# Patient Record
Sex: FEMALE | Race: WHITE | NOT HISPANIC OR LATINO | URBAN - METROPOLITAN AREA
[De-identification: names, ages, dates, MRNs, and addresses within clinical notes are randomized per-mention and may not be internally consistent; named-entity substitution may affect disease eponyms.]

---

## 2018-01-21 ENCOUNTER — INPATIENT (INPATIENT)
Facility: HOSPITAL | Age: 73
LOS: 7 days | Discharge: PSYCHIATRIC OR SUBSTANCE USE FACILITY | DRG: 871 | End: 2018-01-29
Attending: HOSPITALIST | Admitting: HOSPITALIST
Payer: MEDICARE

## 2018-01-21 VITALS
OXYGEN SATURATION: 93 % | DIASTOLIC BLOOD PRESSURE: 81 MMHG | HEIGHT: 62 IN | SYSTOLIC BLOOD PRESSURE: 147 MMHG | RESPIRATION RATE: 27 BRPM | HEART RATE: 89 BPM | WEIGHT: 149.91 LBS | TEMPERATURE: 101 F

## 2018-01-21 DIAGNOSIS — N39.0 URINARY TRACT INFECTION, SITE NOT SPECIFIED: ICD-10-CM

## 2018-01-21 LAB
ALBUMIN SERPL ELPH-MCNC: 3.2 G/DL — LOW (ref 3.3–5.2)
ALP SERPL-CCNC: 180 U/L — HIGH (ref 40–120)
ALT FLD-CCNC: 30 U/L — SIGNIFICANT CHANGE UP
ANION GAP SERPL CALC-SCNC: 13 MMOL/L — SIGNIFICANT CHANGE UP (ref 5–17)
ANISOCYTOSIS BLD QL: SLIGHT — SIGNIFICANT CHANGE UP
APPEARANCE UR: ABNORMAL
APTT BLD: 30.6 SEC — SIGNIFICANT CHANGE UP (ref 27.5–37.4)
AST SERPL-CCNC: 40 U/L — HIGH
BACTERIA # UR AUTO: ABNORMAL
BILIRUB SERPL-MCNC: 0.2 MG/DL — LOW (ref 0.4–2)
BILIRUB UR-MCNC: NEGATIVE — SIGNIFICANT CHANGE UP
BUN SERPL-MCNC: 33 MG/DL — HIGH (ref 8–20)
CALCIUM SERPL-MCNC: 9.7 MG/DL — SIGNIFICANT CHANGE UP (ref 8.6–10.2)
CHLORIDE SERPL-SCNC: 98 MMOL/L — SIGNIFICANT CHANGE UP (ref 98–107)
CO2 SERPL-SCNC: 21 MMOL/L — LOW (ref 22–29)
COLOR SPEC: YELLOW — SIGNIFICANT CHANGE UP
CREAT SERPL-MCNC: 0.76 MG/DL — SIGNIFICANT CHANGE UP (ref 0.5–1.3)
DIFF PNL FLD: ABNORMAL
EPI CELLS # UR: SIGNIFICANT CHANGE UP
GLUCOSE SERPL-MCNC: 165 MG/DL — HIGH (ref 70–115)
GLUCOSE UR QL: NEGATIVE MG/DL — SIGNIFICANT CHANGE UP
HCT VFR BLD CALC: 31.4 % — LOW (ref 37–47)
HGB BLD-MCNC: 10 G/DL — LOW (ref 12–16)
INR BLD: 1.16 RATIO — SIGNIFICANT CHANGE UP (ref 0.88–1.16)
KETONES UR-MCNC: NEGATIVE — SIGNIFICANT CHANGE UP
LACTATE BLDV-MCNC: 1.7 MMOL/L — SIGNIFICANT CHANGE UP (ref 0.5–2)
LEUKOCYTE ESTERASE UR-ACNC: ABNORMAL
LITHIUM SERPL-MCNC: 1.1 MMOL/L — SIGNIFICANT CHANGE UP (ref 0.5–1.5)
LYMPHOCYTES # BLD AUTO: 2 % — LOW (ref 20–55)
MACROCYTES BLD QL: SLIGHT — SIGNIFICANT CHANGE UP
MCHC RBC-ENTMCNC: 31.7 PG — HIGH (ref 27–31)
MCHC RBC-ENTMCNC: 31.8 G/DL — LOW (ref 32–36)
MCV RBC AUTO: 99.7 FL — HIGH (ref 81–99)
MICROCYTES BLD QL: SLIGHT — SIGNIFICANT CHANGE UP
MONOCYTES NFR BLD AUTO: 3 % — SIGNIFICANT CHANGE UP (ref 3–10)
NEUTROPHILS NFR BLD AUTO: 90 % — HIGH (ref 37–73)
NEUTS BAND # BLD: 4 % — SIGNIFICANT CHANGE UP (ref 0–8)
NITRITE UR-MCNC: NEGATIVE — SIGNIFICANT CHANGE UP
NT-PROBNP SERPL-SCNC: 5455 PG/ML — HIGH (ref 0–300)
OVALOCYTES BLD QL SMEAR: SLIGHT — SIGNIFICANT CHANGE UP
PH UR: 6 — SIGNIFICANT CHANGE UP (ref 5–8)
PLAT MORPH BLD: NORMAL — SIGNIFICANT CHANGE UP
PLATELET # BLD AUTO: 165 K/UL — SIGNIFICANT CHANGE UP (ref 150–400)
POIKILOCYTOSIS BLD QL AUTO: SLIGHT — SIGNIFICANT CHANGE UP
POTASSIUM SERPL-MCNC: 4.2 MMOL/L — SIGNIFICANT CHANGE UP (ref 3.5–5.3)
POTASSIUM SERPL-SCNC: 4.2 MMOL/L — SIGNIFICANT CHANGE UP (ref 3.5–5.3)
PROCALCITONIN SERPL-MCNC: 2.62 NG/ML — HIGH (ref 0–0.04)
PROT SERPL-MCNC: 9.4 G/DL — HIGH (ref 6.6–8.7)
PROT UR-MCNC: 500 MG/DL
PROTHROM AB SERPL-ACNC: 12.8 SEC — HIGH (ref 9.8–12.7)
RBC # BLD: 3.15 M/UL — LOW (ref 4.4–5.2)
RBC # FLD: 17.9 % — HIGH (ref 11–15.6)
RBC BLD AUTO: ABNORMAL
RBC CASTS # UR COMP ASSIST: ABNORMAL /HPF (ref 0–4)
SODIUM SERPL-SCNC: 132 MMOL/L — LOW (ref 135–145)
SP GR SPEC: 1.01 — SIGNIFICANT CHANGE UP (ref 1.01–1.02)
TROPONIN T SERPL-MCNC: <0.01 NG/ML — SIGNIFICANT CHANGE UP (ref 0–0.06)
TROPONIN T SERPL-MCNC: <0.01 NG/ML — SIGNIFICANT CHANGE UP (ref 0–0.06)
UROBILINOGEN FLD QL: NEGATIVE MG/DL — SIGNIFICANT CHANGE UP
VARIANT LYMPHS # BLD: 1 % — SIGNIFICANT CHANGE UP (ref 0–6)
WBC # BLD: 9.9 K/UL — SIGNIFICANT CHANGE UP (ref 4.8–10.8)
WBC # FLD AUTO: 9.9 K/UL — SIGNIFICANT CHANGE UP (ref 4.8–10.8)
WBC UR QL: ABNORMAL

## 2018-01-21 PROCEDURE — 71045 X-RAY EXAM CHEST 1 VIEW: CPT | Mod: 26

## 2018-01-21 PROCEDURE — 70450 CT HEAD/BRAIN W/O DYE: CPT | Mod: 26

## 2018-01-21 PROCEDURE — 71250 CT THORAX DX C-: CPT | Mod: 26

## 2018-01-21 PROCEDURE — 99291 CRITICAL CARE FIRST HOUR: CPT

## 2018-01-21 PROCEDURE — 99223 1ST HOSP IP/OBS HIGH 75: CPT

## 2018-01-21 RX ORDER — FUROSEMIDE 40 MG
40 TABLET ORAL DAILY
Qty: 0 | Refills: 0 | Status: COMPLETED | OUTPATIENT
Start: 2018-01-21 | End: 2018-01-21

## 2018-01-21 RX ORDER — SODIUM CHLORIDE 9 MG/ML
2000 INJECTION INTRAMUSCULAR; INTRAVENOUS; SUBCUTANEOUS ONCE
Qty: 0 | Refills: 0 | Status: DISCONTINUED | OUTPATIENT
Start: 2018-01-21 | End: 2018-01-21

## 2018-01-21 RX ORDER — BENZTROPINE MESYLATE 1 MG
0.5 TABLET ORAL AT BEDTIME
Qty: 0 | Refills: 0 | Status: DISCONTINUED | OUTPATIENT
Start: 2018-01-21 | End: 2018-01-29

## 2018-01-21 RX ORDER — CHOLECALCIFEROL (VITAMIN D3) 125 MCG
2000 CAPSULE ORAL DAILY
Qty: 0 | Refills: 0 | Status: DISCONTINUED | OUTPATIENT
Start: 2018-01-21 | End: 2018-01-29

## 2018-01-21 RX ORDER — CALCIUM CARBONATE 500(1250)
1 TABLET ORAL DAILY
Qty: 0 | Refills: 0 | Status: DISCONTINUED | OUTPATIENT
Start: 2018-01-21 | End: 2018-01-29

## 2018-01-21 RX ORDER — LITHIUM CARBONATE 300 MG/1
600 TABLET, EXTENDED RELEASE ORAL DAILY
Qty: 0 | Refills: 0 | Status: DISCONTINUED | OUTPATIENT
Start: 2018-01-21 | End: 2018-01-29

## 2018-01-21 RX ORDER — ASPIRIN/CALCIUM CARB/MAGNESIUM 324 MG
81 TABLET ORAL DAILY
Qty: 0 | Refills: 0 | Status: DISCONTINUED | OUTPATIENT
Start: 2018-01-21 | End: 2018-01-29

## 2018-01-21 RX ORDER — ACYCLOVIR SODIUM 500 MG
400 VIAL (EA) INTRAVENOUS
Qty: 0 | Refills: 0 | Status: DISCONTINUED | OUTPATIENT
Start: 2018-01-21 | End: 2018-01-29

## 2018-01-21 RX ORDER — FOLIC ACID 0.8 MG
1 TABLET ORAL DAILY
Qty: 0 | Refills: 0 | Status: DISCONTINUED | OUTPATIENT
Start: 2018-01-21 | End: 2018-01-29

## 2018-01-21 RX ORDER — CEFTRIAXONE 500 MG/1
1 INJECTION, POWDER, FOR SOLUTION INTRAMUSCULAR; INTRAVENOUS EVERY 24 HOURS
Qty: 0 | Refills: 0 | Status: DISCONTINUED | OUTPATIENT
Start: 2018-01-22 | End: 2018-01-22

## 2018-01-21 RX ORDER — VANCOMYCIN HCL 1 G
125 VIAL (EA) INTRAVENOUS EVERY 6 HOURS
Qty: 0 | Refills: 0 | Status: DISCONTINUED | OUTPATIENT
Start: 2018-01-21 | End: 2018-01-22

## 2018-01-21 RX ORDER — VANCOMYCIN HCL 1 G
1000 VIAL (EA) INTRAVENOUS ONCE
Qty: 0 | Refills: 0 | Status: COMPLETED | OUTPATIENT
Start: 2018-01-21 | End: 2018-01-21

## 2018-01-21 RX ORDER — LEVOTHYROXINE SODIUM 125 MCG
75 TABLET ORAL DAILY
Qty: 0 | Refills: 0 | Status: DISCONTINUED | OUTPATIENT
Start: 2018-01-21 | End: 2018-01-29

## 2018-01-21 RX ORDER — TAMSULOSIN HYDROCHLORIDE 0.4 MG/1
0.4 CAPSULE ORAL AT BEDTIME
Qty: 0 | Refills: 0 | Status: DISCONTINUED | OUTPATIENT
Start: 2018-01-21 | End: 2018-01-29

## 2018-01-21 RX ORDER — CEFTRIAXONE 500 MG/1
1 INJECTION, POWDER, FOR SOLUTION INTRAMUSCULAR; INTRAVENOUS ONCE
Qty: 0 | Refills: 0 | Status: COMPLETED | OUTPATIENT
Start: 2018-01-21 | End: 2018-01-21

## 2018-01-21 RX ORDER — SODIUM CHLORIDE 9 MG/ML
2500 INJECTION INTRAMUSCULAR; INTRAVENOUS; SUBCUTANEOUS ONCE
Qty: 0 | Refills: 0 | Status: COMPLETED | OUTPATIENT
Start: 2018-01-21 | End: 2018-01-21

## 2018-01-21 RX ADMIN — SODIUM CHLORIDE 2500 MILLILITER(S): 9 INJECTION INTRAMUSCULAR; INTRAVENOUS; SUBCUTANEOUS at 12:53

## 2018-01-21 RX ADMIN — Medication 400 MILLIGRAM(S): at 19:25

## 2018-01-21 RX ADMIN — Medication 0.5 MILLIGRAM(S): at 22:22

## 2018-01-21 RX ADMIN — TAMSULOSIN HYDROCHLORIDE 0.4 MILLIGRAM(S): 0.4 CAPSULE ORAL at 22:21

## 2018-01-21 RX ADMIN — Medication 250 MILLIGRAM(S): at 12:53

## 2018-01-21 RX ADMIN — Medication 40 MILLIGRAM(S): at 23:43

## 2018-01-21 RX ADMIN — CEFTRIAXONE 100 GRAM(S): 500 INJECTION, POWDER, FOR SOLUTION INTRAMUSCULAR; INTRAVENOUS at 13:00

## 2018-01-21 RX ADMIN — Medication 125 MILLIGRAM(S): at 19:28

## 2018-01-21 NOTE — H&P ADULT - ASSESSMENT
71 y/o F pt with PMH of was sent to the ED from [place] as she slipped out wheelchair today, no LOC, responsive to painful stimuli with no reported injury or trauma.  Pt is normally very verbal & makes jokes & as per sitter by her bedside but pt has been confused lately. As per sitter pt has been having some congestion, cough, SOB &  diarrhea which started since few days.  She normally walks without assistance but was placed in a wheelchair yesterday due to her being unsteady. Pt appears to be a poor historian at this time & during me encounter saying "leave me alone". Pt gets chemo for her MM and her last chemo was 2 weeks ago.       >Sepsis likely from UTI,  possible URI/ broncititis, r/o c.diff -   >Encephalopathy likely from UTI,  possible URI/ broncititis, r/o c.diff - 73 y/o F pt with PMH of HTN, HLD, Hypothyroid, Mental disorder, Multiple myeloma on chemo, Scoliosis was sent to the ED from [place] as she slipped out wheelchair today, no LOC, responsive to painful stimuli with no reported injury or trauma.  Pt is normally very verbal & makes jokes & as per sitter by her bedside but pt has been confused lately. As per sitter pt has been having some congestion, cough, SOB &  diarrhea which started since few days.  She normally walks without assistance but was placed in a wheelchair yesterday due to her being unsteady. Pt appears to be a poor historian at this time & during me encounter saying "leave me alone". Pt gets chemo for her MM and her last chemo was 2 weeks ago.       >Sepsis likely from UTI,  possible URI/ broncititis, r/o c.diff - CXR with PVC, f/u BNP, Vanco & Rocephin given by the ED, f/u Ucx, blood cx, stool studies, sputum cx, RVP, c/w rocephin, CT chest   >Encephalopathy likely from UTI,  possible URI/ broncititis, r/o c.diff - as above, hold ativan for now until mental status improves  >HTN/ HLD- c/w home meds  >Hypothyroid- c/w home meds, f/u TSH  >Mental disorder- c/w lithium, f/u lithium levels, risperidone IM weekly, benztropine  >Multiple myeloma on chemo- c/w chemo as outpatient    DVT ppx 73 y/o F pt with PMH of HTN, HLD, Hypothyroid, Mental disorder, Multiple myeloma on chemo, Scoliosis was sent to the ED from [place] as she slipped out wheelchair today, no LOC, responsive to painful stimuli with no reported injury or trauma.  Pt is normally very verbal & makes jokes & as per sitter by her bedside but pt has been confused lately. As per sitter pt has been having some congestion, cough, SOB &  diarrhea which started since few days.  She normally walks without assistance but was placed in a wheelchair yesterday due to her being unsteady. Pt appears to be a poor historian at this time & during me encounter saying "leave me alone". Pt gets chemo for her MM and her last chemo was 2 weeks ago.       >Sepsis likely from UTI,  possible URI/ broncititis, r/o c.diff - CXR with PVC, f/u BNP, Vanco & Rocephin given by the ED, f/u Ucx, blood cx, stool studies, sputum cx, RVP, c/w rocephin, CT chest   >Encephalopathy likely from UTI,  possible URI/ broncititis, r/o c.diff - as above, hold ativan for now until mental status improves, TSH, B12, folate, RPR, CT head  >HTN/ HLD- c/w home meds  >Hypothyroid- c/w home meds, f/u TSH  >Mental disorder- c/w lithium, f/u lithium levels, risperidone IM weekly, benztropine  >Multiple myeloma on chemo- c/w chemo as outpatient    DVT ppx: SCDs for now, Lovenox after CT head -ve 71 y/o F pt with PMH of HTN, HLD, Hypothyroid, Mental disorder, Multiple myeloma on chemo, Scoliosis was sent to the ED from [place] as she slipped out wheelchair today, no LOC, responsive to painful stimuli with no reported injury or trauma.  Pt is normally very verbal & makes jokes & as per sitter by her bedside but pt has been confused lately. As per sitter pt has been having some congestion, cough, SOB &  diarrhea which started since few days.  She normally walks without assistance but was placed in a wheelchair yesterday due to her being unsteady. Pt appears to be a poor historian at this time & during me encounter saying "leave me alone". Pt gets chemo for her MM and her last chemo was 2 weeks ago.       >Sepsis likely from UTI,  possible URI/ broncititis, r/o c.diff - CXR with PVC, f/u BNP, Vanco & Rocephin given by the ED, f/u Ucx, blood cx, stool studies, sputum cx, RVP, c/w rocephin, CT chest   >Encephalopathy likely from UTI,  possible URI/ broncititis, r/o c.diff - as above, hold ativan for now until mental status improves, TSH, B12, folate, RPR, CT head  >EKG with NSR & non specific ST-T changes, no chest pain, TnI x 3, f/u Echo  >HTN/ HLD- c/w home meds  >Hypothyroid- c/w home meds, f/u TSH  >Mental disorder- c/w lithium, f/u lithium levels, risperidone IM weekly, benztropine  >Multiple myeloma on chemo- c/w chemo as outpatient    DVT ppx: SCDs for now, Lovenox after CT head -ve

## 2018-01-21 NOTE — ED PROVIDER NOTE - CARDIAC, MLM
Normal rate, regular rhythm.  Heart sounds S1, S2.  No murmurs, rubs or gallops. Normal rate, regular rhythm.  Heart sounds S1, S2.  No murmurs, rubs or gallops. rt chest wall port a cath

## 2018-01-21 NOTE — ED PROVIDER NOTE - OBJECTIVE STATEMENT
73 y/o F pt presents to the ED BIBA with c/o unresponsiveness. Pt fell out of her wheelchair today and was reported to be awake but unresponsive. Pt is normally very verbal but not today s/p fall. Possible syncopal episode but unsure. She normally walks without assistance but was placed in a wheelchair yesterday due to her being unsteady. Code sepsis activated. No further complaints at this time.

## 2018-01-21 NOTE — ED ADULT NURSE REASSESSMENT NOTE - COMFORT CARE
repositioned/patient checked for stool refused/plan of care explained/side rails up/warm blanket provided

## 2018-01-21 NOTE — ED ADULT TRIAGE NOTE - CHIEF COMPLAINT QUOTE
Pt axox0, responsive to painful stimuli and voice BIBA from [facility]. AS per EMS patient fell out of wheelchair and became unresponsive. As per aid pt is usually uncooperative with staff and more "alert then this", pt  has been having increased lethargy x 2 days. PT rectal temp 101.4, code sepsis initiated, md patel at bedside Pt axox0, responsive to painful stimuli and voice BIBA from [facility]. AS per EMS patient fell out of wheelchair and became unresponsive. As per aid pt is usually uncooperative with staff and more "alert then this", pt  has been having increased lethargy x 2 days. PT rectal temp 101.4, code sepsis initiated, md patel at bedside, Bg 156 in triage, no obvious signs of trauma noted.

## 2018-01-21 NOTE — ED ADULT NURSE NOTE - CHIEF COMPLAINT QUOTE
Pt axox0, responsive to painful stimuli and voice BIBA from [facility]. AS per EMS patient fell out of wheelchair and became unresponsive. As per aid pt is usually uncooperative with staff and more "alert then this", pt  has been having increased lethargy x 2 days. PT rectal temp 101.4, code sepsis initiated, md patel at bedside

## 2018-01-21 NOTE — ED CLERICAL - NS ED CLERK UNITS
MON/ / need 2nd orders/2GUL MON//2GUL MON// r/o iso/2GUL 2GUL/**NEEDS RVP/CDIFF DONE**MON// r/o iso 5217**NEEDS RVP/CDIFF DONE**MON// r/o iso/5TWR 5TWR 5TWR/5217-01 5TWR/5217-01 call bed r/o iso/5TWR [id number]/5TWR

## 2018-01-21 NOTE — H&P ADULT - HISTORY OF PRESENT ILLNESS
73 y/o F pt with PMH of was sent to the ED from [place] as she slipped out wheelchair today, no LOC, responsive to painful stimuli with no reported injury or trauma.  Pt is normally very verbal & makes jokes & as per sitter by her bedside but pt has been confused lately. As per sitter pt has been having some congestion, cough, SOB &  diarrhea which started since few days.  She normally walks without assistance but was placed in a wheelchair yesterday due to her being unsteady. Pt appears to be a poor historian at this time & during me encounter saying "leave me alone". Pt gets chemo for her MM and her last chemo was 2 weeks ago. 71 y/o F pt with PMH of HTN, HLD, Hypothyroid, Mental disorder, Multiple myeloma on chemo, Scoliosis was sent to the ED from [place] as she slipped out wheelchair today, no LOC, responsive to painful stimuli with no reported injury or trauma.  Pt is normally very verbal & makes jokes & as per sitter by her bedside but pt has been confused lately. As per sitter pt has been having some congestion, cough, SOB &  diarrhea which started since few days.  She normally walks without assistance but was placed in a wheelchair yesterday due to her being unsteady. Pt appears to be a poor historian at this time & during me encounter saying "leave me alone". Pt gets chemo for her MM and her last chemo was 2 weeks ago.

## 2018-01-21 NOTE — ED PROVIDER NOTE - PMH
Bone cancer    Hypercholesteremia    Hypertension    Hypothyroid    Mental disorder    Multiple myeloma    Scoliosis

## 2018-01-22 DIAGNOSIS — F05 DELIRIUM DUE TO KNOWN PHYSIOLOGICAL CONDITION: ICD-10-CM

## 2018-01-22 DIAGNOSIS — F31.9 BIPOLAR DISORDER, UNSPECIFIED: ICD-10-CM

## 2018-01-22 LAB
ANION GAP SERPL CALC-SCNC: 11 MMOL/L — SIGNIFICANT CHANGE UP (ref 5–17)
ANISOCYTOSIS BLD QL: SLIGHT — SIGNIFICANT CHANGE UP
BUN SERPL-MCNC: 28 MG/DL — HIGH (ref 8–20)
CALCIUM SERPL-MCNC: 8.6 MG/DL — SIGNIFICANT CHANGE UP (ref 8.6–10.2)
CHLORIDE SERPL-SCNC: 103 MMOL/L — SIGNIFICANT CHANGE UP (ref 98–107)
CO2 SERPL-SCNC: 22 MMOL/L — SIGNIFICANT CHANGE UP (ref 22–29)
CREAT SERPL-MCNC: 0.83 MG/DL — SIGNIFICANT CHANGE UP (ref 0.5–1.3)
FOLATE SERPL-MCNC: >20 NG/ML — HIGH (ref 4–16)
GLUCOSE SERPL-MCNC: 110 MG/DL — SIGNIFICANT CHANGE UP (ref 70–115)
HCT VFR BLD CALC: 27.3 % — LOW (ref 37–47)
HGB BLD-MCNC: 8.6 G/DL — LOW (ref 12–16)
LYMPHOCYTES # BLD AUTO: 2 % — LOW (ref 20–55)
MACROCYTES BLD QL: SLIGHT — SIGNIFICANT CHANGE UP
MAGNESIUM SERPL-MCNC: 2 MG/DL — SIGNIFICANT CHANGE UP (ref 1.6–2.6)
MCHC RBC-ENTMCNC: 31.3 PG — HIGH (ref 27–31)
MCHC RBC-ENTMCNC: 31.5 G/DL — LOW (ref 32–36)
MCV RBC AUTO: 99.3 FL — HIGH (ref 81–99)
METHOD TYPE: SIGNIFICANT CHANGE UP
MICROCYTES BLD QL: SLIGHT — SIGNIFICANT CHANGE UP
MONOCYTES NFR BLD AUTO: 10 % — SIGNIFICANT CHANGE UP (ref 3–10)
NEUTROPHILS NFR BLD AUTO: 87 % — HIGH (ref 37–73)
NEUTS BAND # BLD: 1 % — SIGNIFICANT CHANGE UP (ref 0–8)
NT-PROBNP SERPL-SCNC: 1734 PG/ML — HIGH (ref 0–300)
OVALOCYTES BLD QL SMEAR: SLIGHT — SIGNIFICANT CHANGE UP
PHOSPHATE SERPL-MCNC: 3.1 MG/DL — SIGNIFICANT CHANGE UP (ref 2.4–4.7)
PLAT MORPH BLD: NORMAL — SIGNIFICANT CHANGE UP
PLATELET # BLD AUTO: 112 K/UL — LOW (ref 150–400)
POIKILOCYTOSIS BLD QL AUTO: SLIGHT — SIGNIFICANT CHANGE UP
POTASSIUM SERPL-MCNC: 3.8 MMOL/L — SIGNIFICANT CHANGE UP (ref 3.5–5.3)
POTASSIUM SERPL-SCNC: 3.8 MMOL/L — SIGNIFICANT CHANGE UP (ref 3.5–5.3)
RAPID RVP RESULT: SIGNIFICANT CHANGE UP
RBC # BLD: 2.75 M/UL — LOW (ref 4.4–5.2)
RBC # FLD: 18.2 % — HIGH (ref 11–15.6)
RBC BLD AUTO: ABNORMAL
S PNEUM DNA BLD POS QL NAA+NON-PROBE: SIGNIFICANT CHANGE UP
SODIUM SERPL-SCNC: 136 MMOL/L — SIGNIFICANT CHANGE UP (ref 135–145)
STOMATOCYTES BLD QL SMEAR: PRESENT — SIGNIFICANT CHANGE UP
TROPONIN T SERPL-MCNC: <0.01 NG/ML — SIGNIFICANT CHANGE UP (ref 0–0.06)
TSH SERPL-MCNC: 8.23 UIU/ML — HIGH (ref 0.27–4.2)
VIT B12 SERPL-MCNC: 564 PG/ML — SIGNIFICANT CHANGE UP (ref 180–914)
WBC # BLD: 10.4 K/UL — SIGNIFICANT CHANGE UP (ref 4.8–10.8)
WBC # FLD AUTO: 10.4 K/UL — SIGNIFICANT CHANGE UP (ref 4.8–10.8)

## 2018-01-22 PROCEDURE — 99233 SBSQ HOSP IP/OBS HIGH 50: CPT

## 2018-01-22 RX ORDER — ACETAMINOPHEN 500 MG
650 TABLET ORAL EVERY 6 HOURS
Qty: 0 | Refills: 0 | Status: DISCONTINUED | OUTPATIENT
Start: 2018-01-22 | End: 2018-01-29

## 2018-01-22 RX ORDER — AZTREONAM 2 G
500 VIAL (EA) INJECTION ONCE
Qty: 0 | Refills: 0 | Status: COMPLETED | OUTPATIENT
Start: 2018-01-22 | End: 2018-01-22

## 2018-01-22 RX ORDER — SACCHAROMYCES BOULARDII 250 MG
250 POWDER IN PACKET (EA) ORAL
Qty: 0 | Refills: 0 | Status: DISCONTINUED | OUTPATIENT
Start: 2018-01-22 | End: 2018-01-29

## 2018-01-22 RX ORDER — ENOXAPARIN SODIUM 100 MG/ML
40 INJECTION SUBCUTANEOUS DAILY
Qty: 0 | Refills: 0 | Status: DISCONTINUED | OUTPATIENT
Start: 2018-01-22 | End: 2018-01-29

## 2018-01-22 RX ORDER — VANCOMYCIN HCL 1 G
VIAL (EA) INTRAVENOUS
Qty: 0 | Refills: 0 | Status: DISCONTINUED | OUTPATIENT
Start: 2018-01-22 | End: 2018-01-23

## 2018-01-22 RX ORDER — VANCOMYCIN HCL 1 G
1000 VIAL (EA) INTRAVENOUS EVERY 12 HOURS
Qty: 0 | Refills: 0 | Status: DISCONTINUED | OUTPATIENT
Start: 2018-01-22 | End: 2018-01-22

## 2018-01-22 RX ORDER — FUROSEMIDE 40 MG
20 TABLET ORAL DAILY
Qty: 0 | Refills: 0 | Status: DISCONTINUED | OUTPATIENT
Start: 2018-01-22 | End: 2018-01-25

## 2018-01-22 RX ORDER — AZTREONAM 2 G
500 VIAL (EA) INJECTION EVERY 8 HOURS
Qty: 0 | Refills: 0 | Status: DISCONTINUED | OUTPATIENT
Start: 2018-01-22 | End: 2018-01-23

## 2018-01-22 RX ORDER — VANCOMYCIN HCL 1 G
1250 VIAL (EA) INTRAVENOUS EVERY 12 HOURS
Qty: 0 | Refills: 0 | Status: DISCONTINUED | OUTPATIENT
Start: 2018-01-22 | End: 2018-01-23

## 2018-01-22 RX ORDER — AZTREONAM 2 G
VIAL (EA) INJECTION
Qty: 0 | Refills: 0 | Status: DISCONTINUED | OUTPATIENT
Start: 2018-01-22 | End: 2018-01-23

## 2018-01-22 RX ORDER — VANCOMYCIN HCL 1 G
1250 VIAL (EA) INTRAVENOUS ONCE
Qty: 0 | Refills: 0 | Status: COMPLETED | OUTPATIENT
Start: 2018-01-22 | End: 2018-01-22

## 2018-01-22 RX ORDER — OFLOXACIN 0.3 %
1 DROPS OPHTHALMIC (EYE)
Qty: 0 | Refills: 0 | Status: COMPLETED | OUTPATIENT
Start: 2018-01-22 | End: 2018-01-28

## 2018-01-22 RX ADMIN — Medication 50 MILLIGRAM(S): at 14:11

## 2018-01-22 RX ADMIN — Medication 166.67 MILLIGRAM(S): at 15:44

## 2018-01-22 RX ADMIN — Medication 1 TABLET(S): at 12:51

## 2018-01-22 RX ADMIN — Medication 81 MILLIGRAM(S): at 12:51

## 2018-01-22 RX ADMIN — Medication 400 MILLIGRAM(S): at 19:31

## 2018-01-22 RX ADMIN — Medication 2000 UNIT(S): at 12:51

## 2018-01-22 RX ADMIN — Medication 1 MILLIGRAM(S): at 12:51

## 2018-01-22 RX ADMIN — Medication 75 MICROGRAM(S): at 05:39

## 2018-01-22 RX ADMIN — Medication 250 MILLIGRAM(S): at 19:31

## 2018-01-22 RX ADMIN — Medication 125 MILLIGRAM(S): at 05:39

## 2018-01-22 RX ADMIN — LITHIUM CARBONATE 600 MILLIGRAM(S): 300 TABLET, EXTENDED RELEASE ORAL at 14:10

## 2018-01-22 RX ADMIN — Medication 400 MILLIGRAM(S): at 05:39

## 2018-01-22 RX ADMIN — ENOXAPARIN SODIUM 40 MILLIGRAM(S): 100 INJECTION SUBCUTANEOUS at 12:51

## 2018-01-22 NOTE — BEHAVIORAL HEALTH ASSESSMENT NOTE - OTHER
[facility] external transfer summary unable to assess

## 2018-01-22 NOTE — PROGRESS NOTE ADULT - ASSESSMENT
1) Toxic metabolic encephalopathy --> at baseline  --> ct head negative  --> secondary to bacteremia  --> start broad spectrum iv abx    2) Gram pos cocci bacteremia --> will consult ID   --> may need port removed  --> repeat blood cultures for am  --> tte ordered    3) Gram neg uti --> will start iv aztreonam  --> remove iryb     4) >HTN/ HLD- c/w home meds  >Hypothyroid- c/w home meds, f/u TSH  >Mental disorder- c/w lithium, f/u lithium levels, risperidone IM weekly, benztropine  >Multiple myeloma on chemo- c/w chemo as outpatient 1) Toxic metabolic encephalopathy --> at baseline  --> ct head negative  --> secondary to bacteremia  --> start broad spectrum iv abx    2) Gram pos cocci bacteremia --> will consult ID   --> may need port removed  --> repeat blood cultures for am  --> tte ordered    3) Gram neg uti --> will start iv aztreonam  --> remove irby     4) >HTN/ HLD- c/w home meds    5) Hypothyroid- c/w home meds,    6) Bipolar- c/w lithium,  risperidone IM weekly, benztropine  --> psych consult appreciated    7) Multiple myeloma on chemo- c/w chemo as outpatient  --> lytic lesions on CT head, unclear if its baseline or improved  --> outpatient onc follow up

## 2018-01-22 NOTE — BEHAVIORAL HEALTH ASSESSMENT NOTE - SUMMARY
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

## 2018-01-22 NOTE — BEHAVIORAL HEALTH ASSESSMENT NOTE - NSBHCHARTREVIEWIMAGING_PSY_A_CORE FT
< from: CT Head No Cont (01.21.18 @ 22:50) >    INTERPRETATION:  Study degraded by motion. No acute intracranial   hemorrhage or mass effect. Chronic right MCA territory infarct. Chronic   ischemic changes in the frontoparietal white matter.    < end of copied text >

## 2018-01-22 NOTE — BEHAVIORAL HEALTH ASSESSMENT NOTE - NSBHCHARTREVIEWVS_PSY_A_CORE FT
T(C): 36.6 (22 Jan 2018 03:33), Max: 38.6 (21 Jan 2018 12:19)  T(F): 97.8 (22 Jan 2018 03:33), Max: 101.4 (21 Jan 2018 12:19)  HR: 71 (22 Jan 2018 03:33) (71 - 89)  BP: 130/59 (22 Jan 2018 03:33) (130/59 - 161/73)  RR: 19 (22 Jan 2018 03:33) (17 - 27)  SpO2: 96% (22 Jan 2018 03:33) (93% - 96%)

## 2018-01-22 NOTE — BEHAVIORAL HEALTH ASSESSMENT NOTE - NSBHCHARTREVIEWLAB_PSY_A_CORE FT
10.0   9.9   )-----------( 165      ( 2018 12:47 )             31.4         132<L>  |  98  |  33.0<H>  ----------------------------<  165<H>  4.2   |  21.0<L>  |  0.76    Ca    9.7      2018 12:47    TPro  9.4<H>  /  Alb  3.2<L>  /  TBili  0.2<L>  /  DBili  x   /  AST  40<H>  /  ALT  30  /  AlkPhos  180<H>      LIVER FUNCTIONS - ( 2018 12:47 )  Alb: 3.2 g/dL / Pro: 9.4 g/dL / ALK PHOS: 180 U/L / ALT: 30 U/L / AST: 40 U/L / GGT: x           PT/INR - ( 2018 12:47 )   PT: 12.8 sec;   INR: 1.16 ratio         PTT - ( 2018 12:47 )  PTT:30.6 sec  Urinalysis Basic - ( 2018 13:16 )    Color: Yellow / Appearance: Slightly Turbid / S.015 / pH: x  Gluc: x / Ketone: Negative  / Bili: Negative / Urobili: Negative mg/dL   Blood: x / Protein: 500 mg/dL / Nitrite: Negative   Leuk Esterase: Small / RBC: 25-50 /HPF / WBC 11-25   Sq Epi: x / Non Sq Epi: Few / Bacteria: Moderate

## 2018-01-22 NOTE — BEHAVIORAL HEALTH ASSESSMENT NOTE - DETAILS
Keppra- leucopenia Trileptal- hyponatremia [facility] summary in chart NA [facility] Notes indicate history of aggressive behaviors

## 2018-01-23 DIAGNOSIS — R78.81 BACTEREMIA: ICD-10-CM

## 2018-01-23 DIAGNOSIS — C90.00 MULTIPLE MYELOMA NOT HAVING ACHIEVED REMISSION: ICD-10-CM

## 2018-01-23 DIAGNOSIS — Z97.8 PRESENCE OF OTHER SPECIFIED DEVICES: ICD-10-CM

## 2018-01-23 DIAGNOSIS — N30.00 ACUTE CYSTITIS WITHOUT HEMATURIA: ICD-10-CM

## 2018-01-23 LAB
-  AMIKACIN: SIGNIFICANT CHANGE UP
-  AMIKACIN: SIGNIFICANT CHANGE UP
-  AMPICILLIN/SULBACTAM: SIGNIFICANT CHANGE UP
-  AMPICILLIN/SULBACTAM: SIGNIFICANT CHANGE UP
-  AMPICILLIN: SIGNIFICANT CHANGE UP
-  AMPICILLIN: SIGNIFICANT CHANGE UP
-  AZTREONAM: SIGNIFICANT CHANGE UP
-  AZTREONAM: SIGNIFICANT CHANGE UP
-  CEFAZOLIN: SIGNIFICANT CHANGE UP
-  CEFAZOLIN: SIGNIFICANT CHANGE UP
-  CEFEPIME: SIGNIFICANT CHANGE UP
-  CEFEPIME: SIGNIFICANT CHANGE UP
-  CEFOXITIN: SIGNIFICANT CHANGE UP
-  CEFOXITIN: SIGNIFICANT CHANGE UP
-  CEFTAZIDIME: SIGNIFICANT CHANGE UP
-  CEFTAZIDIME: SIGNIFICANT CHANGE UP
-  CEFTRIAXONE: SIGNIFICANT CHANGE UP
-  CEFTRIAXONE: SIGNIFICANT CHANGE UP
-  CIPROFLOXACIN: SIGNIFICANT CHANGE UP
-  CIPROFLOXACIN: SIGNIFICANT CHANGE UP
-  ERTAPENEM: SIGNIFICANT CHANGE UP
-  ERTAPENEM: SIGNIFICANT CHANGE UP
-  GENTAMICIN: SIGNIFICANT CHANGE UP
-  GENTAMICIN: SIGNIFICANT CHANGE UP
-  IMIPENEM: SIGNIFICANT CHANGE UP
-  IMIPENEM: SIGNIFICANT CHANGE UP
-  LEVOFLOXACIN: SIGNIFICANT CHANGE UP
-  LEVOFLOXACIN: SIGNIFICANT CHANGE UP
-  MEROPENEM: SIGNIFICANT CHANGE UP
-  MEROPENEM: SIGNIFICANT CHANGE UP
-  NITROFURANTOIN: SIGNIFICANT CHANGE UP
-  NITROFURANTOIN: SIGNIFICANT CHANGE UP
-  PIPERACILLIN/TAZOBACTAM: SIGNIFICANT CHANGE UP
-  PIPERACILLIN/TAZOBACTAM: SIGNIFICANT CHANGE UP
-  TOBRAMYCIN: SIGNIFICANT CHANGE UP
-  TOBRAMYCIN: SIGNIFICANT CHANGE UP
-  TRIMETHOPRIM/SULFAMETHOXAZOLE: SIGNIFICANT CHANGE UP
-  TRIMETHOPRIM/SULFAMETHOXAZOLE: SIGNIFICANT CHANGE UP
ANION GAP SERPL CALC-SCNC: 11 MMOL/L — SIGNIFICANT CHANGE UP (ref 5–17)
BUN SERPL-MCNC: 27 MG/DL — HIGH (ref 8–20)
CALCIUM SERPL-MCNC: 9 MG/DL — SIGNIFICANT CHANGE UP (ref 8.6–10.2)
CHLORIDE SERPL-SCNC: 104 MMOL/L — SIGNIFICANT CHANGE UP (ref 98–107)
CO2 SERPL-SCNC: 23 MMOL/L — SIGNIFICANT CHANGE UP (ref 22–29)
CREAT SERPL-MCNC: 0.82 MG/DL — SIGNIFICANT CHANGE UP (ref 0.5–1.3)
CULTURE RESULTS: SIGNIFICANT CHANGE UP
GLUCOSE SERPL-MCNC: 114 MG/DL — SIGNIFICANT CHANGE UP (ref 70–115)
HCT VFR BLD CALC: 29.4 % — LOW (ref 37–47)
HGB BLD-MCNC: 8.9 G/DL — LOW (ref 12–16)
MAGNESIUM SERPL-MCNC: 2 MG/DL — SIGNIFICANT CHANGE UP (ref 1.6–2.6)
MCHC RBC-ENTMCNC: 30.3 G/DL — LOW (ref 32–36)
MCHC RBC-ENTMCNC: 30.6 PG — SIGNIFICANT CHANGE UP (ref 27–31)
MCV RBC AUTO: 101 FL — HIGH (ref 81–99)
METHOD TYPE: SIGNIFICANT CHANGE UP
METHOD TYPE: SIGNIFICANT CHANGE UP
ORGANISM # SPEC MICROSCOPIC CNT: SIGNIFICANT CHANGE UP
PLATELET # BLD AUTO: 149 K/UL — LOW (ref 150–400)
POTASSIUM SERPL-MCNC: 3.5 MMOL/L — SIGNIFICANT CHANGE UP (ref 3.5–5.3)
POTASSIUM SERPL-SCNC: 3.5 MMOL/L — SIGNIFICANT CHANGE UP (ref 3.5–5.3)
RBC # BLD: 2.91 M/UL — LOW (ref 4.4–5.2)
RBC # FLD: 17.9 % — HIGH (ref 11–15.6)
SODIUM SERPL-SCNC: 138 MMOL/L — SIGNIFICANT CHANGE UP (ref 135–145)
SPECIMEN SOURCE: SIGNIFICANT CHANGE UP
T PALLIDUM AB TITR SER: NEGATIVE — SIGNIFICANT CHANGE UP
WBC # BLD: 9.8 K/UL — SIGNIFICANT CHANGE UP (ref 4.8–10.8)
WBC # FLD AUTO: 9.8 K/UL — SIGNIFICANT CHANGE UP (ref 4.8–10.8)

## 2018-01-23 PROCEDURE — 99223 1ST HOSP IP/OBS HIGH 75: CPT

## 2018-01-23 PROCEDURE — 99233 SBSQ HOSP IP/OBS HIGH 50: CPT

## 2018-01-23 RX ORDER — AMLODIPINE BESYLATE 2.5 MG/1
5 TABLET ORAL DAILY
Qty: 0 | Refills: 0 | Status: DISCONTINUED | OUTPATIENT
Start: 2018-01-23 | End: 2018-01-29

## 2018-01-23 RX ORDER — CEFTRIAXONE 500 MG/1
2 INJECTION, POWDER, FOR SOLUTION INTRAMUSCULAR; INTRAVENOUS EVERY 24 HOURS
Qty: 0 | Refills: 0 | Status: DISCONTINUED | OUTPATIENT
Start: 2018-01-23 | End: 2018-01-29

## 2018-01-23 RX ADMIN — TAMSULOSIN HYDROCHLORIDE 0.4 MILLIGRAM(S): 0.4 CAPSULE ORAL at 03:25

## 2018-01-23 RX ADMIN — Medication 1 MILLIGRAM(S): at 16:49

## 2018-01-23 RX ADMIN — Medication 400 MILLIGRAM(S): at 06:46

## 2018-01-23 RX ADMIN — Medication 250 MILLIGRAM(S): at 06:46

## 2018-01-23 RX ADMIN — Medication 250 MILLIGRAM(S): at 16:57

## 2018-01-23 RX ADMIN — AMLODIPINE BESYLATE 5 MILLIGRAM(S): 2.5 TABLET ORAL at 16:49

## 2018-01-23 RX ADMIN — Medication 0.5 MILLIGRAM(S): at 03:25

## 2018-01-23 RX ADMIN — Medication 166.67 MILLIGRAM(S): at 06:45

## 2018-01-23 RX ADMIN — Medication 400 MILLIGRAM(S): at 16:57

## 2018-01-23 RX ADMIN — Medication 75 MICROGRAM(S): at 06:46

## 2018-01-23 RX ADMIN — TAMSULOSIN HYDROCHLORIDE 0.4 MILLIGRAM(S): 0.4 CAPSULE ORAL at 21:15

## 2018-01-23 RX ADMIN — Medication 1 DROP(S): at 06:45

## 2018-01-23 RX ADMIN — ENOXAPARIN SODIUM 40 MILLIGRAM(S): 100 INJECTION SUBCUTANEOUS at 16:40

## 2018-01-23 RX ADMIN — CEFTRIAXONE 100 GRAM(S): 500 INJECTION, POWDER, FOR SOLUTION INTRAMUSCULAR; INTRAVENOUS at 16:50

## 2018-01-23 RX ADMIN — Medication 81 MILLIGRAM(S): at 16:50

## 2018-01-23 RX ADMIN — LITHIUM CARBONATE 600 MILLIGRAM(S): 300 TABLET, EXTENDED RELEASE ORAL at 16:41

## 2018-01-23 RX ADMIN — Medication 50 MILLIGRAM(S): at 08:29

## 2018-01-23 RX ADMIN — Medication 2000 UNIT(S): at 16:40

## 2018-01-23 RX ADMIN — Medication 1 TABLET(S): at 16:40

## 2018-01-23 RX ADMIN — Medication 0.5 MILLIGRAM(S): at 21:15

## 2018-01-23 RX ADMIN — Medication 50 MILLIGRAM(S): at 03:25

## 2018-01-23 RX ADMIN — Medication 20 MILLIGRAM(S): at 06:46

## 2018-01-23 RX ADMIN — Medication 1 DROP(S): at 16:57

## 2018-01-23 NOTE — PROGRESS NOTE ADULT - ASSESSMENT
1) Toxic metabolic encephalopathy --> at baseline  --> ct head negative  --> secondary to bacteremia  --> improved  --> supportive care    2) Strep bacteremia --> ID consult appreciated  --> does not need port removed  --> repeat blood cultures sent today   --> tte performed follow up results  --> iv abx switched to iv ceft    3) E coli and Kleb uti -->c.w iv ceft    4) HTN/ HLD- c/w home meds    5) Hypothyroid- c/w home meds,    6) Bipolar- c/w lithium,  risperidone IM weekly, benztropine  --> psych consult appreciated    7) Multiple myeloma on chemo- c/w chemo as outpatient  --> lytic lesions on CT head, unclear if its baseline or improved  -->onc consult appreciated

## 2018-01-23 NOTE — CONSULT NOTE ADULT - ASSESSMENT
IMP  PNEUMOCOCCAL SEPSIS  NO EVID PNEUMONIA OR PROSTHETIC VALVE  NOT PORT RELATED  DUE TO MM AND IMMUNODEF    ASX UTI OF NO CONSEQ    PLAN  ECHO  SERIAL BLOODS  ROCEPHIN

## 2018-01-23 NOTE — CONSULT NOTE ADULT - SUBJECTIVE AND OBJECTIVE BOX
[facility] [place] ELVIN PAULINO MD FACP   JAIRO MALDONADO MD  Diplomates American Board of Internal Medicine and Infecctious Diseases      MRN-[id number]  KRYSTA HERRMANN is a 72y  Female     CC:  72 WF  CHEMOTX FOR ACTIVE MM  PORT IN PLACE  ADMITTED SEPSIS BY CRITERIA  POS BLOOD S. PNEUMO    LIMITED HISTORY DUE TO [facility] PSY[facility] PT    Past Medical & Surgical Hx:  PAST MEDICAL & SURGICAL HISTORY:  Multiple myeloma  Mental disorder  Bone cancer  Scoliosis  Hypercholesteremia  Hypertension  Hypothyroid  No significant past surgical history  FH NEG      Problem List:  HEALTH ISSUES - PROBLEM Dx:  Bipolar 1 disorder  Delirium due to another medical condition, acute, hypoactive            Allergies    ixazomib (Unknown)  NSAIDs (Unknown)  penicillins (Unknown)  sulfa drugs (Unknown)    Intolerances          ANTIBIOTICS:   acyclovir   Tablet 400 milliGRAM(s) Oral two times a day  cefTRIAXone   IVPB 2 Gram(s) IV Intermittent every 24 hours       Review of Systems: - Negative except as mentioned below  NO HA  NO SOB  POS CHILLS  NO NEURO SXS    Physical Exam:    Vital Signs Last 24 Hrs  T(C): 37.1 (2018 05:39), Max: 37.1 (2018 05:39)  T(F): 98.7 (2018 05:39), Max: 98.7 (2018 05:39)  HR: 55 (2018 07:44) (55 - 66)  BP: 164/71 (2018 07:44) (119/55 - 176/71)  BP(mean): --  RR: 18 (2018 07:44) (18 - 18)  SpO2: 97% (2018 07:44) (95% - 97%)        GEN: NAD, pleasant ALERT. UNDERSTANDS AND FOLLOWS COMMANDS  HEENT: normocephalic and atraumatic. EOMI. MOLINA. Moist mucosa. Clear Posterior pharynx.  NECK: Supple. No carotid bruits.  No lymphadenopathy or thyromegaly.  LUNGS: Clear to auscultation.  HEART: Regular rate and rhythm without murmur.  ABDOMEN: Soft, nontender, and nondistended.  Positive bowel sounds.  No hepatosplenomegaly was noted.  EXTREMITIES: Without any cyanosis, clubbing, rash, lesions or edema.  NEUROLOGIC: Cranial nerves II through XII are grossly intact. NECK SUPPLE NO NEUROL DEFICITS  MUSCULOSKELETAL:PORT R CHEST NEG  SKIN: No ulceration or induration present.      Labs:                        8.9    9.8   )-----------( 149      ( 2018 08:18 )             29.4         138  |  104  |  27.0<H>  ----------------------------<  114  3.5   |  23.0  |  0.82    Ca    9.0      2018 08:18  Phos  3.1       Mg     2.0         TPro  9.4<H>  /  Alb  3.2<L>  /  TBili  0.2<L>  /  DBili  x   /  AST  40<H>  /  ALT  30  /  AlkPhos  180<H>      PT/INR - ( 2018 12:47 )   PT: 12.8 sec;   INR: 1.16 ratio         PTT - ( 2018 12:47 )  PTT:30.6 sec  Urinalysis Basic - ( 2018 13:16 )    Color: Yellow / Appearance: Slightly Turbid / S.015 / pH: x  Gluc: x / Ketone: Negative  / Bili: Negative / Urobili: Negative mg/dL   Blood: x / Protein: 500 mg/dL / Nitrite: Negative   Leuk Esterase: Small / RBC: 25-50 /HPF / WBC 11-25   Sq Epi: x / Non Sq Epi: Few / Bacteria: Moderate        Platelet Count - Automated: 149 K/uL ( @ 08:18)  Hematocrit: 29.4 % ( @ 08:18)  Hemoglobin: 8.9 g/dL ( @ 08:18)  WBC Count: 9.8 K/uL ( @ 08:18)  Hemoglobin: 8.6 g/dL ( @ 16:31)  Platelet Count - Automated: 112 K/uL ( @ 16:31)  WBC Count: 10.4 K/uL ( @ 16:31)  Hematocrit: 27.3 % ( @ 16:31)  Hemoglobin: 10.0 g/dL ( @ 12:47)  Hematocrit: 31.4 % ( @ 12:47)  Platelet Count - Automated: 165 K/uL (:47)  WBC Count: 9.9 K/uL (:47)    Sodium, Serum: 138 mmol/L ( @ 08:18)  Blood Urea Nitrogen, Serum: 27.0 mg/dL <H> ( @ 08:18)  Potassium, Serum: 3.5 mmol/L ( @ 08:18)  Blood Urea Nitrogen, Serum: 28.0 mg/dL <H> ( 16:31)  Potassium, Serum: 3.8 mmol/L ( 16:31)  Sodium, Serum: 136 mmol/L ( 16:31)  Potassium, Serum: 4.2 mmol/L (:47)  Blood Urea Nitrogen, Serum: 33.0 mg/dL <H> (:47)  Sodium, Serum: 132 mmol/L <L> (:47)          MICROBIOLOGY    Culture Results:   >100,000 CFU/ml Escherichia coli  >100,000 CFU/ml Klebsiella pneumoniae ( @ 13:16)  Culture Results:   Growth in aerobic and anaerobic bottles: Streptococcus pneumoniae  Susceptibility to follow.  Aerobic Bottle: 11:56 Hours to positivity  Anaerobic Bottle: 12:38 Hours to positivity  ***Blood Panel PCR results on this specimen are available  approximately 3 hours after the Gram stain result.***  Gram stain, PCR, and/or culture results may not always  correspond due to difference in methodologies.  ************************************************************  This PCR assay was performed using [employer].  The following targets are tested for: Enterococcus,  vancomycin resistant enterococci, Listeria monocytogenes,  coagulase negative staphylococci, S. aureus,  methicillin resistant S. aureus, Streptococcus agalactiae  (Group B), S. pneumoniae, S. pyogenes (Group A),  Acinetobacter baumannii, Enterobacter cloacae, E. coli,  Klebsiella oxytoca, K. pneumoniae, Proteus sp.,  Serratia marcescens, Haemophilus influenzae,  Neisseria meningitidis, Pseudomonas aeruginosa, Candida  albicans, C. glabrata, C krusei, C parapsilosis,  C. tropicalis and the KPC resistance gene.  ,  TYPE: (C=Critical, N=Notification, A=Abnormal) c  TESTS:  _ gs  DATE/TIME CALLED: _ 2018 11:18:47  CALLED TO: Madeline guallpa rn  READ BACK (2 Patient Identifiers)(Y/N): _ y  READ BACK VALUES (Y/N): _ y  CALLED BY: Madeline garcia  .  TYPE: (C=Critical, N=Notification, A=Abnormal) C  TESTS:  _ Strep. pneumoniae  DATE/TIME CALLED: _ 2018 09:10:01  CALLED TO: Madeline De León RN  READ BACK (2 Patient Identifiers)(Y/N): _ Y  READ BACK VALUES (Y/N): _ Y  CALLED BY: Madeline Morales ( @ 12:54)  Culture Results:   Growth in aerobic and anaerobic bottles: Streptococcus pneumoniae  Susceptibility to follow.  Aerobic Bottle: 12:08 Hours to positivity  Anaerobic Bottle: 12:23 Hours to positivity  ,  TYPE: (C=Critical, N=Notification, A=Abnormal) c  TESTS:  _ gs  DATE/TIME CALLED: _ 2018 11:20:53  CALLED TO: Madeline guallpa rn  READ BACK (2 Patient Identifiers)(Y/N): _ y  READ BACK VALUES (Y/N): _ y  CALLED BY: Madeline garcia  .  TYPE: (C=Critical, N=Notification, A=Abnormal) C  TESTS:  _ Strep. pneumoniae  DATE/TIME CALLED: _ 2018 09:18:49  CALLED TO: Madeline De León RN  READ BACK (2 Patient Identifiers)(Y/N): _ Y  READ BACK VALUES (Y/N): _ Y  CALLED BY: Madeline Morales ( @ 12:54)      RADIOLOGY    < from: Xray Chest 1 View AP- PORTABLE-Urgent (18 @ 15:29) >   EXAM:  XR CHEST PORTABLE URGENT 1V                          PROCEDURE DATE:  2018          INTERPRETATION:  Chest radiograph (one view)     CPT 96215    CLINICAL INFORMATION:  Patient is unable to communicate. Fever.  Short of   breath.     TECHNIQUE:  Single frontal view of the chest was obtained.    FINDINGS:  Prior study dated 12/15/2016 was available for review.    The lungs demonstrate mild increased pulmonary vascular congestion. No   gross consolidation is seen. No pleural effusion is seen. The heart is   prominent in size. Right-sided central venous access catheter is noted   with the distal tip overlying the superior vena cava.           IMPRESSION: Mild increased pulmonary congestion noted. Prominent cardiac   silhouette. No gross consolidation is seen.                   < end of copied text >            RHINA PAULINO MD FACP

## 2018-01-23 NOTE — CONSULT NOTE ADULT - SUBJECTIVE AND OBJECTIVE BOX
Patient currently at [place]. full consult to follow.    Patient is an established patient with Dr. Klein. Patient has refractory multiple myeloma and has been on multiple treatments including revlimid, velcade, kyprolis, daratumamab, empliciti, panabinostat. In Nov 2017 her Ms-spike was noted to be 8.9 She was started on Doxil/velcade. She has received 2 cycles with last dose on 1/19/18    Patient has now been admitted for toxic metabolic encephalopathy , bacteremia, uti    PAST MEDICAL & SURGICAL HISTORY:  Multiple myeloma  Mental disorder  Bone cancer  Scoliosis  Hypercholesteremia  Hypertension  Hypothyroid  No significant past surgical history      Vital Signs Last 24 Hrs  T(C): 37.1 (23 Jan 2018 05:39), Max: 37.1 (23 Jan 2018 05:39)  T(F): 98.7 (23 Jan 2018 05:39), Max: 98.7 (23 Jan 2018 05:39)  HR: 55 (23 Jan 2018 07:44) (55 - 66)  BP: 164/71 (23 Jan 2018 07:44) (119/55 - 176/71)  BP(mean): --  RR: 18 (23 Jan 2018 07:44) (18 - 18)  SpO2: 97% (23 Jan 2018 07:44) (95% - 97%)                8.9                  138  | 23.0 | 27.0         9.8   >-----------< 149     ------------------------< 114                   29.4                 3.5  | 104  | 0.82                                         Ca 9.0   Mg 2.0   Ph x

## 2018-01-23 NOTE — CONSULT NOTE ADULT - ASSESSMENT
Refractory multiple myeloma: currently on doxil and velcade. SHe seems to be responding to treatment  with imporvement in her M-spike. She may need port removal for her gm + bacteremia.     full consult to follow when patient available.

## 2018-01-24 LAB
-  CEFTRIAXONE: SIGNIFICANT CHANGE UP
-  CEFTRIAXONE: SIGNIFICANT CHANGE UP
-  LEVOFLOXACIN: SIGNIFICANT CHANGE UP
-  LEVOFLOXACIN: SIGNIFICANT CHANGE UP
-  MEROPENEM: SIGNIFICANT CHANGE UP
-  MEROPENEM: SIGNIFICANT CHANGE UP
-  PENICILLIN: SIGNIFICANT CHANGE UP
-  PENICILLIN: SIGNIFICANT CHANGE UP
-  VANCOMYCIN: SIGNIFICANT CHANGE UP
-  VANCOMYCIN: SIGNIFICANT CHANGE UP
ALBUMIN SERPL ELPH-MCNC: 2.9 G/DL — LOW (ref 3.3–5.2)
ALP SERPL-CCNC: 132 U/L — HIGH (ref 40–120)
ALT FLD-CCNC: 15 U/L — SIGNIFICANT CHANGE UP
ANION GAP SERPL CALC-SCNC: 11 MMOL/L — SIGNIFICANT CHANGE UP (ref 5–17)
AST SERPL-CCNC: 19 U/L — SIGNIFICANT CHANGE UP
BASOPHILS # BLD AUTO: 0 K/UL — SIGNIFICANT CHANGE UP (ref 0–0.2)
BASOPHILS NFR BLD AUTO: 0.1 % — SIGNIFICANT CHANGE UP (ref 0–2)
BILIRUB SERPL-MCNC: 0.2 MG/DL — LOW (ref 0.4–2)
BUN SERPL-MCNC: 27 MG/DL — HIGH (ref 8–20)
CALCIUM SERPL-MCNC: 9.3 MG/DL — SIGNIFICANT CHANGE UP (ref 8.6–10.2)
CHLORIDE SERPL-SCNC: 106 MMOL/L — SIGNIFICANT CHANGE UP (ref 98–107)
CO2 SERPL-SCNC: 23 MMOL/L — SIGNIFICANT CHANGE UP (ref 22–29)
CREAT SERPL-MCNC: 0.89 MG/DL — SIGNIFICANT CHANGE UP (ref 0.5–1.3)
CULTURE RESULTS: SIGNIFICANT CHANGE UP
CULTURE RESULTS: SIGNIFICANT CHANGE UP
EOSINOPHIL # BLD AUTO: 0 K/UL — SIGNIFICANT CHANGE UP (ref 0–0.5)
EOSINOPHIL NFR BLD AUTO: 0.6 % — SIGNIFICANT CHANGE UP (ref 0–6)
GLUCOSE SERPL-MCNC: 94 MG/DL — SIGNIFICANT CHANGE UP (ref 70–115)
HCT VFR BLD CALC: 30 % — LOW (ref 37–47)
HGB BLD-MCNC: 9.4 G/DL — LOW (ref 12–16)
LYMPHOCYTES # BLD AUTO: 0.5 K/UL — LOW (ref 1–4.8)
LYMPHOCYTES # BLD AUTO: 5.4 % — LOW (ref 20–55)
MAGNESIUM SERPL-MCNC: 2.1 MG/DL — SIGNIFICANT CHANGE UP (ref 1.6–2.6)
MCHC RBC-ENTMCNC: 31.3 G/DL — LOW (ref 32–36)
MCHC RBC-ENTMCNC: 31.3 PG — HIGH (ref 27–31)
MCV RBC AUTO: 100 FL — HIGH (ref 81–99)
METHOD TYPE: SIGNIFICANT CHANGE UP
METHOD TYPE: SIGNIFICANT CHANGE UP
MONOCYTES # BLD AUTO: 0.8 K/UL — SIGNIFICANT CHANGE UP (ref 0–0.8)
MONOCYTES NFR BLD AUTO: 8.9 % — SIGNIFICANT CHANGE UP (ref 3–10)
NEUTROPHILS # BLD AUTO: 7.4 K/UL — SIGNIFICANT CHANGE UP (ref 1.8–8)
NEUTROPHILS NFR BLD AUTO: 84.7 % — HIGH (ref 37–73)
ORGANISM # SPEC MICROSCOPIC CNT: SIGNIFICANT CHANGE UP
PLATELET # BLD AUTO: 154 K/UL — SIGNIFICANT CHANGE UP (ref 150–400)
POTASSIUM SERPL-MCNC: 3.9 MMOL/L — SIGNIFICANT CHANGE UP (ref 3.5–5.3)
POTASSIUM SERPL-SCNC: 3.9 MMOL/L — SIGNIFICANT CHANGE UP (ref 3.5–5.3)
PROT SERPL-MCNC: 8.6 G/DL — SIGNIFICANT CHANGE UP (ref 6.6–8.7)
RBC # BLD: 3 M/UL — LOW (ref 4.4–5.2)
RBC # FLD: 17.5 % — HIGH (ref 11–15.6)
SODIUM SERPL-SCNC: 140 MMOL/L — SIGNIFICANT CHANGE UP (ref 135–145)
SPECIMEN SOURCE: SIGNIFICANT CHANGE UP
SPECIMEN SOURCE: SIGNIFICANT CHANGE UP
WBC # BLD: 8.7 K/UL — SIGNIFICANT CHANGE UP (ref 4.8–10.8)
WBC # FLD AUTO: 8.7 K/UL — SIGNIFICANT CHANGE UP (ref 4.8–10.8)

## 2018-01-24 PROCEDURE — 99233 SBSQ HOSP IP/OBS HIGH 50: CPT

## 2018-01-24 RX ADMIN — Medication 0.5 MILLIGRAM(S): at 21:23

## 2018-01-24 RX ADMIN — Medication 400 MILLIGRAM(S): at 17:18

## 2018-01-24 RX ADMIN — Medication 20 MILLIGRAM(S): at 06:24

## 2018-01-24 RX ADMIN — Medication 250 MILLIGRAM(S): at 06:24

## 2018-01-24 RX ADMIN — ENOXAPARIN SODIUM 40 MILLIGRAM(S): 100 INJECTION SUBCUTANEOUS at 12:27

## 2018-01-24 RX ADMIN — Medication 1 MILLIGRAM(S): at 12:26

## 2018-01-24 RX ADMIN — Medication 1 TABLET(S): at 12:27

## 2018-01-24 RX ADMIN — Medication 81 MILLIGRAM(S): at 12:27

## 2018-01-24 RX ADMIN — Medication 75 MICROGRAM(S): at 06:24

## 2018-01-24 RX ADMIN — Medication 250 MILLIGRAM(S): at 17:17

## 2018-01-24 RX ADMIN — TAMSULOSIN HYDROCHLORIDE 0.4 MILLIGRAM(S): 0.4 CAPSULE ORAL at 21:23

## 2018-01-24 RX ADMIN — CEFTRIAXONE 100 GRAM(S): 500 INJECTION, POWDER, FOR SOLUTION INTRAMUSCULAR; INTRAVENOUS at 17:17

## 2018-01-24 RX ADMIN — LITHIUM CARBONATE 600 MILLIGRAM(S): 300 TABLET, EXTENDED RELEASE ORAL at 12:28

## 2018-01-24 RX ADMIN — Medication 400 MILLIGRAM(S): at 06:24

## 2018-01-24 RX ADMIN — Medication 1 DROP(S): at 06:24

## 2018-01-24 RX ADMIN — Medication 1 DROP(S): at 17:17

## 2018-01-24 RX ADMIN — AMLODIPINE BESYLATE 5 MILLIGRAM(S): 2.5 TABLET ORAL at 06:24

## 2018-01-24 RX ADMIN — Medication 2000 UNIT(S): at 12:27

## 2018-01-24 NOTE — PROGRESS NOTE ADULT - ASSESSMENT
IMP  NEGG TTE  SPONT PNEUMOCOCCAL BACTEREMIA FROM MM  NO EVID ENDOCARDITIS    IF F/UP BLOOD C/S NEG WOULD DEFER FURTHER EVAL  TX 7 -14 DAYS IV ROCEPHIN

## 2018-01-24 NOTE — PROGRESS NOTE ADULT - ASSESSMENT
1) Toxic metabolic encephalopathy --> at baseline  --> ct head negative  --> secondary to bacteremia  --> improved  --> supportive care    2) Strep bacteremia --> ID consult appreciated  --> does not need port removed  --> repeat blood cultures pending  --> tte performed results above  --> ID recommends IV Rocephin 1- 14 days    3) E coli and Kleb uti -->c.w iv ceft    4) HTN/ HLD- c/w home meds    5) Hypothyroid- c/w home meds,    6) Bipolar- c/w lithium,  risperidone IM weekly, benztropine  --> psych consult appreciated    7) Multiple myeloma on chemo- c/w chemo as outpatient  --> lytic lesions on CT head, unclear if its baseline or improved  -->onc consult appreciated

## 2018-01-25 PROCEDURE — 71046 X-RAY EXAM CHEST 2 VIEWS: CPT | Mod: 26

## 2018-01-25 PROCEDURE — 99233 SBSQ HOSP IP/OBS HIGH 50: CPT

## 2018-01-25 RX ORDER — FUROSEMIDE 40 MG
20 TABLET ORAL DAILY
Qty: 0 | Refills: 0 | Status: DISCONTINUED | OUTPATIENT
Start: 2018-01-25 | End: 2018-01-29

## 2018-01-25 RX ADMIN — Medication 400 MILLIGRAM(S): at 18:10

## 2018-01-25 RX ADMIN — AMLODIPINE BESYLATE 5 MILLIGRAM(S): 2.5 TABLET ORAL at 05:32

## 2018-01-25 RX ADMIN — ENOXAPARIN SODIUM 40 MILLIGRAM(S): 100 INJECTION SUBCUTANEOUS at 11:47

## 2018-01-25 RX ADMIN — Medication 250 MILLIGRAM(S): at 17:43

## 2018-01-25 RX ADMIN — Medication 250 MILLIGRAM(S): at 05:32

## 2018-01-25 RX ADMIN — Medication 20 MILLIGRAM(S): at 05:31

## 2018-01-25 RX ADMIN — Medication 81 MILLIGRAM(S): at 12:23

## 2018-01-25 RX ADMIN — LITHIUM CARBONATE 600 MILLIGRAM(S): 300 TABLET, EXTENDED RELEASE ORAL at 12:23

## 2018-01-25 RX ADMIN — Medication 0.5 MILLIGRAM(S): at 22:30

## 2018-01-25 RX ADMIN — CEFTRIAXONE 100 GRAM(S): 500 INJECTION, POWDER, FOR SOLUTION INTRAMUSCULAR; INTRAVENOUS at 17:43

## 2018-01-25 RX ADMIN — Medication 400 MILLIGRAM(S): at 05:32

## 2018-01-25 RX ADMIN — TAMSULOSIN HYDROCHLORIDE 0.4 MILLIGRAM(S): 0.4 CAPSULE ORAL at 22:30

## 2018-01-25 RX ADMIN — Medication 1 TABLET(S): at 12:23

## 2018-01-25 RX ADMIN — Medication 1 MILLIGRAM(S): at 12:23

## 2018-01-25 RX ADMIN — Medication 1 DROP(S): at 05:31

## 2018-01-25 RX ADMIN — Medication 2000 UNIT(S): at 12:23

## 2018-01-25 RX ADMIN — Medication 1 DROP(S): at 17:43

## 2018-01-25 RX ADMIN — Medication 75 MICROGRAM(S): at 05:32

## 2018-01-25 NOTE — PROGRESS NOTE ADULT - ASSESSMENT
IMP  NEG TTE  SPONT PNEUMOCOCCAL BACTEREMIA FROM MM  NO EVID ENDOCARDITIS    IF F/UP BLOOD C/S NEG WOULD DEFER FURTHER EVAL  TX 7 -14 DAYS IV ROCEPHIN  SL CONGESTED TODAY = CHK NEW CXR R.O LATE ONSET PNEUMOCOCCAL PNEUMONIA/EMPYEMA

## 2018-01-25 NOTE — PROGRESS NOTE ADULT - ASSESSMENT
1) Toxic metabolic encephalopathy --> at baseline  --> ct head negative  --> secondary to strep bacteremia    2) Strep bacteremia --> ID following  --> does not need port removed  --> repeat blood cultures are negative  --> tte performed shows no vegetations  --> c/w iv ceft for 7-14 days as per ID    3) E coli and Kleb uti -->c.w iv ceft    4) HTN/ HLD- c/w home meds    5) Hypothyroid- c/w home meds,    6) Bipolar- c/w lithium,  risperidone IM weekly, benztropine  --> psych consult appreciated    7) Multiple myeloma on chemo- c/w chemo as outpatient  --> lytic lesions on CT head, unclear if its baseline or improved

## 2018-01-26 LAB
ANION GAP SERPL CALC-SCNC: 11 MMOL/L — SIGNIFICANT CHANGE UP (ref 5–17)
BUN SERPL-MCNC: 26 MG/DL — HIGH (ref 8–20)
CALCIUM SERPL-MCNC: 8.7 MG/DL — SIGNIFICANT CHANGE UP (ref 8.6–10.2)
CHLORIDE SERPL-SCNC: 102 MMOL/L — SIGNIFICANT CHANGE UP (ref 98–107)
CO2 SERPL-SCNC: 24 MMOL/L — SIGNIFICANT CHANGE UP (ref 22–29)
CREAT SERPL-MCNC: 0.77 MG/DL — SIGNIFICANT CHANGE UP (ref 0.5–1.3)
GLUCOSE SERPL-MCNC: 89 MG/DL — SIGNIFICANT CHANGE UP (ref 70–115)
MAGNESIUM SERPL-MCNC: 2.2 MG/DL — SIGNIFICANT CHANGE UP (ref 1.6–2.6)
POTASSIUM SERPL-MCNC: 4.2 MMOL/L — SIGNIFICANT CHANGE UP (ref 3.5–5.3)
POTASSIUM SERPL-SCNC: 4.2 MMOL/L — SIGNIFICANT CHANGE UP (ref 3.5–5.3)
SODIUM SERPL-SCNC: 137 MMOL/L — SIGNIFICANT CHANGE UP (ref 135–145)

## 2018-01-26 PROCEDURE — 99233 SBSQ HOSP IP/OBS HIGH 50: CPT

## 2018-01-26 RX ADMIN — Medication 250 MILLIGRAM(S): at 17:28

## 2018-01-26 RX ADMIN — Medication 400 MILLIGRAM(S): at 05:32

## 2018-01-26 RX ADMIN — ENOXAPARIN SODIUM 40 MILLIGRAM(S): 100 INJECTION SUBCUTANEOUS at 11:39

## 2018-01-26 RX ADMIN — Medication 0.5 MILLIGRAM(S): at 21:32

## 2018-01-26 RX ADMIN — AMLODIPINE BESYLATE 5 MILLIGRAM(S): 2.5 TABLET ORAL at 05:32

## 2018-01-26 RX ADMIN — Medication 75 MICROGRAM(S): at 05:32

## 2018-01-26 RX ADMIN — Medication 1 MILLIGRAM(S): at 11:40

## 2018-01-26 RX ADMIN — Medication 250 MILLIGRAM(S): at 05:32

## 2018-01-26 RX ADMIN — Medication 1 DROP(S): at 05:32

## 2018-01-26 RX ADMIN — Medication 1 TABLET(S): at 11:40

## 2018-01-26 RX ADMIN — Medication 400 MILLIGRAM(S): at 17:28

## 2018-01-26 RX ADMIN — Medication 20 MILLIGRAM(S): at 05:32

## 2018-01-26 RX ADMIN — Medication 81 MILLIGRAM(S): at 11:40

## 2018-01-26 RX ADMIN — Medication 1 DROP(S): at 17:28

## 2018-01-26 RX ADMIN — Medication 2000 UNIT(S): at 17:28

## 2018-01-26 RX ADMIN — CEFTRIAXONE 100 GRAM(S): 500 INJECTION, POWDER, FOR SOLUTION INTRAMUSCULAR; INTRAVENOUS at 17:28

## 2018-01-26 RX ADMIN — TAMSULOSIN HYDROCHLORIDE 0.4 MILLIGRAM(S): 0.4 CAPSULE ORAL at 21:32

## 2018-01-26 RX ADMIN — LITHIUM CARBONATE 600 MILLIGRAM(S): 300 TABLET, EXTENDED RELEASE ORAL at 17:25

## 2018-01-26 NOTE — PROGRESS NOTE ADULT - ASSESSMENT
1) Toxic metabolic encephalopathy --> at baseline  --> ct head negative  --> secondary to strep bacteremia    2) Strep bacteremia --> ID following  --> does not need port removed  --> repeat blood cultures are negative  --> tte performed shows no vegetations  --> c/w iv ceft for 7 days as per ID    3) E coli and Kleb uti -->c.w iv ceft    4) HTN/ HLD- c/w home meds    5) Hypothyroid- c/w home meds,    6) Bipolar- c/w lithium,  risperidone IM weekly, benztropine  --> psych consult appreciated    7) Multiple myeloma on chemo- c/w chemo as outpatient  --> lytic lesions on CT head, unclear if its baseline or improved      dispo --> monitor over the weekend and dc back to [place] monday

## 2018-01-26 NOTE — PROGRESS NOTE ADULT - ASSESSMENT
IMP  NEG TTE  SPONT PNEUMOCOCCAL BACTEREMIA FROM MM  NO EVID ENDOCARDITIS    IF F/UP BLOOD C/S NEG WOULD DEFER FURTHER EVAL  TX APPROX 7 DAYS ROCEPHIN AND THEN PO LEVAQUIN  NO NEED FOR PICC AND PROLONGED IV ABS  SL CONGESTED TODAY = NEG CXR

## 2018-01-27 PROCEDURE — 99233 SBSQ HOSP IP/OBS HIGH 50: CPT

## 2018-01-27 RX ADMIN — Medication 20 MILLIGRAM(S): at 06:36

## 2018-01-27 RX ADMIN — Medication 250 MILLIGRAM(S): at 17:19

## 2018-01-27 RX ADMIN — Medication 1 DROP(S): at 06:35

## 2018-01-27 RX ADMIN — Medication 400 MILLIGRAM(S): at 06:36

## 2018-01-27 RX ADMIN — Medication 2000 UNIT(S): at 11:42

## 2018-01-27 RX ADMIN — Medication 400 MILLIGRAM(S): at 17:20

## 2018-01-27 RX ADMIN — AMLODIPINE BESYLATE 5 MILLIGRAM(S): 2.5 TABLET ORAL at 06:36

## 2018-01-27 RX ADMIN — Medication 1 DROP(S): at 17:18

## 2018-01-27 RX ADMIN — Medication 75 MICROGRAM(S): at 06:36

## 2018-01-27 RX ADMIN — CEFTRIAXONE 100 GRAM(S): 500 INJECTION, POWDER, FOR SOLUTION INTRAMUSCULAR; INTRAVENOUS at 17:18

## 2018-01-27 RX ADMIN — Medication 81 MILLIGRAM(S): at 11:44

## 2018-01-27 RX ADMIN — LITHIUM CARBONATE 600 MILLIGRAM(S): 300 TABLET, EXTENDED RELEASE ORAL at 17:19

## 2018-01-27 RX ADMIN — Medication 1 TABLET(S): at 11:44

## 2018-01-27 RX ADMIN — Medication 0.5 MILLIGRAM(S): at 21:12

## 2018-01-27 RX ADMIN — Medication 250 MILLIGRAM(S): at 06:36

## 2018-01-27 RX ADMIN — ENOXAPARIN SODIUM 40 MILLIGRAM(S): 100 INJECTION SUBCUTANEOUS at 11:44

## 2018-01-27 RX ADMIN — TAMSULOSIN HYDROCHLORIDE 0.4 MILLIGRAM(S): 0.4 CAPSULE ORAL at 21:13

## 2018-01-27 RX ADMIN — Medication 1 MILLIGRAM(S): at 11:44

## 2018-01-27 NOTE — PROGRESS NOTE ADULT - ASSESSMENT
1) Toxic metabolic encephalopathy --> at baseline  --> ct head negative  --> secondary to strep bacteremia    2) Strep bacteremia --> ID following  --> does not need port removed  --> repeat blood cultures are negative  --> tte performed shows no vegetations  --> c/w iv ceft for 7 days as per ID    3) E coli and Kleb uti -->c.w iv ceft    4) HTN/ HLD- c/w home meds    5) Hypothyroid- c/w home meds,    6) Bipolar- c/w lithium,  risperidone IM weekly, benztropine  --> psych consult appreciated    7) Multiple myeloma on chemo- c/w chemo as outpatient  --> lytic lesions on CT head,      dispo --> monitor over the weekend and dc back to [place] monday

## 2018-01-27 NOTE — DIETITIAN INITIAL EVALUATION ADULT. - OTHER INFO
Unable to conduct interview with pt secondary to pt with confusion. Pt with multiple myeloma, receiving chemotherapy treatment with last dose 1/19/18. Per charts pt with good PO intake of 75%-100%. Currently on regular diet.

## 2018-01-28 LAB
CULTURE RESULTS: SIGNIFICANT CHANGE UP
CULTURE RESULTS: SIGNIFICANT CHANGE UP
SPECIMEN SOURCE: SIGNIFICANT CHANGE UP
SPECIMEN SOURCE: SIGNIFICANT CHANGE UP

## 2018-01-28 PROCEDURE — 99233 SBSQ HOSP IP/OBS HIGH 50: CPT

## 2018-01-28 PROCEDURE — 99232 SBSQ HOSP IP/OBS MODERATE 35: CPT

## 2018-01-28 RX ADMIN — Medication 1 DROP(S): at 05:57

## 2018-01-28 RX ADMIN — Medication 20 MILLIGRAM(S): at 05:57

## 2018-01-28 RX ADMIN — TAMSULOSIN HYDROCHLORIDE 0.4 MILLIGRAM(S): 0.4 CAPSULE ORAL at 21:43

## 2018-01-28 RX ADMIN — Medication 75 MICROGRAM(S): at 05:56

## 2018-01-28 RX ADMIN — Medication 1 MILLIGRAM(S): at 12:12

## 2018-01-28 RX ADMIN — Medication 250 MILLIGRAM(S): at 17:16

## 2018-01-28 RX ADMIN — Medication 400 MILLIGRAM(S): at 17:21

## 2018-01-28 RX ADMIN — Medication 400 MILLIGRAM(S): at 05:57

## 2018-01-28 RX ADMIN — Medication 81 MILLIGRAM(S): at 12:12

## 2018-01-28 RX ADMIN — CEFTRIAXONE 100 GRAM(S): 500 INJECTION, POWDER, FOR SOLUTION INTRAMUSCULAR; INTRAVENOUS at 17:15

## 2018-01-28 RX ADMIN — Medication 0.5 MILLIGRAM(S): at 21:43

## 2018-01-28 RX ADMIN — Medication 250 MILLIGRAM(S): at 05:56

## 2018-01-28 RX ADMIN — LITHIUM CARBONATE 600 MILLIGRAM(S): 300 TABLET, EXTENDED RELEASE ORAL at 12:11

## 2018-01-28 RX ADMIN — AMLODIPINE BESYLATE 5 MILLIGRAM(S): 2.5 TABLET ORAL at 05:57

## 2018-01-28 RX ADMIN — ENOXAPARIN SODIUM 40 MILLIGRAM(S): 100 INJECTION SUBCUTANEOUS at 12:11

## 2018-01-28 RX ADMIN — Medication 2000 UNIT(S): at 12:12

## 2018-01-28 RX ADMIN — Medication 1 DROP(S): at 17:15

## 2018-01-28 RX ADMIN — Medication 1 TABLET(S): at 12:11

## 2018-01-28 NOTE — PROGRESS NOTE ADULT - PROVIDER SPECIALTY LIST ADULT
Heme/Onc
Hospitalist
Infectious Disease
Internal Medicine
Heme/Onc

## 2018-01-28 NOTE — PROGRESS NOTE ADULT - ASSESSMENT
IMP  NEG TTE  SPONT PNEUMOCOCCAL BACTEREMIA FROM MM  NO EVID ENDOCARDITIS    BLOOD C/S NEG  PLAN  COMPLETE IV ROCEPHIN 01/29  START LEVAQUIN 750 Q DAY 7 DAYS 01/30  OK TO D/C TO [facility] END OF IV ABS IF CLEARED MED/HEME    WILL NO LONGER SEE

## 2018-01-28 NOTE — PROGRESS NOTE ADULT - ASSESSMENT
1) Toxic metabolic encephalopathy --> at baseline  --> ct head negative    2) Strep bacteremia --> ID following  --> does not need port removed  --> repeat blood cultures are negative  --> tte performed shows no vegetations  --> c/w iv ceft for 7 days as per ID then transition to oral     3) E coli and Kleb uti -->c.w iv ceft  --> treated    4) HTN/ HLD- c/w home meds    5) Hypothyroid- c/w home meds,    6) Bipolar- c/w lithium,  risperidone IM weekly, benztropine  --> psych consult appreciated    7) Multiple myeloma on chemo- c/w chemo as outpatient  --> lytic lesions on CT head,      dispo --> dc tomorrow back to [place]

## 2018-01-28 NOTE — PROGRESS NOTE ADULT - SUBJECTIVE AND OBJECTIVE BOX
Heme/Onc. FU: Myeloma. Raissa 1/24/1*  UTI  Bacteremia.    Patient is an established patient with Dr. Klein. Patient has refractory multiple myeloma and has been on multiple treatments including Revlimid, Velcade, Kyprolis, Daratumamab, Empliciti, Panabinostat. In Nov 2017 her M-spike was noted to be 8.9 She was started on Doxil/Velcade. She has received 2 cycles with last dose on 1/19/18    Patient has now been admitted for toxic metabolic encephalopathy , bacteremia, UTI.    Presently comfortable but lethargic and sleeping.      PAST MEDICAL & SURGICAL HISTORY:  Multiple myeloma  Mental disorder  Bone cancer  Scoliosis  Hypercholesteremia  Hypertension  Hypothyroid  No significant past surgical history    Vital Signs Last 24 Hrs  T(C): 37.1 (24 Jan 2018 06:14), Max: 37.2 (23 Jan 2018 19:58)  T(F): 98.7 (24 Jan 2018 06:14), Max: 98.9 (23 Jan 2018 19:58)  HR: 71 (24 Jan 2018 06:14) (51 - 71)  BP: 129/63 (24 Jan 2018 06:14) (129/63 - 152/64)  BP(mean): --  RR: 18 (24 Jan 2018 06:14) (18 - 19)  SpO2: 97% (24 Jan 2018 06:14) (95% - 98%)    Comfortable but sleeping.  No bleeding.  Not in pain.     CBC Full  -  ( 24 Jan 2018 08:13 )  WBC Count : 8.7 K/uL  Hemoglobin : 9.4 g/dL  Hematocrit : 30.0 %  Platelet Count - Automated : 154 K/uL  Mean Cell Volume : 100.0 fl  Mean Cell Hemoglobin : 31.3 pg  Mean Cell Hemoglobin Concentration : 31.3 g/dL  Auto Neutrophil # : 7.4 K/uL  Auto Lymphocyte # : 0.5 K/uL  Auto Monocyte # : 0.8 K/uL  Auto Eosinophil # : 0.0 K/uL  Auto Basophil # : 0.0 K/uL  Auto Neutrophil % : 84.7 %  Auto Lymphocyte % : 5.4 %  Auto Monocyte % : 8.9 %  Auto Eosinophil % : 0.6 %  Auto Basophil % : 0.1 %    01-24    140  |  106  |  27.0<H>  ----------------------------<  94  3.9   |  23.0  |  0.89    Ca    9.3      24 Jan 2018 08:13  Phos  3.1     01-22  Mg     2.1     01-24    TPro  8.6  /  Alb  2.9<L>  /  TBili  0.2<L>  /  DBili  x   /  AST  19  /  ALT  15  /  AlkPhos  132<H>  01-24        Assessment and Recommendation:   · Assessment		    Refractory multiple myeloma: currently on Doxil and Velcade. She seems to be responding to treatment  with improvement in her M-spike. She may need port removal for her gm + bacteremia.   < from: CT Chest No Cont (01.21.18 @ 22:50) >     EXAM:  CT CHEST                          PROCEDURE DATE:  01/21/2018          INTERPRETATION:  HISTORY: Shortness of breath.    Date and Time of Exam: 1/21/2018 10:45 PM    TECHNIQUE:  Sections were obtained from the apices to the diaphragm   without intravenous contrast.    COMPARISON EXAMINATION:   12/15/2016.    FINDINGS: No evidence of mediastinal or hilar lymphadenopathy. There is a   small left pleural effusion. This is a new finding since the prior study.   No evidence of a right pleural effusion. No evidence of a pericardial   effusion. Cardiomegaly is noted.    No evidence of axillary adenopathy.    Linear atelectasis or fibrosis at the right lung base unchanged since the   prior study.    There is a rounded lesion in the left lower lobe suspicious for a   neoplasm. This abnormality measures 4 x 4 CM. This was not present on   12/15/2016.    Degenerative changes in the spine. There is a lytic lesion in the L2   vertebral body, larger since 12/15/2016. There is a lytic lesion in the   left second rib, larger since the prior study.      IMPRESSION:     Enlarging lytic lesions in the left second rib and L2 vertebral body   since 12/15/2016 suspicious for progression of metastatic disease.    Rounded lesion in the left lower lobe likely neoplastic in etiology. This   represents a new finding..    .    < end of copied text >        Abx per ID  Observe from the myeloma point of view for now.
DEBRA HERRMANN    [id number]    72y      Female    INTERVAL HPI/OVERNIGHT EVENTS:    patient being seen for toxic metabolic encephalopathy, strep bacteremia, UTI and MM. Patient seen at bedside with aide and in nad.     last 24 hours patient is afebrile.     REVIEW OF SYSTEMS:    CONSTITUTIONAL: No fever, weight loss, or fatigue  RESPIRATORY: No cough, wheezing, hemoptysis; No shortness of breath  CARDIOVASCULAR: No chest pain, palpitations  GASTROINTESTINAL: No abdominal or epigastric pain. No nausea, vomiting  NEUROLOGICAL: No headaches, memory loss, loss of strength.  MISCELLANEOUS:      Vital Signs Last 24 Hrs  T(C): 36.8 (27 Jan 2018 06:36), Max: 37.1 (26 Jan 2018 21:32)  T(F): 98.3 (27 Jan 2018 06:36), Max: 98.7 (26 Jan 2018 21:32)  HR: 77 (27 Jan 2018 06:36) (60 - 77)  BP: 117/55 (27 Jan 2018 06:36) (117/55 - 145/97)  BP(mean): --  RR: 18 (27 Jan 2018 06:36) (18 - 18)  SpO2: 97% (27 Jan 2018 06:36) (97% - 97%)    PHYSICAL EXAM:    GENERAL: NAD, awake   HEENT: PERRL, +EOMI  NECK: soft,  CHEST/LUNG: Chemoport, clear  HEART: S1S2+, Regular rate and rhythm; No murmurs  ABDOMEN: Soft, Nontender, Nondistended;   EXTREMITIES:  2+ Peripheral Pulses, No edema  SKIN: No rashes or lesions  NEURO: AAOX3,     LABS:    01-26    137  |  102  |  26.0<H>  ----------------------------<  89  4.2   |  24.0  |  0.77    Ca    8.7      26 Jan 2018 05:43  Mg     2.2     01-26      MEDICATIONS  (STANDING):  acyclovir   Tablet 400 milliGRAM(s) Oral two times a day  amLODIPine   Tablet 5 milliGRAM(s) Oral daily  aspirin enteric coated 81 milliGRAM(s) Oral daily  benztropine 0.5 milliGRAM(s) Oral at bedtime  calcium carbonate 500 mG (Tums) Chewable 1 Tablet(s) Chew daily  cefTRIAXone   IVPB 2 Gram(s) IV Intermittent every 24 hours  cholecalciferol 2000 Unit(s) Oral daily  enoxaparin Injectable 40 milliGRAM(s) SubCutaneous daily  folic acid 1 milliGRAM(s) Oral daily  furosemide    Tablet 20 milliGRAM(s) Oral daily  levothyroxine 75 MICROGram(s) Oral daily  lithium citrate Solution 600 milliGRAM(s) Oral daily  ofloxacin 0.3% Solution 1 Drop(s) Right EYE two times a day  saccharomyces boulardii 250 milliGRAM(s) Oral two times a day  tamsulosin 0.4 milliGRAM(s) Oral at bedtime    MEDICATIONS  (PRN):  acetaminophen   Tablet 650 milliGRAM(s) Oral every 6 hours PRN fever, headache or pain      RADIOLOGY & ADDITIONAL TESTS:
DEBRA HERRMANN    [id number]    72y      Female    INTERVAL HPI/OVERNIGHT EVENTS:    patient being seen for toxic metabolic encephalopathy, strep bacteremia, UTI and MM. Patient seen at bedside and without any complaints      REVIEW OF SYSTEMS:    CONSTITUTIONAL: No fever, weight loss, or fatigue  RESPIRATORY: No cough, wheezing, hemoptysis; No shortness of breath  CARDIOVASCULAR: No chest pain, palpitations  GASTROINTESTINAL: No abdominal or epigastric pain. No nausea, vomiting  NEUROLOGICAL: No headaches, memory loss, loss of strength.  MISCELLANEOUS:      Vital Signs Last 24 Hrs  T(C): 36.9 (28 Jan 2018 12:05), Max: 37 (28 Jan 2018 05:23)  T(F): 98.5 (28 Jan 2018 12:05), Max: 98.6 (28 Jan 2018 05:23)  HR: 77 (28 Jan 2018 12:05) (56 - 77)  BP: 133/54 (28 Jan 2018 12:05) (112/53 - 133/54)  BP(mean): --  RR: 20 (28 Jan 2018 12:05) (18 - 20)  SpO2: 97% (28 Jan 2018 05:23) (97% - 97%)    PHYSICAL EXAM:    GENERAL: NAD, awake   HEENT: PERRL, +EOMI  NECK: soft,  CHEST/LUNG: Chemoport, clear  HEART: S1S2+, Regular rate and rhythm; No murmurs  ABDOMEN: Soft, Nontender, Nondistended;   EXTREMITIES:  2+ Peripheral Pulses, No edema  SKIN: No rashes or lesions  NEURO: AAOX3,       LABS:        MEDICATIONS  (STANDING):  acyclovir   Tablet 400 milliGRAM(s) Oral two times a day  amLODIPine   Tablet 5 milliGRAM(s) Oral daily  aspirin enteric coated 81 milliGRAM(s) Oral daily  benztropine 0.5 milliGRAM(s) Oral at bedtime  calcium carbonate 500 mG (Tums) Chewable 1 Tablet(s) Chew daily  cefTRIAXone   IVPB 2 Gram(s) IV Intermittent every 24 hours  cholecalciferol 2000 Unit(s) Oral daily  enoxaparin Injectable 40 milliGRAM(s) SubCutaneous daily  folic acid 1 milliGRAM(s) Oral daily  furosemide    Tablet 20 milliGRAM(s) Oral daily  levothyroxine 75 MICROGram(s) Oral daily  lithium citrate Solution 600 milliGRAM(s) Oral daily  ofloxacin 0.3% Solution 1 Drop(s) Right EYE two times a day  saccharomyces boulardii 250 milliGRAM(s) Oral two times a day  tamsulosin 0.4 milliGRAM(s) Oral at bedtime    MEDICATIONS  (PRN):  acetaminophen   Tablet 650 milliGRAM(s) Oral every 6 hours PRN fever, headache or pain      RADIOLOGY & ADDITIONAL TESTS:
DEBRA HERRMANN    [id number]    72y      Female    INTERVAL HPI/OVERNIGHT EVENTS:    patient being seen for toxic metabolic encephalopathy, strep bacteremia, UTI and MM. Patient seen at bedside with aide. Patient in nad.         REVIEW OF SYSTEMS:    CONSTITUTIONAL: No fever, weight loss, or fatigue  RESPIRATORY: No cough, wheezing, hemoptysis; No shortness of breath  CARDIOVASCULAR: No chest pain, palpitations  GASTROINTESTINAL: No abdominal or epigastric pain. No nausea, vomiting  NEUROLOGICAL: No headaches, memory loss, loss of strength.  MISCELLANEOUS:      Vital Signs Last 24 Hrs  T(C): 36.8 (25 Jan 2018 08:43), Max: 37.5 (24 Jan 2018 15:38)  T(F): 98.2 (25 Jan 2018 08:43), Max: 99.5 (24 Jan 2018 15:38)  HR: 62 (25 Jan 2018 08:43) (56 - 65)  BP: 128/64 (25 Jan 2018 08:43) (128/64 - 142/67)  BP(mean): --  RR: 18 (25 Jan 2018 08:43) (18 - 18)  SpO2: 95% (25 Jan 2018 08:43) (95% - 97%)    PHYSICAL EXAM:    GENERAL: NAD, awake   HEENT: PERRL, +EOMI  NECK: soft,  CHEST/LUNG: Chemoport, clear  HEART: S1S2+, Regular rate and rhythm; No murmurs  ABDOMEN: Soft, Nontender, Nondistended;   EXTREMITIES:  2+ Peripheral Pulses, No edema  SKIN: No rashes or lesions  NEURO: AAOX3,     LABS:                        9.4    8.7   )-----------( 154      ( 24 Jan 2018 08:13 )             30.0     01-24    140  |  106  |  27.0<H>  ----------------------------<  94  3.9   |  23.0  |  0.89    Ca    9.3      24 Jan 2018 08:13  Mg     2.1     01-24    TPro  8.6  /  Alb  2.9<L>  /  TBili  0.2<L>  /  DBili  x   /  AST  19  /  ALT  15  /  AlkPhos  132<H>  01-24          MEDICATIONS  (STANDING):  acyclovir   Tablet 400 milliGRAM(s) Oral two times a day  amLODIPine   Tablet 5 milliGRAM(s) Oral daily  aspirin enteric coated 81 milliGRAM(s) Oral daily  benztropine 0.5 milliGRAM(s) Oral at bedtime  calcium carbonate 500 mG (Tums) Chewable 1 Tablet(s) Chew daily  cefTRIAXone   IVPB 2 Gram(s) IV Intermittent every 24 hours  cholecalciferol 2000 Unit(s) Oral daily  enoxaparin Injectable 40 milliGRAM(s) SubCutaneous daily  folic acid 1 milliGRAM(s) Oral daily  furosemide    Tablet 20 milliGRAM(s) Oral daily  levothyroxine 75 MICROGram(s) Oral daily  lithium citrate Solution 600 milliGRAM(s) Oral daily  ofloxacin 0.3% Solution 1 Drop(s) Right EYE two times a day  saccharomyces boulardii 250 milliGRAM(s) Oral two times a day  tamsulosin 0.4 milliGRAM(s) Oral at bedtime    MEDICATIONS  (PRN):  acetaminophen   Tablet 650 milliGRAM(s) Oral every 6 hours PRN fever, headache or pain      RADIOLOGY & ADDITIONAL TESTS:
DEBRA HERRMANN    [id number]    72y      Female    INTERVAL HPI/OVERNIGHT EVENTS:    patient being seen for toxic metabolic encephalopathy, strep bacteremia, UTI and MM. Patient seen at bedside and in nad.     last 24 hours patient is afebrile.    REVIEW OF SYSTEMS:    CONSTITUTIONAL: No fever, weight loss, or fatigue  RESPIRATORY: No cough, wheezing, hemoptysis; No shortness of breath  CARDIOVASCULAR: No chest pain, palpitations  GASTROINTESTINAL: No abdominal or epigastric pain. No nausea, vomiting  NEUROLOGICAL: No headaches, memory loss, loss of strength.  MISCELLANEOUS:      Vital Signs Last 24 Hrs  T(C): 36.8 (26 Jan 2018 11:45), Max: 37.4 (25 Jan 2018 15:12)  T(F): 98.3 (26 Jan 2018 11:45), Max: 99.3 (25 Jan 2018 15:12)  HR: 65 (26 Jan 2018 11:45) (62 - 75)  BP: 131/61 (26 Jan 2018 11:45) (105/53 - 155/67)  BP(mean): --  RR: 18 (26 Jan 2018 11:45) (18 - 18)  SpO2: 97% (26 Jan 2018 04:33) (97% - 98%)    PHYSICAL EXAM:    GENERAL: NAD, awake   HEENT: PERRL, +EOMI  NECK: soft,  CHEST/LUNG: Chemoport, clear  HEART: S1S2+, Regular rate and rhythm; No murmurs  ABDOMEN: Soft, Nontender, Nondistended;   EXTREMITIES:  2+ Peripheral Pulses, No edema  SKIN: No rashes or lesions  NEURO: AAOX3,         LABS:    01-26    137  |  102  |  26.0<H>  ----------------------------<  89  4.2   |  24.0  |  0.77    Ca    8.7      26 Jan 2018 05:43  Mg     2.2     01-26              MEDICATIONS  (STANDING):  acyclovir   Tablet 400 milliGRAM(s) Oral two times a day  amLODIPine   Tablet 5 milliGRAM(s) Oral daily  aspirin enteric coated 81 milliGRAM(s) Oral daily  benztropine 0.5 milliGRAM(s) Oral at bedtime  calcium carbonate 500 mG (Tums) Chewable 1 Tablet(s) Chew daily  cefTRIAXone   IVPB 2 Gram(s) IV Intermittent every 24 hours  cholecalciferol 2000 Unit(s) Oral daily  enoxaparin Injectable 40 milliGRAM(s) SubCutaneous daily  folic acid 1 milliGRAM(s) Oral daily  furosemide    Tablet 20 milliGRAM(s) Oral daily  levothyroxine 75 MICROGram(s) Oral daily  lithium citrate Solution 600 milliGRAM(s) Oral daily  ofloxacin 0.3% Solution 1 Drop(s) Right EYE two times a day  saccharomyces boulardii 250 milliGRAM(s) Oral two times a day  tamsulosin 0.4 milliGRAM(s) Oral at bedtime    MEDICATIONS  (PRN):  acetaminophen   Tablet 650 milliGRAM(s) Oral every 6 hours PRN fever, headache or pain      RADIOLOGY & ADDITIONAL TESTS:
DEBRA HERRMANN    [id number]    72y      Female    INTERVAL HPI/OVERNIGHT EVENTS:  patient being seen for toxic metabolic encephalopathy, strep bacteremia, UTI and MM. Patient seen at bedside with [facility] aide and is more awake and alert. Patient denies any complaints    last 24 hours patient is afebrile.     No new complaints- pt remains confused and afebrile.  Pt is on 1:1 observation for pt safety.      REVIEW OF SYSTEMS:    CONSTITUTIONAL: No fever, weight loss, or fatigue  RESPIRATORY: No cough, wheezing, hemoptysis; No shortness of breath  CARDIOVASCULAR: No chest pain, palpitations  GASTROINTESTINAL: No abdominal or epigastric pain. No nausea, vomiting  NEUROLOGICAL: No headaches, +memory loss, + loss of strength.      Vital Signs Last 24 Hrs  T(C): 36.9 (24 Jan 2018 11:14), Max: 37.2 (23 Jan 2018 19:58)  T(F): 98.5 (24 Jan 2018 11:14), Max: 98.9 (23 Jan 2018 19:58)  HR: 67 (24 Jan 2018 11:14) (51 - 71)  BP: 124/59 (24 Jan 2018 11:14) (124/59 - 152/64)  RR: 17 (24 Jan 2018 11:14) (17 - 19)  SpO2: 95% (24 Jan 2018 11:14) (95% - 98%)    PHYSICAL EXAM:    GENERAL: NAD, frail, confused  HEENT: PERRL, +EOMI  NECK: soft, Supple, No JVD,   CHEST/LUNG: Clear to ascultation bilaterally; No wheezing, + chemoport  HEART: S1S2+, Regular rate and rhythm; No murmurs, rubs, or gallops  ABDOMEN: Soft, Nontender, Nondistended; Bowel sounds present  EXTREMITIES:  2+ Peripheral Pulses, No clubbing, cyanosis, or edema  SKIN: No rashes or lesions  NEURO: confused      LABS:                        9.4    8.7   )-----------( 154      ( 24 Jan 2018 08:13 )             30.0     01-24    140  |  106  |  27.0<H>  ----------------------------<  94  3.9   |  23.0  |  0.89    Ca    9.3      24 Jan 2018 08:13  Phos  3.1     01-22  Mg     2.1     01-24    TPro  8.6  /  Alb  2.9<L>  /  TBili  0.2<L>  /  DBili  x   /  AST  19  /  ALT  15  /  AlkPhos  132<H>  01-24      RVP neg.      MEDICATIONS  (STANDING):  acyclovir   Tablet 400 milliGRAM(s) Oral two times a day  amLODIPine   Tablet 5 milliGRAM(s) Oral daily  aspirin enteric coated 81 milliGRAM(s) Oral daily  benztropine 0.5 milliGRAM(s) Oral at bedtime  calcium carbonate 500 mG (Tums) Chewable 1 Tablet(s) Chew daily  cefTRIAXone   IVPB 2 Gram(s) IV Intermittent every 24 hours  cholecalciferol 2000 Unit(s) Oral daily  enoxaparin Injectable 40 milliGRAM(s) SubCutaneous daily  folic acid 1 milliGRAM(s) Oral daily  furosemide   Injectable 20 milliGRAM(s) IV Push daily  levothyroxine 75 MICROGram(s) Oral daily  lithium citrate Solution 600 milliGRAM(s) Oral daily  ofloxacin 0.3% Solution 1 Drop(s) Right EYE two times a day  saccharomyces boulardii 250 milliGRAM(s) Oral two times a day  tamsulosin 0.4 milliGRAM(s) Oral at bedtime    MEDICATIONS  (PRN):  acetaminophen   Tablet 650 milliGRAM(s) Oral every 6 hours PRN fever, headache or pain      RADIOLOGY & ADDITIONAL TESTS:    Chest CT:  Enlarging lytic lesions in the left second rib and L2 vertebral body   since 12/15/2016 suspicious for progression of metastatic disease.    Rounded lesion in the left lower lobe likely neoplastic in etiology. This   represents a new finding..       Head CT:  Enlarging lytic lesions in the left second rib and L2 vertebral body   since 12/15/2016 suspicious for progression of metastatic disease.    Rounded lesion in the left lower lobe likely neoplastic in etiology. This   represents a new finding..     TTE:    Summary:   1. Left ventricular ejection fraction, by visual estimation, is 60 to   65%.   2. Normal global left ventricular systolic function.   3. Spectral Doppler shows impaired relaxation pattern of left   ventricular myocardial filling (Grade I diastolic dysfunction).   4. There is no evidence of pericardial effusion.   5. Mild mitral annular calcification.   6. Thickening and calcification of the anterior and posterior mitral   valve leaflets.   7. Mild aortic valve stenosis.   8. Moderate aortic regurgitation.   9. Mildly enlarged left atrium.      ID and Heme/Onc. consults appreciated
Heme/Onc. FU: Myeloma. Raissa 1/24/1*  UTI  Bacteremia.    Patient is an established patient with Dr. Klein. Patient has refractory multiple myeloma and has been on multiple treatments including Revlimid, Velcade, Kyprolis, Daratumamab, Empliciti, Panabinostat. In Nov 2017 her M-spike was noted to be 8.9 She was started on Doxil/Velcade. She has received 2 cycles with last dose on 1/19/18    Patient has now been admitted for toxic metabolic encephalopathy , bacteremia, UTI.    Presently comfortable, awake, confused    PAST MEDICAL & SURGICAL HISTORY:  Multiple myeloma  Mental disorder  Bone cancer  Scoliosis  Hypercholesteremia  Hypertension  Hypothyroid  No significant past surgical history    Vital Signs Last 24 Hrs  T(C): 37.1 (24 Jan 2018 06:14), Max: 37.2 (23 Jan 2018 19:58)  T(F): 98.7 (24 Jan 2018 06:14), Max: 98.9 (23 Jan 2018 19:58)  HR: 71 (24 Jan 2018 06:14) (51 - 71)  BP: 129/63 (24 Jan 2018 06:14) (129/63 - 152/64)  BP(mean): --  RR: 18 (24 Jan 2018 06:14) (18 - 19)  SpO2: 97% (24 Jan 2018 06:14) (95% - 98%)    Comfortable, confused; does not accurately answer questions  No bleeding.  Not in pain.     CBC Full  -  ( 24 Jan 2018 08:13 )  WBC Count : 8.7 K/uL  Hemoglobin : 9.4 g/dL  Hematocrit : 30.0 %  Platelet Count - Automated : 154 K/uL  Mean Cell Volume : 100.0 fl  Mean Cell Hemoglobin : 31.3 pg  Mean Cell Hemoglobin Concentration : 31.3 g/dL  Auto Neutrophil # : 7.4 K/uL  Auto Lymphocyte # : 0.5 K/uL  Auto Monocyte # : 0.8 K/uL  Auto Eosinophil # : 0.0 K/uL  Auto Basophil # : 0.0 K/uL  Auto Neutrophil % : 84.7 %  Auto Lymphocyte % : 5.4 %  Auto Monocyte % : 8.9 %  Auto Eosinophil % : 0.6 %  Auto Basophil % : 0.1 %    01-24    140  |  106  |  27.0<H>  ----------------------------<  94  3.9   |  23.0  |  0.89    Ca    9.3      24 Jan 2018 08:13  Phos  3.1     01-22  Mg     2.1     01-24    TPro  8.6  /  Alb  2.9<L>  /  TBili  0.2<L>  /  DBili  x   /  AST  19  /  ALT  15  /  AlkPhos  132<H>  01-24        Assessment and Recommendation:   · Assessment		    Refractory multiple myeloma: currently on Doxil and Velcade. She seems to be responding to treatment  with improvement in her M-spike. She may need port removal for her gm + bacteremia.   < from: CT Chest No Cont (01.21.18 @ 22:50) >     EXAM:  CT CHEST                          PROCEDURE DATE:  01/21/2018          INTERPRETATION:  HISTORY: Shortness of breath.    Date and Time of Exam: 1/21/2018 10:45 PM    TECHNIQUE:  Sections were obtained from the apices to the diaphragm   without intravenous contrast.    COMPARISON EXAMINATION:   12/15/2016.    FINDINGS: No evidence of mediastinal or hilar lymphadenopathy. There is a   small left pleural effusion. This is a new finding since the prior study.   No evidence of a right pleural effusion. No evidence of a pericardial   effusion. Cardiomegaly is noted.    No evidence of axillary adenopathy.    Linear atelectasis or fibrosis at the right lung base unchanged since the   prior study.    There is a rounded lesion in the left lower lobe suspicious for a   neoplasm. This abnormality measures 4 x 4 CM. This was not present on   12/15/2016.    Degenerative changes in the spine. There is a lytic lesion in the L2   vertebral body, larger since 12/15/2016. There is a lytic lesion in the   left second rib, larger since the prior study.      IMPRESSION:     Enlarging lytic lesions in the left second rib and L2 vertebral body   since 12/15/2016 suspicious for progression of metastatic disease.    Rounded lesion in the left lower lobe likely neoplastic in etiology. This   represents a new finding..    .    Imp: Multiple Myeloma; heavily treated  Additional treatment per treating Oncologist, Dr. Land  Management of sepsis per ID, on antibiotics  Will monitor hematologically
KRYSTA HERRMANN    [id number]    72y      Female    INTERVAL HPI/OVERNIGHT EVENTS:    patient being seen for toxic metabolic encephalopathy, bacteremia, uti and med management. Patient seen at bedside with aide. Patient is awake and alert but is more lethargic as per aide.     last 24 hours patient had tmax 100.1    REVIEW OF SYSTEMS:    CONSTITUTIONAL: fatigue  RESPIRATORY: No cough, wheezing, hemoptysis; No shortness of breath  CARDIOVASCULAR: No chest pain, palpitations  GASTROINTESTINAL: No abdominal or epigastric pain. No nausea, vomiting  NEUROLOGICAL: No headaches, memory loss, loss of strength.  MISCELLANEOUS:      Vital Signs Last 24 Hrs  T(C): 36.8 (2018 11:52), Max: 37.8 (2018 17:12)  T(F): 98.3 (2018 11:52), Max: 100.1 (2018 17:12)  HR: 65 (2018 11:52) (65 - 85)  BP: 119/55 (2018 11:52) (119/55 - 159/71)  BP(mean): --  RR: 18 (2018 11:52) (17 - 20)  SpO2: 95% (2018 11:52) (95% - 96%)    PHYSICAL EXAM:    GENERAL: NAD, lethargic   HEENT: PERRL, +EOMI  NECK: soft,  CHEST/LUNG: Chemoport, diminished at bases  HEART: S1S2+, Regular rate and rhythm; No murmurs  ABDOMEN: Soft, Nontender, Nondistended;   EXTREMITIES:  2+ Peripheral Pulses, No edema  SKIN: No rashes or lesions  NEURO: AAOX3,   PSYCH: flat affect     LABS:                        10.0   9.9   )-----------( 165      ( 2018 12:47 )             31.4     -    132<L>  |  98  |  33.0<H>  ----------------------------<  165<H>  4.2   |  21.0<L>  |  0.76    Ca    9.7      2018 12:47    TPro  9.4<H>  /  Alb  3.2<L>  /  TBili  0.2<L>  /  DBili  x   /  AST  40<H>  /  ALT  30  /  AlkPhos  180<H>      PT/INR - ( 2018 12:47 )   PT: 12.8 sec;   INR: 1.16 ratio         PTT - ( 2018 12:47 )  PTT:30.6 sec  Urinalysis Basic - ( 2018 13:16 )    Color: Yellow / Appearance: Slightly Turbid / S.015 / pH: x  Gluc: x / Ketone: Negative  / Bili: Negative / Urobili: Negative mg/dL   Blood: x / Protein: 500 mg/dL / Nitrite: Negative   Leuk Esterase: Small / RBC: 25-50 /HPF / WBC 11-25   Sq Epi: x / Non Sq Epi: Few / Bacteria: Moderate      blood culture --> gram pos cocci in both bottles    urine culture --> gram neg rods      MEDICATIONS  (STANDING):  acyclovir   Tablet 400 milliGRAM(s) Oral two times a day  aspirin enteric coated 81 milliGRAM(s) Oral daily  aztreonam  IVPB      aztreonam  IVPB 500 milliGRAM(s) IV Intermittent once  aztreonam  IVPB 500 milliGRAM(s) IV Intermittent every 8 hours  benztropine 0.5 milliGRAM(s) Oral at bedtime  calcium carbonate 500 mG (Tums) Chewable 1 Tablet(s) Chew daily  cholecalciferol 2000 Unit(s) Oral daily  enoxaparin Injectable 40 milliGRAM(s) SubCutaneous daily  folic acid 1 milliGRAM(s) Oral daily  furosemide   Injectable 20 milliGRAM(s) IV Push daily  levothyroxine 75 MICROGram(s) Oral daily  lithium citrate Solution 600 milliGRAM(s) Oral daily  ofloxacin 0.3% Solution 1 Drop(s) Right EYE two times a day  saccharomyces boulardii 250 milliGRAM(s) Oral two times a day  tamsulosin 0.4 milliGRAM(s) Oral at bedtime  vancomycin  IVPB      vancomycin  IVPB 1250 milliGRAM(s) IV Intermittent once  vancomycin  IVPB 1250 milliGRAM(s) IV Intermittent every 12 hours    MEDICATIONS  (PRN):  acetaminophen   Tablet 650 milliGRAM(s) Oral every 6 hours PRN fever, headache or pain      RADIOLOGY & ADDITIONAL TESTS:
KRYSTA HERRMANN    [id number]    72y      Female    INTERVAL HPI/OVERNIGHT EVENTS:    patient being seen for toxic metabolic encephalopathy, strep bacteremia, UTI and MM. Patient seen at bedside with [facility] aide and is more awake and alert. Patient denies any complaints    last 24 hours patient is afebrile.     REVIEW OF SYSTEMS:    CONSTITUTIONAL: No fever, weight loss, or fatigue  RESPIRATORY: No cough, wheezing, hemoptysis; No shortness of breath  CARDIOVASCULAR: No chest pain, palpitations  GASTROINTESTINAL: No abdominal or epigastric pain. No nausea, vomiting  NEUROLOGICAL: No headaches, memory loss, loss of strength.  MISCELLANEOUS:      Vital Signs Last 24 Hrs  T(C): 37 (2018 12:16), Max: 37.1 (2018 05:39)  T(F): 98.6 (2018 12:16), Max: 98.7 (2018 05:39)  HR: 51 (2018 12:16) (51 - 66)  BP: 138/65 (2018 12:16) (138/65 - 176/71)  BP(mean): --  RR: 18 (2018 12:16) (18 - 18)  SpO2: 95% (2018 12:16) (95% - 97%)    PHYSICAL EXAM:    GENERAL: NAD, awake   HEENT: PERRL, +EOMI  NECK: soft,  CHEST/LUNG: Chemoport, clear  HEART: S1S2+, Regular rate and rhythm; No murmurs  ABDOMEN: Soft, Nontender, Nondistended;   EXTREMITIES:  2+ Peripheral Pulses, No edema  SKIN: No rashes or lesions  NEURO: AAOX3,       LABS:                        8.9    9.8   )-----------( 149      ( 2018 08:18 )             29.4     01-23    138  |  104  |  27.0<H>  ----------------------------<  114  3.5   |  23.0  |  0.82    Ca    9.0      2018 08:18  Phos  3.1     01-22  Mg     2.0     01-23        Urinalysis Basic - ( 2018 13:16 )    Color: Yellow / Appearance: Slightly Turbid / S.015 / pH: x  Gluc: x / Ketone: Negative  / Bili: Negative / Urobili: Negative mg/dL   Blood: x / Protein: 500 mg/dL / Nitrite: Negative   Leuk Esterase: Small / RBC: 25-50 /HPF / WBC 11-25   Sq Epi: x / Non Sq Epi: Few / Bacteria: Moderate          MEDICATIONS  (STANDING):  acyclovir   Tablet 400 milliGRAM(s) Oral two times a day  amLODIPine   Tablet 5 milliGRAM(s) Oral daily  aspirin enteric coated 81 milliGRAM(s) Oral daily  benztropine 0.5 milliGRAM(s) Oral at bedtime  calcium carbonate 500 mG (Tums) Chewable 1 Tablet(s) Chew daily  cefTRIAXone   IVPB 2 Gram(s) IV Intermittent every 24 hours  cholecalciferol 2000 Unit(s) Oral daily  enoxaparin Injectable 40 milliGRAM(s) SubCutaneous daily  folic acid 1 milliGRAM(s) Oral daily  furosemide   Injectable 20 milliGRAM(s) IV Push daily  levothyroxine 75 MICROGram(s) Oral daily  lithium citrate Solution 600 milliGRAM(s) Oral daily  ofloxacin 0.3% Solution 1 Drop(s) Right EYE two times a day  saccharomyces boulardii 250 milliGRAM(s) Oral two times a day  tamsulosin 0.4 milliGRAM(s) Oral at bedtime    MEDICATIONS  (PRN):  acetaminophen   Tablet 650 milliGRAM(s) Oral every 6 hours PRN fever, headache or pain      RADIOLOGY & ADDITIONAL TESTS:    blood cultures  - in lab
[facility]FECTI[facility] DISEASES [facility] INTERNAL MEDICINE OF [place] ELVIN PAULINO MD FACP   JAIRO MALDONADO MD  Diplomates American Board of Internal Medicine and Infectious Diseases      KRYSTA HERRMANN  MRN-[id number]  72y    INTERVAL HPI/OVERNIGHT EVENTS:  AFEBRILE  VSS  CONFUSED - BASELINE  F/UP BLOOD C/S P    Vital Signs Last 24 Hrs  T(C): 37.1 (24 Jan 2018 06:14), Max: 37.2 (23 Jan 2018 19:58)  T(F): 98.7 (24 Jan 2018 06:14), Max: 98.9 (23 Jan 2018 19:58)  HR: 71 (24 Jan 2018 06:14) (51 - 71)  BP: 129/63 (24 Jan 2018 06:14) (129/63 - 152/64)  BP(mean): --  RR: 18 (24 Jan 2018 06:14) (18 - 19)  SpO2: 97% (24 Jan 2018 06:14) (95% - 98%)    ANTIBIOTICS  acyclovir   Tablet 400 milliGRAM(s) Oral two times a day  cefTRIAXone   IVPB 2 Gram(s) IV Intermittent every 24 hours      Allergies    ixazomib (Unknown)  NSAIDs (Unknown)  penicillins (Unknown)  sulfa drugs (Unknown)    Intolerances        REVIEW OF SYSTEMS:N/A    PHYSICAL EXAM:  Vital Signs Last 24 Hrs  T(C): 37.1 (24 Jan 2018 06:14), Max: 37.2 (23 Jan 2018 19:58)  T(F): 98.7 (24 Jan 2018 06:14), Max: 98.9 (23 Jan 2018 19:58)  HR: 71 (24 Jan 2018 06:14) (51 - 71)  BP: 129/63 (24 Jan 2018 06:14) (129/63 - 152/64)  BP(mean): --  RR: 18 (24 Jan 2018 06:14) (18 - 19)  SpO2: 97% (24 Jan 2018 06:14) (95% - 98%)      GEN: NAD, pleasant  HEENT: normocephalic and atraumatic. EOMI. MOLINA. Moist mucosa. Clear Posterior pharynx. SUPPLE NECK, NL PORT  NECK: Supple. No carotid bruits.  No lymphadenopathy or thyromegaly.  LUNGS: Clear to auscultation.  HEART: Regular rate and rhythm without murmur.  ABDOMEN: Soft, nontender, and nondistended.  Positive bowel sounds.  No hepatosplenomegaly was noted.  EXTREMITIES: Without any cyanosis, clubbing, rash, lesions or edema.  NEUROLOGIC: Cranial nerves II through XII are grossly intact.  MUSCULOSKELETAL:N  SKIN: No ulceration or induration present    LABS:                        9.4    8.7   )-----------( 154      ( 24 Jan 2018 08:13 )             30.0       01-24    140  |  106  |  27.0<H>  ----------------------------<  94  3.9   |  23.0  |  0.89    Ca    9.3      24 Jan 2018 08:13  Phos  3.1     01-22  Mg     2.1     01-24    TPro  8.6  /  Alb  2.9<L>  /  TBili  0.2<L>  /  DBili  x   /  AST  19  /  ALT  15  /  AlkPhos  132<H>  01-24 01-24 @ 08:13  hct 30.0 % hgb 9.4 g/dL    01-24 @ 08:13  plat 154 K/uL wbc 8.7 K/uL    01-23 @ 08:18  hct 29.4 % hgb 8.9 g/dL    01-23 @ 08:18  plat 149 K/uL wbc 9.8 K/uL    01-22 @ 16:31  hct 27.3 % hgb 8.6 g/dL    01-22 @ 16:31  plat 112 K/uL wbc 10.4 K/uL    01-21 @ 12:47  hct 31.4 % hgb 10.0 g/dL    01-21 @ 12:47  plat 165 K/uL wbc 9.9 K/uL      01-24-18  creat 0.89 mg/dL gfr 74 mL/min/1.73M2 bun 27.0 mg/dL k 3.9 mmol/L  01-23-18  creat 0.82 mg/dL gfr 82 mL/min/1.73M2 bun 27.0 mg/dL k 3.5 mmol/L  01-22-18  creat 0.83 mg/dL gfr 81 mL/min/1.73M2 bun 28.0 mg/dL k 3.8 mmol/L  01-21-18  creat 0.76 mg/dL gfr 90 mL/min/1.73M2 bun 33.0 mg/dL k 4.2 mmol/L      MICROBIOLOGY:  Culture Results:   >100,000 CFU/ml Escherichia coli  >100,000 CFU/ml Klebsiella pneumoniae (01-21 @ 13:16)  Culture Results:   Growth in aerobic and anaerobic bottles: Streptococcus pneumoniae  Susceptibility to follow.  Aerobic Bottle: 11:56 Hours to positivity  Anaerobic Bottle: 12:38 Hours to positivity  ***Blood Panel PCR results on this specimen are available  approximately 3 hours after the Gram stain result.***  Gram stain, PCR, and/or culture results may not always  correspond due to difference in methodologies.  ************************************************************  This PCR assay was performed using [employer].  The following targets are tested for: Enterococcus,  vancomycin resistant enterococci, Listeria monocytogenes,  coagulase negative staphylococci, S. aureus,  methicillin resistant S. aureus, Streptococcus agalactiae  (Group B), S. pneumoniae, S. pyogenes (Group A),  Acinetobacter baumannii, Enterobacter cloacae, E. coli,  Klebsiella oxytoca, K. pneumoniae, Proteus sp.,  Serratia marcescens, Haemophilus influenzae,  Neisseria meningitidis, Pseudomonas aeruginosa, Candida  albicans, C. glabrata, C krusei, C parapsilosis,  C. tropicalis and the KPC resistance gene.  ,  TYPE: (C=Critical, N=Notification, A=Abnormal) c  TESTS:  _ gs  DATE/TIME CALLED: _ 01/22/2018 11:18:47  CALLED TO: Madeline guallpa rn  READ BACK (2 Patient Identifiers)(Y/N): _ y  READ BACK VALUES (Y/N): _ y  CALLED BY: _ liberty garcia  .  TYPE: (C=Critical, N=Notification, A=Abnormal) C  TESTS:  _ Strep. pneumoniae  DATE/TIME CALLED: _ 01/23/2018 09:10:01  CALLED TO: _ Tiffany De León RN  READ BACK (2 Patient Identifiers)(Y/N): _ Y  READ BACK VALUES (Y/N): _ Y  CALLED BY: Madeline Morales (01-21 @ 12:54)  Culture Results:   Growth in aerobic and anaerobic bottles: Streptococcus pneumoniae  Susceptibility to follow.  Aerobic Bottle: 12:08 Hours to positivity  Anaerobic Bottle: 12:23 Hours to positivity  ,  TYPE: (C=Critical, N=Notification, A=Abnormal) c  TESTS:  _ gs  DATE/TIME CALLED: _ 01/22/2018 11:20:53  CALLED TO: Madeline guallpa rn  READ BACK (2 Patient Identifiers)(Y/N): _ y  READ BACK VALUES (Y/N): _ y  CALLED BY: Madeline garcia  .  TYPE: (C=Critical, N=Notification, A=Abnormal) C  TESTS:  _ Strep. pneumoniae  DATE/TIME CALLED: _ 01/23/2018 09:18:49  CALLED TO: Madeline De León RN  READ BACK (2 Patient Identifiers)(Y/N): _ Y  READ BACK VALUES (Y/N): _ Y  CALLED BY: Madeline Morales (01-21 @ 12:54)        RADIOLOGY & ADDITIONAL STUDIES:          RHINA PAULINO MD FACP      ?[place]FFERSON
[facility]ECT[facility] DISEASES [facility] INTERNAL MEDICINE OF [place] ELVIN PAULINO MD FACP   JAIRO MALDONADO MD  Diplomates American Board of Internal Medicine and Infectious Diseases      KRYSTA HERRMANN  MRN-[id number]  72y    INTERVAL HPI/OVERNIGHT EVENTS:  AFEBRILE  VSS  CONFUSED - BASELINE  F/UP BLOOD C/S ALL NEGATIVE  LESS CONGESTED  CXR NEG    ALL F/UP BLOOD C/S NEG    Vital Signs Last 24 Hrs  T(C): 37.1 (24 Jan 2018 06:14), Max: 37.2 (23 Jan 2018 19:58)  T(F): 98.7 (24 Jan 2018 06:14), Max: 98.9 (23 Jan 2018 19:58)  HR: 71 (24 Jan 2018 06:14) (51 - 71)  BP: 129/63 (24 Jan 2018 06:14) (129/63 - 152/64)  BP(mean): --  RR: 18 (24 Jan 2018 06:14) (18 - 19)  SpO2: 97% (24 Jan 2018 06:14) (95% - 98%)    ANTIBIOTICS  acyclovir   Tablet 400 milliGRAM(s) Oral two times a day  cefTRIAXone   IVPB 2 Gram(s) IV Intermittent every 24 hours      Allergies    ixazomib (Unknown)  NSAIDs (Unknown)  penicillins (Unknown)  sulfa drugs (Unknown)    Intolerances        REVIEW OF SYSTEMS:N/A    PHYSICAL EXAM: NO CHANGE  Vital Signs Last 24 Hrs  T(C): 37.1 (24 Jan 2018 06:14), Max: 37.2 (23 Jan 2018 19:58)  T(F): 98.7 (24 Jan 2018 06:14), Max: 98.9 (23 Jan 2018 19:58)  HR: 71 (24 Jan 2018 06:14) (51 - 71)  BP: 129/63 (24 Jan 2018 06:14) (129/63 - 152/64)  BP(mean): --  RR: 18 (24 Jan 2018 06:14) (18 - 19)  SpO2: 97% (24 Jan 2018 06:14) (95% - 98%)      GEN: NAD, pleasant  HEENT: normocephalic and atraumatic. EOMI. MOLINA. Moist mucosa. Clear Posterior pharynx. SUPPLE NECK, NL PORT  NECK: Supple. No carotid bruits.  No lymphadenopathy or thyromegaly.  LUNGS: CLEAR  HEART: Regular rate and rhythm without murmur.  ABDOMEN: Soft, nontender, and nondistended.  Positive bowel sounds.  No hepatosplenomegaly was noted.  EXTREMITIES: Without any cyanosis, clubbing, rash, lesions or edema.  NEUROLOGIC: Cranial nerves II through XII are grossly intact.  MUSCULOSKELETAL:N  SKIN: No ulceration or induration present    LABS:                        9.4    8.7   )-----------( 154      ( 24 Jan 2018 08:13 )             30.0       01-24    140  |  106  |  27.0<H>  ----------------------------<  94  3.9   |  23.0  |  0.89    Ca    9.3      24 Jan 2018 08:13  Phos  3.1     01-22  Mg     2.1     01-24    TPro  8.6  /  Alb  2.9<L>  /  TBili  0.2<L>  /  DBili  x   /  AST  19  /  ALT  15  /  AlkPhos  132<H>  01-24 01-24 @ 08:13  hct 30.0 % hgb 9.4 g/dL    01-24 @ 08:13  plat 154 K/uL wbc 8.7 K/uL    01-23 @ 08:18  hct 29.4 % hgb 8.9 g/dL    01-23 @ 08:18  plat 149 K/uL wbc 9.8 K/uL    01-22 @ 16:31  hct 27.3 % hgb 8.6 g/dL    01-22 @ 16:31  plat 112 K/uL wbc 10.4 K/uL    01-21 @ 12:47  hct 31.4 % hgb 10.0 g/dL    01-21 @ 12:47  plat 165 K/uL wbc 9.9 K/uL      01-24-18  creat 0.89 mg/dL gfr 74 mL/min/1.73M2 bun 27.0 mg/dL k 3.9 mmol/L  01-23-18  creat 0.82 mg/dL gfr 82 mL/min/1.73M2 bun 27.0 mg/dL k 3.5 mmol/L  01-22-18  creat 0.83 mg/dL gfr 81 mL/min/1.73M2 bun 28.0 mg/dL k 3.8 mmol/L  01-21-18  creat 0.76 mg/dL gfr 90 mL/min/1.73M2 bun 33.0 mg/dL k 4.2 mmol/L      MICROBIOLOGY:  Culture Results:   >100,000 CFU/ml Escherichia coli  >100,000 CFU/ml Klebsiella pneumoniae (01-21 @ 13:16)  Culture Results:   Growth in aerobic and anaerobic bottles: Streptococcus pneumoniae  Susceptibility to follow.  Aerobic Bottle: 11:56 Hours to positivity  Anaerobic Bottle: 12:38 Hours to positivity  ***Blood Panel PCR results on this specimen are available  approximately 3 hours after the Gram stain result.***  Gram stain, PCR, and/or culture results may not always  correspond due to difference in methodologies.  ************************************************************  This PCR assay was performed using [employer].  The following targets are tested for: Enterococcus,  vancomycin resistant enterococci, Listeria monocytogenes,  coagulase negative staphylococci, S. aureus,  methicillin resistant S. aureus, Streptococcus agalactiae  (Group B), S. pneumoniae, S. pyogenes (Group A),  Acinetobacter baumannii, Enterobacter cloacae, E. coli,  Klebsiella oxytoca, K. pneumoniae, Proteus sp.,  Serratia marcescens, Haemophilus influenzae,  Neisseria meningitidis, Pseudomonas aeruginosa, Candida  albicans, C. glabrata, C krusei, C parapsilosis,  C. tropicalis and the KPC resistance gene.  ,  TYPE: (C=Critical, N=Notification, A=Abnormal) c  TESTS:  _ gs  DATE/TIME CALLED: _ 01/22/2018 11:18:47  CALLED TO: _ jade guallpa rn  READ BACK (2 Patient Identifiers)(Y/N): _ y  READ BACK VALUES (Y/N): _ y  CALLED BY: Madeline garcia  .  TYPE: (C=Critical, N=Notification, A=Abnormal) C  TESTS:  _ Strep. pneumoniae  DATE/TIME CALLED: _ 01/23/2018 09:10:01  CALLED TO: _ Tiffany De León RN  READ BACK (2 Patient Identifiers)(Y/N): _ Y  READ BACK VALUES (Y/N): _ Y  CALLED BY: Madeline Morales (01-21 @ 12:54)  Culture Results:   Growth in aerobic and anaerobic bottles: Streptococcus pneumoniae  Susceptibility to follow.  Aerobic Bottle: 12:08 Hours to positivity  Anaerobic Bottle: 12:23 Hours to positivity  ,  TYPE: (C=Critical, N=Notification, A=Abnormal) c  TESTS:  _ gs  DATE/TIME CALLED: _ 01/22/2018 11:20:53  CALLED TO: Madeline guallpa rn  READ BACK (2 Patient Identifiers)(Y/N): _ y  READ BACK VALUES (Y/N): _ y  CALLED BY: Madeline garcia  .  TYPE: (C=Critical, N=Notification, A=Abnormal) C  TESTS:  _ Strep. pneumoniae  DATE/TIME CALLED: _ 01/23/2018 09:18:49  CALLED TO: Madeline De León RN  READ BACK (2 Patient Identifiers)(Y/N): _ Y  READ BACK VALUES (Y/N): _ Y  CALLED BY: Madeline Morales (01-21 @ 12:54)        RADIOLOGY & ADDITIONAL STUDIES:          RHINA PAULINO MD FACP      ?JEFF[place]
[facility]FECTIOUS DISEASES [facility] INTERNAL MEDICINE OF [place] ELVIN PAULINO MD FACP   JAIRO MALDONADO MD  Diplomates American Board of Internal Medicine and Infectious Diseases      KRYSTA HERRMANN  MRN-[id number]  72y    INTERVAL HPI/OVERNIGHT EVENTS:  AFEBRILE  VSS  CONFUSED - BASELINE  F/UP BLOOD C/S P  APPEARS MORE CONGESTED AND COUGHING    Vital Signs Last 24 Hrs  T(C): 37.1 (24 Jan 2018 06:14), Max: 37.2 (23 Jan 2018 19:58)  T(F): 98.7 (24 Jan 2018 06:14), Max: 98.9 (23 Jan 2018 19:58)  HR: 71 (24 Jan 2018 06:14) (51 - 71)  BP: 129/63 (24 Jan 2018 06:14) (129/63 - 152/64)  BP(mean): --  RR: 18 (24 Jan 2018 06:14) (18 - 19)  SpO2: 97% (24 Jan 2018 06:14) (95% - 98%)    ANTIBIOTICS  acyclovir   Tablet 400 milliGRAM(s) Oral two times a day  cefTRIAXone   IVPB 2 Gram(s) IV Intermittent every 24 hours      Allergies    ixazomib (Unknown)  NSAIDs (Unknown)  penicillins (Unknown)  sulfa drugs (Unknown)    Intolerances        REVIEW OF SYSTEMS:N/A    PHYSICAL EXAM: NO CHANGE  Vital Signs Last 24 Hrs  T(C): 37.1 (24 Jan 2018 06:14), Max: 37.2 (23 Jan 2018 19:58)  T(F): 98.7 (24 Jan 2018 06:14), Max: 98.9 (23 Jan 2018 19:58)  HR: 71 (24 Jan 2018 06:14) (51 - 71)  BP: 129/63 (24 Jan 2018 06:14) (129/63 - 152/64)  BP(mean): --  RR: 18 (24 Jan 2018 06:14) (18 - 19)  SpO2: 97% (24 Jan 2018 06:14) (95% - 98%)      GEN: NAD, pleasant  HEENT: normocephalic and atraumatic. EOMI. MOLINA. Moist mucosa. Clear Posterior pharynx. SUPPLE NECK, NL PORT  NECK: Supple. No carotid bruits.  No lymphadenopathy or thyromegaly.  LUNGS: BIBASILAR RALES.  HEART: Regular rate and rhythm without murmur.  ABDOMEN: Soft, nontender, and nondistended.  Positive bowel sounds.  No hepatosplenomegaly was noted.  EXTREMITIES: Without any cyanosis, clubbing, rash, lesions or edema.  NEUROLOGIC: Cranial nerves II through XII are grossly intact.  MUSCULOSKELETAL:N  SKIN: No ulceration or induration present    LABS:                        9.4    8.7   )-----------( 154      ( 24 Jan 2018 08:13 )             30.0       01-24    140  |  106  |  27.0<H>  ----------------------------<  94  3.9   |  23.0  |  0.89    Ca    9.3      24 Jan 2018 08:13  Phos  3.1     01-22  Mg     2.1     01-24    TPro  8.6  /  Alb  2.9<L>  /  TBili  0.2<L>  /  DBili  x   /  AST  19  /  ALT  15  /  AlkPhos  132<H>  01-24 01-24 @ 08:13  hct 30.0 % hgb 9.4 g/dL    01-24 @ 08:13  plat 154 K/uL wbc 8.7 K/uL    01-23 @ 08:18  hct 29.4 % hgb 8.9 g/dL    01-23 @ 08:18  plat 149 K/uL wbc 9.8 K/uL    01-22 @ 16:31  hct 27.3 % hgb 8.6 g/dL    01-22 @ 16:31  plat 112 K/uL wbc 10.4 K/uL    01-21 @ 12:47  hct 31.4 % hgb 10.0 g/dL    01-21 @ 12:47  plat 165 K/uL wbc 9.9 K/uL      01-24-18  creat 0.89 mg/dL gfr 74 mL/min/1.73M2 bun 27.0 mg/dL k 3.9 mmol/L  01-23-18  creat 0.82 mg/dL gfr 82 mL/min/1.73M2 bun 27.0 mg/dL k 3.5 mmol/L  01-22-18  creat 0.83 mg/dL gfr 81 mL/min/1.73M2 bun 28.0 mg/dL k 3.8 mmol/L  01-21-18  creat 0.76 mg/dL gfr 90 mL/min/1.73M2 bun 33.0 mg/dL k 4.2 mmol/L      MICROBIOLOGY:  Culture Results:   >100,000 CFU/ml Escherichia coli  >100,000 CFU/ml Klebsiella pneumoniae (01-21 @ 13:16)  Culture Results:   Growth in aerobic and anaerobic bottles: Streptococcus pneumoniae  Susceptibility to follow.  Aerobic Bottle: 11:56 Hours to positivity  Anaerobic Bottle: 12:38 Hours to positivity  ***Blood Panel PCR results on this specimen are available  approximately 3 hours after the Gram stain result.***  Gram stain, PCR, and/or culture results may not always  correspond due to difference in methodologies.  ************************************************************  This PCR assay was performed using [employer].  The following targets are tested for: Enterococcus,  vancomycin resistant enterococci, Listeria monocytogenes,  coagulase negative staphylococci, S. aureus,  methicillin resistant S. aureus, Streptococcus agalactiae  (Group B), S. pneumoniae, S. pyogenes (Group A),  Acinetobacter baumannii, Enterobacter cloacae, E. coli,  Klebsiella oxytoca, K. pneumoniae, Proteus sp.,  Serratia marcescens, Haemophilus influenzae,  Neisseria meningitidis, Pseudomonas aeruginosa, Candida  albicans, C. glabrata, C krusei, C parapsilosis,  C. tropicalis and the KPC resistance gene.  ,  TYPE: (C=Critical, N=Notification, A=Abnormal) c  TESTS:  _ gs  DATE/TIME CALLED: _ 01/22/2018 11:18:47  CALLED TO: Madeline guallpa rn  READ BACK (2 Patient Identifiers)(Y/N): _ y  READ BACK VALUES (Y/N): _ y  CALLED BY: Madeline garcia  .  TYPE: (C=Critical, N=Notification, A=Abnormal) C  TESTS:  _ Strep. pneumoniae  DATE/TIME CALLED: _ 01/23/2018 09:10:01  CALLED TO: Madeline De León RN  READ BACK (2 Patient Identifiers)(Y/N): _ Y  READ BACK VALUES (Y/N): _ Y  CALLED BY: _ Sajaz (01-21 @ 12:54)  Culture Results:   Growth in aerobic and anaerobic bottles: Streptococcus pneumoniae  Susceptibility to follow.  Aerobic Bottle: 12:08 Hours to positivity  Anaerobic Bottle: 12:23 Hours to positivity  ,  TYPE: (C=Critical, N=Notification, A=Abnormal) c  TESTS:  _ gs  DATE/TIME CALLED: _ 01/22/2018 11:20:53  CALLED TO: Madeline guallpa rn  READ BACK (2 Patient Identifiers)(Y/N): _ y  READ BACK VALUES (Y/N): _ y  CALLED BY: Madeline garcia  .  TYPE: (C=Critical, N=Notification, A=Abnormal) C  TESTS:  _ Strep. pneumoniae  DATE/TIME CALLED: _ 01/23/2018 09:18:49  CALLED TO: Madeline De León RN  READ BACK (2 Patient Identifiers)(Y/N): _ Y  READ BACK VALUES (Y/N): _ Y  CALLED BY: Madeline Morales (01-21 @ 12:54)        RADIOLOGY & ADDITIONAL STUDIES:          RHINA PAULINO MD FACP      ?[place]
[facility] INFECTIOUS DISEASES [facility] INTERNAL MEDICINE OF [place] ELVIN PAULINO MD FACP   JAIRO MALDONADO MD  Diplomates American Board of Internal Medicine and Infectious Diseases      KRYSTA HERRMANN  MRN-[id number]  72y    INTERVAL HPI/OVERNIGHT EVENTS:  AFEBRILE  VSS  CONFUSED - BASELINE  F/UP BLOOD C/S ALL NEGATIVE  LESS CONGESTED    Vital Signs Last 24 Hrs  T(C): 37.1 (24 Jan 2018 06:14), Max: 37.2 (23 Jan 2018 19:58)  T(F): 98.7 (24 Jan 2018 06:14), Max: 98.9 (23 Jan 2018 19:58)  HR: 71 (24 Jan 2018 06:14) (51 - 71)  BP: 129/63 (24 Jan 2018 06:14) (129/63 - 152/64)  BP(mean): --  RR: 18 (24 Jan 2018 06:14) (18 - 19)  SpO2: 97% (24 Jan 2018 06:14) (95% - 98%)    ANTIBIOTICS  acyclovir   Tablet 400 milliGRAM(s) Oral two times a day  cefTRIAXone   IVPB 2 Gram(s) IV Intermittent every 24 hours      Allergies    ixazomib (Unknown)  NSAIDs (Unknown)  penicillins (Unknown)  sulfa drugs (Unknown)    Intolerances        REVIEW OF SYSTEMS:N/A    PHYSICAL EXAM: NO CHANGE  Vital Signs Last 24 Hrs  T(C): 37.1 (24 Jan 2018 06:14), Max: 37.2 (23 Jan 2018 19:58)  T(F): 98.7 (24 Jan 2018 06:14), Max: 98.9 (23 Jan 2018 19:58)  HR: 71 (24 Jan 2018 06:14) (51 - 71)  BP: 129/63 (24 Jan 2018 06:14) (129/63 - 152/64)  BP(mean): --  RR: 18 (24 Jan 2018 06:14) (18 - 19)  SpO2: 97% (24 Jan 2018 06:14) (95% - 98%)      GEN: NAD, pleasant  HEENT: normocephalic and atraumatic. EOMI. MOLINA. Moist mucosa. Clear Posterior pharynx. SUPPLE NECK, NL PORT  NECK: Supple. No carotid bruits.  No lymphadenopathy or thyromegaly.  LUNGS: BIBASILAR RALES.  HEART: Regular rate and rhythm without murmur.  ABDOMEN: Soft, nontender, and nondistended.  Positive bowel sounds.  No hepatosplenomegaly was noted.  EXTREMITIES: Without any cyanosis, clubbing, rash, lesions or edema.  NEUROLOGIC: Cranial nerves II through XII are grossly intact.  MUSCULOSKELETAL:N  SKIN: No ulceration or induration present    LABS:                        9.4    8.7   )-----------( 154      ( 24 Jan 2018 08:13 )             30.0       01-24    140  |  106  |  27.0<H>  ----------------------------<  94  3.9   |  23.0  |  0.89    Ca    9.3      24 Jan 2018 08:13  Phos  3.1     01-22  Mg     2.1     01-24    TPro  8.6  /  Alb  2.9<L>  /  TBili  0.2<L>  /  DBili  x   /  AST  19  /  ALT  15  /  AlkPhos  132<H>  01-24 01-24 @ 08:13  hct 30.0 % hgb 9.4 g/dL    01-24 @ 08:13  plat 154 K/uL wbc 8.7 K/uL    01-23 @ 08:18  hct 29.4 % hgb 8.9 g/dL    01-23 @ 08:18  plat 149 K/uL wbc 9.8 K/uL    01-22 @ 16:31  hct 27.3 % hgb 8.6 g/dL    01-22 @ 16:31  plat 112 K/uL wbc 10.4 K/uL    01-21 @ 12:47  hct 31.4 % hgb 10.0 g/dL    01-21 @ 12:47  plat 165 K/uL wbc 9.9 K/uL      01-24-18  creat 0.89 mg/dL gfr 74 mL/min/1.73M2 bun 27.0 mg/dL k 3.9 mmol/L  01-23-18  creat 0.82 mg/dL gfr 82 mL/min/1.73M2 bun 27.0 mg/dL k 3.5 mmol/L  01-22-18  creat 0.83 mg/dL gfr 81 mL/min/1.73M2 bun 28.0 mg/dL k 3.8 mmol/L  01-21-18  creat 0.76 mg/dL gfr 90 mL/min/1.73M2 bun 33.0 mg/dL k 4.2 mmol/L      MICROBIOLOGY:  Culture Results:   >100,000 CFU/ml Escherichia coli  >100,000 CFU/ml Klebsiella pneumoniae (01-21 @ 13:16)  Culture Results:   Growth in aerobic and anaerobic bottles: Streptococcus pneumoniae  Susceptibility to follow.  Aerobic Bottle: 11:56 Hours to positivity  Anaerobic Bottle: 12:38 Hours to positivity  ***Blood Panel PCR results on this specimen are available  approximately 3 hours after the Gram stain result.***  Gram stain, PCR, and/or culture results may not always  correspond due to difference in methodologies.  ************************************************************  This PCR assay was performed using [employer].  The following targets are tested for: Enterococcus,  vancomycin resistant enterococci, Listeria monocytogenes,  coagulase negative staphylococci, S. aureus,  methicillin resistant S. aureus, Streptococcus agalactiae  (Group B), S. pneumoniae, S. pyogenes (Group A),  Acinetobacter baumannii, Enterobacter cloacae, E. coli,  Klebsiella oxytoca, K. pneumoniae, Proteus sp.,  Serratia marcescens, Haemophilus influenzae,  Neisseria meningitidis, Pseudomonas aeruginosa, Candida  albicans, C. glabrata, C krusei, C parapsilosis,  C. tropicalis and the KPC resistance gene.  ,  TYPE: (C=Critical, N=Notification, A=Abnormal) c  TESTS:  _ gs  DATE/TIME CALLED: _ 01/22/2018 11:18:47  CALLED TO: Madeline guallpa rn  READ BACK (2 Patient Identifiers)(Y/N): _ y  READ BACK VALUES (Y/N): _ y  CALLED BY: Madeline garcia  .  TYPE: (C=Critical, N=Notification, A=Abnormal) C  TESTS:  _ Strep. pneumoniae  DATE/TIME CALLED: _ 01/23/2018 09:10:01  CALLED TO: _ Tiffany De León RN  READ BACK (2 Patient Identifiers)(Y/N): _ Y  READ BACK VALUES (Y/N): _ Y  CALLED BY: Madeline Morales (01-21 @ 12:54)  Culture Results:   Growth in aerobic and anaerobic bottles: Streptococcus pneumoniae  Susceptibility to follow.  Aerobic Bottle: 12:08 Hours to positivity  Anaerobic Bottle: 12:23 Hours to positivity  ,  TYPE: (C=Critical, N=Notification, A=Abnormal) c  TESTS:  _ gs  DATE/TIME CALLED: _ 01/22/2018 11:20:53  CALLED TO: Madeline guallpa rn  READ BACK (2 Patient Identifiers)(Y/N): _ y  READ BACK VALUES (Y/N): _ y  CALLED BY: Madeline garcia  .  TYPE: (C=Critical, N=Notification, A=Abnormal) C  TESTS:  _ Strep. pneumoniae  DATE/TIME CALLED: _ 01/23/2018 09:18:49  CALLED TO: Madeline De León RN  READ BACK (2 Patient Identifiers)(Y/N): _ Y  READ BACK VALUES (Y/N): _ Y  CALLED BY: Madeline Morales (01-21 @ 12:54)        RADIOLOGY & ADDITIONAL STUDIES:          RHINA PAULINO MD FACP      ?JEFFERSON
Progress Note:   · Provider Specialty	Heme/Onc	      · Subjective and Objective: 	  Heme/Onc. FU: Myeloma. Raissa 1/26/18:  UTI  Bacteremia.    Patient is an established patient with Dr. Klein. Patient has refractory multiple myeloma and has been on multiple treatments including Revlimid, Velcade, Kyprolis, Daratumamab, Empliciti, Panabinostat. In Nov 2017 her M-spike was noted to be 8.9 She was started on Doxil/Velcade. She has received 2 cycles with last dose on 1/19/18    Patient has now been admitted for toxic metabolic encephalopathy , bacteremia, UTI.    Presently comfortable, awake, confused    PAST MEDICAL & SURGICAL HISTORY:  Multiple myeloma  Mental disorder  Bone cancer  Scoliosis  Hypercholesteremia  Hypertension  Hypothyroid  No significant past surgical history        Comfortable, confused; does not accurately answer questions  No bleeding.  Not in pain.            Assessment and Recommendation:   · Assessment		    Refractory multiple myeloma: currently on Doxil and Velcade. She seems to be responding to treatment  with improvement in her M-spike. She may need port removal for her gm + bacteremia.   < from: CT Chest No Cont (01.21.18 @ 22:50) >     EXAM:  CT CHEST                          PROCEDURE DATE:  01/21/2018          INTERPRETATION:  HISTORY: Shortness of breath.    Date and Time of Exam: 1/21/2018 10:45 PM    TECHNIQUE:  Sections were obtained from the apices to the diaphragm   without intravenous contrast.    COMPARISON EXAMINATION:   12/15/2016.    FINDINGS: No evidence of mediastinal or hilar lymphadenopathy. There is a   small left pleural effusion. This is a new finding since the prior study.   No evidence of a right pleural effusion. No evidence of a pericardial   effusion. Cardiomegaly is noted.    No evidence of axillary adenopathy.    Linear atelectasis or fibrosis at the right lung base unchanged since the   prior study.    There is a rounded lesion in the left lower lobe suspicious for a   neoplasm. This abnormality measures 4 x 4 CM. This was not present on   12/15/2016.    Degenerative changes in the spine. There is a lytic lesion in the L2   vertebral body, larger since 12/15/2016. There is a lytic lesion in the   left second rib, larger since the prior study.      IMPRESSION:     Enlarging lytic lesions in the left second rib and L2 vertebral body   since 12/15/2016 suspicious for progression of metastatic disease.    Rounded lesion in the left lower lobe likely neoplastic in etiology. This   represents a new finding..    .    Imp: Multiple Myeloma; heavily treated  Additional treatment per treating Oncologist, Dr. Land  Management of sepsis per ID, on antibiotics  Will monitor hematologically  Supportive care oncologically for now.
Progress Note:   · Provider Specialty	Heme/Onc	      · Subjective and Objective: 	  Heme/Onc. FU: Myeloma.UTI  Bacteremia.    Patient is an established patient with Dr. Klein. Patient has refractory multiple myeloma and has been on multiple treatments including Revlimid, Velcade, Kyprolis, Daratumamab, Empliciti, Panabinostat. In Nov 2017 her M-spike was noted to be 8.9 She was started on Doxil/Velcade. She has received 2 cycles with last dose on 1/19/18    Patient has now been admitted for toxic metabolic encephalopathy , bacteremia, UTI.    Presently comfortable, awake, confused  Denies SOB or chest pain    PAST MEDICAL & SURGICAL HISTORY:  Multiple myeloma  Mental disorder  Bone cancer  Scoliosis  Hypercholesteremia  Hypertension  Hypothyroid  No significant past surgical history        Comfortable, confused; does not accurately answer questions  No bleeding.  Not in pain.            Assessment and Recommendation:   · Assessment		    Refractory multiple myeloma: currently on Doxil and Velcade. She seems to be responding to treatment  with improvement in her M-spike. She may need port removal for her gm + bacteremia.   < from: CT Chest No Cont (01.21.18 @ 22:50) >     EXAM:  CT CHEST                          PROCEDURE DATE:  01/21/2018          INTERPRETATION:  HISTORY: Shortness of breath.    Date and Time of Exam: 1/21/2018 10:45 PM    TECHNIQUE:  Sections were obtained from the apices to the diaphragm   without intravenous contrast.    COMPARISON EXAMINATION:   12/15/2016.    FINDINGS: No evidence of mediastinal or hilar lymphadenopathy. There is a   small left pleural effusion. This is a new finding since the prior study.   No evidence of a right pleural effusion. No evidence of a pericardial   effusion. Cardiomegaly is noted.    No evidence of axillary adenopathy.    Linear atelectasis or fibrosis at the right lung base unchanged since the   prior study.    There is a rounded lesion in the left lower lobe suspicious for a   neoplasm. This abnormality measures 4 x 4 CM. This was not present on   12/15/2016.    Degenerative changes in the spine. There is a lytic lesion in the L2   vertebral body, larger since 12/15/2016. There is a lytic lesion in the   left second rib, larger since the prior study.      IMPRESSION:     Enlarging lytic lesions in the left second rib and L2 vertebral body   since 12/15/2016 suspicious for progression of metastatic disease.    Rounded lesion in the left lower lobe likely neoplastic in etiology. This   represents a new finding..    .    Imp: Multiple Myeloma; heavily treated  Additional treatment per treating Oncologist, Dr. Land  Management of sepsis per ID, on antibiotics  Will monitor hematologically  Supportive care oncologically for now.
Progress Note:   · Provider Specialty	Heme/Onc	      · Subjective and Objective: 	  Heme/Onc. FU: Myeloma.UTI  Bacteremia.    Patient is an established patient with Dr. Klein. Patient has refractory multiple myeloma and has been on multiple treatments including Revlimid, Velcade, Kyprolis, Daratumamab, Empliciti, Panabinostat. In Nov 2017 her M-spike was noted to be 8.9 She was started on Doxil/Velcade. She has received 2 cycles with last dose on 1/19/18    Patient has now been admitted for toxic metabolic encephalopathy , bacteremia, UTI.    Presently comfortable, awake, confused  Offers no new complaints  Denies SOB or chest pain    PAST MEDICAL & SURGICAL HISTORY:  Multiple myeloma  Mental disorder  Bone cancer  Scoliosis  Hypercholesteremia  Hypertension  Hypothyroid  No significant past surgical history        Comfortable, confused; does not accurately answer questions  No bleeding.  Not in pain.            Assessment and Recommendation:   · Assessment		    Refractory multiple myeloma: currently on Doxil and Velcade. She seems to be responding to treatment  with improvement in her M-spike. She may need port removal for her gm + bacteremia.   < from: CT Chest No Cont (01.21.18 @ 22:50) >     EXAM:  CT CHEST                          PROCEDURE DATE:  01/21/2018          INTERPRETATION:  HISTORY: Shortness of breath.    Date and Time of Exam: 1/21/2018 10:45 PM    TECHNIQUE:  Sections were obtained from the apices to the diaphragm   without intravenous contrast.    COMPARISON EXAMINATION:   12/15/2016.    FINDINGS: No evidence of mediastinal or hilar lymphadenopathy. There is a   small left pleural effusion. This is a new finding since the prior study.   No evidence of a right pleural effusion. No evidence of a pericardial   effusion. Cardiomegaly is noted.    No evidence of axillary adenopathy.    Linear atelectasis or fibrosis at the right lung base unchanged since the   prior study.    There is a rounded lesion in the left lower lobe suspicious for a   neoplasm. This abnormality measures 4 x 4 CM. This was not present on   12/15/2016.    Degenerative changes in the spine. There is a lytic lesion in the L2   vertebral body, larger since 12/15/2016. There is a lytic lesion in the   left second rib, larger since the prior study.      IMPRESSION:     Enlarging lytic lesions in the left second rib and L2 vertebral body   since 12/15/2016 suspicious for progression of metastatic disease.    Rounded lesion in the left lower lobe likely neoplastic in etiology. This   represents a new finding..    .    Imp: Multiple Myeloma; heavily treated  Additional treatment per treating Oncologist, Dr. Land  Management of sepsis per ID, on antibiotics  Will monitor hematologically, would recheck CBC  Supportive care oncologically for now.

## 2018-01-29 ENCOUNTER — TRANSCRIPTION ENCOUNTER (OUTPATIENT)
Age: 73
End: 2018-01-29

## 2018-01-29 VITALS
RESPIRATION RATE: 18 BRPM | DIASTOLIC BLOOD PRESSURE: 55 MMHG | OXYGEN SATURATION: 99 % | SYSTOLIC BLOOD PRESSURE: 122 MMHG | TEMPERATURE: 98 F | HEART RATE: 54 BPM

## 2018-01-29 LAB
ANION GAP SERPL CALC-SCNC: 10 MMOL/L — SIGNIFICANT CHANGE UP (ref 5–17)
ANISOCYTOSIS BLD QL: SLIGHT — SIGNIFICANT CHANGE UP
BASOPHILS # BLD AUTO: 0 K/UL — SIGNIFICANT CHANGE UP (ref 0–0.2)
BASOPHILS NFR BLD AUTO: 1 % — SIGNIFICANT CHANGE UP (ref 0–2)
BUN SERPL-MCNC: 26 MG/DL — HIGH (ref 8–20)
CALCIUM SERPL-MCNC: 8.6 MG/DL — SIGNIFICANT CHANGE UP (ref 8.6–10.2)
CHLORIDE SERPL-SCNC: 105 MMOL/L — SIGNIFICANT CHANGE UP (ref 98–107)
CO2 SERPL-SCNC: 22 MMOL/L — SIGNIFICANT CHANGE UP (ref 22–29)
CREAT SERPL-MCNC: 0.75 MG/DL — SIGNIFICANT CHANGE UP (ref 0.5–1.3)
CULTURE RESULTS: SIGNIFICANT CHANGE UP
CULTURE RESULTS: SIGNIFICANT CHANGE UP
EOSINOPHIL # BLD AUTO: 0.1 K/UL — SIGNIFICANT CHANGE UP (ref 0–0.5)
EOSINOPHIL NFR BLD AUTO: 3 % — SIGNIFICANT CHANGE UP (ref 0–5)
GLUCOSE SERPL-MCNC: 84 MG/DL — SIGNIFICANT CHANGE UP (ref 70–115)
HCT VFR BLD CALC: 30.1 % — LOW (ref 37–47)
HGB BLD-MCNC: 9.4 G/DL — LOW (ref 12–16)
HYPOCHROMIA BLD QL: SLIGHT — SIGNIFICANT CHANGE UP
LYMPHOCYTES # BLD AUTO: 0.7 K/UL — LOW (ref 1–4.8)
LYMPHOCYTES # BLD AUTO: 22 % — SIGNIFICANT CHANGE UP (ref 20–55)
MACROCYTES BLD QL: SLIGHT — SIGNIFICANT CHANGE UP
MCHC RBC-ENTMCNC: 30.9 PG — SIGNIFICANT CHANGE UP (ref 27–31)
MCHC RBC-ENTMCNC: 31.2 G/DL — LOW (ref 32–36)
MCV RBC AUTO: 99 FL — SIGNIFICANT CHANGE UP (ref 81–99)
MICROCYTES BLD QL: SLIGHT — SIGNIFICANT CHANGE UP
MONOCYTES # BLD AUTO: 0.6 K/UL — SIGNIFICANT CHANGE UP (ref 0–0.8)
MONOCYTES NFR BLD AUTO: 19 % — HIGH (ref 3–10)
NEUTROPHILS # BLD AUTO: 1.7 K/UL — LOW (ref 1.8–8)
NEUTROPHILS NFR BLD AUTO: 54 % — SIGNIFICANT CHANGE UP (ref 37–73)
PLAT MORPH BLD: NORMAL — SIGNIFICANT CHANGE UP
PLATELET # BLD AUTO: 206 K/UL — SIGNIFICANT CHANGE UP (ref 150–400)
POIKILOCYTOSIS BLD QL AUTO: SLIGHT — SIGNIFICANT CHANGE UP
POTASSIUM SERPL-MCNC: 4.5 MMOL/L — SIGNIFICANT CHANGE UP (ref 3.5–5.3)
POTASSIUM SERPL-SCNC: 4.5 MMOL/L — SIGNIFICANT CHANGE UP (ref 3.5–5.3)
RBC # BLD: 3.04 M/UL — LOW (ref 4.4–5.2)
RBC # FLD: 16.2 % — HIGH (ref 11–15.6)
RBC BLD AUTO: ABNORMAL
SODIUM SERPL-SCNC: 137 MMOL/L — SIGNIFICANT CHANGE UP (ref 135–145)
SPECIMEN SOURCE: SIGNIFICANT CHANGE UP
SPECIMEN SOURCE: SIGNIFICANT CHANGE UP
VARIANT LYMPHS # BLD: 1 % — SIGNIFICANT CHANGE UP (ref 0–6)
WBC # BLD: 3.1 K/UL — LOW (ref 4.8–10.8)
WBC # FLD AUTO: 3.1 K/UL — LOW (ref 4.8–10.8)

## 2018-01-29 PROCEDURE — 87086 URINE CULTURE/COLONY COUNT: CPT

## 2018-01-29 PROCEDURE — 82746 ASSAY OF FOLIC ACID SERUM: CPT

## 2018-01-29 PROCEDURE — 36415 COLL VENOUS BLD VENIPUNCTURE: CPT

## 2018-01-29 PROCEDURE — 84484 ASSAY OF TROPONIN QUANT: CPT

## 2018-01-29 PROCEDURE — 86780 TREPONEMA PALLIDUM: CPT

## 2018-01-29 PROCEDURE — 85027 COMPLETE CBC AUTOMATED: CPT

## 2018-01-29 PROCEDURE — 87040 BLOOD CULTURE FOR BACTERIA: CPT

## 2018-01-29 PROCEDURE — 87633 RESP VIRUS 12-25 TARGETS: CPT

## 2018-01-29 PROCEDURE — 71250 CT THORAX DX C-: CPT

## 2018-01-29 PROCEDURE — 96374 THER/PROPH/DIAG INJ IV PUSH: CPT

## 2018-01-29 PROCEDURE — 87186 SC STD MICRODIL/AGAR DIL: CPT

## 2018-01-29 PROCEDURE — 83735 ASSAY OF MAGNESIUM: CPT

## 2018-01-29 PROCEDURE — 84100 ASSAY OF PHOSPHORUS: CPT

## 2018-01-29 PROCEDURE — 85610 PROTHROMBIN TIME: CPT

## 2018-01-29 PROCEDURE — 93306 TTE W/DOPPLER COMPLETE: CPT

## 2018-01-29 PROCEDURE — 80053 COMPREHEN METABOLIC PANEL: CPT

## 2018-01-29 PROCEDURE — 87581 M.PNEUMON DNA AMP PROBE: CPT

## 2018-01-29 PROCEDURE — 82607 VITAMIN B-12: CPT

## 2018-01-29 PROCEDURE — 83605 ASSAY OF LACTIC ACID: CPT

## 2018-01-29 PROCEDURE — 99285 EMERGENCY DEPT VISIT HI MDM: CPT | Mod: 25

## 2018-01-29 PROCEDURE — 71045 X-RAY EXAM CHEST 1 VIEW: CPT

## 2018-01-29 PROCEDURE — 81001 URINALYSIS AUTO W/SCOPE: CPT

## 2018-01-29 PROCEDURE — 87150 DNA/RNA AMPLIFIED PROBE: CPT

## 2018-01-29 PROCEDURE — 80178 ASSAY OF LITHIUM: CPT

## 2018-01-29 PROCEDURE — 82962 GLUCOSE BLOOD TEST: CPT

## 2018-01-29 PROCEDURE — 87486 CHLMYD PNEUM DNA AMP PROBE: CPT

## 2018-01-29 PROCEDURE — 85730 THROMBOPLASTIN TIME PARTIAL: CPT

## 2018-01-29 PROCEDURE — 96375 TX/PRO/DX INJ NEW DRUG ADDON: CPT

## 2018-01-29 PROCEDURE — 84145 PROCALCITONIN (PCT): CPT

## 2018-01-29 PROCEDURE — 71046 X-RAY EXAM CHEST 2 VIEWS: CPT

## 2018-01-29 PROCEDURE — 87798 DETECT AGENT NOS DNA AMP: CPT

## 2018-01-29 PROCEDURE — 70450 CT HEAD/BRAIN W/O DYE: CPT

## 2018-01-29 PROCEDURE — 80048 BASIC METABOLIC PNL TOTAL CA: CPT

## 2018-01-29 PROCEDURE — 99239 HOSP IP/OBS DSCHRG MGMT >30: CPT

## 2018-01-29 PROCEDURE — 83880 ASSAY OF NATRIURETIC PEPTIDE: CPT

## 2018-01-29 PROCEDURE — 84443 ASSAY THYROID STIM HORMONE: CPT

## 2018-01-29 RX ORDER — LEVOTHYROXINE SODIUM 125 MCG
1 TABLET ORAL
Qty: 0 | Refills: 0 | DISCHARGE
Start: 2018-01-29

## 2018-01-29 RX ORDER — LITHIUM CARBONATE 300 MG/1
600 TABLET, EXTENDED RELEASE ORAL
Qty: 0 | Refills: 0 | COMMUNITY

## 2018-01-29 RX ORDER — TAMSULOSIN HYDROCHLORIDE 0.4 MG/1
1 CAPSULE ORAL
Qty: 0 | Refills: 0 | COMMUNITY
Start: 2018-01-29

## 2018-01-29 RX ORDER — SACCHAROMYCES BOULARDII 250 MG
1 POWDER IN PACKET (EA) ORAL
Qty: 14 | Refills: 0 | OUTPATIENT
Start: 2018-01-29 | End: 2018-02-04

## 2018-01-29 RX ORDER — CALCIUM CARBONATE 500(1250)
1 TABLET ORAL
Qty: 0 | Refills: 0 | COMMUNITY
Start: 2018-01-29

## 2018-01-29 RX ORDER — LITHIUM CARBONATE 300 MG/1
1 TABLET, EXTENDED RELEASE ORAL
Qty: 0 | Refills: 0 | DISCHARGE
Start: 2018-01-29

## 2018-01-29 RX ORDER — LITHIUM CARBONATE 300 MG/1
10 TABLET, EXTENDED RELEASE ORAL
Qty: 0 | Refills: 0 | COMMUNITY
Start: 2018-01-29

## 2018-01-29 RX ORDER — BENZTROPINE MESYLATE 1 MG
1 TABLET ORAL
Qty: 0 | Refills: 0 | COMMUNITY
Start: 2018-01-29

## 2018-01-29 RX ORDER — CIPROFLOXACIN LACTATE 400MG/40ML
1 VIAL (ML) INTRAVENOUS
Qty: 7 | Refills: 0 | OUTPATIENT
Start: 2018-01-29 | End: 2018-02-04

## 2018-01-29 RX ORDER — FUROSEMIDE 40 MG
1 TABLET ORAL
Qty: 0 | Refills: 0 | DISCHARGE
Start: 2018-01-29

## 2018-01-29 RX ORDER — ACYCLOVIR SODIUM 500 MG
1 VIAL (EA) INTRAVENOUS
Qty: 0 | Refills: 0 | COMMUNITY
Start: 2018-01-29

## 2018-01-29 RX ORDER — CHOLECALCIFEROL (VITAMIN D3) 125 MCG
2000 CAPSULE ORAL
Qty: 0 | Refills: 0 | COMMUNITY
Start: 2018-01-29

## 2018-01-29 RX ORDER — ASPIRIN/CALCIUM CARB/MAGNESIUM 324 MG
1 TABLET ORAL
Qty: 0 | Refills: 0 | COMMUNITY
Start: 2018-01-29

## 2018-01-29 RX ORDER — FOLIC ACID 0.8 MG
1 TABLET ORAL
Qty: 0 | Refills: 0 | DISCHARGE
Start: 2018-01-29

## 2018-01-29 RX ORDER — AMLODIPINE BESYLATE 2.5 MG/1
1 TABLET ORAL
Qty: 0 | Refills: 0 | COMMUNITY
Start: 2018-01-29

## 2018-01-29 RX ORDER — BENZTROPINE MESYLATE 1 MG
1 TABLET ORAL
Qty: 0 | Refills: 0 | COMMUNITY

## 2018-01-29 RX ORDER — FUROSEMIDE 40 MG
1 TABLET ORAL
Qty: 0 | Refills: 0 | COMMUNITY
Start: 2018-01-29

## 2018-01-29 RX ADMIN — LITHIUM CARBONATE 600 MILLIGRAM(S): 300 TABLET, EXTENDED RELEASE ORAL at 12:04

## 2018-01-29 RX ADMIN — Medication 1 TABLET(S): at 12:04

## 2018-01-29 RX ADMIN — Medication 400 MILLIGRAM(S): at 05:54

## 2018-01-29 RX ADMIN — Medication 81 MILLIGRAM(S): at 12:04

## 2018-01-29 RX ADMIN — Medication 2000 UNIT(S): at 12:04

## 2018-01-29 RX ADMIN — AMLODIPINE BESYLATE 5 MILLIGRAM(S): 2.5 TABLET ORAL at 05:54

## 2018-01-29 RX ADMIN — Medication 20 MILLIGRAM(S): at 05:53

## 2018-01-29 RX ADMIN — Medication 1 MILLIGRAM(S): at 12:04

## 2018-01-29 RX ADMIN — Medication 75 MICROGRAM(S): at 05:53

## 2018-01-29 RX ADMIN — Medication 250 MILLIGRAM(S): at 05:53

## 2018-01-29 RX ADMIN — ENOXAPARIN SODIUM 40 MILLIGRAM(S): 100 INJECTION SUBCUTANEOUS at 12:04

## 2018-01-29 NOTE — DISCHARGE NOTE ADULT - CARE PROVIDER_API CALL
Speedy Rodgers (MD; PhD), HematologyOncology; Internal Medicine  [address] [place] [address]  Phone: [phone]  Fax: [phone]    pcp,   josé miguel Verdi  Phone: (   )    -  Fax: (   )    -

## 2018-01-29 NOTE — DISCHARGE NOTE ADULT - PLAN OF CARE
resolved, secondary to bacteremia, dc with po abx follow up with primary care  it was not due to chemoport, and it was left in dc with po abx completed full course of iv ceftriazone home meds dc to [facility] follow up with outpatient oncology treated lasix secondary to bacteremia spoke to dr Haji follow up with oncology as outpatient

## 2018-01-29 NOTE — DISCHARGE NOTE ADULT - PATIENT PORTAL LINK FT
“You can access the FollowHealth Patient Portal, offered by [facility], by registering with the following website: [email or web address]”

## 2018-01-29 NOTE — DISCHARGE NOTE ADULT - SECONDARY DIAGNOSIS.
Pneumococcal bacteremia Bipolar 1 disorder Multiple myeloma UTI (urinary tract infection) Diastolic heart failure Sepsis Lung mass

## 2018-01-29 NOTE — DISCHARGE NOTE ADULT - CARE PLAN
Principal Discharge DX:	Toxic metabolic encephalopathy  Goal:	resolved, secondary to bacteremia, dc with po abx  Assessment and plan of treatment:	follow up with primary care  it was not due to chemoport, and it was left in  Secondary Diagnosis:	Pneumococcal bacteremia  Goal:	dc with po abx  Assessment and plan of treatment:	completed full course of iv ceftriazone  Secondary Diagnosis:	Bipolar 1 disorder  Goal:	home meds  Assessment and plan of treatment:	dc to [facility]  Secondary Diagnosis:	Multiple myeloma  Goal:	follow up with outpatient oncology  Secondary Diagnosis:	UTI (urinary tract infection)  Goal:	treated  Secondary Diagnosis:	Diastolic heart failure  Goal:	lasix  Secondary Diagnosis:	Sepsis  Goal:	secondary to bacteremia  Assessment and plan of treatment:	dc with po abx Principal Discharge DX:	Toxic metabolic encephalopathy  Goal:	resolved, secondary to bacteremia, dc with po abx  Assessment and plan of treatment:	follow up with primary care  it was not due to chemoport, and it was left in  Secondary Diagnosis:	Pneumococcal bacteremia  Goal:	dc with po abx  Assessment and plan of treatment:	completed full course of iv ceftriazone  Secondary Diagnosis:	Bipolar 1 disorder  Goal:	home meds  Assessment and plan of treatment:	dc to [facility]  Secondary Diagnosis:	Multiple myeloma  Goal:	follow up with outpatient oncology  Secondary Diagnosis:	UTI (urinary tract infection)  Goal:	treated  Secondary Diagnosis:	Diastolic heart failure  Goal:	lasix  Secondary Diagnosis:	Lung mass  Goal:	spoke to dr Haji  Assessment and plan of treatment:	follow up with oncology as outpatient

## 2018-01-29 NOTE — DISCHARGE NOTE ADULT - HOSPITAL COURSE
71 y/o F pt with PMH of HTN, HLD, Hypothyroid, Mental disorder, Multiple myeloma on chemo, Scoliosis was sent to the ED from [place] as she slipped out wheelchair today, no LOC, responsive to painful stimuli with no reported injury or trauma.  Pt is normally very verbal & makes jokes & as per sitter by her bedside but pt has been confused lately. As per sitter pt has been having some congestion, cough, SOB &  diarrhea which started since few days.  She normally walks without assistance but was placed in a wheelchair yesterday due to her being unsteady. Pt appears to be a poor historian at this time & during me encounter saying "leave me alone". Pt gets chemo for her MM and her last chemo was 2 weeks prior.     patient admitted to medicine as a sepsis and toxic metabolic encephalopathy and cultured. Patient found to have strep bacteremia and id coinsulted. Patient started on iv ceft and cultures repeated and repeats were negative.    urine culturE: kleb and e coli    Patient remained stable and received full course of iv abx and is now stable for dc back to [place] and is to finish 7 days of po levbaquin.    time spent on dc 32 minutes 73 y/o F pt with PMH of HTN, HLD, Hypothyroid, Mental disorder, Multiple myeloma on chemo, Scoliosis was sent to the ED from [place] as she slipped out wheelchair today, no LOC, responsive to painful stimuli with no reported injury or trauma.  Pt is normally very verbal & makes jokes & as per sitter by her bedside but pt has been confused lately. As per sitter pt has been having some congestion, cough, SOB &  diarrhea which started since few days.  She normally walks without assistance but was placed in a wheelchair yesterday due to her being unsteady. Pt appears to be a poor historian at this time & during me encounter saying "leave me alone". Pt gets chemo for her MM and her last chemo was 2 weeks prior.     patient admitted to medicine as a sepsis and toxic metabolic encephalopathy and cultured. Patient found to have strep bacteremia and id coinsulted. Patient started on iv ceft and cultures repeated and repeats were negative.    urine culturE: kleb and e coli    Patient remained stable and received full course of iv abx and is now stable for dc back to [place] and is to finish 7 days of po levbaquin.  SPOKE TO DR ZHOU ABOUT NEW LUNG MASS, nothing to do for now and outpatient follow up. Possible new mass    time spent on dc 32 minutes

## 2018-01-29 NOTE — DISCHARGE NOTE ADULT - MEDICATION SUMMARY - MEDICATIONS TO TAKE
I will START or STAY ON the medications listed below when I get home from the hospital:    aspirin 81 mg oral delayed release tablet  -- 1 tab(s) by mouth once a day  -- Indication: For heart health    calcium carbonate 500 mg (200 mg elemental calcium) oral tablet, chewable  -- 1 tab(s) by mouth once a day  -- Indication: For Antacids    tamsulosin 0.4 mg oral capsule  -- 1 cap(s) by mouth once a day (at bedtime)  -- Indication: For Bladder spasm    Ativan 0.5 mg oral tablet  -- 1 tab(s) by mouth 2 times a day  -- Indication: For Psych    benztropine 0.5 mg oral tablet  -- 1 tab(s) by mouth once a day (at bedtime)  -- Indication: For Bipolar 1 disorder    lithium 300 mg/5 mL (8 mEq/5 mL) oral syrup  -- 10 milliliter(s) by mouth once a day  -- Indication: For Bipolar 1 disorder    risperiDONE  -- 50MG IM EVERY 2 WEEKS  -- Indication: For Bipolar 1 disorder    acyclovir 400 mg oral tablet  -- 1 tab(s) by mouth 2 times a day  -- Indication: For home med    Fosamax 70 mg oral tablet  -- 1 tab(s) by mouth once a week  -- Indication: For osteoporosis    amLODIPine 5 mg oral tablet  -- 1 tab(s) by mouth once a day  -- Indication: For htn    furosemide 20 mg oral tablet  -- 1 tab(s) by mouth once a day  -- Indication: For Swelling    ferrous sulfate 325 mg (65 mg elemental iron) oral tablet  -- 1 tab(s) by mouth 2 times a day  -- Indication: For Anemia    MiraLax oral powder for reconstitution  -- 17 gram(s) by mouth 2 times a day  -- Indication: For constipation    saccharomyces boulardii lyo 250 mg oral capsule  -- 1 cap(s) by mouth 2 times a day  -- Indication: For Probiotics    levoFLOXacin 750 mg oral tablet  -- 1 tab(s) by mouth every 24 hours  -- Indication: For Bacteremia    levothyroxine 75 mcg (0.075 mg) oral tablet  -- 1 tab(s) by mouth once a day  -- Indication: For hypothyroid    folic acid 1 mg oral tablet  -- 1 tab(s) by mouth once a day  -- Indication: For vitamins    cholecalciferol oral tablet  -- 2000 unit(s) by mouth once a day  -- Indication: For vit d def

## 2018-01-29 NOTE — DISCHARGE NOTE ADULT - MEDICATION SUMMARY - MEDICATIONS TO CHANGE
I will SWITCH the dose or number of times a day I take the medications listed below when I get home from the hospital:    cholecalciferol 1000 intl units oral capsule  -- 1 cap(s) by mouth once a day    lithium carbonate  -- 600 milligram(s) by mouth once a day

## 2018-01-29 NOTE — DISCHARGE NOTE ADULT - MEDICATION SUMMARY - MEDICATIONS TO STOP TAKING
I will STOP taking the medications listed below when I get home from the hospital:    metoprolol tartrate 25 mg oral tablet  -- 1 tab(s) by mouth 2 times a day    lithium citrate  -- 450 milligram(s) by mouth once a day    bethanechol 25 mg oral tablet  -- 1 tab(s) by mouth 3 times a day    calcium (as carbonate) 500 mg oral tablet  --  by mouth    Feosol 325 mg (65 mg elemental iron) oral tablet  -- 1 tab(s) by mouth 3 times a day    Macrobid 100 mg oral capsule  -- 1 cap(s) by mouth 2 times a day  -- Finish all this medication unless otherwise directed by prescriber.  May discolor urine or feces.  Take with food or milk.

## 2018-01-31 ENCOUNTER — EMERGENCY (EMERGENCY)
Facility: HOSPITAL | Age: 73
LOS: 1 days | Discharge: HOME | End: 2018-01-31
Attending: EMERGENCY MEDICINE
Payer: MEDICARE

## 2018-01-31 VITALS
HEART RATE: 68 BPM | SYSTOLIC BLOOD PRESSURE: 158 MMHG | TEMPERATURE: 99 F | RESPIRATION RATE: 18 BRPM | OXYGEN SATURATION: 94 % | DIASTOLIC BLOOD PRESSURE: 61 MMHG

## 2018-01-31 VITALS
WEIGHT: 134.92 LBS | HEIGHT: 66 IN | TEMPERATURE: 98 F | DIASTOLIC BLOOD PRESSURE: 78 MMHG | SYSTOLIC BLOOD PRESSURE: 161 MMHG | OXYGEN SATURATION: 98 % | HEART RATE: 76 BPM | RESPIRATION RATE: 18 BRPM

## 2018-01-31 LAB
ALBUMIN SERPL ELPH-MCNC: 2.9 G/DL — LOW (ref 3.3–5.2)
ALP SERPL-CCNC: 103 U/L — SIGNIFICANT CHANGE UP (ref 40–120)
ALT FLD-CCNC: 15 U/L — SIGNIFICANT CHANGE UP
ANION GAP SERPL CALC-SCNC: 13 MMOL/L — SIGNIFICANT CHANGE UP (ref 5–17)
AST SERPL-CCNC: 45 U/L — HIGH
BILIRUB SERPL-MCNC: <0.2 MG/DL — LOW (ref 0.4–2)
BUN SERPL-MCNC: 34 MG/DL — HIGH (ref 8–20)
CALCIUM SERPL-MCNC: 9.1 MG/DL — SIGNIFICANT CHANGE UP (ref 8.6–10.2)
CHLORIDE SERPL-SCNC: 100 MMOL/L — SIGNIFICANT CHANGE UP (ref 98–107)
CO2 SERPL-SCNC: 19 MMOL/L — LOW (ref 22–29)
CREAT SERPL-MCNC: 1.27 MG/DL — SIGNIFICANT CHANGE UP (ref 0.5–1.3)
GLUCOSE SERPL-MCNC: 103 MG/DL — SIGNIFICANT CHANGE UP (ref 70–115)
HCT VFR BLD CALC: 29 % — LOW (ref 37–47)
HGB BLD-MCNC: 8.7 G/DL — LOW (ref 12–16)
LITHIUM SERPL-MCNC: 1.5 MMOL/L — SIGNIFICANT CHANGE UP (ref 0.5–1.5)
LITHIUM SERPL-MCNC: 1.7 MMOL/L — CRITICAL HIGH (ref 0.5–1.5)
MCHC RBC-ENTMCNC: 30 G/DL — LOW (ref 32–36)
MCHC RBC-ENTMCNC: 30.5 PG — SIGNIFICANT CHANGE UP (ref 27–31)
MCV RBC AUTO: 101.8 FL — HIGH (ref 81–99)
PLATELET # BLD AUTO: 188 K/UL — SIGNIFICANT CHANGE UP (ref 150–400)
POTASSIUM SERPL-MCNC: 5 MMOL/L — SIGNIFICANT CHANGE UP (ref 3.5–5.3)
POTASSIUM SERPL-MCNC: 6.2 MMOL/L — CRITICAL HIGH (ref 3.5–5.3)
POTASSIUM SERPL-SCNC: 5 MMOL/L — SIGNIFICANT CHANGE UP (ref 3.5–5.3)
POTASSIUM SERPL-SCNC: 6.2 MMOL/L — CRITICAL HIGH (ref 3.5–5.3)
PROT SERPL-MCNC: 9 G/DL — HIGH (ref 6.6–8.7)
RBC # BLD: 2.85 M/UL — LOW (ref 4.4–5.2)
RBC # FLD: 16.1 % — HIGH (ref 11–15.6)
SODIUM SERPL-SCNC: 132 MMOL/L — LOW (ref 135–145)
WBC # BLD: 4.2 K/UL — LOW (ref 4.8–10.8)
WBC # FLD AUTO: 4.2 K/UL — LOW (ref 4.8–10.8)

## 2018-01-31 PROCEDURE — 99284 EMERGENCY DEPT VISIT MOD MDM: CPT

## 2018-01-31 PROCEDURE — 36415 COLL VENOUS BLD VENIPUNCTURE: CPT

## 2018-01-31 PROCEDURE — 85027 COMPLETE CBC AUTOMATED: CPT

## 2018-01-31 PROCEDURE — 99284 EMERGENCY DEPT VISIT MOD MDM: CPT | Mod: 25

## 2018-01-31 PROCEDURE — 84132 ASSAY OF SERUM POTASSIUM: CPT

## 2018-01-31 PROCEDURE — 93010 ELECTROCARDIOGRAM REPORT: CPT

## 2018-01-31 PROCEDURE — 80053 COMPREHEN METABOLIC PANEL: CPT

## 2018-01-31 PROCEDURE — 80178 ASSAY OF LITHIUM: CPT

## 2018-01-31 PROCEDURE — 93005 ELECTROCARDIOGRAM TRACING: CPT

## 2018-01-31 PROCEDURE — 96374 THER/PROPH/DIAG INJ IV PUSH: CPT

## 2018-01-31 RX ORDER — HALOPERIDOL DECANOATE 100 MG/ML
5 INJECTION INTRAMUSCULAR ONCE
Qty: 0 | Refills: 0 | Status: COMPLETED | OUTPATIENT
Start: 2018-01-31 | End: 2018-01-31

## 2018-01-31 RX ORDER — SODIUM CHLORIDE 9 MG/ML
1000 INJECTION INTRAMUSCULAR; INTRAVENOUS; SUBCUTANEOUS ONCE
Qty: 0 | Refills: 0 | Status: COMPLETED | OUTPATIENT
Start: 2018-01-31 | End: 2018-01-31

## 2018-01-31 RX ADMIN — SODIUM CHLORIDE 2000 MILLILITER(S): 9 INJECTION INTRAMUSCULAR; INTRAVENOUS; SUBCUTANEOUS at 18:51

## 2018-01-31 RX ADMIN — HALOPERIDOL DECANOATE 5 MILLIGRAM(S): 100 INJECTION INTRAMUSCULAR at 18:10

## 2018-01-31 RX ADMIN — SODIUM CHLORIDE 2000 MILLILITER(S): 9 INJECTION INTRAMUSCULAR; INTRAVENOUS; SUBCUTANEOUS at 18:04

## 2018-01-31 NOTE — ED PROVIDER NOTE - OBJECTIVE STATEMENT
71 y/o F, with Hx of mental disorder, HTN, and bone cancer, presents to the ED c/o elevated lithium levels of 2.5 determined by blood work this morning.  Pt is unable to ambulate.  Pt receives chemo for multiple myeloma.  Denies fever, nausea, or pain.

## 2018-01-31 NOTE — ED ADULT TRIAGE NOTE - NS ED NURSE BANDS TYPE
Plan: Recommended for patient to begin treatment as directed today
Otc Regimen: Vanicream Moisturizer - Apply to body BID and PRN
Detail Level: Detailed
Name band;

## 2018-01-31 NOTE — ED PROVIDER NOTE - MUSCULOSKELETAL, MLM
No lower extremity edema. Spine appears normal, range of motion is not limited, no muscle or joint tenderness

## 2018-01-31 NOTE — ED ADULT NURSE REASSESSMENT NOTE - NS ED NURSE REASSESS COMMENT FT1
pt ripped out 22 l hand, pt refusing to have port accessed, screaming and becoming agitated. de escalation techniques attempted to no avail. code gray called, haldol to be given. new 22 g r hand placed

## 2018-01-31 NOTE — ED ADULT NURSE REASSESSMENT NOTE - NS ED NURSE REASSESS COMMENT FT1
Assumed pt care @ 0836 from SIMEON Fine. Pt awaiting ambulance. Denies complaints. Bella william @ bedside.

## 2018-01-31 NOTE — ED ADULT TRIAGE NOTE - CHIEF COMPLAINT QUOTE
Patient BIBA, sent from [place] for high lithium level, blood work done this morning at 6:30 and lithium level 2.15.  As per EMS staff stating patient is acting more aggressive then normal.  Patient awake and alert at this time, exhibiting flight of ideas which is normal for patient, staff member with patient.

## 2018-01-31 NOTE — ED ADULT NURSE NOTE - CHIEF COMPLAINT QUOTE
Patient BIBA, sent from [place] for high lithium level, blood work done this morning at 6:30 and lithium level 2.15.  As per EMS staff stating patient is acting more aggressive then normal.  Patient awake and alert at this time, exhibiting flight of ideas which is normal for patient, staff member with patient.

## 2018-01-31 NOTE — ED PROVIDER NOTE - PROGRESS NOTE DETAILS
Consulted with toxicology who recommends giving a second liter of fluid and repeat lithium level.  If levels do increase, it is safe to discharge with changing lithium dose by psychologist. Spoke to Shaniqua at [facility] to discuss the plan of action.  Plan cleared with medical doctors at [facility] Consulted with toxicology who recommends giving a second liter of fluid and repeat lithium level.  If levels decrease, it is safe to discharge with changing lithium dose by psychologist. Lithium level coming down nicely, OK for d/c

## 2018-05-23 ENCOUNTER — OUTPATIENT (OUTPATIENT)
Dept: OUTPATIENT SERVICES | Facility: HOSPITAL | Age: 73
LOS: 1 days | End: 2018-05-23
Payer: COMMERCIAL

## 2018-05-23 DIAGNOSIS — R51 HEADACHE: ICD-10-CM

## 2018-05-23 DIAGNOSIS — R07.89 OTHER CHEST PAIN: ICD-10-CM

## 2018-05-23 PROCEDURE — 71260 CT THORAX DX C+: CPT | Mod: 26

## 2018-05-23 PROCEDURE — 70450 CT HEAD/BRAIN W/O DYE: CPT

## 2018-05-23 PROCEDURE — 70450 CT HEAD/BRAIN W/O DYE: CPT | Mod: 26

## 2018-05-23 PROCEDURE — 71260 CT THORAX DX C+: CPT

## 2018-07-19 NOTE — ED PROVIDER NOTE - NS ED MD DISPO DISCHARGE
GLYCEMIC CONTROL PROGRESS NOTE:    -discussed in rounds, no known h/o DM, HbA1c within normal range  -BG in target range ICU: 140-180 mg/dL  -recommend d/c POCT + corrective coverage orders  Recent Glucose Results:   Lab Results   Component Value Date/Time     (H) 07/19/2018 04:13 AM    GLUCPOC 119 (H) 07/19/2018 07:49 AM    GLUCPOC 116 (H) 07/18/2018 09:53 PM         Mynor Raymundo RN, MS  Glycemic Control Team  Pager [phone] (M-TH 8:30-5P)  *After Hours pager [phone] Home

## 2018-11-16 ENCOUNTER — EMERGENCY (EMERGENCY)
Facility: HOSPITAL | Age: 73
LOS: 1 days | Discharge: HOME | End: 2018-11-16
Attending: EMERGENCY MEDICINE
Payer: COMMERCIAL

## 2018-11-16 VITALS
HEART RATE: 74 BPM | TEMPERATURE: 98 F | SYSTOLIC BLOOD PRESSURE: 158 MMHG | OXYGEN SATURATION: 99 % | DIASTOLIC BLOOD PRESSURE: 70 MMHG | RESPIRATION RATE: 16 BRPM

## 2018-11-16 VITALS
RESPIRATION RATE: 20 BRPM | TEMPERATURE: 98 F | WEIGHT: 134.92 LBS | OXYGEN SATURATION: 97 % | HEIGHT: 60 IN | HEART RATE: 70 BPM | SYSTOLIC BLOOD PRESSURE: 156 MMHG | DIASTOLIC BLOOD PRESSURE: 72 MMHG

## 2018-11-16 PROCEDURE — 99284 EMERGENCY DEPT VISIT MOD MDM: CPT

## 2018-11-16 PROCEDURE — 93971 EXTREMITY STUDY: CPT | Mod: 26,LT

## 2018-11-16 PROCEDURE — 93971 EXTREMITY STUDY: CPT

## 2018-11-16 NOTE — ED PROVIDER NOTE - MUSCULOSKELETAL MINIMAL EXAM
lle calf circumference 1 cm larger than r, thigh circumferences on both legs the same,  l calf non tender,  distal n/v intact

## 2018-11-16 NOTE — ED ADULT NURSE NOTE - OBJECTIVE STATEMENT
74y/o female c/o lower extremity swelling. Pt denies SOB, numbness or tingling, chest pain. able to ambulate without assistance. will continue to monitor

## 2018-11-16 NOTE — ED ADULT TRIAGE NOTE - CHIEF COMPLAINT QUOTE
Patient BIBA to ED today with c/o left leg swelling and pain.  Patient sent for r/o DVT to E.  Patient arrived from [address] [facility].  Patient has port to her right chest that she received chemo for multiple myeloma.

## 2019-01-03 ENCOUNTER — OUTPATIENT (OUTPATIENT)
Dept: OUTPATIENT SERVICES | Facility: HOSPITAL | Age: 74
LOS: 1 days | End: 2019-01-03

## 2019-01-03 DIAGNOSIS — F20.9 SCHIZOPHRENIA, UNSPECIFIED: ICD-10-CM

## 2019-01-04 ENCOUNTER — OUTPATIENT (OUTPATIENT)
Dept: OUTPATIENT SERVICES | Facility: HOSPITAL | Age: 74
LOS: 1 days | End: 2019-01-04
Payer: MEDICARE

## 2019-01-04 DIAGNOSIS — R50.9 FEVER, UNSPECIFIED: ICD-10-CM

## 2019-01-04 DIAGNOSIS — R05 COUGH: ICD-10-CM

## 2019-01-04 PROCEDURE — 71046 X-RAY EXAM CHEST 2 VIEWS: CPT | Mod: 26

## 2019-01-06 ENCOUNTER — INPATIENT (INPATIENT)
Facility: HOSPITAL | Age: 74
LOS: 3 days | Discharge: PSYCHIATRIC OR SUBSTANCE USE FACILITY | DRG: 178 | End: 2019-01-10
Admitting: HOSPITALIST
Payer: MEDICARE

## 2019-01-06 VITALS
DIASTOLIC BLOOD PRESSURE: 62 MMHG | OXYGEN SATURATION: 94 % | RESPIRATION RATE: 20 BRPM | HEART RATE: 77 BPM | TEMPERATURE: 100 F | SYSTOLIC BLOOD PRESSURE: 153 MMHG

## 2019-01-06 DIAGNOSIS — J18.9 PNEUMONIA, UNSPECIFIED ORGANISM: ICD-10-CM

## 2019-01-06 LAB
ANION GAP SERPL CALC-SCNC: 12 MMOL/L — SIGNIFICANT CHANGE UP (ref 5–17)
ANISOCYTOSIS BLD QL: SLIGHT — SIGNIFICANT CHANGE UP
BASOPHILS # BLD AUTO: 0 K/UL — SIGNIFICANT CHANGE UP (ref 0–0.2)
BASOPHILS NFR BLD AUTO: 0.3 % — SIGNIFICANT CHANGE UP (ref 0–2)
BUN SERPL-MCNC: 23 MG/DL — HIGH (ref 8–20)
CALCIUM SERPL-MCNC: 7.8 MG/DL — LOW (ref 8.6–10.2)
CHLORIDE SERPL-SCNC: 101 MMOL/L — SIGNIFICANT CHANGE UP (ref 98–107)
CO2 SERPL-SCNC: 23 MMOL/L — SIGNIFICANT CHANGE UP (ref 22–29)
CREAT SERPL-MCNC: 0.57 MG/DL — SIGNIFICANT CHANGE UP (ref 0.5–1.3)
ELLIPTOCYTES BLD QL SMEAR: SLIGHT — SIGNIFICANT CHANGE UP
EOSINOPHIL # BLD AUTO: 0 K/UL — SIGNIFICANT CHANGE UP (ref 0–0.5)
EOSINOPHIL NFR BLD AUTO: 0.3 % — SIGNIFICANT CHANGE UP (ref 0–6)
GLUCOSE SERPL-MCNC: 124 MG/DL — HIGH (ref 70–115)
HCT VFR BLD CALC: 26.4 % — LOW (ref 37–47)
HGB BLD-MCNC: 8.2 G/DL — LOW (ref 12–16)
HYPOCHROMIA BLD QL: SLIGHT — SIGNIFICANT CHANGE UP
LACTATE BLDV-MCNC: 1.5 MMOL/L — SIGNIFICANT CHANGE UP (ref 0.5–2)
LYMPHOCYTES # BLD AUTO: 0.2 K/UL — LOW (ref 1–4.8)
LYMPHOCYTES # BLD AUTO: 4.1 % — LOW (ref 20–55)
MACROCYTES BLD QL: SLIGHT — SIGNIFICANT CHANGE UP
MCHC RBC-ENTMCNC: 29.7 PG — SIGNIFICANT CHANGE UP (ref 27–31)
MCHC RBC-ENTMCNC: 31.1 G/DL — LOW (ref 32–36)
MCV RBC AUTO: 95.7 FL — SIGNIFICANT CHANGE UP (ref 81–99)
MICROCYTES BLD QL: SLIGHT — SIGNIFICANT CHANGE UP
MONOCYTES # BLD AUTO: 0.5 K/UL — SIGNIFICANT CHANGE UP (ref 0–0.8)
MONOCYTES NFR BLD AUTO: 13.2 % — HIGH (ref 3–10)
NEUTROPHILS # BLD AUTO: 3.2 K/UL — SIGNIFICANT CHANGE UP (ref 1.8–8)
NEUTROPHILS NFR BLD AUTO: 81.8 % — HIGH (ref 37–73)
PLAT MORPH BLD: NORMAL — SIGNIFICANT CHANGE UP
PLATELET # BLD AUTO: 216 K/UL — SIGNIFICANT CHANGE UP (ref 150–400)
POLYCHROMASIA BLD QL SMEAR: SLIGHT — SIGNIFICANT CHANGE UP
POTASSIUM SERPL-MCNC: 3.3 MMOL/L — LOW (ref 3.5–5.3)
POTASSIUM SERPL-SCNC: 3.3 MMOL/L — LOW (ref 3.5–5.3)
RBC # BLD: 2.76 M/UL — LOW (ref 4.4–5.2)
RBC # FLD: 17.2 % — HIGH (ref 11–15.6)
RBC BLD AUTO: ABNORMAL
SODIUM SERPL-SCNC: 136 MMOL/L — SIGNIFICANT CHANGE UP (ref 135–145)
WBC # BLD: 3.9 K/UL — LOW (ref 4.8–10.8)
WBC # FLD AUTO: 3.9 K/UL — LOW (ref 4.8–10.8)

## 2019-01-06 PROCEDURE — 71045 X-RAY EXAM CHEST 1 VIEW: CPT | Mod: 26

## 2019-01-06 PROCEDURE — 99223 1ST HOSP IP/OBS HIGH 75: CPT

## 2019-01-06 PROCEDURE — 99285 EMERGENCY DEPT VISIT HI MDM: CPT

## 2019-01-06 RX ORDER — SODIUM CHLORIDE 9 MG/ML
3 INJECTION INTRAMUSCULAR; INTRAVENOUS; SUBCUTANEOUS ONCE
Qty: 0 | Refills: 0 | Status: COMPLETED | OUTPATIENT
Start: 2019-01-06 | End: 2019-01-06

## 2019-01-06 RX ORDER — PIPERACILLIN AND TAZOBACTAM 4; .5 G/20ML; G/20ML
3.38 INJECTION, POWDER, LYOPHILIZED, FOR SOLUTION INTRAVENOUS EVERY 8 HOURS
Qty: 0 | Refills: 0 | Status: DISCONTINUED | OUTPATIENT
Start: 2019-01-06 | End: 2019-01-10

## 2019-01-06 RX ORDER — FERROUS SULFATE 325(65) MG
325 TABLET ORAL DAILY
Qty: 0 | Refills: 0 | Status: DISCONTINUED | OUTPATIENT
Start: 2019-01-06 | End: 2019-01-10

## 2019-01-06 RX ORDER — FUROSEMIDE 40 MG
40 TABLET ORAL DAILY
Qty: 0 | Refills: 0 | Status: DISCONTINUED | OUTPATIENT
Start: 2019-01-06 | End: 2019-01-10

## 2019-01-06 RX ORDER — VANCOMYCIN HCL 1 G
1000 VIAL (EA) INTRAVENOUS EVERY 12 HOURS
Qty: 0 | Refills: 0 | Status: DISCONTINUED | OUTPATIENT
Start: 2019-01-06 | End: 2019-01-09

## 2019-01-06 RX ORDER — ACETAMINOPHEN 500 MG
650 TABLET ORAL EVERY 6 HOURS
Qty: 0 | Refills: 0 | Status: DISCONTINUED | OUTPATIENT
Start: 2019-01-06 | End: 2019-01-10

## 2019-01-06 RX ORDER — VANCOMYCIN HCL 1 G
1000 VIAL (EA) INTRAVENOUS ONCE
Qty: 0 | Refills: 0 | Status: COMPLETED | OUTPATIENT
Start: 2019-01-06 | End: 2019-01-06

## 2019-01-06 RX ORDER — LEVOTHYROXINE SODIUM 125 MCG
75 TABLET ORAL DAILY
Qty: 0 | Refills: 0 | Status: DISCONTINUED | OUTPATIENT
Start: 2019-01-06 | End: 2019-01-10

## 2019-01-06 RX ORDER — POTASSIUM CHLORIDE 20 MEQ
40 PACKET (EA) ORAL ONCE
Qty: 0 | Refills: 0 | Status: COMPLETED | OUTPATIENT
Start: 2019-01-06 | End: 2019-01-06

## 2019-01-06 RX ORDER — FOLIC ACID 0.8 MG
1 TABLET ORAL DAILY
Qty: 0 | Refills: 0 | Status: DISCONTINUED | OUTPATIENT
Start: 2019-01-06 | End: 2019-01-10

## 2019-01-06 RX ORDER — SODIUM CHLORIDE 9 MG/ML
1000 INJECTION INTRAMUSCULAR; INTRAVENOUS; SUBCUTANEOUS
Qty: 0 | Refills: 0 | Status: DISCONTINUED | OUTPATIENT
Start: 2019-01-06 | End: 2019-01-08

## 2019-01-06 RX ORDER — HEPARIN SODIUM 5000 [USP'U]/ML
5000 INJECTION INTRAVENOUS; SUBCUTANEOUS EVERY 12 HOURS
Qty: 0 | Refills: 0 | Status: DISCONTINUED | OUTPATIENT
Start: 2019-01-06 | End: 2019-01-10

## 2019-01-06 RX ORDER — LITHIUM CARBONATE 300 MG/1
300 TABLET, EXTENDED RELEASE ORAL DAILY
Qty: 0 | Refills: 0 | Status: DISCONTINUED | OUTPATIENT
Start: 2019-01-06 | End: 2019-01-10

## 2019-01-06 RX ORDER — PIPERACILLIN AND TAZOBACTAM 4; .5 G/20ML; G/20ML
3.38 INJECTION, POWDER, LYOPHILIZED, FOR SOLUTION INTRAVENOUS ONCE
Qty: 0 | Refills: 0 | Status: COMPLETED | OUTPATIENT
Start: 2019-01-06 | End: 2019-01-06

## 2019-01-06 RX ADMIN — SODIUM CHLORIDE 100 MILLILITER(S): 9 INJECTION INTRAMUSCULAR; INTRAVENOUS; SUBCUTANEOUS at 17:58

## 2019-01-06 RX ADMIN — PIPERACILLIN AND TAZOBACTAM 3.38 GRAM(S): 4; .5 INJECTION, POWDER, LYOPHILIZED, FOR SOLUTION INTRAVENOUS at 18:27

## 2019-01-06 RX ADMIN — Medication 1000 MILLIGRAM(S): at 18:58

## 2019-01-06 RX ADMIN — SODIUM CHLORIDE 3 MILLILITER(S): 9 INJECTION INTRAMUSCULAR; INTRAVENOUS; SUBCUTANEOUS at 16:45

## 2019-01-06 RX ADMIN — PIPERACILLIN AND TAZOBACTAM 200 GRAM(S): 4; .5 INJECTION, POWDER, LYOPHILIZED, FOR SOLUTION INTRAVENOUS at 17:57

## 2019-01-06 RX ADMIN — Medication 250 MILLIGRAM(S): at 17:58

## 2019-01-06 NOTE — H&P ADULT - HISTORY OF PRESENT ILLNESS
73F present from [facility] for persistent fevers. The patient is noted to have been treated for pneumonia and had completed a course of levofloxacin and had persistent cough and fevers. The patient reported a non-productive cough and persistent dyspnea. She also reported pleuritic chest pain as well. She is unable to report any aggravating or relieving factors. She is noted to have some decrease in appetite but no abdominal pain, vomiting, or diarrhea. She is also noted to have a history of multiple myeloma for which she had chemotherapy. The patient is unable to provide further information.

## 2019-01-06 NOTE — ED ADULT TRIAGE NOTE - CHIEF COMPLAINT QUOTE
patient sent from [place] for evaluation of worsening cough, fever, on abx over the past week without improvement. patient has not improved prompting symptoms with worsening x-rays

## 2019-01-06 NOTE — H&P ADULT - NSHPPHYSICALEXAM_GEN_ALL_CORE
Vital Signs Last 24 Hrs  T(C): 37.5 (06 Jan 2019 11:36), Max: 37.7 (06 Jan 2019 11:24)  T(F): 99.5 (06 Jan 2019 11:36), Max: 99.8 (06 Jan 2019 11:24)  HR: 77 (06 Jan 2019 11:24) (77 - 77)  BP: 153/62 (06 Jan 2019 11:24) (153/62 - 153/62)  BP(mean): --  RR: 20 (06 Jan 2019 11:24) (20 - 20)  SpO2: 94% (06 Jan 2019 11:24) (94% - 94%)    General appearance: No acute distress, Awake, Alert  HEENT: Normocephalic, Atraumatic, Conjunctiva clear, EOMI  Neck: Supple, No JVD, No tenderness  Lungs: Clear to auscultation, Breath sound equal bilaterally, No wheezes, Bilateral rales  Cardiovascular: S1S2, Regular rhythm  Abdomen: Soft, Nontender, Nondistended, No guarding/rebound, Positive bowel sounds  Extremities: No clubbing, No cyanosis, No calf tenderness, Lower extremity edema  Neuro: Strength equal bilaterally, No tremors  Psychiatric: Appropriate mood, Normal affect

## 2019-01-06 NOTE — ED ADULT NURSE NOTE - NSIMPLEMENTINTERV_GEN_ALL_ED
Implemented All Fall Risk Interventions:  [place] to call system. Call bell, personal items and telephone within reach. Instruct patient to call for assistance. Room bathroom lighting operational. Non-slip footwear when patient is off stretcher. Physically safe environment: no spills, clutter or unnecessary equipment. Stretcher in lowest position, wheels locked, appropriate side rails in place. Provide visual cue, wrist band, yellow gown, etc. Monitor gait and stability. Monitor for mental status changes and reorient to person, place, and time. Review medications for side effects contributing to fall risk. Reinforce activity limits and safety measures with patient and family.

## 2019-01-06 NOTE — H&P ADULT - ASSESSMENT
73F referred from [place] for persistent fevers despite completing a course of levofloxacin for pneumonia    Pneumonia - Piperacillin/tazobactam and vancomycin initiated. Culture results to be followed. Pulse oxymetry to be continued.    Bipolar disorder - On lithium.    Lower extremity edema - On furosemide.    Hypothyroidism - On levothyroxine.    Multiple myeloma - Had chemotherapy.    Hypokalemia - Potassium supplemented. Repeat laboratory studies ordered to monitor.    Anemia - On ferrous sulfate and folic acid. 73F referred from [place] for persistent fevers despite completing a course of levofloxacin for pneumonia    Pneumonia - Piperacillin/tazobactam and vancomycin initiated. Culture results to be followed. Pulse oxymetry to be continued.    Bipolar disorder - On lithium and lorazepam.    Lower extremity edema - On furosemide.    Hypothyroidism - On levothyroxine.    Multiple myeloma - Had chemotherapy.    Hypokalemia - Potassium supplemented. Repeat laboratory studies ordered to monitor.    Anemia - On ferrous sulfate and folic acid.

## 2019-01-06 NOTE — H&P ADULT - NSHPSOCIALHISTORY_GEN_ALL_CORE
No reported tobacco, alcohol, or illicit drug use  Resides at [facility]    PSH: Chemotherapy port placement  Family History: Unable to obtain due to patient's medical condition

## 2019-01-06 NOTE — ED PROVIDER NOTE - OBJECTIVE STATEMENT
74 YO FEMALE SENT FROM [place] WITH ABOVE CC. PT ON PO LEVAQUIN ONE WEEK AT [place] WITH PERSISTENT TEMPS AROUND 100.9 AND COUGH. PT FEELS ILL. FEELS WEAK. SOME DECREASED APPETITE. NO PJHLEGM PRODUCTION NO NVD. NIO DYSURIA  MED HX MULTIPLE MYELOMA ON CHEMO  FURTHER MEDICAL HX : Bone cancer    Hypercholesteremia    Hypertension    Hypothyroid    Mental disorder    Multiple myeloma    Scoliosis 74 YO FEMALE SENT FROM [place] WITH ABOVE CC. PT ON PO LEVAQUIN ONE WEEK AT [place] WITH PERSISTENT TEMPS AROUND 100.9 AND COUGH. PT FEELS ILL. FEELS WEAK. SOME DECREASED APPETITE. NO PHLEGM PRODUCTION NO NVD. NIO DYSURIA  MED HX MULTIPLE MYELOMA ON CHEMO  FURTHER MEDICAL HX : Bone cancer    Hypercholesteremia    Hypertension    Hypothyroid    Mental disorder    Multiple myeloma    Scoliosis

## 2019-01-06 NOTE — ED ADULT NURSE NOTE - OBJECTIVE STATEMENT
"I went to the clinic yesterday and they took his blood and his HBG is too thick and they told me to bring him here, to put him under lights"

## 2019-01-06 NOTE — ED PROVIDER NOTE - CARE PLAN
Principal Discharge DX:	Pneumonia of both lungs due to infectious organism, unspecified part of lung  Secondary Diagnosis:	Multiple myeloma not having achieved remission  Secondary Diagnosis:	Anemia, unspecified type

## 2019-01-06 NOTE — ED ADULT NURSE NOTE - CHIEF COMPLAINT QUOTE
patient sent from [place] for evaluation of worsening cough, fever, on abx over the past week without improvement. patient has not improved prompting symptoms with worsening x-rays

## 2019-01-07 DIAGNOSIS — E03.9 HYPOTHYROIDISM, UNSPECIFIED: ICD-10-CM

## 2019-01-07 DIAGNOSIS — J18.9 PNEUMONIA, UNSPECIFIED ORGANISM: ICD-10-CM

## 2019-01-07 DIAGNOSIS — I10 ESSENTIAL (PRIMARY) HYPERTENSION: ICD-10-CM

## 2019-01-07 DIAGNOSIS — E87.6 HYPOKALEMIA: ICD-10-CM

## 2019-01-07 DIAGNOSIS — F31.9 BIPOLAR DISORDER, UNSPECIFIED: ICD-10-CM

## 2019-01-07 DIAGNOSIS — C90.00 MULTIPLE MYELOMA NOT HAVING ACHIEVED REMISSION: ICD-10-CM

## 2019-01-07 LAB
ANION GAP SERPL CALC-SCNC: 11 MMOL/L — SIGNIFICANT CHANGE UP (ref 5–17)
BUN SERPL-MCNC: 15 MG/DL — SIGNIFICANT CHANGE UP (ref 8–20)
CALCIUM SERPL-MCNC: 7.7 MG/DL — LOW (ref 8.6–10.2)
CHLORIDE SERPL-SCNC: 104 MMOL/L — SIGNIFICANT CHANGE UP (ref 98–107)
CO2 SERPL-SCNC: 25 MMOL/L — SIGNIFICANT CHANGE UP (ref 22–29)
CREAT SERPL-MCNC: 0.62 MG/DL — SIGNIFICANT CHANGE UP (ref 0.5–1.3)
GLUCOSE BLDC GLUCOMTR-MCNC: 156 MG/DL — HIGH (ref 70–99)
GLUCOSE SERPL-MCNC: 111 MG/DL — SIGNIFICANT CHANGE UP (ref 70–115)
HCT VFR BLD CALC: 27.9 % — LOW (ref 37–47)
HGB BLD-MCNC: 8.5 G/DL — LOW (ref 12–16)
MAGNESIUM SERPL-MCNC: 1.8 MG/DL — SIGNIFICANT CHANGE UP (ref 1.6–2.6)
MCHC RBC-ENTMCNC: 29.4 PG — SIGNIFICANT CHANGE UP (ref 27–31)
MCHC RBC-ENTMCNC: 30.5 G/DL — LOW (ref 32–36)
MCV RBC AUTO: 96.5 FL — SIGNIFICANT CHANGE UP (ref 81–99)
PHOSPHATE SERPL-MCNC: 2.8 MG/DL — SIGNIFICANT CHANGE UP (ref 2.4–4.7)
PLATELET # BLD AUTO: 274 K/UL — SIGNIFICANT CHANGE UP (ref 150–400)
POTASSIUM SERPL-MCNC: 3.1 MMOL/L — LOW (ref 3.5–5.3)
POTASSIUM SERPL-SCNC: 3.1 MMOL/L — LOW (ref 3.5–5.3)
RBC # BLD: 2.89 M/UL — LOW (ref 4.4–5.2)
RBC # FLD: 17.2 % — HIGH (ref 11–15.6)
SODIUM SERPL-SCNC: 140 MMOL/L — SIGNIFICANT CHANGE UP (ref 135–145)
WBC # BLD: 4.3 K/UL — LOW (ref 4.8–10.8)
WBC # FLD AUTO: 4.3 K/UL — LOW (ref 4.8–10.8)

## 2019-01-07 PROCEDURE — 99233 SBSQ HOSP IP/OBS HIGH 50: CPT

## 2019-01-07 RX ORDER — SACCHAROMYCES BOULARDII 250 MG
250 POWDER IN PACKET (EA) ORAL
Qty: 0 | Refills: 0 | Status: DISCONTINUED | OUTPATIENT
Start: 2019-01-07 | End: 2019-01-07

## 2019-01-07 RX ORDER — POTASSIUM CHLORIDE 20 MEQ
40 PACKET (EA) ORAL EVERY 4 HOURS
Qty: 0 | Refills: 0 | Status: COMPLETED | OUTPATIENT
Start: 2019-01-07 | End: 2019-01-07

## 2019-01-07 RX ADMIN — HEPARIN SODIUM 5000 UNIT(S): 5000 INJECTION INTRAVENOUS; SUBCUTANEOUS at 05:35

## 2019-01-07 RX ADMIN — PIPERACILLIN AND TAZOBACTAM 25 GRAM(S): 4; .5 INJECTION, POWDER, LYOPHILIZED, FOR SOLUTION INTRAVENOUS at 11:26

## 2019-01-07 RX ADMIN — Medication 325 MILLIGRAM(S): at 12:25

## 2019-01-07 RX ADMIN — Medication 0.5 MILLIGRAM(S): at 19:22

## 2019-01-07 RX ADMIN — Medication 75 MICROGRAM(S): at 05:35

## 2019-01-07 RX ADMIN — Medication 250 MILLIGRAM(S): at 08:21

## 2019-01-07 RX ADMIN — Medication 1 MILLIGRAM(S): at 12:25

## 2019-01-07 RX ADMIN — PIPERACILLIN AND TAZOBACTAM 25 GRAM(S): 4; .5 INJECTION, POWDER, LYOPHILIZED, FOR SOLUTION INTRAVENOUS at 19:19

## 2019-01-07 RX ADMIN — PIPERACILLIN AND TAZOBACTAM 25 GRAM(S): 4; .5 INJECTION, POWDER, LYOPHILIZED, FOR SOLUTION INTRAVENOUS at 03:57

## 2019-01-07 RX ADMIN — Medication 0.5 MILLIGRAM(S): at 05:36

## 2019-01-07 RX ADMIN — LITHIUM CARBONATE 300 MILLIGRAM(S): 300 TABLET, EXTENDED RELEASE ORAL at 12:20

## 2019-01-07 RX ADMIN — SODIUM CHLORIDE 100 MILLILITER(S): 9 INJECTION INTRAMUSCULAR; INTRAVENOUS; SUBCUTANEOUS at 04:00

## 2019-01-07 RX ADMIN — HEPARIN SODIUM 5000 UNIT(S): 5000 INJECTION INTRAVENOUS; SUBCUTANEOUS at 18:27

## 2019-01-07 NOTE — PROGRESS NOTE ADULT - ASSESSMENT
73 yr old female with schizophrenia, hypertension, hypothyroid, multiple myeloma sent in for evaluation from [place] for persistent fevers, recently completed course of oral antibiotics. CXR showed multilobar pneumonia. She was started on broad spectrum antibiotics.

## 2019-01-07 NOTE — PROGRESS NOTE ADULT - SUBJECTIVE AND OBJECTIVE BOX
INTERVAL HPI/OVERNIGHT EVENTS:    CC:  multilobar pneumonia likely secondary to gram positive and gram negative bacteria, bipolar disorder, hypertension, hypothyroid, multiple myeloma      Chart and course reviewed. Aide at bedside, patient states she is 'home now', denies shortness of breath, reports dry cough.    Vital Signs Last 24 Hrs  T(C): 37.1 (07 Jan 2019 05:34), Max: 37.3 (06 Jan 2019 23:44)  T(F): 98.8 (07 Jan 2019 05:34), Max: 99.2 (06 Jan 2019 23:44)  HR: 71 (07 Jan 2019 05:34) (71 - 78)  BP: 148/73 (07 Jan 2019 05:34) (142/68 - 148/73)  BP(mean): --  RR: 18 (07 Jan 2019 05:34) (18 - 18)  SpO2: 97% (07 Jan 2019 05:34) (96% - 97%)    PHYSICAL EXAM:    GENERAL: Not in distress, alert, oriented to person  CHEST/LUNG: b/l air entry, coarse breath sounds  HEART: Regular  ABDOMEN: Soft, BS+  EXTREMITIES:  No edema, tenderness.     MEDICATIONS  (STANDING):  ferrous    sulfate 325 milliGRAM(s) Oral daily  folic acid 1 milliGRAM(s) Oral daily  furosemide    Tablet 40 milliGRAM(s) Oral daily  heparin  Injectable 5000 Unit(s) SubCutaneous every 12 hours  levothyroxine 75 MICROGram(s) Oral daily  lithium 300 milliGRAM(s) Oral daily  LORazepam     Tablet 0.5 milliGRAM(s) Oral two times a day  piperacillin/tazobactam IVPB. 3.375 Gram(s) IV Intermittent every 8 hours  sodium chloride 0.9%. 1000 milliLiter(s) (100 mL/Hr) IV Continuous <Continuous>  vancomycin  IVPB 1000 milliGRAM(s) IV Intermittent every 12 hours    MEDICATIONS  (PRN):  acetaminophen   Tablet .. 650 milliGRAM(s) Oral every 6 hours PRN Temp greater or equal to 38C (100.4F), Mild Pain (1 - 3)      Allergies    ixazomib (Unknown)  NSAIDs (Unknown)  penicillins (Unknown)  sulfa drugs (Unknown)    Intolerances          LABS:                          8.5    4.3   )-----------( 274      ( 07 Jan 2019 09:26 )             27.9     01-07    140  |  104  |  15.0  ----------------------------<  111  3.1<L>   |  25.0  |  0.62    Ca    7.7<L>      07 Jan 2019 09:26  Phos  2.8     01-07  Mg     1.8     01-07            RADIOLOGY & ADDITIONAL TESTS:

## 2019-01-07 NOTE — ED ADULT NURSE REASSESSMENT NOTE - NS ED NURSE REASSESS COMMENT FT1
Pt requesting water, provided with water, able to tolerate. IV antibx initiated, infusing to port in right chest wall, site asymp. Pt resting comfortably on stretcher, offers no complaints @ this time. SaO2 98% on RA. MAEx4. RR even and unlabored, skin warm and dry. Safety maintained, pilgrim aide remains @ bedside. Call bell in reach, rest promoted. Pending bed assignment @ this time, appears in no apparent distress

## 2019-01-07 NOTE — PROGRESS NOTE ADULT - PROBLEM SELECTOR PLAN 1
Likely from gram positive and gram negative bacteria, continue Vancomycin and Zosyn. Follow up cultures

## 2019-01-07 NOTE — CONSULT NOTE ADULT - SUBJECTIVE AND OBJECTIVE BOX
E&M OF ANEMIA.    KRYSTA HERRMANN  73y  Female  [id number]      Patient is a 73y old  Female who presents with a chief complaint of   HPI:  73F present from [facility] for persistent fevers. The patient is noted to have been treated for pneumonia and had completed a course of levofloxacin and had persistent cough and fevers. The patient reported a non-productive cough and persistent dyspnea. She also reported pleuritic chest pain as well. She is unable to report any aggravating or relieving factors. She is noted to have some decrease in appetite but no abdominal pain, vomiting, or diarrhea. She is also noted to have a history of multiple myeloma for which she had chemotherapy. The patient is unable to provide further information. (06 Jan 2019 19:02)    PAST MEDICAL & SURGICAL HISTORY:  Multiple myeloma  Mental disorder  Bone cancer  Scoliosis  Hypercholesteremia  Hypertension  Hypothyroid    FAMILY HISTORY:    REVIEW OF SYSTEMS      General:	    Skin/Breast:  	  Ophthalmologic:  	  ENMT:	    Respiratory and Thorax:  	  Cardiovascular:	    Gastrointestinal:	    Genitourinary:	    Musculoskeletal:	    Neurological:	    Psychiatric:	    Hematology/Lymphatics:	    Endocrine:	    Allergic/Immunologic:	    PHYSICAL EXAM:      Constitutional:    Eyes:    ENMT:    Neck:    Breasts:    Back:    Respiratory:    Cardiovascular:    Gastrointestinal:    Genitourinary:    Rectal:    Extremities:    Vascular:    Neurological:    Skin:    Lymph Nodes:    Musculoskeletal:    Psychiatric:        CBC Full  -  ( 07 Jan 2019 09:26 )  WBC Count : 4.3 K/uL  Hemoglobin : 8.5 g/dL  Hematocrit : 27.9 %  Platelet Count - Automated : 274 K/uL  Mean Cell Volume : 96.5 fl  Mean Cell Hemoglobin : 29.4 pg  Mean Cell Hemoglobin Concentration : 30.5 g/dL  Auto Neutrophil # : x  Auto Lymphocyte # : x  Auto Monocyte # : x  Auto Eosinophil # : x  Auto Basophil # : x  Auto Neutrophil % : x  Auto Lymphocyte % : x  Auto Monocyte % : x  Auto Eosinophil % : x  Auto Basophil % : x      07 Jan 2019 09:26    140    |  104    |  15.0   ----------------------------<  111    3.1     |  25.0   |  0.62     Ca    7.7        07 Jan 2019 09:26  Phos  2.8       07 Jan 2019 09:26  Mg     1.8       07 Jan 2019 09:26            ANEMIA OF CHRONIC DISEASE  ? VITAMIN DEFICIENCIES  ? FE DEF.    WILL CHECK FE STUDIES  B12 AND FOLATE    THANK YOU. E&M OF ANEMIA.    KRYSTA HERRMANN  73y  Female  [id number]      Patient is a 73y old  Female who presents with a chief complaint of   HPI:  73F present from [facility] for persistent fevers. The patient is noted to have been treated for pneumonia and had completed a course of levofloxacin and had persistent cough and fevers. The patient reported a non-productive cough and persistent dyspnea. She also reported pleuritic chest pain as well. She is unable to report any aggravating or relieving factors. She is noted to have some decrease in appetite but no abdominal pain, vomiting, or diarrhea. She is also noted to have a history of multiple myeloma for which she had chemotherapy. The patient is unable to provide further information. (06 Jan 2019 19:02)  Is being treated for PNA  No bone pain.    PAST MEDICAL & SURGICAL HISTORY:  Multiple myeloma  Mental disorder  Bone cancer  Scoliosis  Hypercholesteremia  Hypertension  Hypothyroid    FAMILY HISTORY:    REVIEW OF SYSTEMS    Unable to obtain due to patient  noncooperation      PHYSICAL EXAM:      Constitutional:   Alert  Awake  Pale  No jaundice  No LNs in the neck  Lungs: Rhonchi on the right.  Heart: RR  Abd: non-tender:    CBC Full  -  ( 07 Jan 2019 09:26 )  WBC Count : 4.3 K/uL  Hemoglobin : 8.5 g/dL  Hematocrit : 27.9 %  Platelet Count - Automated : 274 K/uL  Mean Cell Volume : 96.5 fl  Mean Cell Hemoglobin : 29.4 pg  Mean Cell Hemoglobin Concentration : 30.5 g/dL  Auto Neutrophil # : x  Auto Lymphocyte # : x  Auto Monocyte # : x  Auto Eosinophil # : x  Auto Basophil # : x  Auto Neutrophil % : x  Auto Lymphocyte % : x  Auto Monocyte % : x  Auto Eosinophil % : x  Auto Basophil % : x      07 Jan 2019 09:26    140    |  104    |  15.0   ----------------------------<  111    3.1     |  25.0   |  0.62     Ca    7.7        07 Jan 2019 09:26  Phos  2.8       07 Jan 2019 09:26  Mg     1.8       07 Jan 2019 09:26          Hx OF MYELOMA  RENAL FUNCTIONS AND CALCIUM  NORMAL.  ANEMIA OF CHRONIC DISEASE  ? VITAMIN DEFICIENCIES  ? FE DEF.    WILL CHECK FE STUDIES  B12 AND FOLATE  OTHERWISE OBSERVE FOR NOW HEMATOLOGICALLY.      THANK YOU.

## 2019-01-08 DIAGNOSIS — F31.9 BIPOLAR DISORDER, UNSPECIFIED: ICD-10-CM

## 2019-01-08 LAB
ALBUMIN SERPL ELPH-MCNC: 3 G/DL — LOW (ref 3.3–5.2)
ANION GAP SERPL CALC-SCNC: 9 MMOL/L — SIGNIFICANT CHANGE UP (ref 5–17)
BUN SERPL-MCNC: 10 MG/DL — SIGNIFICANT CHANGE UP (ref 8–20)
CALCIUM SERPL-MCNC: 7.2 MG/DL — LOW (ref 8.6–10.2)
CHLORIDE SERPL-SCNC: 106 MMOL/L — SIGNIFICANT CHANGE UP (ref 98–107)
CO2 SERPL-SCNC: 25 MMOL/L — SIGNIFICANT CHANGE UP (ref 22–29)
CREAT SERPL-MCNC: 0.54 MG/DL — SIGNIFICANT CHANGE UP (ref 0.5–1.3)
FERRITIN SERPL-MCNC: 221 NG/ML — HIGH (ref 15–150)
FOLATE SERPL-MCNC: >20 NG/ML — SIGNIFICANT CHANGE UP
GLUCOSE SERPL-MCNC: 83 MG/DL — SIGNIFICANT CHANGE UP (ref 70–115)
IRON SATN MFR SERPL: 10 % — LOW (ref 14–50)
IRON SATN MFR SERPL: 35 UG/DL — LOW (ref 37–145)
MAGNESIUM SERPL-MCNC: 1.6 MG/DL — SIGNIFICANT CHANGE UP (ref 1.6–2.6)
MRSA PCR RESULT.: SIGNIFICANT CHANGE UP
PHOSPHATE SERPL-MCNC: 2 MG/DL — LOW (ref 2.4–4.7)
POTASSIUM SERPL-MCNC: 3.3 MMOL/L — LOW (ref 3.5–5.3)
POTASSIUM SERPL-SCNC: 3.3 MMOL/L — LOW (ref 3.5–5.3)
S AUREUS DNA NOSE QL NAA+PROBE: DETECTED
SODIUM SERPL-SCNC: 140 MMOL/L — SIGNIFICANT CHANGE UP (ref 135–145)
TIBC SERPL-MCNC: 340 UG/DL — SIGNIFICANT CHANGE UP (ref 220–430)
TRANSFERRIN SERPL-MCNC: 238 MG/DL — SIGNIFICANT CHANGE UP (ref 192–382)
TSH SERPL-MCNC: 2.03 UIU/ML — SIGNIFICANT CHANGE UP (ref 0.27–4.2)
VANCOMYCIN TROUGH SERPL-MCNC: 19.4 UG/ML — SIGNIFICANT CHANGE UP (ref 10–20)
VIT B12 SERPL-MCNC: 602 PG/ML — SIGNIFICANT CHANGE UP (ref 232–1245)

## 2019-01-08 PROCEDURE — 99232 SBSQ HOSP IP/OBS MODERATE 35: CPT

## 2019-01-08 RX ORDER — CALCIUM GLUCONATE 100 MG/ML
1 VIAL (ML) INTRAVENOUS ONCE
Qty: 0 | Refills: 0 | Status: COMPLETED | OUTPATIENT
Start: 2019-01-08 | End: 2019-01-09

## 2019-01-08 RX ORDER — POTASSIUM PHOSPHATE, MONOBASIC POTASSIUM PHOSPHATE, DIBASIC 236; 224 MG/ML; MG/ML
15 INJECTION, SOLUTION INTRAVENOUS ONCE
Qty: 0 | Refills: 0 | Status: COMPLETED | OUTPATIENT
Start: 2019-01-08 | End: 2019-01-09

## 2019-01-08 RX ADMIN — Medication 40 MILLIGRAM(S): at 06:38

## 2019-01-08 RX ADMIN — SODIUM CHLORIDE 100 MILLILITER(S): 9 INJECTION INTRAMUSCULAR; INTRAVENOUS; SUBCUTANEOUS at 00:37

## 2019-01-08 RX ADMIN — PIPERACILLIN AND TAZOBACTAM 25 GRAM(S): 4; .5 INJECTION, POWDER, LYOPHILIZED, FOR SOLUTION INTRAVENOUS at 23:26

## 2019-01-08 RX ADMIN — Medication 250 MILLIGRAM(S): at 10:52

## 2019-01-08 RX ADMIN — Medication 250 MILLIGRAM(S): at 22:20

## 2019-01-08 RX ADMIN — PIPERACILLIN AND TAZOBACTAM 25 GRAM(S): 4; .5 INJECTION, POWDER, LYOPHILIZED, FOR SOLUTION INTRAVENOUS at 06:13

## 2019-01-08 RX ADMIN — Medication 1 MILLIGRAM(S): at 10:53

## 2019-01-08 RX ADMIN — Medication 0.5 MILLIGRAM(S): at 06:38

## 2019-01-08 RX ADMIN — Medication 325 MILLIGRAM(S): at 10:53

## 2019-01-08 RX ADMIN — PIPERACILLIN AND TAZOBACTAM 25 GRAM(S): 4; .5 INJECTION, POWDER, LYOPHILIZED, FOR SOLUTION INTRAVENOUS at 14:04

## 2019-01-08 RX ADMIN — Medication 250 MILLIGRAM(S): at 00:22

## 2019-01-08 RX ADMIN — LITHIUM CARBONATE 300 MILLIGRAM(S): 300 TABLET, EXTENDED RELEASE ORAL at 10:53

## 2019-01-08 RX ADMIN — Medication 75 MICROGRAM(S): at 06:38

## 2019-01-08 NOTE — CHART NOTE - NSCHARTNOTEFT_GEN_A_CORE
Pt Had labs tonight. K 3.3, Phos 2.0, Calcium 7.2.----- K 3.1 this am. Supplements ordered, Pt would not take.  15millimoles K PHOS ordered  albumin ordered to see if Calcium needs to be supplemented  Am labs in Pt Had labs tonight. K 3.3, Phos 2.0, Calcium 7.2.----- K 3.1 this am. Supplements ordered, Pt would not take.  15millimoles K PHOS ordered  albumin ordered to see if Calcium needs to be supplemented  Am labs in  Albumin 3  Corrected calcium is 8.0  calcium gluconate 1g IVPB ordered  Discussed with Dr Baca  Labs in am

## 2019-01-08 NOTE — CONSULT NOTE ADULT - SUBJECTIVE AND OBJECTIVE BOX
patient with no complaints sent from [place] due to recurrent fevers recent tx for pna psych hx Bipolar disorder  aide at bedside reports agitation yesterday but calmer today Heme / onc note appreciated H/o myeloma  MSE : unremarkable- pleasant jovial No self injurious or violent expressions [facility]  PAST MEDICAL & SURGICAL HISTORY:  Multiple myeloma  Bone cancer  Scoliosis  Hypercholesteremia  Hypertension  Hypothyroid  MEDICATIONS  (STANDING):  ferrous    sulfate 325 milliGRAM(s) Oral daily  folic acid 1 milliGRAM(s) Oral daily  furosemide    Tablet 40 milliGRAM(s) Oral daily  heparin  Injectable 5000 Unit(s) SubCutaneous every 12 hours  levothyroxine 75 MICROGram(s) Oral daily  lithium 300 milliGRAM(s) Oral daily  LORazepam     Tablet 0.5 milliGRAM(s) Oral two times a day  piperacillin/tazobactam IVPB. 3.375 Gram(s) IV Intermittent every 8 hours  sodium chloride 0.9%. 1000 milliLiter(s) (100 mL/Hr) IV Continuous <Continuous>  vancomycin  IVPB 1000 milliGRAM(s) IV Intermittent every 12 hours                        8.5    4.3   )-----------( 274      ( 07 Jan 2019 09:26 )             27.9     01-07    140  |  104  |  15.0  ----------------------------<  111  3.1<L>   |  25.0  |  0.62    Ca    7.7<L>      07 Jan 2019 09:26  Phos  2.8     01-07  Mg     1.8     01-07  Lithium Level, Serum: .70 mmol/L (01.03.19 @ 16:15)  Thyroid Stimulating Hormone, Serum: 8.23 uIU/mL (01.22.18 @ 16:31)

## 2019-01-08 NOTE — PROGRESS NOTE ADULT - ASSESSMENT
73 yr old female with schizophrenia, hypertension, hypothyroid, multiple myeloma sent in for evaluation from [place] for persistent fevers, recently completed course of oral antibiotics. CXR showed multilobar pneumonia. She was started on broad spectrum antibiotics. Psychiatry was consulted, advised to continue Lithium and Ativan.

## 2019-01-08 NOTE — PROGRESS NOTE ADULT - SUBJECTIVE AND OBJECTIVE BOX
E&M OF ANEMIA.        Patient is a 73y old  Female who presents with a chief complaint of   HPI:  73F present from [facility] for persistent fevers. found to have bilateral pneumonia. The patient is currently being treated for multiple myeloma by Dr. Flores. She is currently on darzalex, velcade dexamethasone, last dose on 12/26/ No bone pain.    PAST MEDICAL & SURGICAL HISTORY:  Multiple myeloma  Mental disorder  Bone cancer  Scoliosis  Hypercholesteremia  Hypertension  Hypothyroid    FAMILY HISTORY:    REVIEW OF SYSTEMS    Unable to obtain due to patient  noncooperation      PHYSICAL EXAM:  Vital Signs Last 24 Hrs  T(C): 36.3 (08 Jan 2019 11:18), Max: 37 (07 Jan 2019 23:50)  T(F): 97.3 (08 Jan 2019 11:18), Max: 98.6 (07 Jan 2019 23:50)  HR: 63 (08 Jan 2019 11:18) (63 - 78)  BP: 153/57 (08 Jan 2019 11:18) (113/67 - 153/57)  BP(mean): --  RR: 18 (08 Jan 2019 11:18) (16 - 18)  SpO2: 95% (08 Jan 2019 11:18) (95% - 100%)  no icterus  scattered ronchi  no edema    MEDICATIONS  (STANDING):  ferrous    sulfate 325 milliGRAM(s) Oral daily  folic acid 1 milliGRAM(s) Oral daily  furosemide    Tablet 40 milliGRAM(s) Oral daily  heparin  Injectable 5000 Unit(s) SubCutaneous every 12 hours  levothyroxine 75 MICROGram(s) Oral daily  lithium 300 milliGRAM(s) Oral daily  LORazepam     Tablet 0.5 milliGRAM(s) Oral two times a day  piperacillin/tazobactam IVPB. 3.375 Gram(s) IV Intermittent every 8 hours  vancomycin  IVPB 1000 milliGRAM(s) IV Intermittent every 12 hours                    8.5                  140  | 25.0 | 15.0         4.3   >-----------< 274     ------------------------< 111                   27.9                 3.1  | 104  | 0.62                                         Ca 7.7   Mg 1.8   Ph 2.8            Multiple myeloma: not in remission. further treatment as an outpatient  Pneumonia: on abx. will check immunoglobulin levels  anemia:  WILL CHECK FE STUDIES  B12 AND FOLATE  OTHERWISE OBSERVE FOR NOW HEMATOLOGICALLY.

## 2019-01-09 LAB
ANION GAP SERPL CALC-SCNC: 11 MMOL/L — SIGNIFICANT CHANGE UP (ref 5–17)
BUN SERPL-MCNC: 8 MG/DL — SIGNIFICANT CHANGE UP (ref 8–20)
CALCIUM SERPL-MCNC: 7.3 MG/DL — LOW (ref 8.6–10.2)
CHLORIDE SERPL-SCNC: 107 MMOL/L — SIGNIFICANT CHANGE UP (ref 98–107)
CO2 SERPL-SCNC: 23 MMOL/L — SIGNIFICANT CHANGE UP (ref 22–29)
CREAT SERPL-MCNC: 0.53 MG/DL — SIGNIFICANT CHANGE UP (ref 0.5–1.3)
GLUCOSE SERPL-MCNC: 85 MG/DL — SIGNIFICANT CHANGE UP (ref 70–115)
HCT VFR BLD CALC: 26.2 % — LOW (ref 37–47)
HGB BLD-MCNC: 8.1 G/DL — LOW (ref 12–16)
MAGNESIUM SERPL-MCNC: 1.7 MG/DL — SIGNIFICANT CHANGE UP (ref 1.6–2.6)
MCHC RBC-ENTMCNC: 29.3 PG — SIGNIFICANT CHANGE UP (ref 27–31)
MCHC RBC-ENTMCNC: 30.9 G/DL — LOW (ref 32–36)
MCV RBC AUTO: 94.9 FL — SIGNIFICANT CHANGE UP (ref 81–99)
PHOSPHATE SERPL-MCNC: 2.9 MG/DL — SIGNIFICANT CHANGE UP (ref 2.4–4.7)
PLATELET # BLD AUTO: 251 K/UL — SIGNIFICANT CHANGE UP (ref 150–400)
POTASSIUM SERPL-MCNC: 3.4 MMOL/L — LOW (ref 3.5–5.3)
POTASSIUM SERPL-SCNC: 3.4 MMOL/L — LOW (ref 3.5–5.3)
RBC # BLD: 2.76 M/UL — LOW (ref 4.4–5.2)
RBC # FLD: 16.8 % — HIGH (ref 11–15.6)
SODIUM SERPL-SCNC: 141 MMOL/L — SIGNIFICANT CHANGE UP (ref 135–145)
WBC # BLD: 6.3 K/UL — SIGNIFICANT CHANGE UP (ref 4.8–10.8)
WBC # FLD AUTO: 6.3 K/UL — SIGNIFICANT CHANGE UP (ref 4.8–10.8)

## 2019-01-09 PROCEDURE — 99232 SBSQ HOSP IP/OBS MODERATE 35: CPT

## 2019-01-09 PROCEDURE — 71045 X-RAY EXAM CHEST 1 VIEW: CPT | Mod: 26

## 2019-01-09 RX ORDER — POTASSIUM CHLORIDE 20 MEQ
40 PACKET (EA) ORAL EVERY 4 HOURS
Qty: 0 | Refills: 0 | Status: COMPLETED | OUTPATIENT
Start: 2019-01-09 | End: 2019-01-09

## 2019-01-09 RX ORDER — CHLORHEXIDINE GLUCONATE 213 G/1000ML
1 SOLUTION TOPICAL
Qty: 0 | Refills: 0 | Status: DISCONTINUED | OUTPATIENT
Start: 2019-01-09 | End: 2019-01-10

## 2019-01-09 RX ORDER — POTASSIUM CHLORIDE 20 MEQ
20 PACKET (EA) ORAL
Qty: 0 | Refills: 0 | Status: COMPLETED | OUTPATIENT
Start: 2019-01-09 | End: 2019-01-09

## 2019-01-09 RX ADMIN — Medication 40 MILLIEQUIVALENT(S): at 16:52

## 2019-01-09 RX ADMIN — Medication 20 MILLIEQUIVALENT(S): at 10:55

## 2019-01-09 RX ADMIN — LITHIUM CARBONATE 300 MILLIGRAM(S): 300 TABLET, EXTENDED RELEASE ORAL at 12:40

## 2019-01-09 RX ADMIN — Medication 20 MILLIEQUIVALENT(S): at 12:40

## 2019-01-09 RX ADMIN — Medication 325 MILLIGRAM(S): at 12:40

## 2019-01-09 RX ADMIN — PIPERACILLIN AND TAZOBACTAM 25 GRAM(S): 4; .5 INJECTION, POWDER, LYOPHILIZED, FOR SOLUTION INTRAVENOUS at 14:37

## 2019-01-09 RX ADMIN — Medication 40 MILLIEQUIVALENT(S): at 19:03

## 2019-01-09 RX ADMIN — Medication 200 GRAM(S): at 03:20

## 2019-01-09 RX ADMIN — PIPERACILLIN AND TAZOBACTAM 25 GRAM(S): 4; .5 INJECTION, POWDER, LYOPHILIZED, FOR SOLUTION INTRAVENOUS at 06:09

## 2019-01-09 RX ADMIN — HEPARIN SODIUM 5000 UNIT(S): 5000 INJECTION INTRAVENOUS; SUBCUTANEOUS at 06:14

## 2019-01-09 RX ADMIN — HEPARIN SODIUM 5000 UNIT(S): 5000 INJECTION INTRAVENOUS; SUBCUTANEOUS at 16:40

## 2019-01-09 RX ADMIN — Medication 75 MICROGRAM(S): at 06:16

## 2019-01-09 RX ADMIN — Medication 1 MILLIGRAM(S): at 12:40

## 2019-01-09 RX ADMIN — POTASSIUM PHOSPHATE, MONOBASIC POTASSIUM PHOSPHATE, DIBASIC 62.5 MILLIMOLE(S): 236; 224 INJECTION, SOLUTION INTRAVENOUS at 00:21

## 2019-01-09 RX ADMIN — Medication 0.5 MILLIGRAM(S): at 23:16

## 2019-01-09 RX ADMIN — CHLORHEXIDINE GLUCONATE 1 APPLICATION(S): 213 SOLUTION TOPICAL at 16:54

## 2019-01-09 NOTE — PROGRESS NOTE ADULT - PROBLEM SELECTOR PLAN 1
Likely from gram positive and gram negative bacteria, started on Vancomycin and Zosyn. Check MRSA PCR- only positive for staph aureus- continue Zosyn, d/c Vanco, sputum cx ordered- instructing pt on how to collect Likely from gram positive and gram negative bacteria, started on Vancomycin and Zosyn. Check MRSA PCR- only positive for staph aureus- continue Zosyn, d/c Vanco, sputum cx ordered- instructing pt on how to collect  - Repeat CXR today  - Swallow eval-r/o aspiration as cause of PNA

## 2019-01-09 NOTE — SWALLOW BEDSIDE ASSESSMENT ADULT - SLP PERTINENT HISTORY OF CURRENT PROBLEM
73 yr old female with schizophrenia, hypertension, hypothyroid, multiple myeloma sent in for evaluation from [place] for persistent fevers, recently completed course of oral antibiotics. CXR showed multilobar pneumonia.

## 2019-01-09 NOTE — SWALLOW BEDSIDE ASSESSMENT ADULT - SWALLOW EVAL: RECOMMENDED FEEDING/EATING TECHNIQUES
crush medication (when feasible)/oral hygiene/small sips/bites/position upright (90 degrees)/maintain upright posture during/after eating for 30 mins

## 2019-01-09 NOTE — SWALLOW BEDSIDE ASSESSMENT ADULT - SWALLOW EVAL: DIAGNOSIS
Oral and pharyngeal stages of swallow are WFL with no overt s/s aspiration at bedside. Please note that bedside evaluation cannot r/o silent aspiration, and if silent aspiration is suspected, pt may benefit from MBS study for objective view of pharyngeal stage of swallow

## 2019-01-09 NOTE — PROGRESS NOTE ADULT - ASSESSMENT
73 yr old female with schizophrenia, hypertension, hypothyroid, multiple myeloma sent in for evaluation from [place] for persistent fevers, recently completed course of oral antibiotics. CXR showed multilobar pneumonia. She was started on broad spectrum antibiotics. Psychiatry was consulted, advised to continue Lithium and Ativan.  No need for 1:1

## 2019-01-09 NOTE — PROGRESS NOTE ADULT - SUBJECTIVE AND OBJECTIVE BOX
FU OF ANEMIA.  Myeloma.    HPI:  73 F presents from [facility] for persistent fevers, found  to have bilateral pneumonia. The patient is currently being treated for multiple myeloma by Dr. Flores. She is currently on Darzalex, Velcade dexamethasone, last dose on 12/26/18.   No bone pain.    PAST MEDICAL & SURGICAL HISTORY:  Multiple myeloma  Mental disorder  Bone cancer  Scoliosis  Hypercholesteremia  Hypertension  Hypothyroid    FAMILY HISTORY:    REVIEW OF SYSTEMS    Unable to obtain due to patient  noncooperation      PHYSICAL EXAM:  Vital Signs Last 24 Hrs  T(C): 37.1 (09 Jan 2019 07:25), Max: 37.8 (08 Jan 2019 23:18)  T(F): 98.7 (09 Jan 2019 07:25), Max: 100 (08 Jan 2019 23:18)  HR: 65 (09 Jan 2019 07:25) (63 - 81)  BP: 142/60 (09 Jan 2019 07:25) (130/63 - 154/78)  BP(mean): 63 (09 Jan 2019 06:03) (63 - 63)  RR: 19 (09 Jan 2019 07:25) (18 - 20)  SpO2: 93% (09 Jan 2019 07:25) (93% - 96%)    MEDICATIONS  (STANDING):  ferrous    sulfate 325 milliGRAM(s) Oral daily  folic acid 1 milliGRAM(s) Oral daily  furosemide    Tablet 40 milliGRAM(s) Oral daily  heparin  Injectable 5000 Unit(s) SubCutaneous every 12 hours  levothyroxine 75 MICROGram(s) Oral daily  lithium 300 milliGRAM(s) Oral daily  LORazepam     Tablet 0.5 milliGRAM(s) Oral two times a day  piperacillin/tazobactam IVPB. 3.375 Gram(s) IV Intermittent every 8 hours  potassium chloride    Tablet ER 20 milliEquivalent(s) Oral every 2 hours    MEDICATIONS  (PRN):  acetaminophen   Tablet .. 650 milliGRAM(s) Oral every 6 hours PRN Temp greater or equal to 38C (100.4F), Mild Pain (1 - 3)  LORazepam   Injectable 1 milliGRAM(s) IntraMuscular every 8 hours PRN Agitation    CBC Full  -  ( 09 Jan 2019 06:42 )  WBC Count : 6.3 K/uL  Hemoglobin : 8.1 g/dL  Hematocrit : 26.2 %  Platelet Count - Automated : 251 K/uL  Mean Cell Volume : 94.9 fl  Mean Cell Hemoglobin : 29.3 pg  Mean Cell Hemoglobin Concentration : 30.9 g/dL  Auto Neutrophil # : x  Auto Lymphocyte # : x  Auto Monocyte # : x  Auto Eosinophil # : x  Auto Basophil # : x  Auto Neutrophil % : x  Auto Lymphocyte % : x  Auto Monocyte % : x  Auto Eosinophil % : x  Auto Basophil % : x    09 Jan 2019 06:42    141    |  107    |  8.0    ----------------------------<  85     3.4     |  23.0   |  0.53     Ca    7.3        09 Jan 2019 06:42  Phos  2.9       09 Jan 2019 06:42  Mg     1.7       09 Jan 2019 06:42    TPro  x      /  Alb  3.0    /  TBili  x      /  DBili  x      /  AST  x      /  ALT  x      /  AlkPhos  x      08 Jan 2019 21:10           EXAM:  XR CHEST PORTABLE IMMED 1V                          PROCEDURE DATE:  01/06/2019          INTERPRETATION:  Portable chest radiograph        CLINICAL INFORMATION:   Cough    TECHNIQUE:  Portable  AP view of the chest was obtained.    COMPARISON: 1/4/2019 available for review.    FINDINGS:   The lungs  show LEFT lower lobe on multifocal airspace consolidations and   ill-defined opacities in the RIGHT lung base periphery concerning for   infectious pneumonia. Upper lobes clear.    The  heart is enlarged in transverse diameter. No hilar mass. Trachea   midline.       . A small catheter tip in SVC.         The heart and mediastinum are within normal limits.         Visualized osseous structures are intact.        IMPRESSION:   Bilateral multifocal airspace consolidations LEFT greater   than RIGHT.    Continued surveillance and follow-up radiograph recommended to complete   resolution to exclude other etiologies such as underlying carcinoma..         < end of copied text >      Myeloma  Anemia, multifactorial  Anemia due to myeloma and possibly treatment for it,  as well as chronic disease.   Transfuse if hemoglobin less than 7.  BL PNA

## 2019-01-09 NOTE — SWALLOW BEDSIDE ASSESSMENT ADULT - SLP GENERAL OBSERVATIONS
Pt received awake and alert in bed, grossly 0x3 (self corrected year from 2018 to 2019), cooperative

## 2019-01-09 NOTE — PROGRESS NOTE ADULT - SUBJECTIVE AND OBJECTIVE BOX
INTERVAL HPI/OVERNIGHT EVENTS:    CC: multilobar pneumonia likely secondary to gram positive and gram negative bacteria, bipolar disorder, hypertension, hypothyroid, multiple myeloma        No fever, no new complaints. Pt reports her dry cough continues.  Pt states she is tired this morning, not opening her eyes but answering questions appropriately.  She is encouraged to sit-up and eat her breakfast.  She states she will later after she rests.  Overnight PA note appreciated- supplements given and am improved.      Vital Signs Last 24 Hrs  T(C): 37.1 (09 Jan 2019 07:25), Max: 37.8 (08 Jan 2019 23:18)  T(F): 98.7 (09 Jan 2019 07:25), Max: 100 (08 Jan 2019 23:18)  HR: 65 (09 Jan 2019 07:25) (63 - 81)  BP: 142/60 (09 Jan 2019 07:25) (130/63 - 154/78)  BP(mean): 63 (09 Jan 2019 06:03) (63 - 63)  RR: 19 (09 Jan 2019 07:25) (18 - 20)  SpO2: 93% (09 Jan 2019 07:25) (93% - 96%)    PHYSICAL EXAM:    GENERAL: Not in distress, alert  CHEST/LUNG: b/l air entry, coarse  HEART: Regular   ABDOMEN: Soft, BS+  EXTREMITIES:  trace pedal edema, no tenderness.    MEDICATIONS  (STANDING):  ferrous    sulfate 325 milliGRAM(s) Oral daily  folic acid 1 milliGRAM(s) Oral daily  furosemide    Tablet 40 milliGRAM(s) Oral daily  heparin  Injectable 5000 Unit(s) SubCutaneous every 12 hours  levothyroxine 75 MICROGram(s) Oral daily  lithium 300 milliGRAM(s) Oral daily  LORazepam     Tablet 0.5 milliGRAM(s) Oral two times a day  piperacillin/tazobactam IVPB. 3.375 Gram(s) IV Intermittent every 8 hours  sodium chloride 0.9%. 1000 milliLiter(s) (100 mL/Hr) IV Continuous <Continuous>  vancomycin  IVPB 1000 milliGRAM(s) IV Intermittent every 12 hours    MEDICATIONS  (PRN):  acetaminophen   Tablet .. 650 milliGRAM(s) Oral every 6 hours PRN Temp greater or equal to 38C (100.4F), Mild Pain (1 - 3)  LORazepam   Injectable 1 milliGRAM(s) IntraMuscular every 8 hours PRN Agitation      Allergies    ixazomib (Unknown)  NSAIDs (Unknown)  penicillins (Unknown)  sulfa drugs (Unknown)    Intolerances          LABS:                                     8.1    6.3   )-----------( 251      ( 09 Jan 2019 06:42 )             26.2     01-09    141  |  107  |  8.0  ----------------------------<  85  3.4<L>   |  23.0  |  0.53    Ca    7.3<L>      09 Jan 2019 06:42  Phos  2.9     01-09  Mg     1.7     01-09    TPro  x   /  Alb  3.0<L>  /  TBili  x   /  DBili  x   /  AST  x   /  ALT  x   /  AlkPhos  x   01-08      MRSA swab - staph aureus only detected     blood culture 1/6/19- no growth x 2    Psych consult appreciated.          RADIOLOGY & ADDITIONAL TESTS:

## 2019-01-09 NOTE — PROGRESS NOTE ADULT - SUBJECTIVE AND OBJECTIVE BOX
Patient is a 73y old  Female who presents with a chief complaint of fever infection (08 Jan 2019 08:17)    Events last 24 hours: ***  patient with no complaints sent from [place] due to recurrent fevers recent tx for pna psych hx Bipolar disorder    PAST MEDICAL & SURGICAL HISTORY:  Multiple myeloma  Mental disorder  Bone cancer  Scoliosis  Hypercholesteremia  Hypertension  Hypothyroid    Allergies    ixazomib (Unknown)  NSAIDs (Unknown)  penicillins (Unknown)  sulfa drugs (Unknown)    Medications:  piperacillin/tazobactam IVPB. 3.375 Gram(s) IV Intermittent every 8 hours  vancomycin  IVPB 1000 milliGRAM(s) IV Intermittent every 12 hours  furosemide    Tablet 40 milliGRAM(s) Oral daily  acetaminophen   Tablet .. 650 milliGRAM(s) Oral every 6 hours PRN  lithium 300 milliGRAM(s) Oral daily  LORazepam     Tablet 0.5 milliGRAM(s) Oral two times a day  LORazepam   Injectable 1 milliGRAM(s) IntraMuscular every 8 hours PRN  heparin  Injectable 5000 Unit(s) SubCutaneous every 12 hours  levothyroxine 75 MICROGram(s) Oral daily  ferrous    sulfate 325 milliGRAM(s) Oral daily  folic acid 1 milliGRAM(s) Oral daily      Vital Signs Last 24 Hrs  T(C): 37.1 (09 Jan 2019 07:25), Max: 37.8 (08 Jan 2019 23:18)  T(F): 98.7 (09 Jan 2019 07:25), Max: 100 (08 Jan 2019 23:18)  HR: 65 (09 Jan 2019 07:25) (63 - 81)  BP: 142/60 (09 Jan 2019 07:25) (130/63 - 154/78)  BP(mean): 63 (09 Jan 2019 06:03) (63 - 63)  RR: 19 (09 Jan 2019 07:25) (18 - 20)  SpO2: 93% (09 Jan 2019 07:25) (93% - 96%)        I&O's Detail    08 Jan 2019 07:01  -  09 Jan 2019 07:00  --------------------------------------------------------  IN:    Solution: 250 mL    Solution: 100 mL    Solution: 250 mL    Solution: 50 mL  Total IN: 650 mL    OUT:  Total OUT: 0 mL    Total NET: 650 mL            LABS:                        8.1    6.3   )-----------( 251      ( 09 Jan 2019 06:42 )             26.2     01-09    141  |  107  |  8.0  ----------------------------<  85  3.4<L>   |  23.0  |  0.53    Ca    7.3<L>      09 Jan 2019 06:42  Phos  2.9     01-09  Mg     1.7     01-09    TPro  x   /  Alb  3.0<L>  /  TBili  x   /  DBili  x   /  AST  x   /  ALT  x   /  AlkPhos  x   01-08          CAPILLARY BLOOD GLUCOSE      POCT Blood Glucose.: 156 mg/dL (07 Jan 2019 22:23)        CULTURES:  Culture Results:   No growth at 48 hours (01-06 @ 19:19)  Culture Results:   No growth at 48 hours (01-06 @ 19:18)    MSE : unremarkable- pleasant jovial No self injurious or violent expressions [facility] med

## 2019-01-10 ENCOUNTER — TRANSCRIPTION ENCOUNTER (OUTPATIENT)
Age: 74
End: 2019-01-10

## 2019-01-10 VITALS
SYSTOLIC BLOOD PRESSURE: 136 MMHG | RESPIRATION RATE: 19 BRPM | DIASTOLIC BLOOD PRESSURE: 76 MMHG | TEMPERATURE: 99 F | HEART RATE: 78 BPM | OXYGEN SATURATION: 96 %

## 2019-01-10 LAB
ANION GAP SERPL CALC-SCNC: 9 MMOL/L — SIGNIFICANT CHANGE UP (ref 5–17)
BLD GP AB SCN SERPL QL: ABNORMAL
BUN SERPL-MCNC: 10 MG/DL — SIGNIFICANT CHANGE UP (ref 8–20)
CALCIUM SERPL-MCNC: 8.1 MG/DL — LOW (ref 8.6–10.2)
CHLORIDE SERPL-SCNC: 108 MMOL/L — HIGH (ref 98–107)
CO2 SERPL-SCNC: 22 MMOL/L — SIGNIFICANT CHANGE UP (ref 22–29)
CREAT SERPL-MCNC: 0.61 MG/DL — SIGNIFICANT CHANGE UP (ref 0.5–1.3)
DAT IGG-SP REAG RBC-IMP: ABNORMAL
DIR ANTIGLOB POLYSPECIFIC INTERPRETATION: ABNORMAL
GLUCOSE SERPL-MCNC: 87 MG/DL — SIGNIFICANT CHANGE UP (ref 70–115)
HCT VFR BLD CALC: 29.9 % — LOW (ref 37–47)
HGB BLD-MCNC: 9.1 G/DL — LOW (ref 12–16)
IAT COMP-SP REAG SERPL QL: SIGNIFICANT CHANGE UP
MAGNESIUM SERPL-MCNC: 2 MG/DL — SIGNIFICANT CHANGE UP (ref 1.6–2.6)
MCHC RBC-ENTMCNC: 29.5 PG — SIGNIFICANT CHANGE UP (ref 27–31)
MCHC RBC-ENTMCNC: 30.4 G/DL — LOW (ref 32–36)
MCV RBC AUTO: 97.1 FL — SIGNIFICANT CHANGE UP (ref 81–99)
PHOSPHATE SERPL-MCNC: 2.1 MG/DL — LOW (ref 2.4–4.7)
PLATELET # BLD AUTO: 319 K/UL — SIGNIFICANT CHANGE UP (ref 150–400)
POTASSIUM SERPL-MCNC: 4.6 MMOL/L — SIGNIFICANT CHANGE UP (ref 3.5–5.3)
POTASSIUM SERPL-SCNC: 4.6 MMOL/L — SIGNIFICANT CHANGE UP (ref 3.5–5.3)
RBC # BLD: 3.08 M/UL — LOW (ref 4.4–5.2)
RBC # FLD: 16.8 % — HIGH (ref 11–15.6)
SODIUM SERPL-SCNC: 139 MMOL/L — SIGNIFICANT CHANGE UP (ref 135–145)
WBC # BLD: 5.8 K/UL — SIGNIFICANT CHANGE UP (ref 4.8–10.8)
WBC # FLD AUTO: 5.8 K/UL — SIGNIFICANT CHANGE UP (ref 4.8–10.8)

## 2019-01-10 PROCEDURE — 99239 HOSP IP/OBS DSCHRG MGMT >30: CPT

## 2019-01-10 RX ORDER — PIPERACILLIN AND TAZOBACTAM 4; .5 G/20ML; G/20ML
200 INJECTION, POWDER, LYOPHILIZED, FOR SOLUTION INTRAVENOUS
Qty: 1600 | Refills: 0 | OUTPATIENT
Start: 2019-01-10 | End: 2019-01-13

## 2019-01-10 RX ADMIN — CHLORHEXIDINE GLUCONATE 1 APPLICATION(S): 213 SOLUTION TOPICAL at 05:51

## 2019-01-10 RX ADMIN — Medication 0.5 MILLIGRAM(S): at 18:25

## 2019-01-10 RX ADMIN — PIPERACILLIN AND TAZOBACTAM 25 GRAM(S): 4; .5 INJECTION, POWDER, LYOPHILIZED, FOR SOLUTION INTRAVENOUS at 16:33

## 2019-01-10 RX ADMIN — LITHIUM CARBONATE 300 MILLIGRAM(S): 300 TABLET, EXTENDED RELEASE ORAL at 16:30

## 2019-01-10 RX ADMIN — Medication 1 MILLIGRAM(S): at 16:30

## 2019-01-10 RX ADMIN — Medication 75 MICROGRAM(S): at 05:51

## 2019-01-10 RX ADMIN — Medication 62.5 MILLIMOLE(S): at 10:33

## 2019-01-10 RX ADMIN — HEPARIN SODIUM 5000 UNIT(S): 5000 INJECTION INTRAVENOUS; SUBCUTANEOUS at 16:31

## 2019-01-10 RX ADMIN — Medication 0.5 MILLIGRAM(S): at 05:51

## 2019-01-10 RX ADMIN — Medication 40 MILLIGRAM(S): at 05:51

## 2019-01-10 RX ADMIN — HEPARIN SODIUM 5000 UNIT(S): 5000 INJECTION INTRAVENOUS; SUBCUTANEOUS at 05:50

## 2019-01-10 RX ADMIN — Medication 300 UNIT(S): at 18:25

## 2019-01-10 RX ADMIN — PIPERACILLIN AND TAZOBACTAM 25 GRAM(S): 4; .5 INJECTION, POWDER, LYOPHILIZED, FOR SOLUTION INTRAVENOUS at 01:45

## 2019-01-10 RX ADMIN — Medication 325 MILLIGRAM(S): at 16:31

## 2019-01-10 RX ADMIN — PIPERACILLIN AND TAZOBACTAM 25 GRAM(S): 4; .5 INJECTION, POWDER, LYOPHILIZED, FOR SOLUTION INTRAVENOUS at 07:45

## 2019-01-10 NOTE — PHYSICAL THERAPY INITIAL EVALUATION ADULT - CRITERIA FOR SKILLED THERAPEUTIC INTERVENTIONS
anticipated discharge recommendation/impairments found/functional limitations in following categories/therapy frequency/rehab potential/predicted duration of therapy intervention

## 2019-01-10 NOTE — DISCHARGE NOTE ADULT - ADDITIONAL INSTRUCTIONS
Returning to [place], folllow-up with PMD and Hematology Returning to [place], folllow-up with PMD and Hematology, repeat CXR in 4-6 weeks, return for worsening complaints.

## 2019-01-10 NOTE — PROGRESS NOTE ADULT - ATTENDING COMMENTS
Discussed with RN.
Patient seen and examined at bedside, agree with above A/P. No overnight events, denies complaints.  Labs and vitals noted.  Exam unchanged  Complete course of antibiotics.   Spoke with MD at [place].  Stable for discharge to [place].  Replace phosphorus.  Needs CXR in 4 weeks.
Patient seen and examined at bedside. Agree with above A/P. Vitals and labs noted.  Cough better, no fever. States feels weak.  On exam,  alert, not in distress  lungs: b/l coarse breath sounds  cvs: reg  abdo: soft, bs+  ext: no edema, tenderness  imp:   multilobar pneumonia  hypokalemia  bipolar disorder  plan:  discontinue Vancomycin  continue Zosyn, check CXR to assess worsening infiltrates  replace potassium for hypokalemia  Continue medications for bipolar  speech and swallow eval to assess for aspiration  anticipate discharge in 24-48 hrs  discussed with PA and RN.

## 2019-01-10 NOTE — PROGRESS NOTE ADULT - SUBJECTIVE AND OBJECTIVE BOX
hospitalist note appreciated Patient with no specific complaints Is illogical and makes irrelevant statements  cooperative w care   swallow evaluation appreciated  Vital Signs Last 24 Hrs  T(C): 37.1 (09 Jan 2019 23:55), Max: 37.1 (09 Jan 2019 15:19)  T(F): 98.7 (09 Jan 2019 23:55), Max: 98.8 (09 Jan 2019 15:19)  HR: 72 (09 Jan 2019 23:55) (72 - 76)  BP: 150/62 (09 Jan 2019 23:55) (150/62 - 152/69)  BP(mean): --  RR: 18 (09 Jan 2019 23:55) (18 - 18)  SpO2: 95% (09 Jan 2019 23:55) (95% - 98%)  MEDICATIONS  (STANDING):  chlorhexidine 2% Cloths 1 Application(s) Topical <User Schedule>  ferrous    sulfate 325 milliGRAM(s) Oral daily  folic acid 1 milliGRAM(s) Oral daily  furosemide    Tablet 40 milliGRAM(s) Oral daily  heparin  Injectable 5000 Unit(s) SubCutaneous every 12 hours  levothyroxine 75 MICROGram(s) Oral daily  lithium 300 milliGRAM(s) Oral daily  LORazepam     Tablet 0.5 milliGRAM(s) Oral two times a day  piperacillin/tazobactam IVPB. 3.375 Gram(s) IV Intermittent every 8 hours                        8.1    6.3   )-----------( 251      ( 09 Jan 2019 06:42 )             26.2     01-09    141  |  107  |  8.0  ----------------------------<  85  3.4<L>   |  23.0  |  0.53    Ca    7.3<L>      09 Jan 2019 06:42  Phos  2.9     01-09  Mg     1.7     01-09    TPro  x   /  Alb  3.0<L>  /  TBili  x   /  DBili  x   /  AST  x   /  ALT  x   /  AlkPhos  x   01-08    LIVER FUNCTIONS - ( 08 Jan 2019 21:10 )  Alb: 3.0 g/dL / Pro: x     / ALK PHOS: x     / ALT: x     / AST: x     / GGT: x

## 2019-01-10 NOTE — DISCHARGE NOTE ADULT - PATIENT PORTAL LINK FT
You can access the [facility] Patient Portal, offered by [facility], by registering with the following website: [email or web address]

## 2019-01-10 NOTE — DISCHARGE NOTE ADULT - MEDICATION SUMMARY - MEDICATIONS TO TAKE
I will START or STAY ON the medications listed below when I get home from the hospital:    vantin  -- 200 milligram(s) by mouth 2 times a day   -- Indication: For Pneumonia    Ativan 0.5 mg oral tablet  -- 1 tab(s) by mouth 2 times a day  -- Indication: For Bipolar disorder    lithium 300 mg oral capsule  -- 1 cap(s) by mouth once a day  -- Indication: For Bipolar disorder    furosemide 40 mg oral tablet  -- 1 tab(s) by mouth once a day  -- Indication: For fluid overload    ferrous sulfate 325 mg (65 mg elemental iron) oral tablet  -- 1 tab(s) by mouth once a day  -- Indication: For Anemia, unspecified type    levothyroxine 75 mcg (0.075 mg) oral tablet  -- 1 tab(s) by mouth once a day  -- Indication: For Hypothyroid    folic acid 1 mg oral tablet  -- 1 tab(s) by mouth once a day  -- Indication: For Anemia, unspecified type I will START or STAY ON the medications listed below when I get home from the hospital:    vantin  -- 200 milligram(s) by mouth 2 times a day   -- Indication: For Pneumonia    Ativan 0.5 mg oral tablet  -- 1 tab(s) by mouth 2 times a day  -- Indication: For Bipolar disorder    lithium 300 mg oral capsule  -- 1 cap(s) by mouth once a day  -- Indication: For Bipolar disorder    furosemide 40 mg oral tablet  -- 1 tab(s) by mouth once a day  -- Indication: For Hypertension    ferrous sulfate 325 mg (65 mg elemental iron) oral tablet  -- 1 tab(s) by mouth once a day  -- Indication: For Anemia, unspecified type    levothyroxine 75 mcg (0.075 mg) oral tablet  -- 1 tab(s) by mouth once a day  -- Indication: For Hypothyroid    folic acid 1 mg oral tablet  -- 1 tab(s) by mouth once a day  -- Indication: For Anemia, unspecified type

## 2019-01-10 NOTE — DISCHARGE NOTE ADULT - HOSPITAL COURSE
73 yr old female with schizophrenia, hypertension, hypothyroid, multiple myeloma sent in for evaluation from [place] for persistent fevers, recently completed course of oral antibiotics. CXR showed multilobar pneumonia. She was started on broad spectrum antibiotics. Psychiatry was consulted, advised to continue Lithium and Ativan.  No need for 1:1. Heme/ Onc consulted for hx of multiple myeloma, - transfuse if hg< 7.  PT completed 4 days of IV antbx (Vanco/ Zosyn).  Repeat CXR- no significant change.  Stable to be discharged with PO antbx.                          9.1    5.8   )-----------( 319      ( 10 Robe 2019 07:58 )             29.9     01-10    139  |  108<H>  |  10.0  ----------------------------<  87  4.6   |  22.0  |  0.61    Ca    8.1<L>      10 Robe 2019 07:58  Phos  2.1     01-10  Mg     2.0     01-10    TPro  x   /  Alb  3.0<L>  /  TBili  x   /  DBili  x   /  AST  x   /  ALT  x   /  AlkPhos  x   01-08 73 yr old female with schizophrenia, hypertension, hypothyroid, multiple myeloma sent in for evaluation from [place] for persistent fevers, recently completed course of oral antibiotics. CXR showed multilobar pneumonia. She was started on broad spectrum antibiotics. Psychiatry was consulted, advised to continue Lithium and Ativan.  No need for 1:1. Heme/ Onc consulted for hx of multiple myeloma, - transfuse if hg< 7.  PT completed 4 days of IV antbx (Vanco/ Zosyn).  Repeat CXR- no significant change.  Stable to be discharged with PO antibiotics. Discussed with MD at [place]. Needs CXR in 4 weeks to check for resolution.                          9.1    5.8   )-----------( 319      ( 10 Robe 2019 07:58 )             29.9     01-10    139  |  108<H>  |  10.0  ----------------------------<  87  4.6   |  22.0  |  0.61    Ca    8.1<L>      10 Robe 2019 07:58  Phos  2.1     01-10  Mg     2.0     01-10    TPro  x   /  Alb  3.0<L>  /  TBili  x   /  DBili  x   /  AST  x   /  ALT  x   /  AlkPhos  x   01-08      Spent 40 mins in discharge plan and documentation.

## 2019-01-10 NOTE — PROGRESS NOTE ADULT - ASSESSMENT
73 yr old female with schizophrenia, hypertension, hypothyroid, multiple myeloma sent in for evaluation from [place] for persistent fevers, recently completed course of oral antibiotics. CXR showed multilobar pneumonia. She was started on broad spectrum antibiotics. Psychiatry was consulted, advised to continue Lithium and Ativan.  No need for 1:1

## 2019-01-10 NOTE — PHYSICAL THERAPY INITIAL EVALUATION ADULT - ADDITIONAL COMMENTS
pt a questionable historian, states uses a RW, ambulates on her own, lives at [facility], no stairs to negotiate

## 2019-01-10 NOTE — PHYSICAL THERAPY INITIAL EVALUATION ADULT - GAIT PATTERN USED, PT EVAL
decreased gait velocity and activity tolerance, contact assist for safety due to unsteadiness, assist for RW negotiation, verbal cues for sequencing

## 2019-01-10 NOTE — PROGRESS NOTE ADULT - SUBJECTIVE AND OBJECTIVE BOX
INTERVAL HPI/OVERNIGHT EVENTS:    CC: multilobar pneumonia likely secondary to gram positive and gram negative bacteria, bipolar disorder, hypertension, hypothyroid, multiple myeloma        No fever, no new complaints. Pt states she is tired again this morning, but arousable and answers questions appropriately.    Vital Signs Last 24 Hrs  T(C): 37 (10 Robe 2019 07:26), Max: 37.1 (09 Jan 2019 15:19)  T(F): 98.6 (10 Robe 2019 07:26), Max: 98.8 (09 Jan 2019 15:19)  HR: 70 (10 Robe 2019 07:26) (70 - 76)  BP: 131/67 (10 Robe 2019 07:26) (131/67 - 152/69)  BP(mean): --  RR: 18 (10 Robe 2019 07:26) (18 - 18)  SpO2: 92% (10 Robe 2019 07:26) (92% - 98%)    PHYSICAL EXAM:    GENERAL: Not in distress, alert  CHEST/LUNG: b/l air entry, no wheeze, rhonchi or rales  HEART: Regular   ABDOMEN: Soft, NT, ND  EXTREMITIES:  trace pedal edema, no tenderness.    MEDICATIONS  (STANDING):  ferrous    sulfate 325 milliGRAM(s) Oral daily  folic acid 1 milliGRAM(s) Oral daily  furosemide    Tablet 40 milliGRAM(s) Oral daily  heparin  Injectable 5000 Unit(s) SubCutaneous every 12 hours  levothyroxine 75 MICROGram(s) Oral daily  lithium 300 milliGRAM(s) Oral daily  LORazepam     Tablet 0.5 milliGRAM(s) Oral two times a day  piperacillin/tazobactam IVPB. 3.375 Gram(s) IV Intermittent every 8 hours  sodium chloride 0.9%. 1000 milliLiter(s) (100 mL/Hr) IV Continuous <Continuous>  vancomycin  IVPB 1000 milliGRAM(s) IV Intermittent every 12 hours    MEDICATIONS  (PRN):  acetaminophen   Tablet .. 650 milliGRAM(s) Oral every 6 hours PRN Temp greater or equal to 38C (100.4F), Mild Pain (1 - 3)  LORazepam   Injectable 1 milliGRAM(s) IntraMuscular every 8 hours PRN Agitation      Allergies    ixazomib (Unknown)  NSAIDs (Unknown)  penicillins (Unknown)  sulfa drugs (Unknown)    Intolerances          LABS:                                     9.1    5.8   )-----------( 319      ( 10 Robe 2019 07:58 )             29.9   01-10    139  |  108<H>  |  10.0  ----------------------------<  87  4.6   |  22.0  |  0.61    Ca    8.1<L>      10 Robe 2019 07:58  Phos  2.1     01-10  Mg     2.0     01-10    TPro  x   /  Alb  3.0<L>  /  TBili  x   /  DBili  x   /  AST  x   /  ALT  x   /  AlkPhos  x   01-08                 MRSA swab - staph aureus only detected     blood culture 1/6/19- no growth x 2    Psych consult appreciated.          RADIOLOGY & ADDITIONAL TESTS:    CXR:   1/9/2019  Again noted are prominence prominence the bronchovascular markings   bilaterally, which could reflect bronchial wall thickening.  Minimal blunting at the right costophrenic angle may reflect trace   pleural effusion and/or pleural thickening.  No new lobar lung consolidation is noted.    There are chronic appearing left-sided rib fracture deformities.

## 2019-01-10 NOTE — PROGRESS NOTE ADULT - ASSESSMENT
pneumonia  Multiple myeloma  Mental disorder- bipolar disorder-chronic - stable  Scoliosis  Hypercholesteremia  Hypertension  Hypothyroid

## 2019-01-10 NOTE — PROGRESS NOTE ADULT - PROBLEM SELECTOR PLAN 1
Likely from gram positive and gram negative bacteria, started on Vancomycin and Zosyn. Check MRSA PCR- only positive for staph aureus- continue Zosyn, d/c Vanco,   -Repeat CXR- no significant change    Probable return to [place] today with PO antbx  - Swallow eval-r/o aspiration as cause of PNA

## 2019-01-10 NOTE — DISCHARGE NOTE ADULT - CARE PLAN
Principal Discharge DX:	Pneumonia of both lungs due to infectious organism, unspecified part of lung  Goal:	complete course of IV antibiotics and monitor for clinical improvement  Assessment and plan of treatment:	Discharge with PO Vantin  Secondary Diagnosis:	Anemia, unspecified type  Goal:	monitor H/H  Assessment and plan of treatment:	stable, follow-up with Hematology as outpt  Secondary Diagnosis:	Hypertension, unspecified type  Goal:	maintain normotensive blood pressure  Assessment and plan of treatment:	continue lasix  Secondary Diagnosis:	Multiple myeloma, remission status unspecified  Goal:	follow-up as an outpt  Assessment and plan of treatment:	follow up with Hematolgy Principal Discharge DX:	Pneumonia of both lungs due to infectious organism, unspecified part of lung  Goal:	complete course of IV antibiotics and monitor for clinical improvement  Assessment and plan of treatment:	Discharge with PO Vantin, complete 4 more days  Secondary Diagnosis:	Anemia, unspecified type  Goal:	monitor H/H  Assessment and plan of treatment:	stable, follow-up with Hematology as outpt  Secondary Diagnosis:	Hypertension, unspecified type  Goal:	maintain normotensive blood pressure  Assessment and plan of treatment:	continue lasix  Secondary Diagnosis:	Multiple myeloma, remission status unspecified  Goal:	follow-up as an outpt  Assessment and plan of treatment:	follow up with Hematology

## 2019-01-10 NOTE — DISCHARGE NOTE ADULT - PLAN OF CARE
complete course of IV antibiotics and monitor for clinical improvement Discharge with PO Vantin monitor H/H stable, follow-up with Hematology as outpt maintain normotensive blood pressure continue lasix follow-up as an outpt follow up with Hematolgy Discharge with PO Vantin, complete 4 more days follow up with Hematology

## 2019-01-10 NOTE — DISCHARGE NOTE ADULT - SECONDARY DIAGNOSIS.
Anemia, unspecified type Hypertension, unspecified type Multiple myeloma, remission status unspecified

## 2019-01-11 LAB
ALLERGY+IMMUNOLOGY DIAG STUDY NOTE: SIGNIFICANT CHANGE UP
CULTURE RESULTS: SIGNIFICANT CHANGE UP
CULTURE RESULTS: SIGNIFICANT CHANGE UP
SPECIMEN SOURCE: SIGNIFICANT CHANGE UP
SPECIMEN SOURCE: SIGNIFICANT CHANGE UP

## 2019-01-11 PROCEDURE — 86901 BLOOD TYPING SEROLOGIC RH(D): CPT

## 2019-01-11 PROCEDURE — 87641 MR-STAPH DNA AMP PROBE: CPT

## 2019-01-11 PROCEDURE — 84443 ASSAY THYROID STIM HORMONE: CPT

## 2019-01-11 PROCEDURE — 83605 ASSAY OF LACTIC ACID: CPT

## 2019-01-11 PROCEDURE — 36415 COLL VENOUS BLD VENIPUNCTURE: CPT

## 2019-01-11 PROCEDURE — 87040 BLOOD CULTURE FOR BACTERIA: CPT

## 2019-01-11 PROCEDURE — 82607 VITAMIN B-12: CPT

## 2019-01-11 PROCEDURE — 82962 GLUCOSE BLOOD TEST: CPT

## 2019-01-11 PROCEDURE — 84100 ASSAY OF PHOSPHORUS: CPT

## 2019-01-11 PROCEDURE — 82746 ASSAY OF FOLIC ACID SERUM: CPT

## 2019-01-11 PROCEDURE — 82728 ASSAY OF FERRITIN: CPT

## 2019-01-11 PROCEDURE — 71046 X-RAY EXAM CHEST 2 VIEWS: CPT

## 2019-01-11 PROCEDURE — 99285 EMERGENCY DEPT VISIT HI MDM: CPT | Mod: 25

## 2019-01-11 PROCEDURE — 82040 ASSAY OF SERUM ALBUMIN: CPT

## 2019-01-11 PROCEDURE — 96365 THER/PROPH/DIAG IV INF INIT: CPT

## 2019-01-11 PROCEDURE — 83735 ASSAY OF MAGNESIUM: CPT

## 2019-01-11 PROCEDURE — 80048 BASIC METABOLIC PNL TOTAL CA: CPT

## 2019-01-11 PROCEDURE — 83550 IRON BINDING TEST: CPT

## 2019-01-11 PROCEDURE — 86880 COOMBS TEST DIRECT: CPT

## 2019-01-11 PROCEDURE — 85027 COMPLETE CBC AUTOMATED: CPT

## 2019-01-11 PROCEDURE — 84466 ASSAY OF TRANSFERRIN: CPT

## 2019-01-11 PROCEDURE — 96368 THER/DIAG CONCURRENT INF: CPT

## 2019-01-11 PROCEDURE — 86870 RBC ANTIBODY IDENTIFICATION: CPT

## 2019-01-11 PROCEDURE — 87640 STAPH A DNA AMP PROBE: CPT

## 2019-01-11 PROCEDURE — 80202 ASSAY OF VANCOMYCIN: CPT

## 2019-01-11 PROCEDURE — 71045 X-RAY EXAM CHEST 1 VIEW: CPT

## 2019-01-11 PROCEDURE — 92610 EVALUATE SWALLOWING FUNCTION: CPT

## 2019-01-11 PROCEDURE — 80178 ASSAY OF LITHIUM: CPT

## 2019-01-11 PROCEDURE — 83540 ASSAY OF IRON: CPT

## 2019-01-11 PROCEDURE — 97163 PT EVAL HIGH COMPLEX 45 MIN: CPT

## 2019-01-11 PROCEDURE — 86850 RBC ANTIBODY SCREEN: CPT

## 2019-01-11 PROCEDURE — 86900 BLOOD TYPING SEROLOGIC ABO: CPT

## 2019-01-17 ENCOUNTER — OUTPATIENT (OUTPATIENT)
Dept: OUTPATIENT SERVICES | Facility: HOSPITAL | Age: 74
LOS: 1 days | End: 2019-01-17
Payer: COMMERCIAL

## 2019-01-17 DIAGNOSIS — Z13.6 ENCOUNTER FOR SCREENING FOR CARDIOVASCULAR DISORDERS: ICD-10-CM

## 2019-01-17 PROCEDURE — 93306 TTE W/DOPPLER COMPLETE: CPT | Mod: 26

## 2019-01-17 PROCEDURE — 93306 TTE W/DOPPLER COMPLETE: CPT

## 2019-02-06 ENCOUNTER — OUTPATIENT (OUTPATIENT)
Dept: OUTPATIENT SERVICES | Facility: HOSPITAL | Age: 74
LOS: 1 days | End: 2019-02-06
Payer: MEDICARE

## 2019-02-06 ENCOUNTER — EMERGENCY (EMERGENCY)
Facility: HOSPITAL | Age: 74
LOS: 1 days | End: 2019-02-06
Attending: STUDENT IN AN ORGANIZED HEALTH CARE EDUCATION/TRAINING PROGRAM
Payer: MEDICARE

## 2019-02-06 VITALS
SYSTOLIC BLOOD PRESSURE: 153 MMHG | WEIGHT: 154.98 LBS | TEMPERATURE: 99 F | DIASTOLIC BLOOD PRESSURE: 105 MMHG | OXYGEN SATURATION: 96 % | HEART RATE: 96 BPM | HEIGHT: 61 IN | RESPIRATION RATE: 18 BRPM

## 2019-02-06 DIAGNOSIS — J18.9 PNEUMONIA, UNSPECIFIED ORGANISM: ICD-10-CM

## 2019-02-06 LAB
ALBUMIN SERPL ELPH-MCNC: 3 G/DL — LOW (ref 3.3–5.2)
ALP SERPL-CCNC: 136 U/L — HIGH (ref 40–120)
ALT FLD-CCNC: 36 U/L — HIGH
ANION GAP SERPL CALC-SCNC: 10 MMOL/L — SIGNIFICANT CHANGE UP (ref 5–17)
AST SERPL-CCNC: 33 U/L — HIGH
BASOPHILS # BLD AUTO: 0 K/UL — SIGNIFICANT CHANGE UP (ref 0–0.2)
BASOPHILS NFR BLD AUTO: 0.2 % — SIGNIFICANT CHANGE UP (ref 0–2)
BILIRUB SERPL-MCNC: <0.2 MG/DL — LOW (ref 0.4–2)
BUN SERPL-MCNC: 27 MG/DL — HIGH (ref 8–20)
CALCIUM SERPL-MCNC: 8.7 MG/DL — SIGNIFICANT CHANGE UP (ref 8.6–10.2)
CHLORIDE SERPL-SCNC: 100 MMOL/L — SIGNIFICANT CHANGE UP (ref 98–107)
CO2 SERPL-SCNC: 26 MMOL/L — SIGNIFICANT CHANGE UP (ref 22–29)
CREAT SERPL-MCNC: 0.62 MG/DL — SIGNIFICANT CHANGE UP (ref 0.5–1.3)
EOSINOPHIL # BLD AUTO: 0 K/UL — SIGNIFICANT CHANGE UP (ref 0–0.5)
EOSINOPHIL NFR BLD AUTO: 0.1 % — SIGNIFICANT CHANGE UP (ref 0–6)
GLUCOSE SERPL-MCNC: 103 MG/DL — SIGNIFICANT CHANGE UP (ref 70–115)
HCT VFR BLD CALC: 28 % — LOW (ref 37–47)
HGB BLD-MCNC: 8.5 G/DL — LOW (ref 12–16)
LYMPHOCYTES # BLD AUTO: 0.6 K/UL — LOW (ref 1–4.8)
LYMPHOCYTES # BLD AUTO: 5.7 % — LOW (ref 20–55)
MCHC RBC-ENTMCNC: 29 PG — SIGNIFICANT CHANGE UP (ref 27–31)
MCHC RBC-ENTMCNC: 30.4 G/DL — LOW (ref 32–36)
MCV RBC AUTO: 95.6 FL — SIGNIFICANT CHANGE UP (ref 81–99)
MONOCYTES # BLD AUTO: 1.5 K/UL — HIGH (ref 0–0.8)
MONOCYTES NFR BLD AUTO: 14.2 % — HIGH (ref 3–10)
NEUTROPHILS # BLD AUTO: 8 K/UL — SIGNIFICANT CHANGE UP (ref 1.8–8)
NEUTROPHILS NFR BLD AUTO: 78.3 % — HIGH (ref 37–73)
PLATELET # BLD AUTO: 181 K/UL — SIGNIFICANT CHANGE UP (ref 150–400)
POTASSIUM SERPL-MCNC: 4.5 MMOL/L — SIGNIFICANT CHANGE UP (ref 3.5–5.3)
POTASSIUM SERPL-SCNC: 4.5 MMOL/L — SIGNIFICANT CHANGE UP (ref 3.5–5.3)
PROT SERPL-MCNC: 8.1 G/DL — SIGNIFICANT CHANGE UP (ref 6.6–8.7)
RBC # BLD: 2.93 M/UL — LOW (ref 4.4–5.2)
RBC # FLD: 17.5 % — HIGH (ref 11–15.6)
SODIUM SERPL-SCNC: 136 MMOL/L — SIGNIFICANT CHANGE UP (ref 135–145)
WBC # BLD: 10.3 K/UL — SIGNIFICANT CHANGE UP (ref 4.8–10.8)
WBC # FLD AUTO: 10.3 K/UL — SIGNIFICANT CHANGE UP (ref 4.8–10.8)

## 2019-02-06 PROCEDURE — 71046 X-RAY EXAM CHEST 2 VIEWS: CPT | Mod: 26

## 2019-02-06 PROCEDURE — 99284 EMERGENCY DEPT VISIT MOD MDM: CPT

## 2019-02-06 PROCEDURE — 93010 ELECTROCARDIOGRAM REPORT: CPT

## 2019-02-06 NOTE — ED ADULT TRIAGE NOTE - CHIEF COMPLAINT QUOTE
patient c/o congestion and chest tightness from [place]. was here and was sent back with Left lower Lobe Pneumonia. patient according to MD is not getting better.

## 2019-02-06 NOTE — ED PROVIDER NOTE - OBJECTIVE STATEMENT
72 y/o F pt with hx of bone CA, HLD, HTN, hypothyroid, multiple myeloma, and scoliosis presents to ED from [facility] with aide c/o productive cough with yellow sputum that onset 2 weeks ago. Pt was seen in ED 2 weeks ago and was diagnosed with PNA. Pt reports she finished course of antibiotics but symptoms have not improved. Pt denies fevers/chills, ha, loc, focal neuro deficits, cp/sob/palp, abd pain/n/v/d, urinary symptoms, recent travel and sick contacts. No further complaints at this time.

## 2019-02-06 NOTE — ED PROVIDER NOTE - MEDICAL DECISION MAKING DETAILS
74 y/o F pt with hx of bone CA, HLD, HTN, hypothyroid, multiple myeloma, and scoliosis presents to ED from [facility] with aide c/o productive cough with yellow sputum that onset 2 weeks ago. 72 y/o F pt with hx of bone CA, HLD, HTN, hypothyroid, multiple myeloma, and scoliosis presents to ED from [facility] with aide c/o productive cough with yellow sputum that onset 2 weeks ago - no evidence of pneumonia on CT, pt afebrile - likely bronchitis - called Mina RN at [facility] stating pt can return - follow up with [facility] MD

## 2019-02-06 NOTE — ED PROVIDER NOTE - PROGRESS NOTE DETAILS
Spoke with Dr. Mc at [facility] - states concern for pt being immunocompromised and continuing to have an infiltrate would like pt admitted for IV abx Spoke with Dr. Mcconnell at [facility] - states concern for pt being immunocompromised and continuing to have an infiltrate on xray

## 2019-02-06 NOTE — ED PROVIDER NOTE - CPE EDP RESP NORM
Post-Operative Vasectomy Patient Instructions      1. Apply ice pack to scrotum for 20 minutes, then remove for 20 minutes and repeat for the first 6 - 8 hours. 2. Relax and take it easy for the first 48 hours after surgery. Please refrain from having     young children in your lap. 3. You may remove gauze to scrotum in am after procedure. You may shower 48 hours after the procedure. 4. Wear a jock strap for 7 days after surgery to support your scrotum. 5. Avoid rigorous exercise, straining or lifting greater than 20 pounds for 10 days. This includes sexual activity, lawn care, or snow removal for 10 days. 6. Take antibiotics as prescribed. 7. Watch for signs of infection such as increased redness, pain, or obvious pus. If you notice any of these signs, please call the clinic immediately at [phone]. A small amount of redness and brownish drainage is normal and expected. 8. Twelve weeks after surgery, bring a fresh semen sample directly to the lab. Please abstain from all sexual activity for 3 days prior to semen collection. Call the lab the day prior to schedule a drop off time for your specimen. (Any [place] lab. Please call the lab department 1 day prior to drop off.)    Collect semen specimen in the sterile container provided by this office. It is preferred that the specimen be collected at the clinic. If collected at home, the specimen must be maintained at a constant temperature by keeping it close to the body and delivered to the lab within 30 minutes of collection. You will be notified of the results in 3 - 5 days. Until that time, please continue to use an alternative form of birth control. SEMEN SPECIMEN COLLECTION        THREE (3) MONTHS AFTER YOUR VASECTOMY  Call the lab the day prior to schedule a drop off time for your specimen.            [facility]  [id number] Lab [phone]  ([facility]aud[facility] Gruber specimens on Tues & Thurs only)     A freshly ejaculated and properly handled semen specimen is necessary to ensure accurate test results. The following instructions will help to ensure a viable specimen. 1. Abstain from all sexual activity for three (3) days prior to specimen collection. 2. Collect the entire semen specimen in the sterile container provided for you by this office. 3. Bring the specimen to the Lab within one (1) hour of collection. Remember to keep the specimen at a constant temperature by keeping it close to the body. normal...

## 2019-02-07 VITALS
TEMPERATURE: 98 F | SYSTOLIC BLOOD PRESSURE: 157 MMHG | OXYGEN SATURATION: 95 % | HEART RATE: 72 BPM | RESPIRATION RATE: 22 BRPM | DIASTOLIC BLOOD PRESSURE: 77 MMHG

## 2019-02-07 PROCEDURE — 71250 CT THORAX DX C-: CPT | Mod: 26

## 2019-02-07 NOTE — ED ADULT NURSE NOTE - CHIEF COMPLAINT QUOTE
patient c/o congestion and chest tightness from [place]. was here and was sent back with Left lower Lobe Pneumonia. patient according to MD is not getting better.

## 2019-02-09 ENCOUNTER — INPATIENT (INPATIENT)
Facility: HOSPITAL | Age: 74
LOS: 4 days | Discharge: PSYCHIATRIC OR SUBSTANCE USE FACILITY | DRG: 193 | End: 2019-02-14
Attending: INTERNAL MEDICINE | Admitting: STUDENT IN AN ORGANIZED HEALTH CARE EDUCATION/TRAINING PROGRAM
Payer: MEDICARE

## 2019-02-09 VITALS
TEMPERATURE: 101 F | OXYGEN SATURATION: 94 % | DIASTOLIC BLOOD PRESSURE: 72 MMHG | SYSTOLIC BLOOD PRESSURE: 130 MMHG | RESPIRATION RATE: 22 BRPM | HEART RATE: 93 BPM

## 2019-02-09 DIAGNOSIS — J84.89 OTHER SPECIFIED INTERSTITIAL PULMONARY DISEASES: ICD-10-CM

## 2019-02-09 LAB
ALBUMIN SERPL ELPH-MCNC: 3.3 G/DL — SIGNIFICANT CHANGE UP (ref 3.3–5.2)
ALP SERPL-CCNC: 109 U/L — SIGNIFICANT CHANGE UP (ref 40–120)
ALT FLD-CCNC: 22 U/L — SIGNIFICANT CHANGE UP
ANION GAP SERPL CALC-SCNC: 11 MMOL/L — SIGNIFICANT CHANGE UP (ref 5–17)
ANISOCYTOSIS BLD QL: SLIGHT — SIGNIFICANT CHANGE UP
APTT BLD: 30.7 SEC — SIGNIFICANT CHANGE UP (ref 27.5–36.3)
AST SERPL-CCNC: 24 U/L — SIGNIFICANT CHANGE UP
BILIRUB SERPL-MCNC: <0.2 MG/DL — LOW (ref 0.4–2)
BUN SERPL-MCNC: 29 MG/DL — HIGH (ref 8–20)
CALCIUM SERPL-MCNC: 8.7 MG/DL — SIGNIFICANT CHANGE UP (ref 8.6–10.2)
CHLORIDE SERPL-SCNC: 96 MMOL/L — LOW (ref 98–107)
CO2 SERPL-SCNC: 25 MMOL/L — SIGNIFICANT CHANGE UP (ref 22–29)
CREAT SERPL-MCNC: 0.73 MG/DL — SIGNIFICANT CHANGE UP (ref 0.5–1.3)
GLUCOSE SERPL-MCNC: 92 MG/DL — SIGNIFICANT CHANGE UP (ref 70–115)
HCT VFR BLD CALC: 29.2 % — LOW (ref 37–47)
HGB BLD-MCNC: 8.9 G/DL — LOW (ref 12–16)
HMPV RNA SPEC QL NAA+PROBE: DETECTED
HYPOCHROMIA BLD QL: SLIGHT — SIGNIFICANT CHANGE UP
INR BLD: 1.12 RATIO — SIGNIFICANT CHANGE UP (ref 0.88–1.16)
LACTATE BLDV-MCNC: 1.5 MMOL/L — SIGNIFICANT CHANGE UP (ref 0.5–2)
LIDOCAIN IGE QN: 58 U/L — HIGH (ref 22–51)
LITHIUM SERPL-MCNC: 0.6 MMOL/L — SIGNIFICANT CHANGE UP (ref 0.5–1.5)
LYMPHOCYTES # BLD AUTO: 10 % — LOW (ref 20–55)
MACROCYTES BLD QL: SLIGHT — SIGNIFICANT CHANGE UP
MAGNESIUM SERPL-MCNC: 1.9 MG/DL — SIGNIFICANT CHANGE UP (ref 1.6–2.6)
MCHC RBC-ENTMCNC: 28.6 PG — SIGNIFICANT CHANGE UP (ref 27–31)
MCHC RBC-ENTMCNC: 30.5 G/DL — LOW (ref 32–36)
MCV RBC AUTO: 93.9 FL — SIGNIFICANT CHANGE UP (ref 81–99)
MICROCYTES BLD QL: SLIGHT — SIGNIFICANT CHANGE UP
MONOCYTES NFR BLD AUTO: 9 % — SIGNIFICANT CHANGE UP (ref 3–10)
NEUTROPHILS NFR BLD AUTO: 81 % — HIGH (ref 37–73)
OVALOCYTES BLD QL SMEAR: SLIGHT — SIGNIFICANT CHANGE UP
PHOSPHATE SERPL-MCNC: 4 MG/DL — SIGNIFICANT CHANGE UP (ref 2.4–4.7)
PLAT MORPH BLD: NORMAL — SIGNIFICANT CHANGE UP
PLATELET # BLD AUTO: 205 K/UL — SIGNIFICANT CHANGE UP (ref 150–400)
POIKILOCYTOSIS BLD QL AUTO: SLIGHT — SIGNIFICANT CHANGE UP
POTASSIUM SERPL-MCNC: 4 MMOL/L — SIGNIFICANT CHANGE UP (ref 3.5–5.3)
POTASSIUM SERPL-SCNC: 4 MMOL/L — SIGNIFICANT CHANGE UP (ref 3.5–5.3)
PROT SERPL-MCNC: 8.4 G/DL — SIGNIFICANT CHANGE UP (ref 6.6–8.7)
PROTHROM AB SERPL-ACNC: 12.9 SEC — SIGNIFICANT CHANGE UP (ref 10–12.9)
RAPID RVP RESULT: DETECTED
RBC # BLD: 3.11 M/UL — LOW (ref 4.4–5.2)
RBC # FLD: 17.8 % — HIGH (ref 11–15.6)
RBC BLD AUTO: ABNORMAL
SODIUM SERPL-SCNC: 132 MMOL/L — LOW (ref 135–145)
TROPONIN T SERPL-MCNC: <0.01 NG/ML — SIGNIFICANT CHANGE UP (ref 0–0.06)
TSH SERPL-MCNC: 1.66 UIU/ML — SIGNIFICANT CHANGE UP (ref 0.27–4.2)
WBC # BLD: 7.4 K/UL — SIGNIFICANT CHANGE UP (ref 4.8–10.8)
WBC # FLD AUTO: 7.4 K/UL — SIGNIFICANT CHANGE UP (ref 4.8–10.8)

## 2019-02-09 PROCEDURE — 99223 1ST HOSP IP/OBS HIGH 75: CPT | Mod: AI

## 2019-02-09 PROCEDURE — 99285 EMERGENCY DEPT VISIT HI MDM: CPT | Mod: 25

## 2019-02-09 PROCEDURE — 71045 X-RAY EXAM CHEST 1 VIEW: CPT | Mod: 26

## 2019-02-09 RX ORDER — LEVOTHYROXINE SODIUM 125 MCG
75 TABLET ORAL DAILY
Qty: 0 | Refills: 0 | Status: DISCONTINUED | OUTPATIENT
Start: 2019-02-09 | End: 2019-02-14

## 2019-02-09 RX ORDER — SODIUM CHLORIDE 9 MG/ML
1000 INJECTION INTRAMUSCULAR; INTRAVENOUS; SUBCUTANEOUS
Qty: 0 | Refills: 0 | Status: DISCONTINUED | OUTPATIENT
Start: 2019-02-09 | End: 2019-02-09

## 2019-02-09 RX ORDER — SODIUM CHLORIDE 9 MG/ML
2000 INJECTION INTRAMUSCULAR; INTRAVENOUS; SUBCUTANEOUS ONCE
Qty: 0 | Refills: 0 | Status: COMPLETED | OUTPATIENT
Start: 2019-02-09 | End: 2019-02-09

## 2019-02-09 RX ORDER — FOLIC ACID 0.8 MG
1 TABLET ORAL DAILY
Qty: 0 | Refills: 0 | Status: DISCONTINUED | OUTPATIENT
Start: 2019-02-09 | End: 2019-02-14

## 2019-02-09 RX ORDER — ACETAMINOPHEN 500 MG
650 TABLET ORAL ONCE
Qty: 0 | Refills: 0 | Status: COMPLETED | OUTPATIENT
Start: 2019-02-09 | End: 2019-02-09

## 2019-02-09 RX ORDER — TIOTROPIUM BROMIDE 18 UG/1
1 CAPSULE ORAL; RESPIRATORY (INHALATION) DAILY
Qty: 0 | Refills: 0 | Status: DISCONTINUED | OUTPATIENT
Start: 2019-02-09 | End: 2019-02-14

## 2019-02-09 RX ORDER — LITHIUM CARBONATE 300 MG/1
300 TABLET, EXTENDED RELEASE ORAL DAILY
Qty: 0 | Refills: 0 | Status: DISCONTINUED | OUTPATIENT
Start: 2019-02-09 | End: 2019-02-14

## 2019-02-09 RX ORDER — ALBUTEROL 90 UG/1
1 AEROSOL, METERED ORAL EVERY 4 HOURS
Qty: 0 | Refills: 0 | Status: DISCONTINUED | OUTPATIENT
Start: 2019-02-09 | End: 2019-02-14

## 2019-02-09 RX ORDER — IPRATROPIUM/ALBUTEROL SULFATE 18-103MCG
3 AEROSOL WITH ADAPTER (GRAM) INHALATION EVERY 6 HOURS
Qty: 0 | Refills: 0 | Status: DISCONTINUED | OUTPATIENT
Start: 2019-02-09 | End: 2019-02-14

## 2019-02-09 RX ORDER — VANCOMYCIN HCL 1 G
1000 VIAL (EA) INTRAVENOUS ONCE
Qty: 0 | Refills: 0 | Status: COMPLETED | OUTPATIENT
Start: 2019-02-09 | End: 2019-02-09

## 2019-02-09 RX ORDER — FUROSEMIDE 40 MG
40 TABLET ORAL DAILY
Qty: 0 | Refills: 0 | Status: DISCONTINUED | OUTPATIENT
Start: 2019-02-09 | End: 2019-02-14

## 2019-02-09 RX ORDER — FERROUS SULFATE 325(65) MG
325 TABLET ORAL DAILY
Qty: 0 | Refills: 0 | Status: DISCONTINUED | OUTPATIENT
Start: 2019-02-09 | End: 2019-02-14

## 2019-02-09 RX ADMIN — Medication 650 MILLIGRAM(S): at 02:49

## 2019-02-09 RX ADMIN — SODIUM CHLORIDE 1000 MILLILITER(S): 9 INJECTION INTRAMUSCULAR; INTRAVENOUS; SUBCUTANEOUS at 02:09

## 2019-02-09 RX ADMIN — SODIUM CHLORIDE 250 MILLILITER(S): 9 INJECTION INTRAMUSCULAR; INTRAVENOUS; SUBCUTANEOUS at 03:22

## 2019-02-09 RX ADMIN — Medication 600 MILLIGRAM(S): at 19:14

## 2019-02-09 RX ADMIN — Medication 650 MILLIGRAM(S): at 02:26

## 2019-02-09 RX ADMIN — Medication 1 MILLIGRAM(S): at 11:36

## 2019-02-09 RX ADMIN — SODIUM CHLORIDE 2000 MILLILITER(S): 9 INJECTION INTRAMUSCULAR; INTRAVENOUS; SUBCUTANEOUS at 03:22

## 2019-02-09 RX ADMIN — Medication 40 MILLIGRAM(S): at 19:14

## 2019-02-09 RX ADMIN — Medication 325 MILLIGRAM(S): at 11:36

## 2019-02-09 RX ADMIN — Medication 125 MILLIGRAM(S): at 02:10

## 2019-02-09 RX ADMIN — Medication 3 MILLILITER(S): at 16:06

## 2019-02-09 RX ADMIN — Medication 75 MICROGRAM(S): at 11:49

## 2019-02-09 RX ADMIN — LITHIUM CARBONATE 300 MILLIGRAM(S): 300 TABLET, EXTENDED RELEASE ORAL at 11:36

## 2019-02-09 RX ADMIN — Medication 250 MILLIGRAM(S): at 22:22

## 2019-02-09 RX ADMIN — Medication 0.5 MILLIGRAM(S): at 19:14

## 2019-02-09 NOTE — H&P ADULT - NSHPPHYSICALEXAM_GEN_ALL_CORE
Vital Signs Last 24 Hrs  T(C): 36.8 (02-09-19 @ 23:36), Max: 37.4 (02-09-19 @ 20:03)  T(F): 98.3 (02-09-19 @ 23:36), Max: 99.3 (02-09-19 @ 20:03)  HR: 61 (02-10-19 @ 08:51) (58 - 79)  BP: 166/75 (02-10-19 @ 05:33) (139/61 - 172/75)  BP(mean): --  RR: 20 (02-09-19 @ 23:36) (18 - 20)  SpO2: 100% (02-10-19 @ 08:51) (95% - 100%)  Constitutional : Appears uncomfortable, speaking in short sentences   Head :NC AT , no swelling, + pharyngeal erythema   Eyes :eomi, WILDER eye erythema, with yellow crusty type discharge   Mouth :mm dry  Neck : supple, trachea in midline  Chest :Wilder air entry, symm chest expansion, actively coughing, diffuse wheeze with mild retractions, Mediport on R upper chest with no surrounding erythema or swelling  Heart :S1 S2 distant  Abdomen :abd soft, non tender  : no groin erythema   Musc/Skel: WILDER LE with trace edema (L>R), no deformity, no spine tenderness, distal pulses present  Neuro: A&O x2, no focal deficits

## 2019-02-09 NOTE — ED ADULT TRIAGE NOTE - CHIEF COMPLAINT QUOTE
patient annie from [facility], states that she has difficulty breathing and congestion, patient states that she was diagnosed with pneumonia, was seen here 2 days ago for the same, does not appear to be in any respiratory distress. patient annie from [facility], states that she has difficulty breathing and congestion, patient states that she was diagnosed with pneumonia, was seen here 2 days ago for the same, does not appear to be in any respiratory distress. ems states that patient with o2 sat between 89-90% on room air, code sepsis called.

## 2019-02-09 NOTE — ED PROVIDER NOTE - UNABLE TO OBTAIN
Severe Illness/Injury Unable to obtain ROS secondary to patient's level of agitation and mental state Unable to obtain ROS secondary to patient's level of agitation and mental state, from [facility].

## 2019-02-09 NOTE — H&P ADULT - ASSESSMENT
1. viral infection HmPV positive 72 y/o F with PMHx of MM, anemia, hyperuricemia related to MM, Hypothyroid, OP, Neurogenic bladder, constipation, Neutropenia, HTN, prolonged QT, HLD, obesity, Psych disorder     1. Acute hypoxic resp failure- duonebs, solumedrol given in ED. Oxygen. CXR improved from prior. no evidence of new acute findings. will hold off on antibx for now. Patient is steroid dependent and so will maintain solu medrol taper.    2. Fever- HmPV positive- Viral infection. Symptomatic treatment. Blood c/s testing    3. MM/ anemia, hypothyroid, htn, psych disorder- maintain home meds.     Lovenox

## 2019-02-09 NOTE — ED ADULT NURSE NOTE - CHIEF COMPLAINT QUOTE
patient annie from [facility], states that she has difficulty breathing and congestion, patient states that she was diagnosed with pneumonia, was seen here 2 days ago for the same, does not appear to be in any respiratory distress. ems states that patient with o2 sat between 89-90% on room air, code sepsis called.

## 2019-02-09 NOTE — ED ADULT NURSE REASSESSMENT NOTE - NS ED NURSE REASSESS COMMENT FT1
assumed pt care at 0730.  pt resting in position of comfort with aide from [facility] at bedside.  patent 18g PIV present to RAC with NS infusing. no s/s infiltration.  VSS.  discussed plan of care with pt.  currently awaiting admission bed assignment.  will continue to monitor.

## 2019-02-09 NOTE — ED PROVIDER NOTE - PHYSICAL EXAMINATION
Constitutional : Appears comfortably, talking in full sentences  Head :NC AT , no swelling  Eyes :eomi, no swelling  Mouth :mm moist,  Neck : supple, trachea in midline  Chest :Wilder air entry, symm chest expansion, no distress  Heart :S1 S2 distant  Abdomen :abd soft, non tender  Musc/Skel :ext no swelling, no deformity, no spine tenderness, distal pulses present  Neuro  :AAO 3 no focal deficits Constitutional : Appears uncomfortable, speaking in short sentences   Head :NC AT , no swelling, + pharyngeal erythema   Eyes :eomi, WILDER eye erythema, with yellow crusty type discharge   Mouth :mm dry  Neck : supple, trachea in midline  Chest :Wilder air entry, symm chest expansion, actively coughing, diffuse wheeze with mild retractions, Mediport on R upper chest with no surrounding erythema or swelling  Heart :S1 S2 distant  Abdomen :abd soft, non tender  : no groin erythema   Musc/Skel: WILDER LE with trace edema (L>R), no deformity, no spine tenderness, distal pulses present  Neuro: A&O x2, no focal deficits

## 2019-02-09 NOTE — CHART NOTE - NSCHARTNOTEFT_GEN_A_CORE
called by RN pt has positive blood cultures cocci in pairs.  Vancomycin 1gram i.v.p.b. x one dose given.  Suggest ID consult in a.m.

## 2019-02-09 NOTE — H&P ADULT - HISTORY OF PRESENT ILLNESS
from [place] for above complaints 72 y/o F with PMHx of MM, anemia, hyperuricemia related to MM, Hypothyroid, OP, Neurogenic bladder, constipation, Neutropenia, HTN, prolonged QT, HLD, obesity, Psych disorder presents to ED from [facility] c/o difficulty breathing and productive cough onset several days ago. Patient is a poor historian and unable to provide details regarding her medical history. As per EMS, the patient's O2 saturation was 89-90% on room air. As per patient's past records, she was evaluated in the ED several days ago for same, was diagnosed with PNA and was discharged on medications, which her aide states she "thinks" the patient is complaint with. Patient states she has diarrhea "all the time" from the medications she was discharged on". Denies abdominal pain, nausea or vomiting.    SH- lives at [place], no habits  FH- unknown, none on [place] records

## 2019-02-09 NOTE — ED ADULT NURSE NOTE - OBJECTIVE STATEMENT
Pt recently seen at [facility] and diagnosed with PNU and sent back to [place], pt presents with fever, congestion, chills, cough. Pt noted to have wheezing b/l and is tachypneic with a wet cough. Pt asking RN for coffee.

## 2019-02-09 NOTE — ED ADULT NURSE NOTE - NSIMPLEMENTINTERV_GEN_ALL_ED
Implemented All Universal Safety Interventions:  [place] to call system. Call bell, personal items and telephone within reach. Instruct patient to call for assistance. Room bathroom lighting operational. Non-slip footwear when patient is off stretcher. Physically safe environment: no spills, clutter or unnecessary equipment. Stretcher in lowest position, wheels locked, appropriate side rails in place.

## 2019-02-09 NOTE — ED PROVIDER NOTE - MEDICAL DECISION MAKING DETAILS
72 y/o F from nursing home, recently in hospital and diagnosed with muti lobar PNA, presenting with cough and hypoxia, plan to treat for sepsis PNA and re-evaluate.

## 2019-02-10 PROCEDURE — 99223 1ST HOSP IP/OBS HIGH 75: CPT

## 2019-02-10 PROCEDURE — 99232 SBSQ HOSP IP/OBS MODERATE 35: CPT

## 2019-02-10 RX ORDER — TAMSULOSIN HYDROCHLORIDE 0.4 MG/1
0.4 CAPSULE ORAL AT BEDTIME
Qty: 0 | Refills: 0 | Status: DISCONTINUED | OUTPATIENT
Start: 2019-02-10 | End: 2019-02-14

## 2019-02-10 RX ORDER — POLYETHYLENE GLYCOL 3350 17 G/17G
17 POWDER, FOR SOLUTION ORAL DAILY
Qty: 0 | Refills: 0 | Status: DISCONTINUED | OUTPATIENT
Start: 2019-02-10 | End: 2019-02-14

## 2019-02-10 RX ORDER — BENZTROPINE MESYLATE 1 MG
0.5 TABLET ORAL AT BEDTIME
Qty: 0 | Refills: 0 | Status: DISCONTINUED | OUTPATIENT
Start: 2019-02-10 | End: 2019-02-14

## 2019-02-10 RX ORDER — AMLODIPINE BESYLATE 2.5 MG/1
5 TABLET ORAL DAILY
Qty: 0 | Refills: 0 | Status: DISCONTINUED | OUTPATIENT
Start: 2019-02-10 | End: 2019-02-14

## 2019-02-10 RX ORDER — ENOXAPARIN SODIUM 100 MG/ML
40 INJECTION SUBCUTANEOUS EVERY 24 HOURS
Qty: 0 | Refills: 0 | Status: DISCONTINUED | OUTPATIENT
Start: 2019-02-10 | End: 2019-02-14

## 2019-02-10 RX ORDER — CHOLECALCIFEROL (VITAMIN D3) 125 MCG
1000 CAPSULE ORAL DAILY
Qty: 0 | Refills: 0 | Status: DISCONTINUED | OUTPATIENT
Start: 2019-02-10 | End: 2019-02-14

## 2019-02-10 RX ADMIN — Medication 40 MILLIGRAM(S): at 11:48

## 2019-02-10 RX ADMIN — Medication 40 MILLIGRAM(S): at 21:49

## 2019-02-10 RX ADMIN — AMLODIPINE BESYLATE 5 MILLIGRAM(S): 2.5 TABLET ORAL at 11:50

## 2019-02-10 RX ADMIN — Medication 600 MILLIGRAM(S): at 19:10

## 2019-02-10 RX ADMIN — Medication 600 MILLIGRAM(S): at 06:01

## 2019-02-10 RX ADMIN — Medication 3 MILLILITER(S): at 15:47

## 2019-02-10 RX ADMIN — Medication 3 MILLILITER(S): at 08:51

## 2019-02-10 RX ADMIN — Medication 40 MILLIGRAM(S): at 06:01

## 2019-02-10 RX ADMIN — TAMSULOSIN HYDROCHLORIDE 0.4 MILLIGRAM(S): 0.4 CAPSULE ORAL at 21:59

## 2019-02-10 RX ADMIN — Medication 0.5 MILLIGRAM(S): at 21:59

## 2019-02-10 RX ADMIN — Medication 1000 UNIT(S): at 11:49

## 2019-02-10 RX ADMIN — Medication 3 MILLILITER(S): at 03:38

## 2019-02-10 RX ADMIN — Medication 1 MILLIGRAM(S): at 11:49

## 2019-02-10 RX ADMIN — Medication 75 MICROGRAM(S): at 06:01

## 2019-02-10 RX ADMIN — Medication 0.5 MILLIGRAM(S): at 06:01

## 2019-02-10 RX ADMIN — Medication 325 MILLIGRAM(S): at 11:50

## 2019-02-10 RX ADMIN — ENOXAPARIN SODIUM 40 MILLIGRAM(S): 100 INJECTION SUBCUTANEOUS at 11:50

## 2019-02-10 NOTE — CONSULT NOTE ADULT - ASSESSMENT
72 y/o woman with PMH of MM, anemia, Hypothyroidism, and HTN and psych disorder was admitted on 2/9 from [facility] due to difficulty breathing and productive cough for few days.  She had hMPV in her RVP and blood culture showed 1/4 GPR and 1/4 GPC.     Viral URI  R/O bacterial pneumonia due to RLL opacity  Bacteremia GPR and GPC (CoNS and corynebacterium??)     - Follow up blood cultures for ID and sensitivity, there is possibility of contamination   - 1/4 with GPR and 1/4 with GPC in pairs   - Repeat blood cultures  - RVP with hMPV  - CXR with RLL pneumonia  - Will start her on ceftriaxone 1gm daily (covers strep pneumo?) and azithromycin 500mg daily  to cover pneumonia      Will follow 74 y/o woman with PMH of MM, anemia, Hypothyroidism, and HTN and psych disorder was admitted on 2/9 from [facility] due to difficulty breathing and productive cough for few days.  She had hMPV in her RVP and blood culture showed 1/4 GPR and 1/4 GPC.     Viral URI  R/O bacterial pneumonia due to RLL opacity  Bacteremia GPR and GPC (CoNS and corynebacterium??)   PCN allergy     - Follow up blood cultures for ID and sensitivity, there is possibility of contamination   - 1/4 with GPR and 1/4 with GPC in pairs   - Repeat blood cultures  - RVP with hMPV  - CXR with RLL pneumonia  - Will start her on Levaquin 750mg daily for now  - Since has prolonged QT interval, watch her ECG closely and stop Levaquin in case of prolongation      Will follow

## 2019-02-10 NOTE — PROGRESS NOTE ADULT - ASSESSMENT
72 y/o F with PMHx of MM, anemia, hyperuricemia related to MM, Hypothyroid, OP, Neurogenic bladder, constipation, Neutropenia, HTN, prolonged QT, HLD, obesity, Psych disorder     1. Acute hypoxic resp failure- duonebs, solumedrol given in ED. Oxygen. CXR improved from prior. no evidence of new acute findings. will hold off on antibx for now. Patient is steroid dependent and so will maintain solu medrol taper. ID consulted    4. blood c/s 1/2 bottles G + cocci; 1/2 bottles G + rods. likely contaminant. rpt c/s ordered. ID consulted    2. Fever- HmPV positive- Viral infection. Symptomatic treatment. Blood c/s testing    3. MM/ anemia, hypothyroid, htn, psych disorder- maintain home meds.     Lovenox

## 2019-02-10 NOTE — PROGRESS NOTE ADULT - SUBJECTIVE AND OBJECTIVE BOX
HEALTH ISSUES - PROBLEM Dx:    CC- HmPV infection, hypoxic rsp failure, recent PNA treated, blood c/s positive    INTERVAL HPI/ OVERNIGHT EVENTS:    refusing to get rpt bld c/s done  refusing soem of her meds by closing her eyes and mouth shut tightly  intentionally not responding to name or touch, when shaken she squeezes her eyes shut      REVIEW OF SYSTEMS:    fever - cough - chills - sob -    Vital Signs Last 24 Hrs  T(C): 37.2 (10 Feb 2019 16:59), Max: 37.2 (10 Feb 2019 16:59)  T(F): 98.9 (10 Feb 2019 16:59), Max: 98.9 (10 Feb 2019 16:59)  HR: 62 (10 Feb 2019 16:59) (58 - 80)  BP: 160/60 (10 Feb 2019 16:59) (139/61 - 166/75)  BP(mean): --  RR: 18 (10 Feb 2019 16:59) (18 - 20)  SpO2: 100% (10 Feb 2019 19:34) (95% - 100%)    PHYSICAL EXAM-  Constitutional : obese  	Head :NC AT , no swelling,   	Eyes :eomi,   	Mouth :mm moist  	Neck : supple, trachea in midline  	Chest :Wilder air entry good  	Heart :S1 S2 distant  	Abdomen :abd soft, non tender  	: no groin erythema   Musc/Skel: No edema, no deformity, no spine tenderness, distal pulses    LABS:                        8.9    7.4   )-----------( 205      ( 09 Feb 2019 02:14 )             29.2     02-09    132<L>  |  96<L>  |  29.0<H>  ----------------------------<  92  4.0   |  25.0  |  0.73    Ca    8.7      09 Feb 2019 02:14  Phos  4.0     02-09  Mg     1.9     02-09    TPro  8.4  /  Alb  3.3  /  TBili  <0.2<L>  /  DBili  x   /  AST  24  /  ALT  22  /  AlkPhos  109  02-09    PT/INR - ( 09 Feb 2019 02:14 )   PT: 12.9 sec;   INR: 1.12 ratio         PTT - ( 09 Feb 2019 02:14 )  PTT:30.7 sec        Assessment and Plan

## 2019-02-10 NOTE — CONSULT NOTE ADULT - SUBJECTIVE AND OBJECTIVE BOX
[facility]  INFECTIOUS DISEASES AND INTERNAL MEDICINE at [place]  =======================================================  Blu Franz MD  Diplomates American Board of Internal Medicine and Infectious Diseases  =======================================================    N-[id number]  KRYSTA HERRMANN     CC: fever cough sob wheezing     HPI:  74 y/o woman with PMH of MM, anemia, Hypothyroidism, and HTN and psych disorder was admitted on 2/9 from [facility] due to difficulty breathing and productive cough for few days.  She has been admitted for possible pneumonia and work for fever was done, with positive blood culture so ID was called for recommendation.  She is sleeping and doesn't want to answer questions.     PAST MEDICAL & SURGICAL HISTORY:  Multiple myeloma  Mental disorder  Bone cancer  Scoliosis  Hypercholesteremia  Hypertension  Hypothyroid  No significant past surgical history    Social Hx: no smoking or other toxic habits     FAMILY HISTORY: None as per [place] record    Allergies  ixazomib (Unknown)  NSAIDs (Unknown)  penicillins (Unknown)  sulfa drugs (Unknown)    Antibiotics:  None     REVIEW OF SYSTEMS:  Not answering    Physical Exam:  Vital Signs Last 24 Hrs  T(C): 36.9 (10 Feb 2019 10:43), Max: 37.4 (09 Feb 2019 20:03)  T(F): 98.4 (10 Feb 2019 10:43), Max: 99.3 (09 Feb 2019 20:03)  HR: 80 (10 Feb 2019 10:43) (58 - 80)  BP: 160/71 (10 Feb 2019 10:43) (139/61 - 172/75)  BP(mean): --  RR: 18 (10 Feb 2019 10:43) (18 - 20)  SpO2: 100% (10 Feb 2019 10:43) (95% - 100%)  GEN: NAD  HEENT: normocephalic and atraumatic. EOMI. PERRL.    NECK: Supple.  No lymphadenopathy   LUNGS: Clear to auscultation from front  HEART: Regular rate and rhythm  ABDOMEN: Soft, nontender, and nondistended.  fullness in suprapubic area  : No CVA tenderness  EXTREMITIES: Without any cyanosis, clubbing, rash, lesions or edema.  NEUROLOGIC: grossly intact.  PSYCHIATRIC: Appropriate affect .  SKIN: No ulceration or induration present.    Labs:  02-09    132<L>  |  96<L>  |  29.0<H>  ----------------------------<  92  4.0   |  25.0  |  0.73    Ca    8.7      09 Feb 2019 02:14  Phos  4.0     02-09  Mg     1.9     02-09    TPro  8.4  /  Alb  3.3  /  TBili  <0.2<L>  /  DBili  x   /  AST  24  /  ALT  22  /  AlkPhos  109  02-09                          8.9    7.4   )-----------( 205      ( 09 Feb 2019 02:14 )             29.2     PT/INR - ( 09 Feb 2019 02:14 )   PT: 12.9 sec;   INR: 1.12 ratio    PTT - ( 09 Feb 2019 02:14 )  PTT:30.7 sec    LIVER FUNCTIONS - ( 09 Feb 2019 02:14 )  Alb: 3.3 g/dL / Pro: 8.4 g/dL / ALK PHOS: 109 U/L / ALT: 22 U/L / AST: 24 U/L / GGT: x           CARDIAC MARKERS ( 09 Feb 2019 02:14 )  x     / <0.01 ng/mL / x     / x     / x        RECENT CULTURES:  02-09 @ 02:32      Detected hMPV    02-09 @ 02:14 .Blood     Growth in aerobic bottle: Gram positive cocci in pairs  Aerobic Bottle: 14:17 Hours to positivity  Anaerobic Bottle: No growth to date    02-09 @ 02:13 .Blood     Growth in aerobic bottle: Gram Positive Rods  Aerobic Bottle: 13:27 Hours to positivity  Anaerobic Bottle: No growth to date      All imaging and other data have been reviewed.    CXR 2/9  RLL pneumonia

## 2019-02-11 PROCEDURE — 99232 SBSQ HOSP IP/OBS MODERATE 35: CPT

## 2019-02-11 RX ORDER — CHLORHEXIDINE GLUCONATE 213 G/1000ML
1 SOLUTION TOPICAL
Qty: 0 | Refills: 0 | Status: DISCONTINUED | OUTPATIENT
Start: 2019-02-11 | End: 2019-02-14

## 2019-02-11 RX ADMIN — Medication 325 MILLIGRAM(S): at 13:00

## 2019-02-11 RX ADMIN — Medication 3 MILLILITER(S): at 09:51

## 2019-02-11 RX ADMIN — Medication 1000 UNIT(S): at 13:00

## 2019-02-11 RX ADMIN — AMLODIPINE BESYLATE 5 MILLIGRAM(S): 2.5 TABLET ORAL at 05:52

## 2019-02-11 RX ADMIN — Medication 40 MILLIGRAM(S): at 05:52

## 2019-02-11 RX ADMIN — Medication 600 MILLIGRAM(S): at 05:52

## 2019-02-11 RX ADMIN — Medication 3 MILLILITER(S): at 20:02

## 2019-02-11 RX ADMIN — CHLORHEXIDINE GLUCONATE 1 APPLICATION(S): 213 SOLUTION TOPICAL at 18:25

## 2019-02-11 RX ADMIN — Medication 0.5 MILLIGRAM(S): at 05:52

## 2019-02-11 RX ADMIN — POLYETHYLENE GLYCOL 3350 17 GRAM(S): 17 POWDER, FOR SOLUTION ORAL at 13:00

## 2019-02-11 RX ADMIN — Medication 1 MILLIGRAM(S): at 13:00

## 2019-02-11 RX ADMIN — Medication 40 MILLIGRAM(S): at 18:26

## 2019-02-11 RX ADMIN — TAMSULOSIN HYDROCHLORIDE 0.4 MILLIGRAM(S): 0.4 CAPSULE ORAL at 21:09

## 2019-02-11 RX ADMIN — Medication 75 MICROGRAM(S): at 05:52

## 2019-02-11 RX ADMIN — LITHIUM CARBONATE 300 MILLIGRAM(S): 300 TABLET, EXTENDED RELEASE ORAL at 13:00

## 2019-02-11 RX ADMIN — Medication 0.5 MILLIGRAM(S): at 21:08

## 2019-02-11 RX ADMIN — ENOXAPARIN SODIUM 40 MILLIGRAM(S): 100 INJECTION SUBCUTANEOUS at 13:00

## 2019-02-11 RX ADMIN — Medication 40 MILLIGRAM(S): at 05:53

## 2019-02-11 NOTE — PROGRESS NOTE ADULT - SUBJECTIVE AND OBJECTIVE BOX
HEALTH ISSUES - PROBLEM Dx:    CC- Viral infection, r/o bacteremia    INTERVAL HPI/ OVERNIGHT EVENTS:    pt comfortable, on and off cooperation noted.  and uncooperative episodes also noted wherein refuses to open her mouth to take meds    REVIEW OF SYSTEMS:    fever- sob - cough - congestion -    Vital Signs Last 24 Hrs  T(C): 36.6 (11 Feb 2019 10:04), Max: 37.2 (10 Feb 2019 16:59)  T(F): 97.9 (11 Feb 2019 10:04), Max: 98.9 (10 Feb 2019 16:59)  HR: 56 (11 Feb 2019 10:04) (56 - 74)  BP: 160/62 (11 Feb 2019 10:04) (143/56 - 160/62)  BP(mean): --  RR: 18 (11 Feb 2019 10:04) (18 - 18)  SpO2: 100% (11 Feb 2019 10:04) (98% - 100%)    PHYSICAL EXAM-    Constitutional : obese  Head :NC AT , no swelling,   Eyes :eomi,   Mouth :mm moist  Neck : supple, trachea in midline  Chest :Wilder air entry good  Heart :S1 S2 distant  Abdomen :abd soft, non tender  : no groin erythema   Musc/Skel: No edema, no deformity, no spine tenderness, distal pulses    LABS:    Assessment and Plan

## 2019-02-11 NOTE — PROGRESS NOTE ADULT - ASSESSMENT
74 y/o F with PMHx of MM, anemia, hyperuricemia related to MM, Hypothyroid, OP, Neurogenic bladder, constipation, Neutropenia, HTN, prolonged QT, HLD, obesity, Psych disorder     1. Acute hypoxic resp failure  - likely sec to viral infection  - duonebs, solumedrol given in ED. Oxygen.   - CXR improved from prior. no evidence of new acute findings.   - Patient is steroid dependent and so will maintain solu medrol taper.   - ID consult appreciated  - per ID prudent to treat with Levaquin, while awaiting repeat cultures, given MM    4. blood c/s 1/2 bottles G + cocci; 1/2 bottles G + rods. likely contaminant. rpt c/s ordered. ID consulted    2. Fever- HmPV positive- Viral infection. Symptomatic treatment. Rpt Blood c/s awaited    3. MM/ anemia, hypothyroid, htn,- maintain home meds.     5. On admission, states sepsis PNA. Sepsis ruled out. Viral infection +. Bld c/s most likely contaminant and therefore rpt c/s awaited.     6. Psych disorder, anf from [place]- Psych consulted here.    Lovenox

## 2019-02-11 NOTE — PROGRESS NOTE ADULT - SUBJECTIVE AND OBJECTIVE BOX
· Subjective and Objective: 	  [facility]  INFECTIOUS DISEASES AND INTERNAL MEDICINE at [place]  =======================================================  Blu Franz MD  Diplomates American Board of Internal Medicine and Infectious Diseases  =======================================================    [facility]-[id number]  KRYSTA HERRMANN     Follow up: Viral URI and bacterial pneumonia    She looks better, just complaining of cough. has hallucinations.  Afebrile.     PAST MEDICAL & SURGICAL HISTORY:  Multiple myeloma  Mental disorder  Bone cancer  Scoliosis  Hypercholesteremia  Hypertension  Hypothyroid  No significant past surgical history    Social Hx: no smoking or other toxic habits     FAMILY HISTORY: None as per [facility] record    Allergies  ixazomib (Unknown)  NSAIDs (Unknown)  penicillins (Unknown)  sulfa drugs (Unknown)    Antibiotics:  Levaquin     REVIEW OF SYSTEMS:  As above otherwise neg.     Physical Exam:  Vital Signs Last 24 Hrs  T(C): 36.6 (11 Feb 2019 10:04), Max: 37.2 (10 Feb 2019 16:59)  T(F): 97.9 (11 Feb 2019 10:04), Max: 98.9 (10 Feb 2019 16:59)  HR: 56 (11 Feb 2019 10:04) (56 - 74)  BP: 160/62 (11 Feb 2019 10:04) (143/56 - 160/62)  BP(mean): --  RR: 18 (11 Feb 2019 10:04) (18 - 18)  SpO2: 100% (11 Feb 2019 10:04) (98% - 100%)  GEN: NAD  HEENT: normocephalic and atraumatic. EOMI. PERRL.    NECK: Supple.  No lymphadenopathy   LUNGS: Clear to auscultation from front  HEART: Regular rate and rhythm  ABDOMEN: Soft, nontender, and nondistended.    : No CVA tenderness  EXTREMITIES: Without any cyanosis, clubbing, rash, lesions or edema.  NEUROLOGIC: grossly intact.  PSYCHIATRIC: Appropriate affect .  SKIN: No ulceration or induration present.    Labs:  RECENT CULTURES:  02-09 @ 02:32      Detected  hMPV    02-09 @ 02:14 .Blood     Growth in aerobic bottle: Aerococcus viridans Susceptibility to follow.  Aerobic Bottle: 14:17 Hours to positivity  Anaerobic Bottle: No growth to date    02-09 @ 02:13 .Blood     Growth in aerobic bottle: Bacillus species not anthracis  Aerobic Bottle: 13:27 Hours to positivity  Anaerobic Bottle: No growth to date    All imaging and other data have been reviewed.    CXR 2/9  RLL pneumonia

## 2019-02-11 NOTE — CDI QUERY NOTE - NSCDIOTHERTXTBX_GEN_ALL_CORE_HH
Clinical documentation indicates that this patient has Sepsis.  The Physician's or Provider's documentation of sepsis is clinically supported by the patient's presentation, evaluation and medical management, as indicated below.  There is a need to further clarify the status of sepsis.  Please indicate status of the patient's sepsis diagnosis in your progress notes and discharge summary as : Sepsis treatment continues, or Sepsis resolving, or Sepsis resolved, or Sepsis ruled-out.     SUPPORTING DOCUMENTATION AND/OR CLINICAL EVIDENCE:    ED- 72 y/o F from nursing home, recently in hospital and diagnosed with muti lobar PNA, presenting with cough and hypoxia, plan to treat for sepsis PNA     2/10 PN- "blood c/s 1/2 bottles G + cocci; 1/2 bottles G + rods. likely contaminant.  " HmPV positive- Viral infection."    Vital Signs on Admission:    T 101.3, P 93, R 22    WBC: WBC Count: 7.4 K/uL [4.8 - 10.8] (02-09-19)  WBC Count: 10.3 K/uL [4.8 - 10.8] (02-06-19)                                       Lactate: 1.5             Blood Cultures:   positive blood cultures cocci in pairs.     Antibiotics:  levoFLOXacin IVPB   50 mL/Hr IV Intermittent (02-09-19)    levoFLOXacin IVPB   100 mL/Hr IV Intermittent (02-10-19)    vancomycin  IVPB   250 mL/Hr IV Intermittent (02-09-19)        Please clarify known or suspected organism and/or associated organ failure, if known and applicable.      STATUS:  Viral sepsis POA resolving  Sepsis ruled out  Early Sepsis POA, resolved      PRESENT ON ADMISSION:  Was sepsis present on admission?  If so, please document.

## 2019-02-11 NOTE — PROGRESS NOTE ADULT - ASSESSMENT
74 y/o woman with PMH of MM, anemia, Hypothyroidism, and HTN and psych disorder was admitted on 2/9 from [facility] due to difficulty breathing and productive cough for few days.  She had hMPV in her RVP and blood culture showed 1/4 GPR and 1/4 GPC.   She has improved significantly.    Viral URI  R/O bacterial pneumonia due to RLL opacity  Bacteremia GPR and GPC (CoNS and corynebacterium??)   PCN allergy     - Follow up blood cultures for ID and sensitivity, could be contamination   - 1/4 with Aerococcus viridans and 1/4 with bacillus species   - Repeat blood cultures pending   - RVP with hMPV  - CXR with RLL pneumonia  - Continue Levaquin 750mg daily that covers Aerococcus and pneumonia  - Since has prolonged QT interval, watch her ECG closely and stop Levaquin in case of prolongation   - TTE today.      Will follow

## 2019-02-12 DIAGNOSIS — F31.9 BIPOLAR DISORDER, UNSPECIFIED: ICD-10-CM

## 2019-02-12 PROCEDURE — 99232 SBSQ HOSP IP/OBS MODERATE 35: CPT

## 2019-02-12 PROCEDURE — 93010 ELECTROCARDIOGRAM REPORT: CPT

## 2019-02-12 RX ORDER — FUROSEMIDE 40 MG
40 TABLET ORAL ONCE
Qty: 0 | Refills: 0 | Status: COMPLETED | OUTPATIENT
Start: 2019-02-12 | End: 2019-02-12

## 2019-02-12 RX ADMIN — Medication 40 MILLIGRAM(S): at 06:16

## 2019-02-12 RX ADMIN — Medication 0.5 MILLIGRAM(S): at 17:52

## 2019-02-12 RX ADMIN — TAMSULOSIN HYDROCHLORIDE 0.4 MILLIGRAM(S): 0.4 CAPSULE ORAL at 22:16

## 2019-02-12 RX ADMIN — Medication 0.5 MILLIGRAM(S): at 22:16

## 2019-02-12 RX ADMIN — Medication 75 MICROGRAM(S): at 06:16

## 2019-02-12 RX ADMIN — POLYETHYLENE GLYCOL 3350 17 GRAM(S): 17 POWDER, FOR SOLUTION ORAL at 12:02

## 2019-02-12 RX ADMIN — Medication 600 MILLIGRAM(S): at 17:52

## 2019-02-12 RX ADMIN — Medication 3 MILLILITER(S): at 03:37

## 2019-02-12 RX ADMIN — Medication 40 MILLIGRAM(S): at 17:52

## 2019-02-12 RX ADMIN — Medication 3 MILLILITER(S): at 21:19

## 2019-02-12 RX ADMIN — ENOXAPARIN SODIUM 40 MILLIGRAM(S): 100 INJECTION SUBCUTANEOUS at 12:02

## 2019-02-12 RX ADMIN — Medication 1000 UNIT(S): at 12:03

## 2019-02-12 RX ADMIN — Medication 0.5 MILLIGRAM(S): at 06:21

## 2019-02-12 RX ADMIN — LITHIUM CARBONATE 300 MILLIGRAM(S): 300 TABLET, EXTENDED RELEASE ORAL at 12:03

## 2019-02-12 RX ADMIN — Medication 3 MILLILITER(S): at 15:18

## 2019-02-12 RX ADMIN — Medication 325 MILLIGRAM(S): at 12:03

## 2019-02-12 RX ADMIN — Medication 600 MILLIGRAM(S): at 06:16

## 2019-02-12 RX ADMIN — AMLODIPINE BESYLATE 5 MILLIGRAM(S): 2.5 TABLET ORAL at 06:16

## 2019-02-12 RX ADMIN — Medication 1 MILLIGRAM(S): at 12:03

## 2019-02-12 NOTE — PROGRESS NOTE ADULT - SUBJECTIVE AND OBJECTIVE BOX
[facility]  INFECTIOUS DISEASES AND INTERNAL MEDICINE at [place]  =======================================================  Blu Franz MD  Diplomates American Board of Internal Medicine and Infectious Diseases  =======================================================    N-[id number]  KRYSTA HERRMANN     Follow up: Viral URI and bacterial pneumonia    She looks better, no new complaint or event. seen by psych.   Afebrile.     PAST MEDICAL & SURGICAL HISTORY:  Multiple myeloma  Mental disorder  Bone cancer  Scoliosis  Hypercholesteremia  Hypertension  Hypothyroid  No significant past surgical history    Social Hx: no smoking or other toxic habits     FAMILY HISTORY: None as per [facility] record    Allergies  ixazomib (Unknown)  NSAIDs (Unknown)  penicillins (Unknown)  sulfa drugs (Unknown)    Antibiotics:  Levaquin     REVIEW OF SYSTEMS:  As above otherwise neg.     Physical Exam:  Vital Signs Last 24 Hrs  T(C): 36.9 (12 Feb 2019 08:15), Max: 37.3 (12 Feb 2019 00:21)  T(F): 98.5 (12 Feb 2019 08:15), Max: 99.1 (12 Feb 2019 00:21)  HR: 79 (12 Feb 2019 08:15) (60 - 79)  BP: 141/65 (12 Feb 2019 08:15) (141/65 - 168/71)  BP(mean): --  RR: 18 (12 Feb 2019 08:15) (18 - 18)  SpO2: 99% (12 Feb 2019 08:15) (99% - 99%)  GEN: NAD  HEENT: normocephalic and atraumatic. EOMI. PERRL.    NECK: Supple.  No lymphadenopathy   LUNGS: Clear to auscultation from front  HEART: Regular rate and rhythm  ABDOMEN: Soft, nontender, and nondistended.    : No CVA tenderness  EXTREMITIES: Without any cyanosis, clubbing, rash, lesions or edema.  NEUROLOGIC: grossly intact.  PSYCHIATRIC: Appropriate affect .  SKIN: No ulceration or induration present.    Labs:  RECENT CULTURES:  02-09 @ 02:32      Detected  hMPV    02-09 @ 02:14 .Blood     Growth in aerobic bottle: Aerococcus viridans Susceptibility to follow.  Aerobic Bottle: 14:17 Hours to positivity  Anaerobic Bottle: No growth to date    02-09 @ 02:13 .Blood     Growth in aerobic bottle: Bacillus species not anthracis  Aerobic Bottle: 13:27 Hours to positivity  Anaerobic Bottle: No growth to date    All imaging and other data have been reviewed.    CXR 2/9  RLL pneumonia

## 2019-02-12 NOTE — PROGRESS NOTE ADULT - ASSESSMENT
72 y/o woman with PMH of MM, anemia, Hypothyroidism, and HTN and psych disorder was admitted on 2/9 from [facility] due to difficulty breathing and productive cough for few days.  She had hMPV in her RVP and blood culture showed 1/4 bacillus and 1/4 Aerococcus that can cause sepsis.    She has improved significantly.    Viral URI  R/O bacterial pneumonia due to RLL opacity  Bacteremia GPR and GPC (CoNS and corynebacterium??)   PCN allergy     - Follow up blood cultures for ID and sensitivity, could be contamination   - 1/4 with Aerococcus viridans and 1/4 with bacillus species   - Repeat blood cultures from 2/10 and 2/11 are pending   - RVP with hMPV  - CXR with RLL pneumonia  - Continue Levaquin 750mg daily that covers Aerococcus and pneumonia  - Since has prolonged QT interval, watch her ECG closely and stop Levaquin in case of prolongation   - TTE      Will follow

## 2019-02-12 NOTE — CONSULT NOTE ADULT - ASSESSMENT
Bipolar disorder manic phase with psychotic features  no new recommendations  continue meds per [facility] list   return to [facility] once medically approriate

## 2019-02-12 NOTE — PROGRESS NOTE ADULT - SUBJECTIVE AND OBJECTIVE BOX
HEALTH ISSUES - PROBLEM Dx:    CC- Viral infection, r/o bacteremia    INTERVAL HPI/ OVERNIGHT EVENTS:    pt comfortable,   ambulating    REVIEW OF SYSTEMS:    fever- sob - cough - congestion -    Vital Signs Last 24 Hrs  T(C): 36.9 (12 Feb 2019 08:15), Max: 37.3 (12 Feb 2019 00:21)  T(F): 98.5 (12 Feb 2019 08:15), Max: 99.1 (12 Feb 2019 00:21)  HR: 79 (12 Feb 2019 08:15) (60 - 79)  BP: 141/65 (12 Feb 2019 08:15) (141/65 - 168/71)  BP(mean): --  RR: 18 (12 Feb 2019 08:15) (18 - 18)  SpO2: 99% (12 Feb 2019 08:15) (99% - 99%)    PHYSICAL EXAM-    Constitutional : obese  Head :NC AT , no swelling,   Eyes :eomi,   Mouth :mm moist  Neck : supple, trachea in midline  Chest :Wilder air entry good, diffuse rhonchi today  Heart :S1 S2 distant  Abdomen :abd soft, non tender  : no groin erythema   Musc/ Skel: No edema, no deformity, no spine tenderness, distal pulses    LABS:    Assessment and Plan

## 2019-02-12 NOTE — CONSULT NOTE ADULT - SUBJECTIVE AND OBJECTIVE BOX
admitted for respiratory infection Patient with multiple myeloma Currently manic delusional giddy denies suicidal or violent urges She reports SOB and cough denies pain generally cooperative with care otherwise NANCY blanchard  PAST MEDICAL & SURGICAL HISTORY:  Multiple myeloma  bipolar disorder  Scoliosis  Hypercholesteremia  Hypertension  Hypothyroid  No significant past surgical history  MEDICATIONS  (STANDING):  Albuterol    90 MICROgram(s) HFA Inhaler 1 Puff(s) Inhalation every 4 hours  ALBUTerol/ipratropium for Nebulization 3 milliLiter(s) Nebulizer every 6 hours  amlodipine   Tablet 5 milliGRAM(s) Oral daily  benztropine 0.5 milliGRAM(s) Oral at bedtime  chlorhexidine 2% Cloths 1 Application(s) Topical <User Schedule>  cholecalciferol 1000 Unit(s) Oral daily  enoxaparin Injectable 40 milliGRAM(s) SubCutaneous every 24 hours  ferrous    sulfate 325 milliGRAM(s) Oral daily  folic acid 1 milliGRAM(s) Oral daily  furosemide    Tablet 40 milliGRAM(s) Oral daily  guaianesin  milliGRAM(s) Oral every 12 hours  levofloxacin IVPB      levofloxacin IVPB 500 milliGRAM(s) IV Intermittent every 24 hours  levothyroxine 75 MICROGram(s) Oral daily  lithium 300 milliGRAM(s) Oral daily  Lorazepam     Tablet 0.5 milliGRAM(s) Oral two times a day  methylprednisolone sodium succinate Injectable 40 milliGRAM(s) IV Push every 12 hours  polyethylene glycol 3350 17 Gram(s) Oral daily  tamsulosin 0.4 milliGRAM(s) Oral at bedtime  tiotropium 18 MICROgram(s) Capsule 1 Capsule(s) Inhalation daily  Vital Signs Last 24 Hrs  T(C): 37.3 (12 Feb 2019 00:21), Max: 37.3 (12 Feb 2019 00:21)  T(F): 99.1 (12 Feb 2019 00:21), Max: 99.1 (12 Feb 2019 00:21)  HR: 75 (12 Feb 2019 06:13) (56 - 75)  BP: 168/71 (12 Feb 2019 06:13) (144/64 - 168/71)  BP(mean): --  RR: 18 (12 Feb 2019 00:21) (18 - 18)  SpO2: 99% (12 Feb 2019 03:37) (99% - 100%)

## 2019-02-12 NOTE — PROGRESS NOTE ADULT - ASSESSMENT
74 y/o F with PMHx of MM, anemia, hyperuricemia related to MM, Hypothyroid, OP, Neurogenic bladder, constipation, Neutropenia, HTN, prolonged QT, HLD, obesity, Psych disorder     1. Acute hypoxic resp failure  - likely sec to viral infection  - duonebs, solumedrol given in ED. Oxygen.   - CXR improved from prior. no evidence of new acute findings.   - Patient is steroid dependent and so will maintain solu medrol taper.   - ID consult appreciated. On Levaquin per ID  - Lasix x 1 dose today for increased rhonchi    4. blood c/s 1/2 bottles G + cocci; 1/2 bottles G + rods. likely contaminant. rpt c/s testing. ID consulted    2. Fever- HmPV positive- Viral infection. Symptomatic treatment. Rpt Blood c/s awaited    3. MM/ anemia, hypothyroid, htn,- maintain home meds.     5. On admission, states sepsis PNA. Sepsis ruled out. Viral infection +. Bld c/s most likely contaminant and therefore rpt c/s awaited.     6. Psych disorder, and from [place]- Psych consulted here. No new changes. OK to return to [place]    Lovenox

## 2019-02-13 PROCEDURE — 99232 SBSQ HOSP IP/OBS MODERATE 35: CPT

## 2019-02-13 PROCEDURE — 93010 ELECTROCARDIOGRAM REPORT: CPT

## 2019-02-13 RX ADMIN — Medication 3 MILLILITER(S): at 03:28

## 2019-02-13 RX ADMIN — Medication 3 MILLILITER(S): at 20:12

## 2019-02-13 RX ADMIN — Medication 75 MICROGRAM(S): at 05:12

## 2019-02-13 RX ADMIN — Medication 0.5 MILLIGRAM(S): at 05:13

## 2019-02-13 RX ADMIN — Medication 40 MILLIGRAM(S): at 17:38

## 2019-02-13 RX ADMIN — ENOXAPARIN SODIUM 40 MILLIGRAM(S): 100 INJECTION SUBCUTANEOUS at 12:03

## 2019-02-13 RX ADMIN — TAMSULOSIN HYDROCHLORIDE 0.4 MILLIGRAM(S): 0.4 CAPSULE ORAL at 21:45

## 2019-02-13 RX ADMIN — CHLORHEXIDINE GLUCONATE 1 APPLICATION(S): 213 SOLUTION TOPICAL at 05:13

## 2019-02-13 RX ADMIN — Medication 600 MILLIGRAM(S): at 17:39

## 2019-02-13 RX ADMIN — Medication 1 MILLIGRAM(S): at 12:03

## 2019-02-13 RX ADMIN — Medication 3 MILLILITER(S): at 15:27

## 2019-02-13 RX ADMIN — LITHIUM CARBONATE 300 MILLIGRAM(S): 300 TABLET, EXTENDED RELEASE ORAL at 17:51

## 2019-02-13 RX ADMIN — Medication 600 MILLIGRAM(S): at 05:12

## 2019-02-13 RX ADMIN — Medication 3 MILLILITER(S): at 10:25

## 2019-02-13 RX ADMIN — Medication 40 MILLIGRAM(S): at 05:12

## 2019-02-13 RX ADMIN — AMLODIPINE BESYLATE 5 MILLIGRAM(S): 2.5 TABLET ORAL at 05:13

## 2019-02-13 RX ADMIN — Medication 1000 UNIT(S): at 12:03

## 2019-02-13 RX ADMIN — Medication 0.5 MILLIGRAM(S): at 17:39

## 2019-02-13 NOTE — PROGRESS NOTE ADULT - SUBJECTIVE AND OBJECTIVE BOX
HEALTH ISSUES - PROBLEM Dx:    CC- Viral infection, r/o bacteremia    INTERVAL HPI/ OVERNIGHT EVENTS:    pt comfortable,   ambulating  says she is a  at [employer]. 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    REVIEW OF SYSTEMS:    fever- sob - cough - congestion -    Vital Signs Last 24 Hrs  T(C): 36.9 (13 Feb 2019 07:58), Max: 37 (12 Feb 2019 23:45)  T(F): 98.5 (13 Feb 2019 07:58), Max: 98.6 (12 Feb 2019 23:45)  HR: 72 (13 Feb 2019 15:28) (68 - 88)  BP: 156/60 (13 Feb 2019 09:49) (110/58 - 156/60)  BP(mean): --  RR: 20 (13 Feb 2019 07:58) (18 - 20)  SpO2: 96% (13 Feb 2019 15:28) (94% - 97%)    PHYSICAL EXAM-  Constitutional : obese  Head :NC AT , no swelling,   Eyes :eomi,   Mouth :mm moist  Neck : supple, trachea in midline  Chest :Wilder air entry good, diffuse rhonchi today  Heart :S1 S2 distant  Abdomen :abd soft, non tender  : no groin erythema   Musc/ Skel: No edema, no deformity, no spine tenderness, distal pulses    LABS:    Assessment and Plan

## 2019-02-13 NOTE — PROGRESS NOTE BEHAVIORAL HEALTH - NSBHCONSULTMEDS_PSY_A_CORE FT
benztropine 0.5 milliGRAM(s) Oral at bedtime  lithium 300 milliGRAM(s) Oral daily  Lorazepam     Tablet 0.5 milliGRAM(s) Oral two times a day

## 2019-02-13 NOTE — PROGRESS NOTE BEHAVIORAL HEALTH - NSBHCHARTREVIEWVS_PSY_A_CORE FT
Vital Signs Last 24 Hrs  T(C): 37 (12 Feb 2019 23:45), Max: 37 (12 Feb 2019 23:45)  T(F): 98.6 (12 Feb 2019 23:45), Max: 98.6 (12 Feb 2019 23:45)  HR: 88 (13 Feb 2019 04:37) (71 - 88)  BP: 111/70 (13 Feb 2019 04:37) (110/58 - 153/78)  BP(mean): --  RR: 20 (12 Feb 2019 23:45) (18 - 20)  SpO2: 94% (13 Feb 2019 03:29) (93% - 99%)

## 2019-02-13 NOTE — PROGRESS NOTE ADULT - ASSESSMENT
72 y/o F with PMHx of MM, anemia, hyperuricemia related to MM, Hypothyroid, OP, Neurogenic bladder, constipation, Neutropenia, HTN, prolonged QT, HLD, obesity, Psych disorder     1. Acute hypoxic resp failure  - likely sec to viral infection  - duonebs, solumedrol given in ED. Oxygen.   - CXR improved from prior. no evidence of new acute findings.   - Patient is steroid dependent and so will maintain solu medrol taper.   - ID consult appreciated. On Levaquin per ID  - will change to prednisone from 2/14    4. blood c/s 1/2 bottles G + cocci; 1/2 bottles G + rods. likely contaminant. rpt c/s negative. await ID for length of Levaquin dosing    2. Fever- HmPV positive- Viral infection. Symptomatic treatment. Rpt Blood c/s awaited    3. MM/ anemia, hypothyroid, htn,- maintain home meds.     5. On admission, states sepsis PNA. Sepsis ruled out. Viral infection +. Bld c/s most likely contaminant and therefore rpt c/s negative    6. Psych disorder, and from [place]- Psych consulted here. No new changes. OK to return to [place]    Lovenox

## 2019-02-13 NOTE — PROGRESS NOTE BEHAVIORAL HEALTH - NSBHFUPINTERVALHXFT_PSY_A_CORE
c/o fatigue Is hungry asks for food remains hypomanic generally cooperative hospitalist note appreciated ID note appareciated

## 2019-02-14 ENCOUNTER — TRANSCRIPTION ENCOUNTER (OUTPATIENT)
Age: 74
End: 2019-02-14

## 2019-02-14 VITALS — WEIGHT: 118.39 LBS

## 2019-02-14 LAB
APPEARANCE UR: CLEAR — SIGNIFICANT CHANGE UP
BILIRUB UR-MCNC: NEGATIVE — SIGNIFICANT CHANGE UP
COLOR SPEC: YELLOW — SIGNIFICANT CHANGE UP
CULTURE RESULTS: SIGNIFICANT CHANGE UP
CULTURE RESULTS: SIGNIFICANT CHANGE UP
DIFF PNL FLD: ABNORMAL
EPI CELLS # UR: SIGNIFICANT CHANGE UP
GLUCOSE UR QL: NEGATIVE MG/DL — SIGNIFICANT CHANGE UP
KETONES UR-MCNC: NEGATIVE — SIGNIFICANT CHANGE UP
LEUKOCYTE ESTERASE UR-ACNC: NEGATIVE — SIGNIFICANT CHANGE UP
NITRITE UR-MCNC: NEGATIVE — SIGNIFICANT CHANGE UP
ORGANISM # SPEC MICROSCOPIC CNT: SIGNIFICANT CHANGE UP
ORGANISM # SPEC MICROSCOPIC CNT: SIGNIFICANT CHANGE UP
PH UR: 6.5 — SIGNIFICANT CHANGE UP (ref 5–8)
PROT UR-MCNC: 100 MG/DL
RBC CASTS # UR COMP ASSIST: SIGNIFICANT CHANGE UP /HPF (ref 0–4)
SP GR SPEC: 1.01 — SIGNIFICANT CHANGE UP (ref 1.01–1.02)
SPECIMEN SOURCE: SIGNIFICANT CHANGE UP
SPECIMEN SOURCE: SIGNIFICANT CHANGE UP
UROBILINOGEN FLD QL: NEGATIVE MG/DL — SIGNIFICANT CHANGE UP
WBC UR QL: SIGNIFICANT CHANGE UP

## 2019-02-14 PROCEDURE — 99285 EMERGENCY DEPT VISIT HI MDM: CPT | Mod: 25

## 2019-02-14 PROCEDURE — 71046 X-RAY EXAM CHEST 2 VIEWS: CPT

## 2019-02-14 PROCEDURE — 87798 DETECT AGENT NOS DNA AMP: CPT

## 2019-02-14 PROCEDURE — 87040 BLOOD CULTURE FOR BACTERIA: CPT

## 2019-02-14 PROCEDURE — 93005 ELECTROCARDIOGRAM TRACING: CPT

## 2019-02-14 PROCEDURE — 94640 AIRWAY INHALATION TREATMENT: CPT

## 2019-02-14 PROCEDURE — 87486 CHLMYD PNEUM DNA AMP PROBE: CPT

## 2019-02-14 PROCEDURE — 83690 ASSAY OF LIPASE: CPT

## 2019-02-14 PROCEDURE — 96375 TX/PRO/DX INJ NEW DRUG ADDON: CPT

## 2019-02-14 PROCEDURE — 85027 COMPLETE CBC AUTOMATED: CPT

## 2019-02-14 PROCEDURE — 80178 ASSAY OF LITHIUM: CPT

## 2019-02-14 PROCEDURE — 84443 ASSAY THYROID STIM HORMONE: CPT

## 2019-02-14 PROCEDURE — 96365 THER/PROPH/DIAG IV INF INIT: CPT

## 2019-02-14 PROCEDURE — 71045 X-RAY EXAM CHEST 1 VIEW: CPT

## 2019-02-14 PROCEDURE — 87086 URINE CULTURE/COLONY COUNT: CPT

## 2019-02-14 PROCEDURE — 99239 HOSP IP/OBS DSCHRG MGMT >30: CPT

## 2019-02-14 PROCEDURE — 85730 THROMBOPLASTIN TIME PARTIAL: CPT

## 2019-02-14 PROCEDURE — 84100 ASSAY OF PHOSPHORUS: CPT

## 2019-02-14 PROCEDURE — 84484 ASSAY OF TROPONIN QUANT: CPT

## 2019-02-14 PROCEDURE — 83605 ASSAY OF LACTIC ACID: CPT

## 2019-02-14 PROCEDURE — 87186 SC STD MICRODIL/AGAR DIL: CPT

## 2019-02-14 PROCEDURE — 36415 COLL VENOUS BLD VENIPUNCTURE: CPT

## 2019-02-14 PROCEDURE — 71250 CT THORAX DX C-: CPT

## 2019-02-14 PROCEDURE — 83735 ASSAY OF MAGNESIUM: CPT

## 2019-02-14 PROCEDURE — 87633 RESP VIRUS 12-25 TARGETS: CPT

## 2019-02-14 PROCEDURE — 99284 EMERGENCY DEPT VISIT MOD MDM: CPT

## 2019-02-14 PROCEDURE — 81001 URINALYSIS AUTO W/SCOPE: CPT

## 2019-02-14 PROCEDURE — 99232 SBSQ HOSP IP/OBS MODERATE 35: CPT

## 2019-02-14 PROCEDURE — 80053 COMPREHEN METABOLIC PANEL: CPT

## 2019-02-14 PROCEDURE — 85610 PROTHROMBIN TIME: CPT

## 2019-02-14 PROCEDURE — 87581 M.PNEUMON DNA AMP PROBE: CPT

## 2019-02-14 RX ORDER — IPRATROPIUM/ALBUTEROL SULFATE 18-103MCG
3 AEROSOL WITH ADAPTER (GRAM) INHALATION
Qty: 0 | Refills: 0 | DISCHARGE
Start: 2019-02-14

## 2019-02-14 RX ORDER — MELPHALAN HYDROCHLORIDE 50 MG
3 KIT INTRAVENOUS
Qty: 0 | Refills: 0 | COMMUNITY

## 2019-02-14 RX ORDER — TIOTROPIUM BROMIDE 18 UG/1
1 CAPSULE ORAL; RESPIRATORY (INHALATION)
Qty: 0 | Refills: 0 | DISCHARGE
Start: 2019-02-14

## 2019-02-14 RX ADMIN — Medication 0.5 MILLIGRAM(S): at 17:25

## 2019-02-14 RX ADMIN — POLYETHYLENE GLYCOL 3350 17 GRAM(S): 17 POWDER, FOR SOLUTION ORAL at 11:25

## 2019-02-14 RX ADMIN — ENOXAPARIN SODIUM 40 MILLIGRAM(S): 100 INJECTION SUBCUTANEOUS at 11:25

## 2019-02-14 RX ADMIN — Medication 75 MICROGRAM(S): at 06:55

## 2019-02-14 RX ADMIN — AMLODIPINE BESYLATE 5 MILLIGRAM(S): 2.5 TABLET ORAL at 06:55

## 2019-02-14 RX ADMIN — Medication 600 MILLIGRAM(S): at 06:55

## 2019-02-14 RX ADMIN — Medication 1000 UNIT(S): at 11:25

## 2019-02-14 RX ADMIN — Medication 1 MILLIGRAM(S): at 11:25

## 2019-02-14 RX ADMIN — Medication 600 MILLIGRAM(S): at 17:25

## 2019-02-14 RX ADMIN — LITHIUM CARBONATE 300 MILLIGRAM(S): 300 TABLET, EXTENDED RELEASE ORAL at 14:22

## 2019-02-14 RX ADMIN — Medication 325 MILLIGRAM(S): at 11:25

## 2019-02-14 NOTE — CHART NOTE - NSCHARTNOTEFT_GEN_A_CORE
May use [employer] for transportation and then D/C at destination.    - MATTHEW Allen MD  [phone]

## 2019-02-14 NOTE — PROGRESS NOTE ADULT - ASSESSMENT
72 y/o F with PMHx of MM, anemia, hyperuricemia related to MM, Hypothyroid, OP, Neurogenic bladder, constipation, Neutropenia, HTN, prolonged QT, HLD, obesity, Psych disorder     1. Acute hypoxic resp failure  - likely sec to viral infection  - duonebs, solumedrol given in ED. Oxygen.   - CXR improved from prior. no evidence of new acute findings.   - Patient is steroid dependent and so will maintain solu medrol taper.   - ID consult appreciated. On Levaquin per ID  - will change to prednisone from 2/14 with 3 days taper    4. blood c/s 1/2 bottles G + cocci; 1/2 bottles G + rods. likely contaminant. rpt c/s negative. cahnged to Po Levaquin through 2/18/19    2. Fever- HmPV positive- Viral infection. Symptomatic treatment. Rpt Blood c/s neg    3. MM/ anemia, hypothyroid, htn,- maintain home meds.     5. On admission, states sepsis PNA. Sepsis ruled out. Viral infection +. Bld c/s most likely contaminant and therefore rpt c/s - negative    6. Psych disorder, and from [place]- Psych consulted here. No new changes. OK to return to [place]    Lovenox

## 2019-02-14 NOTE — DISCHARGE NOTE ADULT - MEDICATION SUMMARY - MEDICATIONS TO STOP TAKING
I will STOP taking the medications listed below when I get home from the hospital:    vantin  -- 200 milligram(s) by mouth 2 times a day

## 2019-02-14 NOTE — DISCHARGE NOTE ADULT - ADDITIONAL INSTRUCTIONS
can start melphalan after finsihing antibiotics  can start reg decadron and pred after current prednisone taper completed

## 2019-02-14 NOTE — DISCHARGE NOTE ADULT - CARE PROVIDER_API CALL
PMD,   Phone: (   )    -  Fax: (   )    -  Follow Up Time:     ONC,   Phone: (   )    -  Fax: (   )    -  Follow Up Time:

## 2019-02-14 NOTE — DISCHARGE NOTE ADULT - PLAN OF CARE
likely viral pneumonitis Follow with PMD in < 1 week can resume melphalan after finsihing antibiotics  can resume decadron and reg prednisone after finishing current prednisone taper doing well on room air

## 2019-02-14 NOTE — PROGRESS NOTE ADULT - ASSESSMENT
72 y/o woman with PMH of MM, anemia, Hypothyroidism, and HTN and psych disorder was admitted on 2/9 from [facility] due to difficulty breathing and productive cough for few days.  She had hMPV in her RVP and blood culture showed 1/4 bacillus and 1/4 Aerococcus that can cause sepsis.    She has improved significantly.    Viral URI  R/O bacterial pneumonia due to RLL opacity  Bacteremia GPR and GPC (CoNS and corynebacterium??)   PCN allergy     - Positive blood culture could be contamination   - 1/4 with Aerococcus viridans and 1/4 with bacillus species   - Repeat blood cultures from 2/10 and 2/11 are neg. 4 sets   - RVP with hMPV  - CXR with RLL pneumonia  - Continue Levaquin 750mg daily that covers Aerococcus and pneumonia  - Since has prolonged QT interval, watch her ECG closely and stop Levaquin in case of prolongation   - Will stop ABx after completion of 10days.   - Can switch to po Levaquin 500mg daily, last day would be 2/18     Will sign off please call with any question.

## 2019-02-14 NOTE — DISCHARGE NOTE ADULT - HOSPITAL COURSE
admitted with hypoxic resp failure and sob. cxr shows resolving infiltrates from prior admission. first contaminant blood c/s and 2 sets of rpt negative blood c/s. seen by ID who started levaquin full course. HmPV positive. stable psychwise.   I d/w Dr Guan from [place] about patients return diana and follow care plan  Medically stable and agreeable with discharge and follow up plan. Patient was advised to return to ED if any symptoms occur or worsen.    Vital Signs Last 24 Hrs  T(C): 36.9 (02-14-19 @ 06:47), Max: 36.9 (02-13-19 @ 23:53)  T(F): 98.4 (02-14-19 @ 06:47), Max: 98.4 (02-13-19 @ 23:53)  HR: 74 (02-14-19 @ 06:47) (72 - 87)  BP: 153/73 (02-14-19 @ 06:47) (144/67 - 160/70)  BP(mean): --  RR: 20 (02-14-19 @ 06:47) (20 - 20)  SpO2: 93% (02-14-19 @ 06:47) (93% - 97%)  Constitutional : obese  Head :NC AT , no swelling,   Eyes :eomi,   Mouth :mm moist  Neck : supple, trachea in midline  Chest :Wilder air entry good, diffuse rhonchi today  Heart :S1 S2 distant  Abdomen :abd soft, non tender  : no groin erythema   Musc/ Skel: No edema, no deformity, no spine tenderness, distal pulses    Time 43 mins

## 2019-02-14 NOTE — DISCHARGE NOTE ADULT - CARE PLAN
Principal Discharge DX:	Pneumonitis  Goal:	likely viral pneumonitis  Assessment and plan of treatment:	Follow with PMD in < 1 week  Secondary Diagnosis:	Bipolar 1 disorder  Secondary Diagnosis:	Multiple myeloma  Assessment and plan of treatment:	can resume melphalan after finsihing antibiotics  can resume decadron and reg prednisone after finishing current prednisone taper  Secondary Diagnosis:	Hypoxia  Assessment and plan of treatment:	doing well on room air  Secondary Diagnosis:	Hypothyroid  Secondary Diagnosis:	Hypertension  Secondary Diagnosis:	Hypercholesteremia

## 2019-02-14 NOTE — DISCHARGE NOTE ADULT - PATIENT PORTAL LINK FT
You can access the [facility], offered by [facility], by registering with the following website: [email or web address]

## 2019-02-14 NOTE — DISCHARGE NOTE ADULT - MEDICATION SUMMARY - MEDICATIONS TO TAKE
I will START or STAY ON the medications listed below when I get home from the hospital:    predniSONE 20 mg oral tablet  -- 2 tab(s) by mouth once a day on 2/15  then reduce by 10 mg every 3 days until finish  -- Indication: For viral pneumonia, resp failure    aspirin 81 mg oral tablet  -- 1 tab(s) by mouth once a day  -- Indication: For cardio prophylaxis    calcium carbonate 500 mg (200 mg elemental calcium) oral tablet, chewable  -- 1 tab(s) by mouth once a day  -- Indication: For OP    Flomax 0.4 mg oral capsule  -- 1 cap(s) by mouth once a day  -- Indication: For u issues    Ativan 0.5 mg oral tablet  -- 1 tab(s) by mouth 2 times a day  -- Indication: For anxiety    benztropine 0.5 mg oral tablet  -- 1 tab(s) by mouth once a day (at bedtime)  -- Indication: For psych meds    RisperDAL  -- 50 milligram(s) intramuscular every other week  -- Indication: For psych meds    lithium 300 mg oral capsule  -- 1 cap(s) by mouth once a day  -- Indication: For psych meds    acyclovir 400 mg oral tablet  -- 1 tab(s) by mouth 2 times a day  -- Indication: For maintainence    ipratropium-albuterol 0.5 mg-2.5 mg/3 mLinhalation solution  -- 3 milliliter(s) inhaled every 6 hours  -- Indication: For resp failure/ dyspnea    tiotropium 18 mcg inhalation capsule  -- 1 cap(s) inhaled once a day  -- Indication: For resp failrue/ dyspnea    amLODIPine 5 mg oral tablet  -- 1 tab(s) by mouth once a day  -- Indication: For Htn    furosemide 40 mg oral tablet  -- 1 tab(s) by mouth once a day  -- Indication: For Htn    guaiFENesin 600 mg oral tablet, extended release  -- 1 tab(s) by mouth every 12 hours  -- Indication: For cough/ congestion    ferrous sulfate 325 mg (65 mg elemental iron) oral tablet  -- 1 tab(s) by mouth once a day  -- Indication: For anemia    MiraLax oral powder for reconstitution  -- 17 milligram(s) by mouth once a day  -- Indication: For Bowel regimen    Levaquin 500 mg oral tablet  -- 1 tab(s) by mouth every 24 hours through 2/18/19  -- Indication: For pneumonia    levothyroxine 75 mcg (0.075 mg) oral tablet  -- 1 tab(s) by mouth once a day  -- Indication: For Hypothyroid    folic acid 1 mg oral tablet  -- 1 tab(s) by mouth once a day  -- Indication: For supplement    Vitamin D3 1000 intl units oral tablet  -- 1 tab(s) by mouth once a day  -- Indication: For supplement

## 2019-02-14 NOTE — PROGRESS NOTE ADULT - SUBJECTIVE AND OBJECTIVE BOX
[facility]  INFECTIOUS DISEASES AND INTERNAL MEDICINE at [place]  =======================================================  Blu Franz MD  Diplomates American Board of Internal Medicine and Infectious Diseases  =======================================================    [id number]  KRYSTA HERRMANN     Follow up: Viral URI and bacterial pneumonia    She looks better, no new complaint or event.   Afebrile.     PAST MEDICAL & SURGICAL HISTORY:  Multiple myeloma  Mental disorder  Bone cancer  Scoliosis  Hypercholesteremia  Hypertension  Hypothyroid  No significant past surgical history    Social Hx: no smoking or other toxic habits     FAMILY HISTORY: None as per [facility] record    Allergies  ixazomib (Unknown)  NSAIDs (Unknown)  penicillins (Unknown)  sulfa drugs (Unknown)    Antibiotics:  Levaquin     REVIEW OF SYSTEMS:  As above otherwise neg.     Physical Exam:  Vital Signs Last 24 Hrs  T(C): 36.9 (2019 06:47), Max: 36.9 (2019 23:53)  T(F): 98.4 (2019 06:47), Max: 98.4 (2019 23:53)  HR: 74 (2019 06:47) (72 - 87)  BP: 153/73 (2019 06:47) (144/67 - 160/70)  BP(mean): --  RR: 20 (2019 06:47) (20 - 20)  SpO2: 93% (2019 06:47) (93% - 97%)  GEN: NAD  HEENT: normocephalic and atraumatic. EOMI. PERRL.    NECK: Supple.  No lymphadenopathy   LUNGS: Clear to auscultation from front  HEART: Regular rate and rhythm  ABDOMEN: Soft, nontender, and nondistended.    : No CVA tenderness  EXTREMITIES: Without any cyanosis, clubbing, rash, lesions or edema.  NEUROLOGIC: grossly intact.  PSYCHIATRIC: Appropriate affect .  SKIN: No ulceration or induration present.    Labs:  Urinalysis Basic - ( 2019 03:53 )    Color: Yellow / Appearance: Clear / S.010 / pH: x  Gluc: x / Ketone: Negative  / Bili: Negative / Urobili: Negative mg/dL   Blood: x / Protein: 100 mg/dL / Nitrite: Negative   Leuk Esterase: Negative / RBC: 0-2 /HPF / WBC 3-5   Sq Epi: x / Non Sq Epi: Occasional / Bacteria: x    RECENT CULTURES:   @ 13:58 .Blood     No growth at 48 hours    02-10 @ 22:10 .Blood     No growth at 48 hours     @ 02:32      Detected     @ 02:14 .Blood Aerococcus viridans    Growth in aerobic bottle: Aerococcus viridans  Aerobic Bottle: 14:17 Hours to positivity  Anaerobic Bottle: No growth at 5 days.     @ 02:13 .Blood     Growth in aerobic bottle: Bacillus species not anthracis  Aerobic Bottle: 13:27 Hours to positivity  Anaerobic Bottle: No growth to date      All imaging and other data have been reviewed.    CXR   RLL pneumonia

## 2019-02-14 NOTE — PROGRESS NOTE ADULT - SUBJECTIVE AND OBJECTIVE BOX
HEALTH ISSUES - PROBLEM Dx:    CC- Viral infection, r/o bacteremia    INTERVAL HPI/ OVERNIGHT EVENTS:    comfortable blowing her nose  no issues of sob, cough    REVIEW OF SYSTEMS:    fever- sob - cough - congestion -    Vital Signs Last 24 Hrs  T(C): 36.9 (2019 06:47), Max: 36.9 (2019 23:53)  T(F): 98.4 (2019 06:47), Max: 98.4 (2019 23:53)  HR: 74 (2019 06:47) (72 - 87)  BP: 153/73 (2019 06:47) (144/67 - 160/70)  BP(mean): --  RR: 20 (2019 06:47) (20 - 20)  SpO2: 93% (2019 06:47) (93% - 97%)    PHYSICAL EXAM-  Constitutional : obese  Head :NC AT , no swelling,   Eyes :eomi,   Mouth :mm moist  Neck : supple, trachea in midline  Chest :Wilder air entry good, diffuse rhonchi today  Heart :S1 S2 distant  Abdomen :abd soft, non tender  : no groin erythema   Musc/ Skel: No edema, no deformity, no spine tenderness, distal pulses    LABS:      Urinalysis Basic - ( 2019 03:53 )    Color: Yellow / Appearance: Clear / S.010 / pH: x  Gluc: x / Ketone: Negative  / Bili: Negative / Urobili: Negative mg/dL   Blood: x / Protein: 100 mg/dL / Nitrite: Negative   Leuk Esterase: Negative / RBC: 0-2 /HPF / WBC 3-5   Sq Epi: x / Non Sq Epi: Occasional / Bacteria: x    Assessment and Plan

## 2019-02-15 LAB
CULTURE RESULTS: SIGNIFICANT CHANGE UP
SPECIMEN SOURCE: SIGNIFICANT CHANGE UP

## 2019-02-22 ENCOUNTER — OUTPATIENT (OUTPATIENT)
Dept: OUTPATIENT SERVICES | Facility: HOSPITAL | Age: 74
LOS: 1 days | End: 2019-02-22
Payer: COMMERCIAL

## 2019-02-22 DIAGNOSIS — R07.9 CHEST PAIN, UNSPECIFIED: ICD-10-CM

## 2019-02-22 PROCEDURE — 71046 X-RAY EXAM CHEST 2 VIEWS: CPT

## 2019-02-22 PROCEDURE — 71046 X-RAY EXAM CHEST 2 VIEWS: CPT | Mod: 26

## 2019-04-13 NOTE — BEHAVIORAL HEALTH ASSESSMENT NOTE - HPI (INCLUDE ILLNESS QUALITY, SEVERITY, DURATION, TIMING, CONTEXT, MODIFYING FACTORS, ASSOCIATED SIGNS AND SYMPTOMS)
I have personally performed a face to face diagnostic evaluation on this patient. I have reviewed the ACP note and agree with the history, exam and plan of care, except as noted. see [facility] summary  fell out of wheelchair  non verbal at [facility] but followed directions  now lethargic mumbling  admitted for sepsis due to UTI patient with hypoactrivwe delirium

## 2019-06-03 NOTE — ED PROVIDER NOTE - CONSTITUTIONAL, MLM
patient normal... Well appearing, well nourished, awake, alert, oriented to person, place, time/situation and in no apparent distress.

## 2019-12-11 ENCOUNTER — INPATIENT (INPATIENT)
Facility: HOSPITAL | Age: 74
LOS: 7 days | Discharge: PSYCHIATRIC OR SUBSTANCE USE FACILITY | DRG: 281 | End: 2019-12-19
Attending: HOSPITALIST | Admitting: HOSPITALIST
Payer: MEDICARE

## 2019-12-11 VITALS
RESPIRATION RATE: 20 BRPM | DIASTOLIC BLOOD PRESSURE: 66 MMHG | SYSTOLIC BLOOD PRESSURE: 100 MMHG | TEMPERATURE: 98 F | HEART RATE: 85 BPM | OXYGEN SATURATION: 98 % | WEIGHT: 128.97 LBS | HEIGHT: 58 IN

## 2019-12-11 LAB
ALBUMIN SERPL ELPH-MCNC: 2.4 G/DL — LOW (ref 3.3–5.2)
ALP SERPL-CCNC: 87 U/L — SIGNIFICANT CHANGE UP (ref 40–120)
ALT FLD-CCNC: 7 U/L — SIGNIFICANT CHANGE UP
ANION GAP SERPL CALC-SCNC: 10 MMOL/L — SIGNIFICANT CHANGE UP (ref 5–17)
ANISOCYTOSIS BLD QL: SLIGHT — SIGNIFICANT CHANGE UP
APTT BLD: 24 SEC — LOW (ref 27.5–36.3)
AST SERPL-CCNC: 14 U/L — SIGNIFICANT CHANGE UP
BASOPHILS # BLD AUTO: 0.13 K/UL — SIGNIFICANT CHANGE UP (ref 0–0.2)
BASOPHILS NFR BLD AUTO: 2.6 % — HIGH (ref 0–2)
BILIRUB SERPL-MCNC: <0.2 MG/DL — LOW (ref 0.4–2)
BUN SERPL-MCNC: 22 MG/DL — HIGH (ref 8–20)
CALCIUM SERPL-MCNC: 7.7 MG/DL — LOW (ref 8.6–10.2)
CHLORIDE SERPL-SCNC: 109 MMOL/L — HIGH (ref 98–107)
CO2 SERPL-SCNC: 17 MMOL/L — LOW (ref 22–29)
CREAT SERPL-MCNC: 0.84 MG/DL — SIGNIFICANT CHANGE UP (ref 0.5–1.3)
EOSINOPHIL # BLD AUTO: 0 K/UL — SIGNIFICANT CHANGE UP (ref 0–0.5)
EOSINOPHIL NFR BLD AUTO: 0 % — SIGNIFICANT CHANGE UP (ref 0–6)
GLUCOSE SERPL-MCNC: 82 MG/DL — SIGNIFICANT CHANGE UP (ref 70–115)
HCT VFR BLD CALC: 30.7 % — LOW (ref 34.5–45)
HGB BLD-MCNC: 9.7 G/DL — LOW (ref 11.5–15.5)
INR BLD: 1.24 RATIO — HIGH (ref 0.88–1.16)
LIDOCAIN IGE QN: 9 U/L — LOW (ref 22–51)
LITHIUM SERPL-MCNC: 0.17 MMOL/L — LOW (ref 0.5–1.5)
LYMPHOCYTES # BLD AUTO: 0.41 K/UL — LOW (ref 1–3.3)
LYMPHOCYTES # BLD AUTO: 8 % — LOW (ref 13–44)
MACROCYTES BLD QL: SLIGHT — SIGNIFICANT CHANGE UP
MANUAL SMEAR VERIFICATION: SIGNIFICANT CHANGE UP
MCHC RBC-ENTMCNC: 31.6 GM/DL — LOW (ref 32–36)
MCHC RBC-ENTMCNC: 31.8 PG — SIGNIFICANT CHANGE UP (ref 27–34)
MCV RBC AUTO: 100.7 FL — HIGH (ref 80–100)
MONOCYTES # BLD AUTO: 1.18 K/UL — HIGH (ref 0–0.9)
MONOCYTES NFR BLD AUTO: 23 % — HIGH (ref 2–14)
NEUTROPHILS # BLD AUTO: 3.42 K/UL — SIGNIFICANT CHANGE UP (ref 1.8–7.4)
NEUTROPHILS NFR BLD AUTO: 66.4 % — SIGNIFICANT CHANGE UP (ref 43–77)
OVALOCYTES BLD QL SMEAR: SLIGHT — SIGNIFICANT CHANGE UP
PLAT MORPH BLD: NORMAL — SIGNIFICANT CHANGE UP
PLATELET # BLD AUTO: 229 K/UL — SIGNIFICANT CHANGE UP (ref 150–400)
POIKILOCYTOSIS BLD QL AUTO: SLIGHT — SIGNIFICANT CHANGE UP
POLYCHROMASIA BLD QL SMEAR: SLIGHT — SIGNIFICANT CHANGE UP
POTASSIUM SERPL-MCNC: 3.7 MMOL/L — SIGNIFICANT CHANGE UP (ref 3.5–5.3)
POTASSIUM SERPL-SCNC: 3.7 MMOL/L — SIGNIFICANT CHANGE UP (ref 3.5–5.3)
PROT SERPL-MCNC: 7.8 G/DL — SIGNIFICANT CHANGE UP (ref 6.6–8.7)
PROTHROM AB SERPL-ACNC: 14.4 SEC — HIGH (ref 10–12.9)
RBC # BLD: 3.05 M/UL — LOW (ref 3.8–5.2)
RBC # FLD: 14.9 % — HIGH (ref 10.3–14.5)
RBC BLD AUTO: ABNORMAL
SODIUM SERPL-SCNC: 136 MMOL/L — SIGNIFICANT CHANGE UP (ref 135–145)
TROPONIN T SERPL-MCNC: 0.23 NG/ML — HIGH (ref 0–0.06)
WBC # BLD: 5.15 K/UL — SIGNIFICANT CHANGE UP (ref 3.8–10.5)
WBC # FLD AUTO: 5.15 K/UL — SIGNIFICANT CHANGE UP (ref 3.8–10.5)

## 2019-12-11 PROCEDURE — 99285 EMERGENCY DEPT VISIT HI MDM: CPT

## 2019-12-11 PROCEDURE — 93010 ELECTROCARDIOGRAM REPORT: CPT

## 2019-12-11 PROCEDURE — 71045 X-RAY EXAM CHEST 1 VIEW: CPT | Mod: 26

## 2019-12-11 RX ORDER — SODIUM CHLORIDE 9 MG/ML
1000 INJECTION INTRAMUSCULAR; INTRAVENOUS; SUBCUTANEOUS ONCE
Refills: 0 | Status: COMPLETED | OUTPATIENT
Start: 2019-12-11 | End: 2019-12-11

## 2019-12-11 RX ADMIN — SODIUM CHLORIDE 1000 MILLILITER(S): 9 INJECTION INTRAMUSCULAR; INTRAVENOUS; SUBCUTANEOUS at 21:43

## 2019-12-11 NOTE — ED PROVIDER NOTE - PROGRESS NOTE DETAILS
Jose A: trop positive. EKG repeat ekg unchanged but has T wave inversion in lateral leads with mild ST depression that is new from [place]. heparine gtt started. cardiology seen the patient. request CTA to r/o PE Given the significant and immediate threats to this patient based on initial presentation, the benefits of emergency contrast-enhanced CT imaging without obtaining GFR/creatinine serum level results greatly outweigh the potential risk of harm due to contrast-induced nephropathy Patient unable to make decision.  will need IV contrast for medical necessity. I spoke to radiology regarding CT abdomen, there is a large mass unable to differentiate as to where the mass is originate from. No signes of obstruction. UA showed UTI. rocephine ordered. repeat trop 0.26, on heparine infusion. no PE. I spoke to hospitalist for admission.

## 2019-12-11 NOTE — ED PROVIDER NOTE - CLINICAL SUMMARY MEDICAL DECISION MAKING FREE TEXT BOX
patient BIBEMS from [facility] due to abd pain and diarrhea. patient poor historian due to bipolar disease. patient at baseline MS. normal VS. nontoxic appearing, abd distended and tender. will check labs, gentle hydration. CT A/P. screenin gEKG. stool sample if produces reasses

## 2019-12-11 NOTE — ED ADULT NURSE NOTE - NSIMPLEMENTINTERV_GEN_ALL_ED
Implemented All Fall with Harm Risk Interventions:  [place] to call system. Call bell, personal items and telephone within reach. Instruct patient to call for assistance. Room bathroom lighting operational. Non-slip footwear when patient is off stretcher. Physically safe environment: no spills, clutter or unnecessary equipment. Stretcher in lowest position, wheels locked, appropriate side rails in place. Provide visual cue, wrist band, yellow gown, etc. Monitor gait and stability. Monitor for mental status changes and reorient to person, place, and time. Review medications for side effects contributing to fall risk. Reinforce activity limits and safety measures with patient and family. Provide visual clues: red socks.

## 2019-12-11 NOTE — ED PROVIDER NOTE - PHYSICAL EXAMINATION
Gen: NAD, slightly agitated and aggressive on quesitoning/exam, AOx3  Head: NCAT  HEENT: EOMI, oral mucosa moist, pale conjunctiva, neck supple  Lung: CTAB, no respiratory distress  CV: rrr, no murmur, Normal perfusion  Abd: soft, distended, diffusely tender no rebound/gaurding  MSK: No edema, no visible deformities  Neuro: No focal neurologic deficits  Skin: No rash   Psych: loud, slightly aggressive but cooperative, blurts out statements slightly delusional

## 2019-12-11 NOTE — ED PROVIDER NOTE - OBJECTIVE STATEMENT
75yo F with MM, anemia from chemo, on PO chemo agent, HTN, HLD, bipolar on lithium, per patient 'diarrhea for months' per aide usually has some diarrhea after chemo- does not know when last given. per EMS documents patient with abdominal mg and diarrhea with episode of hypotension in the field. patient admits to abdominal pain and not eating today, +nausea and vomiting NBNB. no HA. no CP/SOB. per staff acting at baseline

## 2019-12-11 NOTE — ED ADULT NURSE NOTE - CHPI ED NUR SYMPTOMS NEG
no nausea/no abdominal distension/no blood in stool/no burning urination/no chills/no dysuria/no vomiting/no fever/no hematuria

## 2019-12-11 NOTE — ED PROVIDER NOTE - CARE PLAN
Principal Discharge DX:	NSTEMI (non-ST elevated myocardial infarction)  Secondary Diagnosis:	Pelvic mass

## 2019-12-11 NOTE — ED ADULT NURSE NOTE - OBJECTIVE STATEMENT
pt a+ox3, sent to ED from [facility] c/o abd pain and diarrhea for several weeks.     Pt BIBA from [facility] for c/o generalized ABD pain and diarrhea for past several weeks as per EMS. Pt is A/Ox1-2 and unable to answer most nursing note questions. When asked if pt has any bed, pt states no. Pt requires assist to stand, walk and go to bathroom. [facility]. Call bell within reach.

## 2019-12-12 DIAGNOSIS — R74.8 ABNORMAL LEVELS OF OTHER SERUM ENZYMES: ICD-10-CM

## 2019-12-12 DIAGNOSIS — N39.0 URINARY TRACT INFECTION, SITE NOT SPECIFIED: ICD-10-CM

## 2019-12-12 DIAGNOSIS — H10.9 UNSPECIFIED CONJUNCTIVITIS: ICD-10-CM

## 2019-12-12 DIAGNOSIS — I21.4 NON-ST ELEVATION (NSTEMI) MYOCARDIAL INFARCTION: ICD-10-CM

## 2019-12-12 DIAGNOSIS — E78.00 PURE HYPERCHOLESTEROLEMIA, UNSPECIFIED: ICD-10-CM

## 2019-12-12 DIAGNOSIS — I50.9 HEART FAILURE, UNSPECIFIED: ICD-10-CM

## 2019-12-12 DIAGNOSIS — E03.9 HYPOTHYROIDISM, UNSPECIFIED: ICD-10-CM

## 2019-12-12 DIAGNOSIS — K29.70 GASTRITIS, UNSPECIFIED, WITHOUT BLEEDING: ICD-10-CM

## 2019-12-12 DIAGNOSIS — F31.9 BIPOLAR DISORDER, UNSPECIFIED: ICD-10-CM

## 2019-12-12 DIAGNOSIS — I10 ESSENTIAL (PRIMARY) HYPERTENSION: ICD-10-CM

## 2019-12-12 DIAGNOSIS — A04.72 ENTEROCOLITIS DUE TO CLOSTRIDIUM DIFFICILE, NOT SPECIFIED AS RECURRENT: ICD-10-CM

## 2019-12-12 DIAGNOSIS — C90.00 MULTIPLE MYELOMA NOT HAVING ACHIEVED REMISSION: ICD-10-CM

## 2019-12-12 DIAGNOSIS — R19.00 INTRA-ABDOMINAL AND PELVIC SWELLING, MASS AND LUMP, UNSPECIFIED SITE: ICD-10-CM

## 2019-12-12 LAB
APPEARANCE UR: ABNORMAL
APTT BLD: 46.2 SEC — HIGH (ref 27.5–36.3)
APTT BLD: 77.2 SEC — HIGH (ref 27.5–36.3)
BACTERIA # UR AUTO: ABNORMAL
BILIRUB UR-MCNC: NEGATIVE — SIGNIFICANT CHANGE UP
C DIFF BY PCR RESULT: DETECTED
C DIFF TOX GENS STL QL NAA+PROBE: SIGNIFICANT CHANGE UP
CHOLEST SERPL-MCNC: 97 MG/DL — LOW (ref 110–199)
CK SERPL-CCNC: 68 U/L — SIGNIFICANT CHANGE UP (ref 25–170)
COLOR SPEC: YELLOW — SIGNIFICANT CHANGE UP
DIFF PNL FLD: ABNORMAL
EPI CELLS # UR: ABNORMAL
ERYTHROCYTE [SEDIMENTATION RATE] IN BLOOD: 60 MM/HR — HIGH (ref 0–20)
GLUCOSE UR QL: NEGATIVE — SIGNIFICANT CHANGE UP
HBA1C BLD-MCNC: 4.7 % — SIGNIFICANT CHANGE UP (ref 4–5.6)
HCT VFR BLD CALC: 29.8 % — LOW (ref 34.5–45)
HDLC SERPL-MCNC: 50 MG/DL — SIGNIFICANT CHANGE UP
HGB BLD-MCNC: 9.4 G/DL — LOW (ref 11.5–15.5)
INR BLD: 1.32 RATIO — HIGH (ref 0.88–1.16)
IRON SATN MFR SERPL: 10 % — LOW (ref 14–50)
IRON SATN MFR SERPL: 27 UG/DL — LOW (ref 37–145)
KETONES UR-MCNC: NEGATIVE — SIGNIFICANT CHANGE UP
LEUKOCYTE ESTERASE UR-ACNC: ABNORMAL
LIDOCAIN IGE QN: 10 U/L — LOW (ref 22–51)
LIPID PNL WITH DIRECT LDL SERPL: 38 MG/DL — SIGNIFICANT CHANGE UP
MCHC RBC-ENTMCNC: 31.5 GM/DL — LOW (ref 32–36)
MCHC RBC-ENTMCNC: 32.3 PG — SIGNIFICANT CHANGE UP (ref 27–34)
MCV RBC AUTO: 102.4 FL — HIGH (ref 80–100)
NITRITE UR-MCNC: POSITIVE
NT-PROBNP SERPL-SCNC: 6220 PG/ML — HIGH (ref 0–300)
PH UR: 6 — SIGNIFICANT CHANGE UP (ref 5–8)
PLATELET # BLD AUTO: 200 K/UL — SIGNIFICANT CHANGE UP (ref 150–400)
PROT UR-MCNC: 100
PROTHROM AB SERPL-ACNC: 15.3 SEC — HIGH (ref 10–12.9)
RBC # BLD: 2.91 M/UL — LOW (ref 3.8–5.2)
RBC # FLD: 14.8 % — HIGH (ref 10.3–14.5)
RBC CASTS # UR COMP ASSIST: ABNORMAL /HPF (ref 0–4)
SP GR SPEC: 1.02 — SIGNIFICANT CHANGE UP (ref 1.01–1.02)
TIBC SERPL-MCNC: 270 UG/DL — SIGNIFICANT CHANGE UP (ref 220–430)
TOTAL CHOLESTEROL/HDL RATIO MEASUREMENT: 2 RATIO — LOW (ref 3.3–7.1)
TRANSFERRIN SERPL-MCNC: 189 MG/DL — LOW (ref 192–382)
TRIGL SERPL-MCNC: 46 MG/DL — SIGNIFICANT CHANGE UP (ref 10–200)
TROPONIN T SERPL-MCNC: 0.26 NG/ML — HIGH (ref 0–0.06)
TROPONIN T SERPL-MCNC: 0.26 NG/ML — HIGH (ref 0–0.06)
UROBILINOGEN FLD QL: NEGATIVE — SIGNIFICANT CHANGE UP
WBC # BLD: 5.51 K/UL — SIGNIFICANT CHANGE UP (ref 3.8–10.5)
WBC # FLD AUTO: 5.51 K/UL — SIGNIFICANT CHANGE UP (ref 3.8–10.5)
WBC UR QL: ABNORMAL

## 2019-12-12 PROCEDURE — 99223 1ST HOSP IP/OBS HIGH 75: CPT

## 2019-12-12 PROCEDURE — 93306 TTE W/DOPPLER COMPLETE: CPT | Mod: 26

## 2019-12-12 PROCEDURE — 93010 ELECTROCARDIOGRAM REPORT: CPT

## 2019-12-12 PROCEDURE — 71275 CT ANGIOGRAPHY CHEST: CPT | Mod: 26

## 2019-12-12 PROCEDURE — 74177 CT ABD & PELVIS W/CONTRAST: CPT | Mod: 26

## 2019-12-12 RX ORDER — CEFTRIAXONE 500 MG/1
1000 INJECTION, POWDER, FOR SOLUTION INTRAMUSCULAR; INTRAVENOUS ONCE
Refills: 0 | Status: COMPLETED | OUTPATIENT
Start: 2019-12-12 | End: 2019-12-12

## 2019-12-12 RX ORDER — CEFTRIAXONE 500 MG/1
1000 INJECTION, POWDER, FOR SOLUTION INTRAMUSCULAR; INTRAVENOUS EVERY 24 HOURS
Refills: 0 | Status: DISCONTINUED | OUTPATIENT
Start: 2019-12-13 | End: 2019-12-19

## 2019-12-12 RX ORDER — FUROSEMIDE 40 MG
40 TABLET ORAL DAILY
Refills: 0 | Status: DISCONTINUED | OUTPATIENT
Start: 2019-12-12 | End: 2019-12-19

## 2019-12-12 RX ORDER — BENZTROPINE MESYLATE 1 MG
0.5 TABLET ORAL AT BEDTIME
Refills: 0 | Status: DISCONTINUED | OUTPATIENT
Start: 2019-12-12 | End: 2019-12-19

## 2019-12-12 RX ORDER — OFLOXACIN 0.3 %
1 DROPS OPHTHALMIC (EYE)
Refills: 0 | Status: COMPLETED | OUTPATIENT
Start: 2019-12-12 | End: 2019-12-19

## 2019-12-12 RX ORDER — HEPARIN SODIUM 5000 [USP'U]/ML
3800 INJECTION INTRAVENOUS; SUBCUTANEOUS EVERY 6 HOURS
Refills: 0 | Status: DISCONTINUED | OUTPATIENT
Start: 2019-12-12 | End: 2019-12-12

## 2019-12-12 RX ORDER — HEPARIN SODIUM 5000 [USP'U]/ML
INJECTION INTRAVENOUS; SUBCUTANEOUS
Qty: 25000 | Refills: 0 | Status: DISCONTINUED | OUTPATIENT
Start: 2019-12-12 | End: 2019-12-12

## 2019-12-12 RX ORDER — DIPHENHYDRAMINE HCL 50 MG
50 CAPSULE ORAL EVERY 4 HOURS
Refills: 0 | Status: DISCONTINUED | OUTPATIENT
Start: 2019-12-12 | End: 2019-12-19

## 2019-12-12 RX ORDER — HEPARIN SODIUM 5000 [USP'U]/ML
3500 INJECTION INTRAVENOUS; SUBCUTANEOUS EVERY 6 HOURS
Refills: 0 | Status: DISCONTINUED | OUTPATIENT
Start: 2019-12-12 | End: 2019-12-12

## 2019-12-12 RX ORDER — VALACYCLOVIR 500 MG/1
500 TABLET, FILM COATED ORAL
Refills: 0 | Status: DISCONTINUED | OUTPATIENT
Start: 2019-12-12 | End: 2019-12-19

## 2019-12-12 RX ORDER — LITHIUM CARBONATE 300 MG/1
300 TABLET, EXTENDED RELEASE ORAL DAILY
Refills: 0 | Status: DISCONTINUED | OUTPATIENT
Start: 2019-12-12 | End: 2019-12-19

## 2019-12-12 RX ORDER — ACYCLOVIR SODIUM 500 MG
1 VIAL (EA) INTRAVENOUS
Qty: 0 | Refills: 0 | DISCHARGE

## 2019-12-12 RX ORDER — CEFTRIAXONE 500 MG/1
INJECTION, POWDER, FOR SOLUTION INTRAMUSCULAR; INTRAVENOUS
Refills: 0 | Status: DISCONTINUED | OUTPATIENT
Start: 2019-12-12 | End: 2019-12-19

## 2019-12-12 RX ORDER — ASPIRIN/CALCIUM CARB/MAGNESIUM 324 MG
81 TABLET ORAL DAILY
Refills: 0 | Status: DISCONTINUED | OUTPATIENT
Start: 2019-12-12 | End: 2019-12-19

## 2019-12-12 RX ORDER — VANCOMYCIN HCL 1 G
125 VIAL (EA) INTRAVENOUS EVERY 6 HOURS
Refills: 0 | Status: DISCONTINUED | OUTPATIENT
Start: 2019-12-12 | End: 2019-12-19

## 2019-12-12 RX ORDER — AMLODIPINE BESYLATE 2.5 MG/1
5 TABLET ORAL DAILY
Refills: 0 | Status: DISCONTINUED | OUTPATIENT
Start: 2019-12-12 | End: 2019-12-12

## 2019-12-12 RX ORDER — LOPERAMIDE HCL 2 MG
2 TABLET ORAL
Refills: 0 | Status: DISCONTINUED | OUTPATIENT
Start: 2019-12-12 | End: 2019-12-16

## 2019-12-12 RX ORDER — RISPERIDONE 4 MG/1
50 TABLET ORAL
Qty: 0 | Refills: 0 | DISCHARGE

## 2019-12-12 RX ORDER — PANTOPRAZOLE SODIUM 20 MG/1
40 TABLET, DELAYED RELEASE ORAL
Refills: 0 | Status: DISCONTINUED | OUTPATIENT
Start: 2019-12-12 | End: 2019-12-19

## 2019-12-12 RX ORDER — FOLIC ACID 0.8 MG
1 TABLET ORAL DAILY
Refills: 0 | Status: DISCONTINUED | OUTPATIENT
Start: 2019-12-12 | End: 2019-12-19

## 2019-12-12 RX ORDER — HEPARIN SODIUM 5000 [USP'U]/ML
3500 INJECTION INTRAVENOUS; SUBCUTANEOUS ONCE
Refills: 0 | Status: DISCONTINUED | OUTPATIENT
Start: 2019-12-12 | End: 2019-12-12

## 2019-12-12 RX ORDER — LEVOTHYROXINE SODIUM 125 MCG
75 TABLET ORAL DAILY
Refills: 0 | Status: DISCONTINUED | OUTPATIENT
Start: 2019-12-12 | End: 2019-12-16

## 2019-12-12 RX ORDER — DEXAMETHASONE 0.5 MG/5ML
20 ELIXIR ORAL DAILY
Refills: 0 | Status: DISCONTINUED | OUTPATIENT
Start: 2019-12-12 | End: 2019-12-13

## 2019-12-12 RX ORDER — LEVOTHYROXINE SODIUM 125 MCG
25 TABLET ORAL DAILY
Refills: 0 | Status: DISCONTINUED | OUTPATIENT
Start: 2019-12-12 | End: 2019-12-12

## 2019-12-12 RX ORDER — POLYETHYLENE GLYCOL 3350 17 G/17G
17 POWDER, FOR SOLUTION ORAL
Qty: 0 | Refills: 0 | DISCHARGE

## 2019-12-12 RX ORDER — HEPARIN SODIUM 5000 [USP'U]/ML
3800 INJECTION INTRAVENOUS; SUBCUTANEOUS ONCE
Refills: 0 | Status: COMPLETED | OUTPATIENT
Start: 2019-12-12 | End: 2019-12-12

## 2019-12-12 RX ORDER — CARVEDILOL PHOSPHATE 80 MG/1
3.12 CAPSULE, EXTENDED RELEASE ORAL EVERY 12 HOURS
Refills: 0 | Status: DISCONTINUED | OUTPATIENT
Start: 2019-12-12 | End: 2019-12-16

## 2019-12-12 RX ORDER — FUROSEMIDE 40 MG
40 TABLET ORAL DAILY
Refills: 0 | Status: DISCONTINUED | OUTPATIENT
Start: 2019-12-12 | End: 2019-12-12

## 2019-12-12 RX ORDER — FERROUS SULFATE 325(65) MG
325 TABLET ORAL DAILY
Refills: 0 | Status: DISCONTINUED | OUTPATIENT
Start: 2019-12-12 | End: 2019-12-19

## 2019-12-12 RX ORDER — CIPROFLOXACIN LACTATE 400MG/40ML
1 VIAL (ML) INTRAVENOUS
Qty: 0 | Refills: 0 | DISCHARGE

## 2019-12-12 RX ORDER — TAMSULOSIN HYDROCHLORIDE 0.4 MG/1
1 CAPSULE ORAL
Qty: 0 | Refills: 0 | DISCHARGE

## 2019-12-12 RX ADMIN — HEPARIN SODIUM 750 UNIT(S)/HR: 5000 INJECTION INTRAVENOUS; SUBCUTANEOUS at 02:24

## 2019-12-12 RX ADMIN — Medication 125 MILLIGRAM(S): at 13:11

## 2019-12-12 RX ADMIN — Medication 1 DROP(S): at 13:10

## 2019-12-12 RX ADMIN — Medication 125 MILLIGRAM(S): at 18:23

## 2019-12-12 RX ADMIN — SODIUM CHLORIDE 1000 MILLILITER(S): 9 INJECTION INTRAMUSCULAR; INTRAVENOUS; SUBCUTANEOUS at 00:28

## 2019-12-12 RX ADMIN — CEFTRIAXONE 100 MILLIGRAM(S): 500 INJECTION, POWDER, FOR SOLUTION INTRAMUSCULAR; INTRAVENOUS at 13:39

## 2019-12-12 RX ADMIN — Medication 81 MILLIGRAM(S): at 13:41

## 2019-12-12 RX ADMIN — CARVEDILOL PHOSPHATE 3.12 MILLIGRAM(S): 80 CAPSULE, EXTENDED RELEASE ORAL at 18:23

## 2019-12-12 RX ADMIN — PANTOPRAZOLE SODIUM 40 MILLIGRAM(S): 20 TABLET, DELAYED RELEASE ORAL at 18:24

## 2019-12-12 RX ADMIN — HEPARIN SODIUM 700 UNIT(S)/HR: 5000 INJECTION INTRAVENOUS; SUBCUTANEOUS at 09:41

## 2019-12-12 RX ADMIN — VALACYCLOVIR 500 MILLIGRAM(S): 500 TABLET, FILM COATED ORAL at 18:23

## 2019-12-12 RX ADMIN — HEPARIN SODIUM 3800 UNIT(S): 5000 INJECTION INTRAVENOUS; SUBCUTANEOUS at 02:25

## 2019-12-12 RX ADMIN — Medication 1 DROP(S): at 18:22

## 2019-12-12 RX ADMIN — Medication 1 MILLIGRAM(S): at 13:41

## 2019-12-12 RX ADMIN — Medication 325 MILLIGRAM(S): at 13:12

## 2019-12-12 RX ADMIN — LITHIUM CARBONATE 300 MILLIGRAM(S): 300 TABLET, EXTENDED RELEASE ORAL at 18:22

## 2019-12-12 NOTE — CONSULT NOTE ADULT - ASSESSMENT
75yo F from [facility] with new abdominal pain and incidental findings of positive cardiac biomarkers and new ECG changes, however, patient with no acute cardiac symptoms 73yo F from [facility] with new abdominal pain and incidental findings of positive cardiac biomarkers and new ECG changes for NSTEMI

## 2019-12-12 NOTE — H&P ADULT - PROBLEM SELECTOR PLAN 1
Trop: 0.26. patient denies chest pain or SOB. ECG showed ischemic changes. ? chronic. heparin drip was started in ER. Echo cardiogram. cardio consult: [facility]

## 2019-12-12 NOTE — H&P ADULT - PROBLEM SELECTOR PLAN 6
unclear in [place] medications record if patient is taking Seliexor 80 mg po daily or twice a week. not available in the hospital. pending pharmacy check for availability (Ext: [phone]). Hematology consult Dr. Marina was called unclear in [place] medications record if patient is taking Seliexor 80 mg po daily or twice a week. not available in the hospital. pending pharmacy check for availability (Ext: [phone]). Hematology consult Dr. matos was called

## 2019-12-12 NOTE — CONSULT NOTE ADULT - SUBJECTIVE AND OBJECTIVE BOX
SURGICAL ONCOLOGY    Consulting surgical team: Surgical Oncology  Consulting attending: Dr. Lara  Patient seen and examined: 19 @ 12:55    HPI:  77 y/o female with h/o multiple myeloma, CHF, HTN, Bipolar disorder, was referred from [facility] for chest pain and abdominal pain with diarrhea for 3+months. CTA chest abdomen and pelvis showed numerous lytic lesions and a large infiltrating pelvic mass encasing rectum, uterus, bladder and iliac vessels. Pt is a poor historian. Pt reports she gets her MM treated by Dr. Land who recently switched her over to PO meds - however unsure if she takes them or not. She did not know about her pelvic mass, does not recall her last c-scope. Patient is tolerating a diet. Denies f/c/n/v/sob.    PAST MEDICAL HISTORY:  Congestive heart failure (CHF) EF <40  Multiple myeloma  Mental disorder  Bone cancer  Scoliosis  Hypercholesteremia  Hypertension  Hypothyroid      PAST SURGICAL HISTORY:  No significant past surgical history      ALLERGIES:  ixazomib (Unknown)  NSAIDs (Unknown)  penicillins (Unknown)  sulfa drugs (Unknown)      MEDICATIONS  (STANDING):  amLODIPine   Tablet 5 milliGRAM(s) Oral daily  aspirin  chewable 81 milliGRAM(s) Oral daily  benztropine 0.5 milliGRAM(s) Oral at bedtime  cefTRIAXone   IVPB 1000 milliGRAM(s) IV Intermittent once  cefTRIAXone   IVPB      dexAMETHasone     Tablet 20 milliGRAM(s) Oral daily  ferrous    sulfate 325 milliGRAM(s) Oral daily  folic acid 1 milliGRAM(s) Oral daily  furosemide    Tablet 40 milliGRAM(s) Oral daily  heparin  Infusion.  Unit(s)/Hr (7.5 mL/Hr) IV Continuous <Continuous>  levothyroxine 75 MICROGram(s) Oral daily  levothyroxine 25 MICROGram(s) Oral daily  lithium citrate Solution 300 milliGRAM(s) Oral daily  LORazepam     Tablet 0.5 milliGRAM(s) Oral two times a day  ofloxacin 0.3% Solution 1 Drop(s) Both EYES four times a day  pantoprazole  Injectable 40 milliGRAM(s) IV Push two times a day  valACYclovir 500 milliGRAM(s) Oral two times a day  vancomycin    Solution 125 milliGRAM(s) Oral every 6 hours    MEDICATIONS  (PRN):  aluminum hydroxide/magnesium hydroxide/simethicone Suspension 30 milliLiter(s) Oral every 4 hours PRN Dyspepsia  diphenhydrAMINE   Injectable 50 milliGRAM(s) IntraMuscular every 4 hours PRN Rash and/or Itching  heparin  Injectable 3800 Unit(s) IV Push every 6 hours PRN For aPTT less than 40  loperamide 2 milliGRAM(s) Oral two times a day PRN watery diarrhea      VITALS & I/Os:  Vital Signs Last 24 Hrs  T(C): 36.9 (12 Dec 2019 06:30), Max: 37.1 (11 Dec 2019 21:41)  T(F): 98.5 (12 Dec 2019 06:30), Max: 98.7 (11 Dec 2019 21:41)  HR: 86 (12 Dec 2019 07:14) (75 - 86)  BP: 115/60 (12 Dec 2019 07:14) (100/66 - 115/60)  BP(mean): --  RR: 20 (12 Dec 2019 07:14) (20 - 20)  SpO2: 100% (12 Dec 2019 07:14) (97% - 100%)  CAPILLARY BLOOD GLUCOSE          I&O's Summary        GEN: NAD  HEENT: WNL  CHEST: Symmetrical chest rise, breath sounds CTAB. Mediport on R chest  HEART: RRR, non-muffled heart sounds  ABD: Soft distended, general tenderness, no guarding, no rebound, no signs of peritonitis  ANDREWS: external hemorrhoid, soft stool in rectal vault, no gross blood, no palpable masses  EXT/VASC: No c/c/e    LABS:                        9.4    5.51  )-----------( 200      ( 12 Dec 2019 11:55 )             29.8         136  |  109<H>  |  22.0<H>  ----------------------------<  82  3.7   |  17.0<L>  |  0.84    Ca    7.7<L>      11 Dec 2019 22:55  Mg     1.8         TPro  7.8  /  Alb  2.4<L>  /  TBili  <0.2<L>  /  DBili  x   /  AST  14  /  ALT  7   /  AlkPhos  87        PT/INR - ( 12 Dec 2019 09:29 )   PT: 15.3 sec;   INR: 1.32 ratio         PTT - ( 12 Dec 2019 09:29 )  PTT:77.2 sec    CARDIAC MARKERS ( 12 Dec 2019 05:51 )  x     / 0.26 ng/mL / x     / x     / x      CARDIAC MARKERS ( 12 Dec 2019 04:43 )  x     / 0.26 ng/mL / 68 U/L / x     / x      CARDIAC MARKERS ( 11 Dec 2019 22:55 )  x     / 0.23 ng/mL / x     / x     / x            Urinalysis Basic - ( 12 Dec 2019 04:52 )    Color: Yellow / Appearance: Slightly Turbid / S.020 / pH: x  Gluc: x / Ketone: Negative  / Bili: Negative / Urobili: Negative   Blood: x / Protein: 100 / Nitrite: Positive   Leuk Esterase: Small / RBC: 11-25 /HPF / WBC 26-50   Sq Epi: x / Non Sq Epi: Moderate / Bacteria: Moderate        IMAGING:

## 2019-12-12 NOTE — H&P ADULT - PROBLEM SELECTOR PLAN 2
possible reason for chronic diarrhea as it is encasing the rectum. palpable left supra clavicular lymph nodes. Onco surgery consult was called. Oncology consult Dr Marina was called possible reason for chronic diarrhea as it is encasing the rectum. palpable left supra clavicular lymph nodes. Onco surgery consult was called. Oncology consult Dr Jade was called

## 2019-12-12 NOTE — CONSULT NOTE ADULT - ATTENDING COMMENTS
Pt is  seen, examined, chart reviewed, d/w NP/PA.  73yo F from [facility] with new abdominal pain who was found to have abnormal troponin and new ECG changes for NSTEMI.  Patient with new ECG changes and increased trops r/o NSTEMI  [facility] planned.  Echo.  trend trop.  Cont meds. Pt is  seen, examined, chart reviewed, d/w NP/PA.  75yo F from [facility] with new abdominal pain who was found to have abnormal troponin and new ECG changes for NSTEMI.  Patient with new ECG changes and increased trops r/o NSTEMI  [facility] planned.  Echo.  trend trop.  Cont meds.        ADDENDUM:  Echo Reviewed:  3. Segmental wall motion abnormalities in RCA territory.  Left ventricular ejection fraction, by visual estimation, is 45-50%.   Grade II diastolic dysfunction.  Moderate-severe mitral valve regurgitation. Tethering and restricted posterior leaflet.  Mild to moderate aortic valve stenosis. Moderate aortic regurgitation.    CT Abdomen:  Large infiltrating pelvic mass encasing the rectum, uterus, bladder and iliac vessels. 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 Other considerations include sarcoma and lymphoma.  Numerous lytic bone lesions compatible with multiple myeloma, including lesions involving both femoral necks.      CT Chest:  No PE  Pulmonary edema and small bilateral pleural effusions.  Lytic lesions involving the acromion process-left scapula, left   second rib and multiple vertebral bodies suspicious for metastatic   disease.        LHC d/w pt Pt unwilling to have LHC.   Stress Test d/w pt. Pt is unwilling to have Stress Test.     1) d/c IV heparin  2) Cont ASA  3) Evaluate for decision making capacity, d/w Dr. Graham  4) Pt is planned for IR biopsy procedure.   5) Changed Frusemide to IV.  6) discontinued Amlodipine.  7) Started Carvedilol with holding parameters            CARDIO MEDS:  furosemide 40 mg oral tablet: 1 tab(s) orally once a day  amLODIPine 5 mg oral tablet: 1 tab(s) orally once a day  aspirin 81 mg oral tablet: 1 tab(s) orally once a day  Flomax 0.4 mg oral capsule: 1 cap(s) orally once a day

## 2019-12-12 NOTE — CONSULT NOTE ADULT - SUBJECTIVE AND OBJECTIVE BOX
[place] [facility]                                                        Office: [address] [address]                                                       Telephone: [phone]. Fax:[phone]                                                              CARDIOLOGY CONSULTATION NOTE                                                                            History obtained by: Patient and medical record     obtained: No    Chief complaint:  stomach pain     HPI: Patient is a 75yo F BIBA from [facility] after c/o ongoing "diarrhea for months." Per aide, still at the bedside, patient usually experiences some diarrhea after chemo but this time she still has loose BMs.  In addition, as per EMS documents "patient with abdominal pan and diarrhea with episode of hypotension in the field."  As I'm speaking to Mrs. Andre, she denies abdominal pain, fever, chills, bloody stools, nausea and vomiting, CP or diaphoresis.  Asking for food and she is normotensive by newest VS.  Cardiology Cx called by [facility] after +Trop of 0.23.  Patient "did not see Cardiology for a long time."          REVIEW OF SYMPTOMS:   General: + agitation  Cardiovascular:  See HPI. No chest pain, No dyspnea, No syncope, No palpitations, No dizziness, No Orthopnea, No Paroxsymal nocturnal dyspnea;    Respiratory: No Dyspnea, No cough,     Genitourinary: No dysuria, no hematuria;   Gastrointestinal: + hunger, No nausea, no vomiting. + diarrhea. + abdominal pain. No dark color stool, no melena;   Neurological: No headache, no dizziness, no slurred speech;    Psychiatric: + anxiety.  ALL OTHER REVIEW OF SYSTEMS ARE NEGATIVE.    ALLERGIES:   ixazomib (Unknown)  NSAIDs (Unknown)  penicillins (Unknown)  sulfa drugs (Unknown)    CURRENT MEDICATIONS:  heparin  Infusion.  heparin  Injectable    HOME MEDICATIONS:  guaiFENesin 600 mg oral tablet, extended release: 1 tab(s) orally every 12 hours  tiotropium 18 mcg inhalation capsule: 1 cap(s) inhaled once a day  ipratropium-albuterol 0.5 mg-2.5 mg/3 mLinhalation solution: 3 milliliter(s) inhaled every 6 hours  furosemide 40 mg oral tablet: 1 tab(s) orally once a day  levothyroxine 75 mcg (0.075 mg) oral tablet: 1 tab(s) orally once a day  folic acid 1 mg oral tablet: 1 tab(s) orally once a day  lithium 300 mg oral capsule: 1 cap(s) orally once a day  Ativan 0.5 mg oral tablet: 1 tab(s) orally 2 times a day  ferrous sulfate 325 mg (65 mg elemental iron) oral tablet: 1 tab(s) orally once a day  MiraLax oral powder for reconstitution: 17 milligram(s) orally once a day  calcium carbonate 500 mg (200 mg elemental calcium) oral tablet, chewable: 1 tab(s) orally once a day  amLODIPine 5 mg oral tablet: 1 tab(s) orally once a day  benztropine 0.5 mg oral tablet: 1 tab(s) orally once a day (at bedtime)  RisperDAL: 50 milligram(s) intramuscular every other week  aspirin 81 mg oral tablet: 1 tab(s) orally once a day  Flomax 0.4 mg oral capsule: 1 cap(s) orally once a day  Vitamin D3 1000 intl units oral tablet: 1 tab(s) orally once a day    PAST MEDICAL HISTORY  Multiple myeloma  Mental disorder bipolar on lithium  Bone cancer  Scoliosis  Hypercholesteremia  Hypertension  Hypothyroid  Anemia from chemo, on PO chemo agent    PAST SURGICAL HISTORY no significant past surgical history    FAMILY HISTORY:    SOCIAL HISTORY:  Denies smoking/alcohol/drugs    Vital Signs Last 24 Hrs  T(C): 37.1 (11 Dec 2019 21:41), Max: 37.1 (11 Dec 2019 21:41)  T(F): 98.7 (11 Dec 2019 21:41), Max: 98.7 (11 Dec 2019 21:41)  HR: 78 (12 Dec 2019 01:00) (75 - 85)  BP: 106/61 (12 Dec 2019 01:00) (100/66 - 106/61)  BP(mean): --  RR: 20 (12 Dec 2019 01:00) (20 - 20)  SpO2: 99% (12 Dec 2019 01:00) (97% - 99%)      PHYSICAL EXAM:  Constitutional: Restless and very talkative    HEENT: Atraumatic, EOMI, neck is supple, no JVD   CNS: Alert and awake, conversing fluently, Motor intact and no focal deficits   Respiratory: CTA b/l no wheezing  Cardiovascular: S1S2 RRR, No murmur/rubs  Gastrointestinal: Soft with mild generalized tenderness, + BS   Extremities: No edema, warm and good pulses of LE   Psychiatric: + loud & slightly agitated when during questioning  Skin: No skin rash/ulcers visualized to face, hands or feet.    Intake and output:     LABS:                        9.7    5.15  )-----------( 229      ( 11 Dec 2019 21:56 )             30.7     12-11    136  |  109<H>  |  22.0<H>  ----------------------------<  82  3.7   |  17.0<L>  |  0.84    Ca    7.7<L>      11 Dec 2019 22:55  Mg     1.8     12-11    TPro  7.8  /  Alb  2.4<L>  /  TBili  <0.2<L>  /  DBili  x   /  AST  14  /  ALT  7   /  AlkPhos  87  12-11    CARDIAC MARKERS ( 11 Dec 2019 22:55 )  x     / 0.23 ng/mL / x     / x     / x        PT: 14.4 sec;   INR: 1.24 ratio    PTT:24.0 sec    INTERPRETATION OF TELEMETRY: Reviewed by me.   ECG: NSR @ 80bpm, new Q's in V1-V3, flat T's V4-V6, I, aVL    RADIOLOGY & ADDITIONAL STUDIES:   ECHO FINDINGS: Date:  Summary:   1. Normal biventricular systolic function. Left ventricular ejection   fraction, by visual estimation, is 55 to 60%.   2. There is mild concentric left ventricular hypertrophy.   3. Spectral Doppler shows impaired relaxation pattern of left   ventricular myocardial filling (Grade I diastolic dysfunction).   4. Mild to moderate degenerative aortic stenosis with moderate aortic insufficiency.   5. No pericardial effusion.   6. ** Compared to TTE dated 1/23/18, no significant changes.    [id number] Camelia Dang DO, Electronically signed on 1/18/2019 [place] [facility]                                                        Office: [address] [address]                                                       Telephone: [phone]. Fax:[phone]                                                              CARDIOLOGY CONSULTATION NOTE                                                                            History obtained by: Patient and medical record     obtained: No    Chief complaint:  stomach pain     HPI: Patient is a 75yo F BIBA from [facility] after c/o ongoing "diarrhea for months." Per aide, still at the bedside, patient usually experiences some diarrhea after chemo but this time she still has loose BMs.  In addition, as per EMS documents "patient with abdominal pan and diarrhea with episode of hypotension in the field."  As I'm speaking to Mrs. Andre, she denies abdominal pain, fever, chills, bloody stools, nausea and vomiting, CP or diaphoresis.  Asking for food and she is normotensive by newest VS.  Cardiology Cx called by [facility] after +Trop of 0.23.  Patient "did not see Cardiology for a long time."          REVIEW OF SYMPTOMS:   General: + agitation  Cardiovascular:  See HPI. No chest pain, No dyspnea, No syncope, No palpitations, No dizziness, No Orthopnea, No Paroxsymal nocturnal dyspnea;    Respiratory: No Dyspnea, No cough,     Genitourinary: No dysuria, no hematuria;   Gastrointestinal: + hunger, No nausea, no vomiting. + diarrhea. + abdominal pain. No dark color stool, no melena;   Neurological: No headache, no dizziness, no slurred speech;    Psychiatric: + anxiety.  ALL OTHER REVIEW OF SYSTEMS ARE NEGATIVE.    ALLERGIES:   ixazomib (Unknown)  NSAIDs (Unknown)  penicillins (Unknown)  sulfa drugs (Unknown)    CURRENT MEDICATIONS:  heparin  Infusion.  heparin  Injectable    HOME MEDICATIONS:  guaiFENesin 600 mg oral tablet, extended release: 1 tab(s) orally every 12 hours  tiotropium 18 mcg inhalation capsule: 1 cap(s) inhaled once a day  ipratropium-albuterol 0.5 mg-2.5 mg/3 mLinhalation solution: 3 milliliter(s) inhaled every 6 hours  furosemide 40 mg oral tablet: 1 tab(s) orally once a day  levothyroxine 75 mcg (0.075 mg) oral tablet: 1 tab(s) orally once a day  folic acid 1 mg oral tablet: 1 tab(s) orally once a day  lithium 300 mg oral capsule: 1 cap(s) orally once a day  Ativan 0.5 mg oral tablet: 1 tab(s) orally 2 times a day  ferrous sulfate 325 mg (65 mg elemental iron) oral tablet: 1 tab(s) orally once a day  MiraLax oral powder for reconstitution: 17 milligram(s) orally once a day  calcium carbonate 500 mg (200 mg elemental calcium) oral tablet, chewable: 1 tab(s) orally once a day  amLODIPine 5 mg oral tablet: 1 tab(s) orally once a day  benztropine 0.5 mg oral tablet: 1 tab(s) orally once a day (at bedtime)  RisperDAL: 50 milligram(s) intramuscular every other week  aspirin 81 mg oral tablet: 1 tab(s) orally once a day  Flomax 0.4 mg oral capsule: 1 cap(s) orally once a day  Vitamin D3 1000 intl units oral tablet: 1 tab(s) orally once a day    PAST MEDICAL HISTORY  Multiple myeloma  Mental disorder bipolar on lithium  Bone cancer  Scoliosis  Hypercholesteremia  Hypertension  Hypothyroid  Anemia from chemo, on PO chemo agent    PAST SURGICAL HISTORY no significant past surgical history    FAMILY HISTORY:    SOCIAL HISTORY:  Denies smoking/alcohol/drugs    Vital Signs Last 24 Hrs  T(C): 37.1 (11 Dec 2019 21:41), Max: 37.1 (11 Dec 2019 21:41)  T(F): 98.7 (11 Dec 2019 21:41), Max: 98.7 (11 Dec 2019 21:41)  HR: 78 (12 Dec 2019 01:00) (75 - 85)  BP: 106/61 (12 Dec 2019 01:00) (100/66 - 106/61)  BP(mean): --  RR: 20 (12 Dec 2019 01:00) (20 - 20)  SpO2: 99% (12 Dec 2019 01:00) (97% - 99%)      PHYSICAL EXAM:  Constitutional: Restless and very talkative    HEENT: Atraumatic, EOMI, neck is supple, no JVD   CNS: Alert and awake, conversing fluently, Motor intact and no focal deficits   Respiratory: CTA b/l no wheezing  Cardiovascular: S1S2 RRR, No murmur/rubs  Gastrointestinal: Soft with mild generalized tenderness, + BS   Extremities: No edema, warm and good pulses of LE   Psychiatric: + loud & slightly agitated when during questioning  Skin: No skin rash/ulcers visualized to face, hands or feet.    Intake and output:     LABS:                        9.7    5.15  )-----------( 229      ( 11 Dec 2019 21:56 )             30.7         136  |  109<H>  |  22.0<H>  ----------------------------<  82  3.7   |  17.0<L>  |  0.84    Ca    7.7<L>      11 Dec 2019 22:55  Mg     1.8         TPro  7.8  /  Alb  2.4<L>  /  TBili  <0.2<L>  /  DBili  x   /  AST  14  /  ALT  7   /  AlkPhos  87      CARDIAC MARKERS ( 11 Dec 2019 22:55 )  x     / 0.23 ng/mL / x     / x     / x        PT: 14.4 sec;   INR: 1.24 ratio    PTT:24.0 sec    INTERPRETATION OF TELEMETRY: Reviewed by me.   ECG: NSR @ 80bpm, new Q's in V1-V3, flat T's V4-V6, I, aVL    RADIOLOGY & ADDITIONAL STUDIES:   ECHO FINDINGS: Date:  Summary:   1. Normal biventricular systolic function. Left ventricular ejection   fraction, by visual estimation, is 55 to 60%.   2. There is mild concentric left ventricular hypertrophy.   3. Spectral Doppler shows impaired relaxation pattern of left   ventricular myocardial filling (Grade I diastolic dysfunction).   4. Mild to moderate degenerative aortic stenosis with moderate aortic insufficiency.   5. No pericardial effusion.   6. ** Compared to TTE dated 18, no significant changes.    [id number] Camelia Dagn DO, Electronically signed on 2019           Echo 2019    EXAM:  ECHO TRANSTHORACIC COMP W DOPP      PROCEDURE DATE:  Dec 12 2019   .      INTERPRETATION:  REPORT:    TRANSTHORACIC ECHOCARDIOGRAM REPORT         Patient Name:   KRYSTA ANDRE Patient Location: [facility] Rec #:  [id number]          Accession #:      [id number]  Account #:      [id number]          Height:           57.9 in 147.0 cm  YOB: 1945           Weight:           132.3 lb 60.00 kg  Patient Age:    74 years            BSA:              1.53 m²  Patient Gender: F                   BP:               110/58 mmHg       Date of Exam:        2019 7:52:39 AM  Sonographer:         Rosa Rock  Referring Physician: Jorge Mazariegos    Procedure:     2D Echo/Doppler/Color Doppler Complete.  Indications:   Abnormal electrocardiogram [ECG] [EKG] - R94.31  Diagnosis:     Abnormal electrocardiogram [ECG] [EKG] - R94.31  Study Details: Technically adequate study.         2D AND M-MODE MEASUREMENTS (normal ranges within parentheses):  Left                 Normal   Aorta/Left            Normal  Ventricle:                    Atrium:  IVSd (2D):    1.11  (0.7-1.1) Aortic Root  2.67 cm (2.4-3.7)                 cm             (2D):  LVPWd (2D):   1.07  (0.7-1.1) Left Atrium  3.70 cm (1.9-4.0)                 cm             (2D):  LVIDd (2D):   4.86  (3.4-5.7) LA Volume     50.7                 cm             Index         ml/m²  LVIDs (2D):   3.50            Right Ventricle:                 cm             TAPSE:           1.88 cm  LV FS (2D):   28.0   (>25%)                  %  Relative Wall 0.44   (<0.42)  Thickness    LV SYSTOLIC FUNCTION BY 2D PLANIMETRY (MOD):  EF-A4C View: 49.4 % EF-A2C View: 53.0 % EF-Biplane: 50 %    LV DIASTOLIC FUNCTION:  MV Peak E: 1.09 m/s E/e' Ratio: 12.10  MV Peak A: 0.99 m/s  E/A Ratio: 1.10    SPECTRAL DOPPLER ANALYSIS (where applicable):  Aortic Valve: AoV Max Lewis: 2.50 m/s AoV Peak P.0 mmHg AoV Mean P.0 mmHg    LVOT Vmax: 1.52 m/s LVOT VTI: 0.256 m LVOT Diameter: 1.85 cm    AoV Area, Vmax: 1.63 cm² AoV Area, VTI: 1.38 cm² AoV Area, Vmn: 1.46 cm²  Ao VTI: 0.498  Aortic Insufficiency:  AI Half-time: 297 msec    Tricuspid Valve and PA/RV Systolic Pressure: TR Max Velocity: 2.66 m/s RA   Pressure: 8 mmHg RVSP/PASP: 36.3 mmHg       PHYSICIAN INTERPRETATION:  Left Ventricle: The left ventricular internal cavity size is normal. Left   ventricular wall thickness is normal.  Global LV systolic function was mildly decreased. Left ventricular   ejection fraction, by visual estimation, is 45 to 50%. Spectral Doppler   shows pseudonormal pattern of left ventricular myocardial filling (Grade   II diastolic dysfunction). Elevated mean left atrial pressure. Segmental   wall motion abnormalities in RCA territory.       LV Wall Scoring:  The basal and mid inferolateral wall, basal inferoseptal segment, and   basal  inferior segment are hypokinetic.    Right Ventricle: Normal right ventricular size and function. The right   ventricular size is normal. RV systolic function is normal.  Left Atrium: Severely enlarged left atrium.  Right Atrium: Moderately enlarged right atrium.  Pericardium: There is no evidence of pericardial effusion. There is a   significant pericardial fat pad present.  Mitral Valve: Mild thickening of the anterior and posterior mitral valve   leaflets. There is mild mitral annular calcification. Moderate mitral   valve regurgitation is seen.  Tricuspid Valve: The tricuspid valve is normal in structure. Mild   tricuspid regurgitation is visualized. Estimated pulmonary artery   systolic pressure is 36.3 mmHg assuming a right atrial pressure of 8   mmHg, which is consistent with borderline pulmonary hypertension.  Aortic Valve: The aortic valve is trileaflet. Mild to moderate aortic   stenosis is present. Moderate aortic valve regurgitation is seen.  Pulmonic Valve: The pulmonic valve is normal. Mild pulmonic valve   regurgitation.  Aorta: The aortic root and ascending aorta are structurally normal, with   no evidence of dilitation.  Pulmonary Artery: The main pulmonary artery is normal in size.  Venous: A systolic blunting flow pattern is recorded from the right upper   pulmonary vein. The inferior vena cava was normal sized, with respiratory   size variation less than 50%.       Summary:   1. Technically adequate study.   2. Severely enlarged left atrium.   3. Segmental wall motion abnormalities in RCA territory.   4. Left ventricular ejection fraction, by visual estimation, is 45-50%.   Grade II diastolic dysfunction.   5. Elevated mean left atrial pressure.   6. Moderately enlarged right atrium.   7. Normal right ventricular size and function.   8. Moderate-severe mitral valve regurgitation. Tethering and restricted   posterior leaflet.   9. Mild to moderate aortic valve stenosis. Moderate aortic regurgitation.  10. There is no evidence of pericardial effusion.    MD Andie, RPVI Electronically signed on 2019 at 4:27:06   PM              *** Final ***                  MCKINLEY AVALOS M.D., ATTENDING CARDIOLOGY  This document has been electronically signed. Dec 12 2019  7:52AM

## 2019-12-12 NOTE — CONSULT NOTE ADULT - ATTENDING COMMENTS
Given patient's known multiple myeloma history, the non-solid appearance of the pelvic mass, and her metastatic lytic spinal lesions, it is highly unlikely this is an operable process. Would recommend percutaneous biopsy to assess pathology of the mass and treat accordingly.

## 2019-12-12 NOTE — CONSULT NOTE ADULT - ASSESSMENT
Imp:  75 yo female with known refractory Multiple Myeloma, not currently on tx-refused recommended tx, admitted with ongoing abd pain and diarrhea-found to have large pelvic mass.  Also c/o chest pain, has EKG changes and abnormal enzymes, being assessed by Cardiology  Report of lytic lesions on CT's may be secondary to Myeloma, as may pelvic mass, or may be unrelated malignancy.  Rec:  bx of mass for further recommendations  Anemia-check iron studies, likely multifactorial, chronic disease, advanced Myeloma.  Will follow.   Any [phone]

## 2019-12-12 NOTE — CONSULT NOTE ADULT - PROBLEM SELECTOR RECOMMENDATION 9
Patient with new ECG changes and increased trops r/o NSTEMI  However, it could be another clinical entity on the differential such as: PE vs. stress cardiomyopathy vs. myocarditis  Admit to Tele, CBC, BMP, TFTs, trend trops, FLP and A1C in AM  Start Heparin gtt, PTT 60-80 sec, ASA, Statin, monitor daily CBC  Awaiting CT Angio/Abdomen to r/o PE vs GI etiology of pain  Will schedule for TTE to r/o Takotsubo since patient with psychiatric/neurologic disorders may be predisposed to develop stress cardiomyopathy, based on current literature. Patient with new ECG changes and increased trops r/o NSTEMI  However, it could be another clinical entity on the differential such as: PE vs. stress cardiomyopathy vs. myocarditis  Admit to Tele, CBC, BMP, TFTs, trend trops, FLP and A1C in AM  Start Heparin gtt, PTT 60-80 sec, ASA, Statin, monitor daily CBC  Awaiting CT Angio/Abdomen to r/o PE vs GI etiology of pain  Will schedule for Cath to day and plan TTE to r/o Takotsubo post Cath, since patient with psychiatric/neurologic disorders may be predisposed to develop stress cardiomyopathy, based on current literature.

## 2019-12-12 NOTE — CONSULT NOTE ADULT - SUBJECTIVE AND OBJECTIVE BOX
HPI: Patient is a 74y Female seen on consultation for the evaluation and management of Multiple Myeloma. Other med problems include CHF, HTN, Bipolar disorder Pt is under care of Dr. Castillo from [facility] for MM, known refractory to all treatments.  Last seen 12/10/19; IgG 4877, TP 9.1, alb 3, lambda light chains 1742.  Recommendations are fro new drug Selinexor with Decadron, for refractory MM.  Pt refuses to take it as it causes Diarrhea, and has not been receiving tx.  Sent to ER by [facility]  for c/o ongoing diarrhea and abdominal pain.  Has had diarrhea reportedly for 3 months.  Also c/o chest pain.  Has EKG changes and elevated enzymes; seen by Cardiology with ongoing eval, possible cath.  Also found to have large infiltrating pelvic mass encasing rectum, uterus, bladder and iliac vessels-apparently new findings.  Also has disseminated lytic lesions, possibly secondary to MM.  Hx per pt is unreliable-currently c/o abd pain and diarrea and SOB.  Denies fevers or chills, denies bleeding.         PAST MEDICAL & SURGICAL HISTORY:  Congestive heart failure (CHF)  Multiple myeloma  Mental disorder  Bone cancer  Scoliosis  Hypercholesteremia  Hypertension  Hypothyroid  No significant past surgical history      REVIEW OF SYSTEMS is unrelieable      General:refuses to eat	    Skin/Breast:no rash  	    Respiratory and Thorax:c/o dyspnea and chest pain  	  Cardiovascular:	chest pain    Gastrointestinal:	abdominal cramping and chronic diarrhea    Genitourinary:	denies dysuria or hematuria            Psychiatric:	Bipolar disorder            MEDICATIONS  (STANDING):  aspirin  chewable 81 milliGRAM(s) Oral daily  benztropine 0.5 milliGRAM(s) Oral at bedtime  carvedilol 3.125 milliGRAM(s) Oral every 12 hours  cefTRIAXone   IVPB      dexAMETHasone     Tablet 20 milliGRAM(s) Oral daily  ferrous    sulfate 325 milliGRAM(s) Oral daily  folic acid 1 milliGRAM(s) Oral daily  furosemide   Injectable 40 milliGRAM(s) IV Push daily  levothyroxine 75 MICROGram(s) Oral daily  lithium citrate Solution 300 milliGRAM(s) Oral daily  LORazepam     Tablet 0.5 milliGRAM(s) Oral two times a day  ofloxacin 0.3% Solution 1 Drop(s) Both EYES four times a day  pantoprazole  Injectable 40 milliGRAM(s) IV Push two times a day  valACYclovir 500 milliGRAM(s) Oral two times a day  vancomycin    Solution 125 milliGRAM(s) Oral every 6 hours    MEDICATIONS  (PRN):  aluminum hydroxide/magnesium hydroxide/simethicone Suspension 30 milliLiter(s) Oral every 4 hours PRN Dyspepsia  diphenhydrAMINE   Injectable 50 milliGRAM(s) IntraMuscular every 4 hours PRN Rash and/or Itching  loperamide 2 milliGRAM(s) Oral two times a day PRN watery diarrhea      Allergies    ixazomib (Unknown)  NSAIDs (Unknown)  penicillins (Unknown)  sulfa drugs (Unknown)    Intolerances        SOCIAL HISTORY:    Smoking Status:denies  Alcohol:denies  Marital Status:resides at [facility]      	                  Vital Signs Last 24 Hrs  T(C): 36.7 (12 Dec 2019 16:11), Max: 37.1 (11 Dec 2019 21:41)  T(F): 98 (12 Dec 2019 16:11), Max: 98.7 (11 Dec 2019 21:41)  HR: 82 (12 Dec 2019 16:11) (75 - 86)  BP: 108/69 (12 Dec 2019 16:11) (105/75 - 115/60)  BP(mean): --  RR: 19 (12 Dec 2019 16:11) (19 - 20)  SpO2: 93% (12 Dec 2019 16:11) (93% - 100%)    PHYSICAL EXAM:      Constitutional:Awake, alert, comfortable    Eyes:anicteric    ENMT:dry mm's    Neck:no adenopathy      Respiratory:clear    Cardiovascular:RRR norm S1S2    Gastrointestinal:soft, nontender pos BS      Extremities:bilateral LE edema            LABS:                        9.4    5.51  )-----------( 200      ( 12 Dec 2019 11:55 )             29.8         136  |  109<H>  |  22.0<H>  ----------------------------<  82  3.7   |  17.0<L>  |  0.84    Ca    7.7<L>      11 Dec 2019 22:55  Mg     1.8         TPro  7.8  /  Alb  2.4<L>  /  TBili  <0.2<L>  /  DBili  x   /  AST  14  /  ALT  7   /  AlkPhos  87      PT/INR - ( 12 Dec 2019 09:29 )   PT: 15.3 sec;   INR: 1.32 ratio         PTT - ( 12 Dec 2019 09:29 )  PTT:77.2 sec  Urinalysis Basic - ( 12 Dec 2019 04:52 )    Color: Yellow / Appearance: Slightly Turbid / S.020 / pH: x  Gluc: x / Ketone: Negative  / Bili: Negative / Urobili: Negative   Blood: x / Protein: 100 / Nitrite: Positive   Leuk Esterase: Small / RBC: 11-25 /HPF / WBC 26-50   Sq Epi: x / Non Sq Epi: Moderate / Bacteria: Moderate        RADIOLOGY & ADDITIONAL STUDIES:

## 2019-12-12 NOTE — H&P ADULT - HISTORY OF PRESENT ILLNESS
77 y/o female with h/o multiple myeloma, CHF, HTN, Bipolar disorder, was referred from [facility] for abdominal pain and diarrhea for >1 month. Hypotension. no fever. CTA chest abdomen and pelvis showed numerous lytic lesions and a large infiltrating pelvic mass encasing rectum, uterus, bladder and iliac vessels. Labs sgowed Hgb: 9.7 (was 10.7 on 11/4/19). Trop: 0.23-0.26-0.26. no chest pain, SOB or Palpitations. no orthopnea. o/e: abdominal distension with epigastric/RUQ tenderness 77 y/o female with h/o multiple myeloma, CHF, HTN, Bipolar disorder, was referred from [facility] for abdominal pain and diarrhea for >1 month. Hypotension. no fever. CTA chest abdomen and pelvis showed numerous lytic lesions and a large infiltrating pelvic mass encasing rectum, uterus, bladder and iliac vessels. Labs sgowed Hgb: 9.7 (was 10.7 on 11/4/19). Trop: 0.23-0.26-0.26. no chest pain, SOB or Palpitations. no orthopnea. o/e: abdominal distension with epigastric/RUQ tenderness and multiple enlarged firm fixed left supraclavicular lymph nodes

## 2019-12-12 NOTE — CONSULT NOTE ADULT - ASSESSMENT
77 y/o female with h/o multiple myeloma, CHF, HTN, Bipolar disorder, was referred from [facility] for chest pain and abdominal pain with diarrhea for 3+months. CTA chest abdomen and pelvis showed numerous lytic lesions and a large infiltrating pelvic mass encasing rectum, uterus, bladder and iliac vessels.  - Attempted to call sister, Kim Chambers ([phone]) - No answer  - Patient wants all interventions  - [facility] per cards  - f/u Onc consult      NOTE PENDING DISCUSSION WITH Dr. Lara 75 y/o female with h/o multiple myeloma, CHF, HTN, Bipolar disorder, was referred from [facility] for chest pain and abdominal pain with diarrhea for 3+months. CTA chest abdomen and pelvis showed numerous lytic lesions and a large infiltrating pelvic mass encasing rectum, uterus, bladder and iliac vessels. Spoke to Dr. Land who states that patient has been on chemotherapy for many years. Has recently switched her over to PO chemo, which she has not started yet. She has been off treatment for approx 2 weeks. Dr. Land was not aware of the mass at her last visit.  - Attempted to call sister, Kim Chambers ([phone]) - No answer  - Patient wants all interventions  - [facility] per cards  - Recommend gyn onc to r/o gynecologic origin        NOTE PENDING DISCUSSION WITH Dr. Lara 75 y/o female with h/o multiple myeloma, CHF, HTN, Bipolar disorder, was referred from [facility] for chest pain and abdominal pain with diarrhea for 3+months. CTA chest abdomen and pelvis showed numerous lytic lesions and a large infiltrating pelvic mass encasing rectum, uterus, bladder and iliac vessels. Spoke to Dr. Land who states that patient has been on chemotherapy for many years. Has recently switched her over to PO chemo, which she has not started yet. She has been off treatment for approx 2 weeks. Dr. Land was not aware of the mass at her last visit.  - Attempted to call sister, Kim Chambers ([phone]) - No answer  - Patient wants all interventions, however, unsure if pt has capacity  - Palliative for goals of care  - [facility] per cards  - Recommend gyn onc to r/o gynecologic origin        NOTE PENDING DISCUSSION WITH Dr. Lara 75 y/o female with h/o multiple myeloma, CHF, HTN, Bipolar disorder, was referred from [facility] for chest pain and abdominal pain with diarrhea for 3+months. CTA chest abdomen and pelvis showed numerous lytic lesions and a large infiltrating pelvic mass encasing rectum, uterus, bladder and iliac vessels. Spoke to Dr. Land who states that patient has been on chemotherapy for many years. Has recently switched her over to PO chemo, which she has not started yet. She has been off treatment for approx 2 weeks. Dr. Land was not aware of the mass at her last visit.  - C.diff positive, recommend PO vanc  - Attempted to call sister, Kim Chambers ([phone]) - No answer  - Patient wants all interventions, however, unsure if pt has capacity  - Palliative for goals of care  - [facility] per cards  - Recommend gyn onc to r/o gynecologic origin        NOTE PENDING DISCUSSION WITH Dr. Lara 77 y/o female with h/o multiple myeloma, CHF, HTN, Bipolar disorder, was referred from [facility] for chest pain and abdominal pain with diarrhea for 3+months. CTA chest abdomen and pelvis showed numerous lytic lesions and a large infiltrating pelvic mass encasing rectum, uterus, bladder and iliac vessels. Spoke to Dr. Land who states that patient has been on chemotherapy for many years. Has recently switched her over to PO chemo, which she has not started yet. She has been off treatment for approx 2 weeks. Dr. Land was not aware of the mass at her last visit.  - C.diff positive, recommend PO vanc  - Attempted to call sister, Kim Chambers ([phone]) - No answer  - Patient wants all interventions, however, unsure if pt has capacity  - Palliative for goals of care  - [facility] per cards  - Recommend GI for c-scope  - Recommend IR for biopsy of mass  - Surgical oncology will continue to follow pt

## 2019-12-12 NOTE — H&P ADULT - NSICDXPASTMEDICALHX_GEN_ALL_CORE_FT
PAST MEDICAL HISTORY:  Bone cancer     Congestive heart failure (CHF)     Hypercholesteremia     Hypertension     Hypothyroid     Mental disorder     Multiple myeloma     Scoliosis

## 2019-12-12 NOTE — ED ADULT NURSE REASSESSMENT NOTE - NS ED NURSE REASSESS COMMENT FT1
Pt back safely from ED CT scan. To start heparin drip as per MD orders. Liset 1:1 nursing aide at bedside. Call bell within reach.

## 2019-12-12 NOTE — PROGRESS NOTE ADULT - SUBJECTIVE AND OBJECTIVE BOX
Spoke to hospitalist attending and surgical oncology resident as well as reviewed imaging. Would recommend sorting through patient's cardiac issues first as she is currently being treated for NSTEMI and cardiology would like to take patient for cardiac catheterization) as well as getting a psychiatry consult. Once these things have been sorted out we can revisit the potential for biopsy of the infiltrative soft tissue mass.

## 2019-12-12 NOTE — H&P ADULT - ASSESSMENT
75 y/o female with NSTEMI, Pelvic mass, diarrhea, gastritis. conjunctivitis, UTI, CHF, HTN, Bipolar disorder, Multiple myeloma

## 2019-12-12 NOTE — H&P ADULT - PROBLEM SELECTOR PLAN 7
Patient was hypotensive in the ER. chronic diarrhea. continue Amlodipine 5 mg po daily. Hold if SBP<130

## 2019-12-12 NOTE — H&P ADULT - PROBLEM SELECTOR PLAN 4
probably 2nd to large dose of Dexamethasone. Pantoprazole 40 mg iv BID. Mylanta po every 4 hrs prn for dyspepsia

## 2019-12-12 NOTE — H&P ADULT - PROBLEM SELECTOR PLAN 9
patient is quite and answers questions clearly but talking about [place]. Risperidone 50 mg IM every 2 weeks. Benztropine 0.5 mg po QHS. Lithium 300 mg po daily patient is quite and answers questions clearly but talking about [place]. Risperidone 50 mg IM every 2 weeks. Benztropine 0.5 mg po QHS. Lithium 300 mg po daily. Psychiatry consult was called to determine patient's capacity to give consent for cardiac cath/ surgery/invasive procedures

## 2019-12-12 NOTE — CONSULT NOTE ADULT - SUBJECTIVE AND OBJECTIVE BOX
Patient is a 74y old  Female who presents with a chief complaint of abdominal pain. NSTEMI (12 Dec 2019 14:06)      HPI:  75 y/o female with h/o multiple myeloma, CHF, HTN, Bipolar disorder, was referred from [facility] for abdominal pain and diarrhea for >1 month. Hypotension. no fever. CTA chest abdomen and pelvis showed numerous lytic lesions involving ribs. vertebrae and scapula and a large infiltrating pelvic mass encasing rectum, uterus, bladder and iliac vessels. Labs showed Hgb: 9.7 (was 10.7 on 11/4/19). Trop: 0.23-0.26-0.26. Denies SOB or Palpitations. no orthopnea. o/e: abdominal distension with epigastric/RUQ tenderness and multiple enlarged firm fixed left supraclavicular lymph nodes (12 Dec 2019 10:03).    She has a hx of multiple myelo,a on chemo for many years by Dr Land who was about to change her to oral meds. The patient is a poor historian and unreliable. She does c/o intermittant diarrhea for several years and present a this time. Stool for C diff is positive and she has been placed on vanco 125 PO QID. She also c/o vague diffuse abdominal pain but no nausea or vomiting. It is unclear if she has had chest pain but her trop are levated with abnormal EKG. Patient seen by surgery who recommended colonoscopy althoug the lesion in the pelvis is nto felt to be from the colon according to the report and colonoscopy is contrindicated in active C diff infection. No rectal bleeding or melena.      REVIEW OF SYSTEMS:    CONSTITUTIONAL: No fever, weight loss, or fatigue  EYES: No eye pain, visual disturbances, or discharge  ENMT:  No difficulty hearing, tinnitus, vertigo; No sinus or throat pain  NECK: No pain or stiffness  RESPIRATORY: No cough, wheezing, chills or hemoptysis; No shortness of breath  CARDIOVASCULAR: No chest pain, palpitations, dizziness, or leg swelling  GASTROINTESTINAL:as above  NEUROLOGICAL: No headaches, memory loss, loss of strength, numbness, or tremors  SKIN: No itching, burning, rashes, or lesions   LYMPH NODES: No enlarged glands  MUSCULOSKELETAL: No joint pain or swelling; No muscle, back, or extremity pain  PSYCHIATRIC: No depression, anxiety, mood swings, or difficulty sleeping  HEME/LYMPH: No easy bruising, or bleeding gums  ALLERY AND IMMUNOLOGIC: No hives or eczema      PAST MEDICAL & SURGICAL HISTORY:  Congestive heart failure (CHF)  Multiple myeloma  Mental disorder  Bone cancer  Scoliosis  Hypercholesteremia  Hypertension  Hypothyroid  No significant past surgical history      FAMILY HISTORY:      SOCIAL HISTORY:  Smoking Status: [ ] Current, [ ] Former, [ ] Never  Pack Years:  Alcohol Use:    Home Medications:  amLODIPine 5 mg oral tablet: 1 tab(s) orally once a day (12 Dec 2019 10:55)  aspirin 81 mg oral tablet: 1 tab(s) orally once a day (12 Dec 2019 10:55)  Ativan 0.5 mg oral tablet: 1 tab(s) orally 2 times a day (12 Dec 2019 10:55)  benztropine 0.5 mg oral tablet: 1 tab(s) orally once a day (at bedtime) (12 Dec 2019 10:55)  calcium carbonate 500 mg (200 mg elemental calcium) oral tablet, chewable: 1 tab(s) orally once a day (12 Dec 2019 10:55)  dexamethasone 4 mg oral tablet: 5 tab(s) orally once a day (12 Dec 2019 10:55)  ferrous sulfate 325 mg (65 mg elemental iron) oral tablet: 1 tab(s) orally once a day (12 Dec 2019 10:55)  folic acid 1 mg oral tablet: 1 tab(s) orally once a day (12 Dec 2019 10:55)  furosemide 40 mg oral tablet: 1 tab(s) orally once a day (12 Dec 2019 10:55)  levothyroxine 75 mcg (0.075 mg) oral tablet: 1 tab(s) orally once a day (12 Dec 2019 10:55)  levothyroxine 75 mcg (0.075 mg) oral tablet: 1 tab(s) orally once a day (12 Dec 2019 10:55)  lithium 300 mg oral capsule: 1 cap(s) orally once a day (12 Dec 2019 10:55)  lithium 300 mg/5 mL (8 mEq/5 mL) oral syrup: 5 milliliter(s) orally 3 times a day (12 Dec 2019 10:55)  loperamide 2 mg oral tablet: 1 tab(s) orally 2 times a day, As Needed (12 Dec 2019 10:55)  risperiDONE 50 mg/2 weeks intramuscular injection, extended release: 50 milligram(s) intramuscular every other week (12 Dec 2019 10:55)  selinexor 20 mg oral tablet: 4 tab(s) orally once a day (12 Dec 2019 10:55)  valACYclovir 500 mg oral tablet: 1 tab(s) orally 2 times a day (12 Dec 2019 10:55)  Vitamin D3 1000 intl units oral tablet: 1 tab(s) orally once a day (12 Dec 2019 10:55)      MEDICATIONS:  MEDICATIONS  (STANDING):  amLODIPine   Tablet 5 milliGRAM(s) Oral daily  aspirin  chewable 81 milliGRAM(s) Oral daily  benztropine 0.5 milliGRAM(s) Oral at bedtime  cefTRIAXone   IVPB      dexAMETHasone     Tablet 20 milliGRAM(s) Oral daily  ferrous    sulfate 325 milliGRAM(s) Oral daily  folic acid 1 milliGRAM(s) Oral daily  furosemide    Tablet 40 milliGRAM(s) Oral daily  heparin  Infusion.  Unit(s)/Hr (7.5 mL/Hr) IV Continuous <Continuous>  levothyroxine 75 MICROGram(s) Oral daily  lithium citrate Solution 300 milliGRAM(s) Oral daily  LORazepam     Tablet 0.5 milliGRAM(s) Oral two times a day  ofloxacin 0.3% Solution 1 Drop(s) Both EYES four times a day  pantoprazole  Injectable 40 milliGRAM(s) IV Push two times a day  valACYclovir 500 milliGRAM(s) Oral two times a day  vancomycin    Solution 125 milliGRAM(s) Oral every 6 hours    MEDICATIONS  (PRN):  aluminum hydroxide/magnesium hydroxide/simethicone Suspension 30 milliLiter(s) Oral every 4 hours PRN Dyspepsia  diphenhydrAMINE   Injectable 50 milliGRAM(s) IntraMuscular every 4 hours PRN Rash and/or Itching  heparin  Injectable 3800 Unit(s) IV Push every 6 hours PRN For aPTT less than 40  loperamide 2 milliGRAM(s) Oral two times a day PRN watery diarrhea      Allergies    ixazomib (Unknown)  NSAIDs (Unknown)  penicillins (Unknown)  sulfa drugs (Unknown)    Intolerances        Vital Signs Last 24 Hrs  T(C): 36.9 (12 Dec 2019 06:30), Max: 37.1 (11 Dec 2019 21:41)  T(F): 98.5 (12 Dec 2019 06:30), Max: 98.7 (11 Dec 2019 21:41)  HR: 86 (12 Dec 2019 07:14) (75 - 86)  BP: 115/60 (12 Dec 2019 07:14) (100/66 - 115/60)  BP(mean): --  RR: 20 (12 Dec 2019 07:14) (20 - 20)  SpO2: 100% (12 Dec 2019 07:14) (97% - 100%)        PHYSICAL EXAM:    General: Well developed; well nourished; in no acute distress  HEENT: MMM, conjunctiva and sclera clear  Lungs: Clear  Heart: Rhythm Regular, No Murmurs  Gastrointestinal: Soft, protuberant with diffuse mild tenderness without guarding or rebound. Normal bowel sounds;  No Organomegaly & No Masses  Extremities: Normal range of motion, No clubbing, cyanosis or edema  Neurological: Alert and oriented x3, Non-focal  Skin: Warm and dry. No obvious rash  Rectal: unable to perform as patient in hallway in ER.      LABS:                        9.4    5.51  )-----------( 200      ( 12 Dec 2019 11:55 )             29.8     12-11    136  |  109<H>  |  22.0<H>  ----------------------------<  82  3.7   |  17.0<L>  |  0.84    Ca    7.7<L>      11 Dec 2019 22:55  Mg     1.8     12-11    TPro  7.8  /  Alb  2.4<L>  /  TBili  <0.2<L>  /  DBili  x   /  AST  14  /  ALT  7   /  AlkPhos  87  12-11          RADIOLOGY & ADDITIONAL STUDIES:     < from: CT Abdomen and Pelvis w/ IV Cont (12.12.19 @ 02:25) >   EXAM:  CT ABDOMEN AND PELVIS IC                         EXAM:  CT ANGIO CHEST (W)AW IC                          PROCEDURE DATE:  12/12/2019          INTERPRETATION:  PROCEDURE INFORMATION:   Exam: CT Angiography Chest With Contrast   Exam date andtime: 12/12/2019 2:11 AM   Age: 74 years old   Clinical history: Shortness of breath     TECHNIQUE:   Imaging protocol: Computed tomographic angiography of the chest with   intravenous contrast.   3D rendering: MIP reconstructed images were created and reviewed.   Contrast material: OMNI 350; Contrast volume: 95 ml; Contrast route: IV;      COMPARISON:   CT CHEST February 07, 2019     FINDINGS:   Pulmonary arteries: Adequate contrast enhancement of the pulmonary   arteries.   Aorta: No evidence of thoracic aortic dissection.   Chronic   atherosclerotic   calcification of the vasculature.   Lungs: No evidence endobronchial lesion. Diffuse smooth septal   thickening, peribronchial cuffing and scattered areas of groundglass   attenuation compatible with edema.    Pleural space: Mild bilateral pleural effusions. No evidence of   pneumothorax.   Heart: Heart appears within normal limits, no pericardial effusion. No   evidence   of filling defects in the cardiac chambers.   Kidneys and ureters:Ovoid 7 cm right renal simple cyst.   Lymph nodes: Few up to 1 cm mediastinal lymph nodes.   Bones/joints: Destructive lesion   involving acromion process-left scapula. Lytic lesions involving lateral   aspect 2nd left rib and at multiple levels in the thoracic spine, for   example T2, T4, T8, T9 and T10. Stable moderate T9 and mild T10   compression deformities. Old bilateral rib fractures  Soft tissues: Anasarca.    IMPRESSION:   1. No evidence of pulmonary embolism.   2. Pulmonary edema and small bilateral pleural effusions.  3.  Lytic lesions involving the acromion process-left scapula, left   second rib and multiple vertebral bodies suspicious for metastatic   disease.    A preliminary report was provided by the [facility] radiologist.      CT ABDOMEN AND PELVIS  CLINICAL INFORMATION: Abdominal pain and diarrhea    COMPARISON: December 15, 2016    PROCEDURE:   CT of the Abdomen and Pelvis was performed with intravenous contrast.   Intravenous contrast: 95 ml Omnipaque 350.    Oral contrast:None.  Sagittal and coronal reformats were performed.    FINDINGS:    LIVER: Within normal limits.  BILE DUCTS: Normal caliber.  GALLBLADDER: Nonspecific gallbladder wall thickening.  SPLEEN: Within normal limits.  PANCREAS: Within normal limits.  ADRENALS: Within normal limits.  KIDNEYS/URETERS: Right renal cyst. No hydronephrosis.    BLADDER: Within normal limits.  PERITONEUM: No ascites.  VESSELS: Within normal limits.  RETROPERITONEUM/LYMPH NODES/REPRODUCTIVE/BOWEL: Large infiltrating   hyperenhancing soft tissue mass occupying the presacral and perirectal   space, surrounding and narrowing the rectum, uterus and lateral aspect of   the bladder and encasing the iliac vessels. No bowel obstruction.  ABDOMINAL WALL: Within normal limits.  BONES: Numerous lytic lesions involving the bony pelvis and lumbar spine,   including a large L2 vertebral body lesion with mild compression   deformity. There are also lesions involving both femoral necks, without   pathologic fracture.    IMPRESSION:    Large infiltrating pelvic mass encasing the rectum, uterus, bladder and   iliac vessels. 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 Other considerations include sarcoma and lymphoma.    Numerous lytic bone lesionscompatible with multiple myeloma, including   lesions involving both femoral necks.                      HARVEY ORTIZ M.D., ATTENDING RADIOLOGIST  This document has been electronically signed. Dec 12 2019  8:46AM                  < end of copied text >

## 2019-12-12 NOTE — CHART NOTE - NSCHARTNOTEFT_GEN_A_CORE
Onco surgery called back and requested GI and IR consults. GI and IR consult were called. IR requested to speak to onco surgery team directly regarding more details. Onco surgery was called back and informed to call IR directly for more questions.

## 2019-12-13 LAB
ALBUMIN SERPL ELPH-MCNC: 2.6 G/DL — LOW (ref 3.3–5.2)
ALP SERPL-CCNC: 88 U/L — SIGNIFICANT CHANGE UP (ref 40–120)
ALT FLD-CCNC: 6 U/L — SIGNIFICANT CHANGE UP
ANION GAP SERPL CALC-SCNC: 11 MMOL/L — SIGNIFICANT CHANGE UP (ref 5–17)
ANISOCYTOSIS BLD QL: SLIGHT — SIGNIFICANT CHANGE UP
AST SERPL-CCNC: 13 U/L — SIGNIFICANT CHANGE UP
BASOPHILS # BLD AUTO: 0.05 K/UL — SIGNIFICANT CHANGE UP (ref 0–0.2)
BASOPHILS NFR BLD AUTO: 0.9 % — SIGNIFICANT CHANGE UP (ref 0–2)
BILIRUB SERPL-MCNC: 0.2 MG/DL — LOW (ref 0.4–2)
BUN SERPL-MCNC: 22 MG/DL — HIGH (ref 8–20)
CALCIUM SERPL-MCNC: 8.1 MG/DL — LOW (ref 8.6–10.2)
CHLORIDE SERPL-SCNC: 108 MMOL/L — HIGH (ref 98–107)
CO2 SERPL-SCNC: 17 MMOL/L — LOW (ref 22–29)
CREAT SERPL-MCNC: 0.79 MG/DL — SIGNIFICANT CHANGE UP (ref 0.5–1.3)
CULTURE RESULTS: SIGNIFICANT CHANGE UP
EOSINOPHIL # BLD AUTO: 0.05 K/UL — SIGNIFICANT CHANGE UP (ref 0–0.5)
EOSINOPHIL NFR BLD AUTO: 0.8 % — SIGNIFICANT CHANGE UP (ref 0–6)
FERRITIN SERPL-MCNC: 502 NG/ML — HIGH (ref 15–150)
GLUCOSE SERPL-MCNC: 94 MG/DL — SIGNIFICANT CHANGE UP (ref 70–115)
HCT VFR BLD CALC: 29.6 % — LOW (ref 34.5–45)
HGB BLD-MCNC: 9 G/DL — LOW (ref 11.5–15.5)
IGA FLD-MCNC: 45 MG/DL — LOW (ref 84–499)
IGA FLD-MCNC: 45 MG/DL — LOW (ref 84–499)
IGG FLD-MCNC: 4658 MG/DL — HIGH (ref 610–1660)
IGM SERPL-MCNC: <10 MG/DL — LOW (ref 35–242)
IGM SERPL-MCNC: <10 MG/DL — LOW (ref 35–242)
KAPPA LC SER QL IFE: 0.12 MG/DL — LOW (ref 0.33–1.94)
KAPPA/LAMBDA FREE LIGHT CHAIN RATIO, SERUM: 0 RATIO — LOW (ref 0.26–1.65)
LAMBDA LC SER QL IFE: 184.8 MG/DL — HIGH (ref 0.57–2.63)
LYMPHOCYTES # BLD AUTO: 0.05 K/UL — LOW (ref 1–3.3)
LYMPHOCYTES # BLD AUTO: 0.9 % — LOW (ref 13–44)
MACROCYTES BLD QL: SLIGHT — SIGNIFICANT CHANGE UP
MANUAL SMEAR VERIFICATION: SIGNIFICANT CHANGE UP
MCHC RBC-ENTMCNC: 30.4 GM/DL — LOW (ref 32–36)
MCHC RBC-ENTMCNC: 31.1 PG — SIGNIFICANT CHANGE UP (ref 27–34)
MCV RBC AUTO: 102.4 FL — HIGH (ref 80–100)
MICROCYTES BLD QL: SLIGHT — SIGNIFICANT CHANGE UP
MONOCYTES # BLD AUTO: 0.58 K/UL — SIGNIFICANT CHANGE UP (ref 0–0.9)
MONOCYTES NFR BLD AUTO: 9.5 % — SIGNIFICANT CHANGE UP (ref 2–14)
MYELOCYTES NFR BLD: 0.9 % — HIGH (ref 0–0)
NEUTROPHILS # BLD AUTO: 5.25 K/UL — SIGNIFICANT CHANGE UP (ref 1.8–7.4)
NEUTROPHILS NFR BLD AUTO: 86.1 % — HIGH (ref 43–77)
OVALOCYTES BLD QL SMEAR: SLIGHT — SIGNIFICANT CHANGE UP
PLAT MORPH BLD: NORMAL — SIGNIFICANT CHANGE UP
PLATELET # BLD AUTO: 199 K/UL — SIGNIFICANT CHANGE UP (ref 150–400)
PLATELET COUNT - ESTIMATE: NORMAL — SIGNIFICANT CHANGE UP
POIKILOCYTOSIS BLD QL AUTO: SLIGHT — SIGNIFICANT CHANGE UP
POTASSIUM SERPL-MCNC: 4.1 MMOL/L — SIGNIFICANT CHANGE UP (ref 3.5–5.3)
POTASSIUM SERPL-SCNC: 4.1 MMOL/L — SIGNIFICANT CHANGE UP (ref 3.5–5.3)
PROT SERPL-MCNC: 8.8 G/DL — HIGH (ref 6.6–8.7)
RBC # BLD: 2.89 M/UL — LOW (ref 3.8–5.2)
RBC # FLD: 15 % — HIGH (ref 10.3–14.5)
RBC BLD AUTO: NORMAL — SIGNIFICANT CHANGE UP
SODIUM SERPL-SCNC: 136 MMOL/L — SIGNIFICANT CHANGE UP (ref 135–145)
SPECIMEN SOURCE: SIGNIFICANT CHANGE UP
VARIANT LYMPHS # BLD: 0.9 % — SIGNIFICANT CHANGE UP (ref 0–6)
WBC # BLD: 6.1 K/UL — SIGNIFICANT CHANGE UP (ref 3.8–10.5)
WBC # FLD AUTO: 6.1 K/UL — SIGNIFICANT CHANGE UP (ref 3.8–10.5)

## 2019-12-13 PROCEDURE — 99233 SBSQ HOSP IP/OBS HIGH 50: CPT

## 2019-12-13 PROCEDURE — 93010 ELECTROCARDIOGRAM REPORT: CPT

## 2019-12-13 PROCEDURE — 99232 SBSQ HOSP IP/OBS MODERATE 35: CPT

## 2019-12-13 RX ORDER — LISINOPRIL 2.5 MG/1
2.5 TABLET ORAL DAILY
Refills: 0 | Status: DISCONTINUED | OUTPATIENT
Start: 2019-12-13 | End: 2019-12-19

## 2019-12-13 RX ORDER — HEPARIN SODIUM 5000 [USP'U]/ML
5000 INJECTION INTRAVENOUS; SUBCUTANEOUS EVERY 12 HOURS
Refills: 0 | Status: DISCONTINUED | OUTPATIENT
Start: 2019-12-13 | End: 2019-12-19

## 2019-12-13 RX ADMIN — CARVEDILOL PHOSPHATE 3.12 MILLIGRAM(S): 80 CAPSULE, EXTENDED RELEASE ORAL at 05:31

## 2019-12-13 RX ADMIN — LITHIUM CARBONATE 300 MILLIGRAM(S): 300 TABLET, EXTENDED RELEASE ORAL at 11:22

## 2019-12-13 RX ADMIN — Medication 325 MILLIGRAM(S): at 11:23

## 2019-12-13 RX ADMIN — CEFTRIAXONE 100 MILLIGRAM(S): 500 INJECTION, POWDER, FOR SOLUTION INTRAMUSCULAR; INTRAVENOUS at 11:19

## 2019-12-13 RX ADMIN — Medication 75 MICROGRAM(S): at 05:31

## 2019-12-13 RX ADMIN — Medication 40 MILLIGRAM(S): at 11:22

## 2019-12-13 RX ADMIN — Medication 125 MILLIGRAM(S): at 05:31

## 2019-12-13 RX ADMIN — Medication 125 MILLIGRAM(S): at 00:04

## 2019-12-13 RX ADMIN — Medication 0.5 MILLIGRAM(S): at 00:03

## 2019-12-13 RX ADMIN — Medication 1 DROP(S): at 00:03

## 2019-12-13 RX ADMIN — Medication 0.5 MILLIGRAM(S): at 05:34

## 2019-12-13 RX ADMIN — PANTOPRAZOLE SODIUM 40 MILLIGRAM(S): 20 TABLET, DELAYED RELEASE ORAL at 18:22

## 2019-12-13 RX ADMIN — Medication 125 MILLIGRAM(S): at 18:21

## 2019-12-13 RX ADMIN — Medication 81 MILLIGRAM(S): at 11:23

## 2019-12-13 RX ADMIN — Medication 1 DROP(S): at 18:21

## 2019-12-13 RX ADMIN — PANTOPRAZOLE SODIUM 40 MILLIGRAM(S): 20 TABLET, DELAYED RELEASE ORAL at 05:31

## 2019-12-13 RX ADMIN — Medication 1 DROP(S): at 11:23

## 2019-12-13 RX ADMIN — Medication 1 MILLIGRAM(S): at 11:23

## 2019-12-13 RX ADMIN — Medication 125 MILLIGRAM(S): at 11:22

## 2019-12-13 RX ADMIN — VALACYCLOVIR 500 MILLIGRAM(S): 500 TABLET, FILM COATED ORAL at 05:31

## 2019-12-13 RX ADMIN — Medication 1 DROP(S): at 05:29

## 2019-12-13 RX ADMIN — HEPARIN SODIUM 5000 UNIT(S): 5000 INJECTION INTRAVENOUS; SUBCUTANEOUS at 18:22

## 2019-12-13 NOTE — PROGRESS NOTE ADULT - SUBJECTIVE AND OBJECTIVE BOX
HPI/OVERNIGHT EVENTS:  75 y/o female with h/o multiple myeloma now with numerous lytic lesions and a large infiltrating pelvic mass encasing rectum, uterus, bladder and iliac vessels. Pt is a poor historian. Patient is tolerating a diet without nausea or vomiting. Evaluated by Heme/Onc team , further recs regarding treatment pending biopsy of mass. No CP, fevers, chills.     MEDICATIONS  (STANDING):  aspirin  chewable 81 milliGRAM(s) Oral daily  benztropine 0.5 milliGRAM(s) Oral at bedtime  carvedilol 3.125 milliGRAM(s) Oral every 12 hours  cefTRIAXone   IVPB 1000 milliGRAM(s) IV Intermittent every 24 hours  cefTRIAXone   IVPB      dexAMETHasone     Tablet 20 milliGRAM(s) Oral daily  ferrous    sulfate 325 milliGRAM(s) Oral daily  folic acid 1 milliGRAM(s) Oral daily  furosemide   Injectable 40 milliGRAM(s) IV Push daily  levothyroxine 75 MICROGram(s) Oral daily  lithium citrate Solution 300 milliGRAM(s) Oral daily  LORazepam     Tablet 0.5 milliGRAM(s) Oral two times a day  ofloxacin 0.3% Solution 1 Drop(s) Both EYES four times a day  pantoprazole  Injectable 40 milliGRAM(s) IV Push two times a day  valACYclovir 500 milliGRAM(s) Oral two times a day  vancomycin    Solution 125 milliGRAM(s) Oral every 6 hours    MEDICATIONS  (PRN):  aluminum hydroxide/magnesium hydroxide/simethicone Suspension 30 milliLiter(s) Oral every 4 hours PRN Dyspepsia  diphenhydrAMINE   Injectable 50 milliGRAM(s) IntraMuscular every 4 hours PRN Rash and/or Itching  loperamide 2 milliGRAM(s) Oral two times a day PRN watery diarrhea      Vital Signs Last 24 Hrs  T(C): 37.4 (13 Dec 2019 00:13), Max: 37.4 (13 Dec 2019 00:13)  T(F): 99.3 (13 Dec 2019 00:13), Max: 99.3 (13 Dec 2019 00:13)  HR: 93 (13 Dec 2019 00:13) (78 - 93)  BP: 120/67 (13 Dec 2019 00:13) (108/69 - 120/67)  BP(mean): --  RR: 18 (13 Dec 2019 00:13) (18 - 20)  SpO2: 93% (13 Dec 2019 00:13) (93% - 100%)    PHYSICAL EXAM:  Constitutional: NAD  Neck: trachea midline, normal ROM  Respiratory: No rales, wheezes, bronchi  Cardiovascular: RRR norm S1S2  Gastrointestinal: soft, nontender, no guarding or rebound  Extremities: bilateral LE edema          I&O's Detail      LABS:                        9.4    5.51  )-----------( 200      ( 12 Dec 2019 11:55 )             29.8         136  |  109<H>  |  22.0<H>  ----------------------------<  82  3.7   |  17.0<L>  |  0.84    Ca    7.7<L>      11 Dec 2019 22:55  Mg     1.8         TPro  7.8  /  Alb  2.4<L>  /  TBili  <0.2<L>  /  DBili  x   /  AST  14  /  ALT  7   /  AlkPhos  87      PT/INR - ( 12 Dec 2019 09:29 )   PT: 15.3 sec;   INR: 1.32 ratio         PTT - ( 12 Dec 2019 18:22 )  PTT:46.2 sec  Urinalysis Basic - ( 12 Dec 2019 04:52 )    Color: Yellow / Appearance: Slightly Turbid / S.020 / pH: x  Gluc: x / Ketone: Negative  / Bili: Negative / Urobili: Negative   Blood: x / Protein: 100 / Nitrite: Positive   Leuk Esterase: Small / RBC: 11-25 /HPF / WBC 26-50   Sq Epi: x / Non Sq Epi: Moderate / Bacteria: Moderate

## 2019-12-13 NOTE — CONSULT NOTE ADULT - PROBLEM SELECTOR PROBLEM 1
Clostridium difficile colitis
Bipolar affective disorder, remission status unspecified
Cardiac enzymes elevated

## 2019-12-13 NOTE — PROGRESS NOTE ADULT - SUBJECTIVE AND OBJECTIVE BOX
CARDIOLOGY PROGRESS NOTE   ([facility])                                                                                                        Subjective: no new c/o, nad, denied cp, dyspnea    Vitals:  T(C): 36.4 (12-13-19 @ 16:21), Max: 37.4 (12-13-19 @ 00:13)  HR: 80 (12-13-19 @ 16:21) (62 - 93)  BP: 131/68 (12-13-19 @ 16:21) (112/63 - 131/68)  RR: 18 (12-13-19 @ 16:21) (18 - 18)  SpO2: 95% (12-13-19 @ 16:21) (93% - 98%)  Wt(kg): --  I&O's Summary        PHYSICAL EXAM:  Appearance: Normal	  HEENT:   Atraumatic  Cardiovascular: Normal S1 S2, No JVD, No murmurs, No edema  Respiratory: Lungs clear to auscultation	  Gastrointestinal:  Soft, Non-tender, + BS	  Skin: No rashes, No ecchymoses, No cyanosis  Neurologic: Alert and awake, able to move extremities  Extremities: No edema    TELEMETRY: SR	        CURRENT MEDICATIONS:  carvedilol 3.125 milliGRAM(s) Oral every 12 hours  furosemide   Injectable 40 milliGRAM(s) IV Push daily    cefTRIAXone   IVPB 1000 milliGRAM(s) IV Intermittent every 24 hours  cefTRIAXone   IVPB      valACYclovir 500 milliGRAM(s) Oral two times a day  vancomycin    Solution 125 milliGRAM(s) Oral every 6 hours    diphenhydrAMINE   Injectable 50 milliGRAM(s) IntraMuscular every 4 hours PRN    benztropine 0.5 milliGRAM(s) Oral at bedtime  lithium citrate Solution 300 milliGRAM(s) Oral daily  LORazepam     Tablet 0.5 milliGRAM(s) Oral two times a day    aluminum hydroxide/magnesium hydroxide/simethicone Suspension 30 milliLiter(s) Oral every 4 hours PRN  loperamide 2 milliGRAM(s) Oral two times a day PRN  pantoprazole  Injectable 40 milliGRAM(s) IV Push two times a day    levothyroxine 75 MICROGram(s) Oral daily    aspirin  chewable 81 milliGRAM(s) Oral daily  ferrous    sulfate 325 milliGRAM(s) Oral daily  folic acid 1 milliGRAM(s) Oral daily  heparin  Injectable 5000 Unit(s) SubCutaneous every 12 hours  ofloxacin 0.3% Solution 1 Drop(s) Both EYES four times a day      LABS:	 	                              9.0    6.10  )-----------( 199      ( 13 Dec 2019 07:39 )             29.6     12-13    136  |  108<H>  |  22.0<H>  ----------------------------<  94  4.1   |  17.0<L>  |  0.79    Ca    8.1<L>      13 Dec 2019 07:39  Mg     1.8     12-11    TPro  8.8<H>  /  Alb  2.6<L>  /  TBili  0.2<L>  /  DBili  x   /  AST  13  /  ALT  6   /  AlkPhos  88  12-13    proBNP: Serum Pro-Brain Natriuretic Peptide: 6220 pg/mL (12-12 @ 19:45)    Lipid Profile: Date: 12-12 @ 05:51  Total cholesterol 97; Direct LDL: 38; HDL: 50; Triglycerides:46        Echo Reviewed:  3. Segmental wall motion abnormalities in RCA territory.  Left ventricular ejection fraction, by visual estimation, is 45-50%.   Grade II diastolic dysfunction.  Moderate-severe mitral valve regurgitation. Tethering and restricted posterior leaflet.  Mild to moderate aortic valve stenosis. Moderate aortic regurgitation.    CT Abdomen:  Large infiltrating pelvic mass encasing the rectum, uterus, bladder and iliac vessels. 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 Other considerations include sarcoma and lymphoma.  Numerous lytic bone lesions compatible with multiple myeloma, including lesions involving both femoral necks.      CT Chest:  No PE  Pulmonary edema and small bilateral pleural effusions.  Lytic lesions involving the acromion process-left scapula, left   second rib and multiple vertebral bodies suspicious for metastatic   disease.

## 2019-12-13 NOTE — PROGRESS NOTE ADULT - SUBJECTIVE AND OBJECTIVE BOX
Internal Medicine Hospitalist Progress Note  follow up for abd mass , psychosis   pt seen in am, refuses to answer questions  or examined   no distress seen lying in the strecher                 ROS: as above, all remaining ROS are negative.       BACKGROUND:  MEDICATIONS  (STANDING):  aspirin  chewable 81 milliGRAM(s) Oral daily  benztropine 0.5 milliGRAM(s) Oral at bedtime  carvedilol 3.125 milliGRAM(s) Oral every 12 hours  cefTRIAXone   IVPB 1000 milliGRAM(s) IV Intermittent every 24 hours  cefTRIAXone   IVPB      ferrous    sulfate 325 milliGRAM(s) Oral daily  folic acid 1 milliGRAM(s) Oral daily  furosemide   Injectable 40 milliGRAM(s) IV Push daily  levothyroxine 75 MICROGram(s) Oral daily  lithium citrate Solution 300 milliGRAM(s) Oral daily  LORazepam     Tablet 0.5 milliGRAM(s) Oral two times a day  ofloxacin 0.3% Solution 1 Drop(s) Both EYES four times a day  pantoprazole  Injectable 40 milliGRAM(s) IV Push two times a day  valACYclovir 500 milliGRAM(s) Oral two times a day  vancomycin    Solution 125 milliGRAM(s) Oral every 6 hours    MEDICATIONS  (PRN):  aluminum hydroxide/magnesium hydroxide/simethicone Suspension 30 milliLiter(s) Oral every 4 hours PRN Dyspepsia  diphenhydrAMINE   Injectable 50 milliGRAM(s) IntraMuscular every 4 hours PRN Rash and/or Itching  loperamide 2 milliGRAM(s) Oral two times a day PRN watery diarrhea    Allergies    ixazomib (Unknown)  NSAIDs (Unknown)  penicillins (Unknown)  sulfa drugs (Unknown)    Intolerances            VITALS:  Vital Signs Last 24 Hrs  T(C): 36.4 (13 Dec 2019 16:21), Max: 37.4 (13 Dec 2019 00:13)  T(F): 97.6 (13 Dec 2019 16:21), Max: 99.3 (13 Dec 2019 00:13)  HR: 80 (13 Dec 2019 16:21) (62 - 93)  BP: 131/68 (13 Dec 2019 16:21) (112/63 - 131/68)  BP(mean): --  RR: 18 (13 Dec 2019 16:21) (18 - 18)  SpO2: 95% (13 Dec 2019 16:21) (93% - 98%) Daily     Daily   CAPILLARY BLOOD GLUCOSE        I&O's Summary      PHYSICAL EXAM:      Constitutional: no distress resting looks comfortable     Respiratory: not allowed to exam by pt     Cardiovascular: not examined pt was hitting with her arms     Gastrointestinal:    Extremities: no edema           LABS:                        9.0    6.10  )-----------( 199      ( 13 Dec 2019 07:39 )             29.6     12-13    136  |  108<H>  |  22.0<H>  ----------------------------<  94  4.1   |  17.0<L>  |  0.79    Ca    8.1<L>      13 Dec 2019 07:39  Mg     1.8     12-11    TPro  8.8<H>  /  Alb  2.6<L>  /  TBili  0.2<L>  /  DBili  x   /  AST  13  /  ALT  6   /  AlkPhos  88  12-13    PT/INR - ( 12 Dec 2019 09:29 )   PT: 15.3 sec;   INR: 1.32 ratio         PTT - ( 12 Dec 2019 18:22 )  PTT:46.2 sec    Radiology :    < from: CT Abdomen and Pelvis w/ IV Cont (12.12.19 @ 02:25) >  IMPRESSION:    Large infiltrating pelvic mass encasing the rectum, uterus, bladder and   iliac vessels. 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 Other considerations include sarcoma and lymphoma.    Numerous lytic bone lesionscompatible with multiple myeloma, including   lesions involving both femoral necks.                < end of copied text >

## 2019-12-13 NOTE — CONSULT NOTE ADULT - PROBLEM SELECTOR RECOMMENDATION 9
1 to 1 not used at [facility]  lithium subtherapeutic  presently sensorium depressed   will hold lithium given current cardiac concerns and diarrhea - clostridium difficile  review other psychotropic medications  surrogate consent for procedures needing informed consent as patient has impaired decision making capacity  Asked [facility] to assist in identifying either proxy or available surrogate

## 2019-12-13 NOTE — CONSULT NOTE ADULT - SUBJECTIVE AND OBJECTIVE BOX
74 yr old female with very complex medical history complicated by chronic mental illness - bipolar disorder w psychotic features- had been refusing care  now with advanced multiple myeloma likely spread extraosseous to pelvis, c dificile positive, NSTEMI  presently somnolent  arousable to verbal and light tactlie stimuli but unable to participate in interview  history obtained from chart incl [facility] transfer packet and discussion w Dr Centeno Chief of Medicane at [facility] [phone]  PAST MEDICAL & SURGICAL HISTORY:  Congestive heart failure (CHF)  Multiple myeloma  Bipolar disorder  Scoliosis  Hypercholesteremia  Hypertension  Hypothyroid  infusaport placed for chemotx  [place] Medications:  amLODIPine 5 mg oral tablet: 1 tab(s) orally once a day (12 Dec 2019 10:55)  aspirin 81 mg oral tablet: 1 tab(s) orally once a day (12 Dec 2019 10:55)  Ativan 0.5 mg oral tablet: 1 tab(s) orally 2 times a day (12 Dec 2019 10:55)  benztropine 0.5 mg oral tablet: 1 tab(s) orally once a day (at bedtime) (12 Dec 2019 10:55)  calcium carbonate 500 mg (200 mg elemental calcium) oral tablet, chewable: 1 tab(s) orally once a day (12 Dec 2019 10:55)  dexamethasone 4 mg oral tablet: 5 tab(s) orally once a day (12 Dec 2019 10:55)  ferrous sulfate 325 mg (65 mg elemental iron) oral tablet: 1 tab(s) orally once a day (12 Dec 2019 10:55)  folic acid 1 mg oral tablet: 1 tab(s) orally once a day (12 Dec 2019 10:55)  furosemide 40 mg oral tablet: 1 tab(s) orally once a day (12 Dec 2019 10:55)  levothyroxine 75 mcg (0.075 mg) oral tablet: 1 tab(s) orally once a day (12 Dec 2019 10:55)  levothyroxine 75 mcg (0.075 mg) oral tablet: 1 tab(s) orally once a day (12 Dec 2019 10:55)  lithium 300 mg oral capsule: 1 cap(s) orally once a day (12 Dec 2019 10:55)  lithium 300 mg/5 mL (8 mEq/5 mL) oral syrup: 5 milliliter(s) orally 3 times a day (12 Dec 2019 10:55)  loperamide 2 mg oral tablet: 1 tab(s) orally 2 times a day, As Needed (12 Dec 2019 10:55)  risperiDONE 50 mg/2 weeks intramuscular injection, extended release: 50 milligram(s) intramuscular every other week (12 Dec 2019 10:55)  selinexor 20 mg oral tablet: 4 tab(s) orally once a day (12 Dec 2019 10:55)- refusing at [facility]  valACYclovir 500 mg oral tablet: 1 tab(s) orally 2 times a day (12 Dec 2019 10:55)  Vitamin D3 1000 intl units oral tablet: 1 tab(s) orally once a day (12 Dec 2019 10:55)                        9.0    6.10  )-----------( 199      ( 13 Dec 2019 07:39 )             29.6         136  |  108<H>  |  22.0<H>  ----------------------------<  94  4.1   |  17.0<L>  |  0.79    Ca    8.1<L>      13 Dec 2019 07:39  Mg     1.8     12    TPro  8.8<H>  /  Alb  2.6<L>  /  TBili  0.2<L>  /  DBili  x   /  AST  13  /  ALT  6   /  AlkPhos  88  1213    LIVER FUNCTIONS - ( 13 Dec 2019 07:39 )  Alb: 2.6 g/dL / Pro: 8.8 g/dL / ALK PHOS: 88 U/L / ALT: 6 U/L / AST: 13 U/L / GGT: x           PT/INR - ( 12 Dec 2019 09:29 )   PT: 15.3 sec;   INR: 1.32 ratio         PTT - ( 12 Dec 2019 18:22 )  PTT:46.2 sec  Urinalysis Basic - ( 12 Dec 2019 04:52 )    Color: Yellow / Appearance: Slightly Turbid / S.020 / pH: x  Gluc: x / Ketone: Negative  / Bili: Negative / Urobili: Negative   Blood: x / Protein: 100 / Nitrite: Positive   Leuk Esterase: Small / RBC: 11-25 /HPF / WBC 26-50   Sq Epi: x / Non Sq Epi: Moderate / Bacteria: Moderate    < from: CT Abdomen and Pelvis w/ IV Cont (19 @ 02:25) >  IMPRESSION:    Large infiltrating pelvic mass encasing the rectum, uterus, bladder and   iliac vessels. 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 Other considerations include sarcoma and lymphoma.    Numerous lytic bone lesionscompatible with multiple myeloma, including   lesions involving both femoral necks.      < end of copied text >  < from: 12 Lead ECG (1219 @ 00:53) >  Diagnosis Line *** Poor data quality, interpretation may be adversely affected  *** Suspect unspecified pacemaker failure  Normal sinus rhythm  Septal infarct , age undetermined  ST & T wave abnormality, consider lateral ischemia  Abnormal ECG    < end of copied text >

## 2019-12-13 NOTE — CONSULT NOTE ADULT - ASSESSMENT
Hypoactive delirium - multifactorial  Bipolar disorder most recent episode manic w psychotic features  Documentation from [place] indicates pattern of treatment refusal / non compliance related to chronic psychotic state

## 2019-12-14 LAB
HCT VFR BLD CALC: 29.3 % — LOW (ref 34.5–45)
HGB BLD-MCNC: 9.1 G/DL — LOW (ref 11.5–15.5)
MCHC RBC-ENTMCNC: 31 PG — SIGNIFICANT CHANGE UP (ref 27–34)
MCHC RBC-ENTMCNC: 31.1 GM/DL — LOW (ref 32–36)
MCV RBC AUTO: 99.7 FL — SIGNIFICANT CHANGE UP (ref 80–100)
PLATELET # BLD AUTO: 213 K/UL — SIGNIFICANT CHANGE UP (ref 150–400)
PROT SERPL-MCNC: 8.6 G/DL — HIGH (ref 6–8.3)
PROT SERPL-MCNC: 8.6 G/DL — HIGH (ref 6–8.3)
RBC # BLD: 2.94 M/UL — LOW (ref 3.8–5.2)
RBC # FLD: 14.7 % — HIGH (ref 10.3–14.5)
WBC # BLD: 6.65 K/UL — SIGNIFICANT CHANGE UP (ref 3.8–10.5)
WBC # FLD AUTO: 6.65 K/UL — SIGNIFICANT CHANGE UP (ref 3.8–10.5)

## 2019-12-14 PROCEDURE — 99233 SBSQ HOSP IP/OBS HIGH 50: CPT

## 2019-12-14 RX ADMIN — Medication 125 MILLIGRAM(S): at 13:28

## 2019-12-14 RX ADMIN — Medication 1 DROP(S): at 01:01

## 2019-12-14 RX ADMIN — CEFTRIAXONE 100 MILLIGRAM(S): 500 INJECTION, POWDER, FOR SOLUTION INTRAMUSCULAR; INTRAVENOUS at 13:38

## 2019-12-14 RX ADMIN — Medication 125 MILLIGRAM(S): at 01:01

## 2019-12-14 RX ADMIN — Medication 40 MILLIGRAM(S): at 13:50

## 2019-12-14 RX ADMIN — LITHIUM CARBONATE 300 MILLIGRAM(S): 300 TABLET, EXTENDED RELEASE ORAL at 13:28

## 2019-12-14 RX ADMIN — Medication 0.5 MILLIGRAM(S): at 22:34

## 2019-12-14 NOTE — PROGRESS NOTE ADULT - SUBJECTIVE AND OBJECTIVE BOX
Internal Medicine Hospitalist Progress Note  follow up for abd mass , psychosis , C diff infection , + troponin   Pt is calm , per nurse has been refusing to take her oral medications ., pt prefers liquid   no distress she admits having pain all over abd         Vital Signs Last 24 Hrs  T(C): 36.7 (14 Dec 2019 11:25), Max: 36.7 (14 Dec 2019 11:25)  T(F): 98 (14 Dec 2019 11:25), Max: 98 (14 Dec 2019 11:25)  HR: 93 (14 Dec 2019 11:25) (80 - 97)  BP: 115/61 (14 Dec 2019 11:25) (115/61 - 146/76)  BP(mean): --  RR: 18 (14 Dec 2019 11:25) (18 - 19)  SpO2: 93% (14 Dec 2019 05:45) (93% - 95%)          Constitutional: no distress resting looks comfortable     Respiratory: CTA anteriorly     Cardiovascular: not examined pt was hitting with her arms     Gastrointestinal: soft obese , mild tenderness diffuse     Extremities: no pretibial edema                               9.1    6.65  )-----------( 213      ( 14 Dec 2019 05:46 )             29.3   12-13    136  |  108<H>  |  22.0<H>  ----------------------------<  94  4.1   |  17.0<L>  |  0.79    Ca    8.1<L>      13 Dec 2019 07:39    TPro  8.8<H>  /  Alb  2.6<L>  /  TBili  0.2<L>  /  DBili  x   /  AST  13  /  ALT  6   /  AlkPhos  88  12-13  TPro  8.8<H>  /  Alb  2.6<L>  /  TBili  0.2<L>  /  DBili  x   /  AST  13  /  ALT  6   /  AlkPhos  88  12-13    PT/INR - ( 12 Dec 2019 09:29 )   PT: 15.3 sec;   INR: 1.32 ratio         PTT - ( 12 Dec 2019 18:22 )  PTT:46.2 sec    Radiology :    < from: CT Abdomen and Pelvis w/ IV Cont (12.12.19 @ 02:25) >  IMPRESSION:    Large infiltrating pelvic mass encasing the rectum, uterus, bladder and   iliac vessels. 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 Other considerations include sarcoma and lymphoma.    Numerous lytic bone lesionscompatible with multiple myeloma, including   lesions involving both femoral necks.                < end of copied text >

## 2019-12-14 NOTE — PROGRESS NOTE ADULT - SUBJECTIVE AND OBJECTIVE BOX
HPI: Patient is a 74y Female seen on consultation for the evaluation and management of Multiple Myeloma. Other med problems include CHF, HTN, Bipolar disorder Pt is under care of Dr. Castillo from [facility] for MM, known refractory to all treatments.  Last seen 12/10/19; IgG 4877, TP 9.1, alb 3, lambda light chains 1742.  Recommendations are fro new drug Selinexor with Decadron, for refractory MM.  Pt refuses to take it as it causes Diarrhea, and has not been receiving tx.  Sent to ER by [place]  for c/o ongoing diarrhea and abdominal pain.  Has had diarrhea reportedly for 3 months.  Also c/o chest pain.  Has EKG changes and elevated enzymes; seen by Cardiology with ongoing eval, possible cath.  Also found to have large infiltrating pelvic mass encasing rectum, uterus, bladder and iliac vessels-apparently new findings.  Also has disseminated lytic lesions, possibly secondary to MM.  Hx per pt is unreliable-currently c/o abd pain and diarrhea and SOB.  Denies fevers or chills, denies bleeding.     Still c/o abdominal bloating and diarrhea.    PAST MEDICAL & SURGICAL HISTORY:  Congestive heart failure (CHF)  Multiple myeloma  Mental disorder  Bone cancer  Scoliosis  Hypercholesteremia  Hypertension  Hypothyroid  No significant past surgical history      REVIEW OF SYSTEMS is unrelieable      General:rappetite improved    Skin/Breast:no rash  	    Respiratory and Thorax:c/o dyspnea and chest pain  	  Cardiovascular:	chest pain    Gastrointestinal:	abdominal cramping and chronic diarrhea    Genitourinary:	denies dysuria or hematuria            Psychiatric:	Bipolar disorder;             MEDICATIONS  (STANDING):  aspirin  chewable 81 milliGRAM(s) Oral daily  benztropine 0.5 milliGRAM(s) Oral at bedtime  carvedilol 3.125 milliGRAM(s) Oral every 12 hours  cefTRIAXone   IVPB      dexAMETHasone     Tablet 20 milliGRAM(s) Oral daily  ferrous    sulfate 325 milliGRAM(s) Oral daily  folic acid 1 milliGRAM(s) Oral daily  furosemide   Injectable 40 milliGRAM(s) IV Push daily  levothyroxine 75 MICROGram(s) Oral daily  lithium citrate Solution 300 milliGRAM(s) Oral daily  LORazepam     Tablet 0.5 milliGRAM(s) Oral two times a day  ofloxacin 0.3% Solution 1 Drop(s) Both EYES four times a day  pantoprazole  Injectable 40 milliGRAM(s) IV Push two times a day  valACYclovir 500 milliGRAM(s) Oral two times a day  vancomycin    Solution 125 milliGRAM(s) Oral every 6 hours    MEDICATIONS  (PRN):  aluminum hydroxide/magnesium hydroxide/simethicone Suspension 30 milliLiter(s) Oral every 4 hours PRN Dyspepsia  diphenhydrAMINE   Injectable 50 milliGRAM(s) IntraMuscular every 4 hours PRN Rash and/or Itching  loperamide 2 milliGRAM(s) Oral two times a day PRN watery diarrhea      Allergies    ixazomib (Unknown)  NSAIDs (Unknown)  penicillins (Unknown)  sulfa drugs (Unknown)    Intolerances        SOCIAL HISTORY:    Smoking Status:denies  Alcohol:denies  Marital Status:resides at [place]      	                  Vital Signs Last 24 Hrs  T(C): 36.7 (12 Dec 2019 16:11), Max: 37.1 (11 Dec 2019 21:41)  T(F): 98 (12 Dec 2019 16:11), Max: 98.7 (11 Dec 2019 21:41)  HR: 82 (12 Dec 2019 16:11) (75 - 86)  BP: 108/69 (12 Dec 2019 16:11) (105/75 - 115/60)  BP(mean): --  RR: 19 (12 Dec 2019 16:11) (19 - 20)  SpO2: 93% (12 Dec 2019 16:11) (93% - 100%)    PHYSICAL EXAM:      Constitutional:Awake, alert, comfortable    Eyes:anicteric    ENMT:dry mm's    Neck:no adenopathy      Respiratory:clear    Cardiovascular:RRR norm S1S2    Gastrointestinal:soft, nontender pos BS; abdomen distended, but soft      Extremities:bilateral LE edema            LABS:                        9.4    5.51  )-----------( 200      ( 12 Dec 2019 11:55 )             29.8         136  |  109<H>  |  22.0<H>  ----------------------------<  82  3.7   |  17.0<L>  |  0.84    Ca    7.7<L>      11 Dec 2019 22:55  Mg     1.8         TPro  7.8  /  Alb  2.4<L>  /  TBili  <0.2<L>  /  DBili  x   /  AST  14  /  ALT  7   /  AlkPhos  87      PT/INR - ( 12 Dec 2019 09:29 )   PT: 15.3 sec;   INR: 1.32 ratio         PTT - ( 12 Dec 2019 09:29 )  PTT:77.2 sec  Urinalysis Basic - ( 12 Dec 2019 04:52 )    Color: Yellow / Appearance: Slightly Turbid / S.020 / pH: x  Gluc: x / Ketone: Negative  / Bili: Negative / Urobili: Negative   Blood: x / Protein: 100 / Nitrite: Positive   Leuk Esterase: Small / RBC: 11-25 /HPF / WBC 26-50   Sq Epi: x / Non Sq Epi: Moderate / Bacteria: Moderate        RADIOLOGY & ADDITIONAL STUDIES:

## 2019-12-15 LAB
HCT VFR BLD CALC: 29.7 % — LOW (ref 34.5–45)
HGB BLD-MCNC: 9.1 G/DL — LOW (ref 11.5–15.5)
MCHC RBC-ENTMCNC: 30.6 GM/DL — LOW (ref 32–36)
MCHC RBC-ENTMCNC: 30.7 PG — SIGNIFICANT CHANGE UP (ref 27–34)
MCV RBC AUTO: 100.3 FL — HIGH (ref 80–100)
PLATELET # BLD AUTO: 203 K/UL — SIGNIFICANT CHANGE UP (ref 150–400)
RBC # BLD: 2.96 M/UL — LOW (ref 3.8–5.2)
RBC # FLD: 15 % — HIGH (ref 10.3–14.5)
WBC # BLD: 6.48 K/UL — SIGNIFICANT CHANGE UP (ref 3.8–10.5)
WBC # FLD AUTO: 6.48 K/UL — SIGNIFICANT CHANGE UP (ref 3.8–10.5)

## 2019-12-15 PROCEDURE — 99232 SBSQ HOSP IP/OBS MODERATE 35: CPT

## 2019-12-15 RX ADMIN — Medication 40 MILLIGRAM(S): at 06:18

## 2019-12-15 RX ADMIN — Medication 125 MILLIGRAM(S): at 23:09

## 2019-12-15 RX ADMIN — Medication 125 MILLIGRAM(S): at 10:16

## 2019-12-15 RX ADMIN — Medication 1 DROP(S): at 23:09

## 2019-12-15 RX ADMIN — CEFTRIAXONE 100 MILLIGRAM(S): 500 INJECTION, POWDER, FOR SOLUTION INTRAMUSCULAR; INTRAVENOUS at 10:06

## 2019-12-15 RX ADMIN — Medication 125 MILLIGRAM(S): at 18:44

## 2019-12-15 RX ADMIN — PANTOPRAZOLE SODIUM 40 MILLIGRAM(S): 20 TABLET, DELAYED RELEASE ORAL at 06:18

## 2019-12-15 RX ADMIN — Medication 1 DROP(S): at 18:47

## 2019-12-15 NOTE — PROGRESS NOTE ADULT - SUBJECTIVE AND OBJECTIVE BOX
HPI: Patient is a 74y Female seen on consultation for the evaluation and management of Multiple Myeloma. Other med problems include CHF, HTN, Bipolar disorder Pt is under care of Dr. Castillo from [facility] for MM, known refractory to all treatments.  Last seen 12/10/19; IgG 4877, TP 9.1, alb 3, lambda light chains 1742.  Recommendations are fro new drug Selinexor with Decadron, for refractory MM.  Pt refuses to take it as it causes Diarrhea, and has not been receiving tx.  Sent to ER by [place] [place]  for c/o ongoing diarrhea and abdominal pain.  Has had diarrhea reportedly for 3 months.  Also c/o chest pain.  Has EKG changes and elevated enzymes; seen by Cardiology with ongoing eval, possible cath.  Also found to have large infiltrating pelvic mass encasing rectum, uterus, bladder and iliac vessels-apparently new findings.  Also has disseminated lytic lesions, possibly secondary to MM.  Hx per pt is unreliable-currently c/o abd pain and diarrhea and SOB.  Denies fevers or chills, denies bleeding.     Still c/o abdominal bloating and diarrhea.  remains difficult to speak with-refusing biopsy    PAST MEDICAL & SURGICAL HISTORY:  Congestive heart failure (CHF)  Multiple myeloma  Mental disorder  Bone cancer  Scoliosis  Hypercholesteremia  Hypertension  Hypothyroid  No significant past surgical history      REVIEW OF SYSTEMS is unrelieable      General:rappetite improved    Skin/Breast:no rash  	    Respiratory and Thorax:c/o dyspnea and chest pain  	  Cardiovascular:	chest pain    Gastrointestinal:	abdominal cramping and chronic diarrhea    Genitourinary:	denies dysuria or hematuria            Psychiatric:	Bipolar disorder;             MEDICATIONS  (STANDING):  aspirin  chewable 81 milliGRAM(s) Oral daily  benztropine 0.5 milliGRAM(s) Oral at bedtime  carvedilol 3.125 milliGRAM(s) Oral every 12 hours  cefTRIAXone   IVPB      dexAMETHasone     Tablet 20 milliGRAM(s) Oral daily  ferrous    sulfate 325 milliGRAM(s) Oral daily  folic acid 1 milliGRAM(s) Oral daily  furosemide   Injectable 40 milliGRAM(s) IV Push daily  levothyroxine 75 MICROGram(s) Oral daily  lithium citrate Solution 300 milliGRAM(s) Oral daily  LORazepam     Tablet 0.5 milliGRAM(s) Oral two times a day  ofloxacin 0.3% Solution 1 Drop(s) Both EYES four times a day  pantoprazole  Injectable 40 milliGRAM(s) IV Push two times a day  valACYclovir 500 milliGRAM(s) Oral two times a day  vancomycin    Solution 125 milliGRAM(s) Oral every 6 hours    MEDICATIONS  (PRN):  aluminum hydroxide/magnesium hydroxide/simethicone Suspension 30 milliLiter(s) Oral every 4 hours PRN Dyspepsia  diphenhydrAMINE   Injectable 50 milliGRAM(s) IntraMuscular every 4 hours PRN Rash and/or Itching  loperamide 2 milliGRAM(s) Oral two times a day PRN watery diarrhea      Allergies    ixazomib (Unknown)  NSAIDs (Unknown)  penicillins (Unknown)  sulfa drugs (Unknown)    Intolerances        SOCIAL HISTORY:    Smoking Status:denies  Alcohol:denies  Marital Status:resides at [place]      	                  Vital Signs Last 24 Hrs  T(C): 36.7 (12 Dec 2019 16:11), Max: 37.1 (11 Dec 2019 21:41)  T(F): 98 (12 Dec 2019 16:11), Max: 98.7 (11 Dec 2019 21:41)  HR: 82 (12 Dec 2019 16:11) (75 - 86)  BP: 108/69 (12 Dec 2019 16:11) (105/75 - 115/60)  BP(mean): --  RR: 19 (12 Dec 2019 16:11) (19 - 20)  SpO2: 93% (12 Dec 2019 16:11) (93% - 100%)    PHYSICAL EXAM:      Constitutional:Awake, alert, comfortable    Eyes:anicteric    ENMT:dry mm's    Neck:no adenopathy      Respiratory:clear    Cardiovascular:RRR norm S1S2    Gastrointestinal:soft, nontender pos BS; abdomen distended, but soft      Extremities:bilateral LE edema            LABS:    12/15/19:  WBC 6.48, H/H 9.1/29.7, plt ct 203,000                    9.4    5.51  )-----------( 200      ( 12 Dec 2019 11:55 )             29.8         136  |  109<H>  |  22.0<H>  ----------------------------<  82  3.7   |  17.0<L>  |  0.84    Ca    7.7<L>      11 Dec 2019 22:55  Mg     1.8         TPro  7.8  /  Alb  2.4<L>  /  TBili  <0.2<L>  /  DBili  x   /  AST  14  /  ALT  7   /  AlkPhos  87      PT/INR - ( 12 Dec 2019 09:29 )   PT: 15.3 sec;   INR: 1.32 ratio         PTT - ( 12 Dec 2019 09:29 )  PTT:77.2 sec  Urinalysis Basic - ( 12 Dec 2019 04:52 )    Color: Yellow / Appearance: Slightly Turbid / S.020 / pH: x  Gluc: x / Ketone: Negative  / Bili: Negative / Urobili: Negative   Blood: x / Protein: 100 / Nitrite: Positive   Leuk Esterase: Small / RBC: 11-25 /HPF / WBC 26-50   Sq Epi: x / Non Sq Epi: Moderate / Bacteria: Moderate        RADIOLOGY & ADDITIONAL STUDIES:

## 2019-12-15 NOTE — PROGRESS NOTE ADULT - SUBJECTIVE AND OBJECTIVE BOX
follow up for abd mass , psychosis , C diff infection , + troponin   Pt is seen in the ED on 1:1 from [place]   she took her abx only per nurse , has been refusing to take all meds   she is resting , cooperative during exam    oncology follow up appreciated       Vital Signs Last 24 Hrs  T(C): 36.1 (15 Dec 2019 07:00), Max: 36.9 (14 Dec 2019 23:20)  T(F): 97 (15 Dec 2019 07:00), Max: 98.5 (15 Dec 2019 03:33)  HR: 77 (15 Dec 2019 07:00) (77 - 86)  BP: 128/86 (15 Dec 2019 07:00) (90/52 - 128/86)  BP(mean): --  RR: 18 (15 Dec 2019 07:00) (18 - 19)  SpO2: 98% (15 Dec 2019 07:00) (95% - 100%)      Constitutional: no distress resting looks comfortable     Respiratory: CTA anteriorly     Cardiovascular: regular s1 /s2     Gastrointestinal: soft obese , mild tenderness diffuse , ? mass in lower left abd     Extremities: no pretibial edema                             9.1    6.48  )-----------( 203      ( 15 Dec 2019 08:47 )             29.7           136  |  108<H>  |  22.0<H>  ----------------------------<  94  4.1   |  17.0<L>  |  0.79    Ca    8.1<L>      13 Dec 2019 07:39      < from: CT Abdomen and Pelvis w/ IV Cont (12.12.19 @ 02:25) >  IMPRESSION:    Large infiltrating pelvic mass encasing the rectum, uterus, bladder and   iliac vessels. 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 Other considerations include sarcoma and lymphoma.    Numerous lytic bone lesionscompatible with multiple myeloma, including   lesions involving both femoral necks.                < end of copied text >

## 2019-12-16 LAB
% ALBUMIN: 33 % — SIGNIFICANT CHANGE UP
% ALPHA 1: 4 % — SIGNIFICANT CHANGE UP
% ALPHA 2: 12 % — SIGNIFICANT CHANGE UP
% BETA: 7.7 % — SIGNIFICANT CHANGE UP
% GAMMA: 43.3 % — SIGNIFICANT CHANGE UP
% M SPIKE: 40.5 % — SIGNIFICANT CHANGE UP
ALBUMIN SERPL ELPH-MCNC: 2.8 G/DL — LOW (ref 3.6–5.5)
ALBUMIN/GLOB SERPL ELPH: 0.5 RATIO — SIGNIFICANT CHANGE UP
ALPHA1 GLOB SERPL ELPH-MCNC: 0.3 G/DL — SIGNIFICANT CHANGE UP (ref 0.1–0.4)
ALPHA2 GLOB SERPL ELPH-MCNC: 1 G/DL — SIGNIFICANT CHANGE UP (ref 0.5–1)
B-GLOBULIN SERPL ELPH-MCNC: 0.7 G/DL — SIGNIFICANT CHANGE UP (ref 0.5–1)
GAMMA GLOBULIN: 3.7 G/DL — HIGH (ref 0.6–1.6)
HCT VFR BLD CALC: 28.7 % — LOW (ref 34.5–45)
HGB BLD-MCNC: 9 G/DL — LOW (ref 11.5–15.5)
INTERPRETATION SERPL IFE-IMP: SIGNIFICANT CHANGE UP
M-SPIKE: 3.5 G/DL — HIGH (ref 0–0)
MCHC RBC-ENTMCNC: 31.4 GM/DL — LOW (ref 32–36)
MCHC RBC-ENTMCNC: 31.6 PG — SIGNIFICANT CHANGE UP (ref 27–34)
MCV RBC AUTO: 100.7 FL — HIGH (ref 80–100)
PLATELET # BLD AUTO: 194 K/UL — SIGNIFICANT CHANGE UP (ref 150–400)
PROT PATTERN SERPL ELPH-IMP: SIGNIFICANT CHANGE UP
RBC # BLD: 2.85 M/UL — LOW (ref 3.8–5.2)
RBC # FLD: 15 % — HIGH (ref 10.3–14.5)
WBC # BLD: 6.28 K/UL — SIGNIFICANT CHANGE UP (ref 3.8–10.5)
WBC # FLD AUTO: 6.28 K/UL — SIGNIFICANT CHANGE UP (ref 3.8–10.5)

## 2019-12-16 PROCEDURE — 93010 ELECTROCARDIOGRAM REPORT: CPT

## 2019-12-16 PROCEDURE — 99233 SBSQ HOSP IP/OBS HIGH 50: CPT

## 2019-12-16 PROCEDURE — 99231 SBSQ HOSP IP/OBS SF/LOW 25: CPT

## 2019-12-16 PROCEDURE — 99497 ADVNCD CARE PLAN 30 MIN: CPT

## 2019-12-16 RX ORDER — METOPROLOL TARTRATE 50 MG
5 TABLET ORAL ONCE
Refills: 0 | Status: DISCONTINUED | OUTPATIENT
Start: 2019-12-16 | End: 2019-12-16

## 2019-12-16 RX ORDER — METOPROLOL TARTRATE 50 MG
2.5 TABLET ORAL EVERY 6 HOURS
Refills: 0 | Status: DISCONTINUED | OUTPATIENT
Start: 2019-12-16 | End: 2019-12-18

## 2019-12-16 RX ORDER — LEVOTHYROXINE SODIUM 125 MCG
37.5 TABLET ORAL AT BEDTIME
Refills: 0 | Status: DISCONTINUED | OUTPATIENT
Start: 2019-12-16 | End: 2019-12-19

## 2019-12-16 RX ORDER — HALOPERIDOL DECANOATE 100 MG/ML
5 INJECTION INTRAMUSCULAR EVERY 6 HOURS
Refills: 0 | Status: DISCONTINUED | OUTPATIENT
Start: 2019-12-16 | End: 2019-12-17

## 2019-12-16 RX ADMIN — Medication 125 MILLIGRAM(S): at 18:05

## 2019-12-16 RX ADMIN — VALACYCLOVIR 500 MILLIGRAM(S): 500 TABLET, FILM COATED ORAL at 21:51

## 2019-12-16 RX ADMIN — Medication 37.5 MICROGRAM(S): at 21:55

## 2019-12-16 RX ADMIN — Medication 125 MILLIGRAM(S): at 05:30

## 2019-12-16 RX ADMIN — Medication 1 DROP(S): at 23:33

## 2019-12-16 RX ADMIN — Medication 0.5 MILLIGRAM(S): at 21:51

## 2019-12-16 RX ADMIN — Medication 1 DROP(S): at 18:17

## 2019-12-16 RX ADMIN — Medication 1 DROP(S): at 05:30

## 2019-12-16 RX ADMIN — Medication 2.5 MILLIGRAM(S): at 23:32

## 2019-12-16 RX ADMIN — HEPARIN SODIUM 5000 UNIT(S): 5000 INJECTION INTRAVENOUS; SUBCUTANEOUS at 05:35

## 2019-12-16 RX ADMIN — CEFTRIAXONE 100 MILLIGRAM(S): 500 INJECTION, POWDER, FOR SOLUTION INTRAMUSCULAR; INTRAVENOUS at 14:08

## 2019-12-16 RX ADMIN — HEPARIN SODIUM 5000 UNIT(S): 5000 INJECTION INTRAVENOUS; SUBCUTANEOUS at 18:11

## 2019-12-16 RX ADMIN — Medication 2.5 MILLIGRAM(S): at 18:07

## 2019-12-16 RX ADMIN — Medication 2.5 MILLIGRAM(S): at 10:20

## 2019-12-16 RX ADMIN — PANTOPRAZOLE SODIUM 40 MILLIGRAM(S): 20 TABLET, DELAYED RELEASE ORAL at 21:51

## 2019-12-16 RX ADMIN — Medication 125 MILLIGRAM(S): at 23:35

## 2019-12-16 RX ADMIN — LITHIUM CARBONATE 300 MILLIGRAM(S): 300 TABLET, EXTENDED RELEASE ORAL at 18:00

## 2019-12-16 NOTE — PROGRESS NOTE BEHAVIORAL HEALTH - NSBHCHARTREVIEWVS_PSY_A_CORE FT
Vital Signs Last 24 Hrs  T(C): 36.5 (16 Dec 2019 00:51), Max: 36.8 (15 Dec 2019 17:31)  T(F): 97.7 (16 Dec 2019 00:51), Max: 98.2 (15 Dec 2019 17:31)  HR: 83 (16 Dec 2019 00:51) (78 - 83)  BP: 113/60 (16 Dec 2019 00:51) (113/60 - 119/62)  RR: 18 (16 Dec 2019 00:51) (18 - 18)  SpO2: 93% (16 Dec 2019 00:51) (93% - 99%)

## 2019-12-16 NOTE — PROGRESS NOTE ADULT - SUBJECTIVE AND OBJECTIVE BOX
CC: abdominal pain/NSTEMI/C  diff/Abdominal mass    INTERVAL HPI/OVERNIGHT EVENTS: Patient seen and examined. Rapid AFIBB noted on CM this am. Patient has been refusing all oral medications, including Coreg. Received 2.5 mg IV lopressor. Patient currently in NSR. Swatting and spitting at nurse. Wants to be left alone.     Vital Signs Last 24 Hrs  T(C): 36.5 (16 Dec 2019 00:51), Max: 36.8 (15 Dec 2019 17:31)  T(F): 97.7 (16 Dec 2019 00:51), Max: 98.2 (15 Dec 2019 17:31)  HR: 83 (16 Dec 2019 00:51) (78 - 83)  BP: 113/60 (16 Dec 2019 00:51) (113/60 - 119/62)  BP(mean): --  RR: 18 (16 Dec 2019 00:51) (18 - 18)  SpO2: 93% (16 Dec 2019 00:51) (93% - 99%)    ROS:  Limited due to patient lack of cooperation    PHYSICAL EXAM:  Appearance: NAD  HEENT:   Atraumatic  Cardiovascular: Normal S1 S2, No JVD, No murmurs, No edema  Respiratory: Lungs clear to auscultation	  Gastrointestinal:  Soft, Non-tender, + BS	  Skin: No rashes, No ecchymoses, No cyanosis  Neurologic: Alert and awake, able to move extremities  Extremities: No edema    I&O's Detail    15 Dec 2019 07:01  -  16 Dec 2019 07:00  --------------------------------------------------------  IN:  Total IN: 0 mL    OUT:    Voided: 450 mL  Total OUT: 450 mL    Total NET: -450 mL                                    9.0    6.28  )-----------( 194      ( 16 Dec 2019 08:28 )             28.7             CAPILLARY BLOOD GLUCOSE            Hemoglobin A1C, Whole Blood: 4.7 % (12-12-19 @ 04:43)    MEDICATIONS  (STANDING):  aspirin  chewable 81 milliGRAM(s) Oral daily  benztropine 0.5 milliGRAM(s) Oral at bedtime  cefTRIAXone   IVPB 1000 milliGRAM(s) IV Intermittent every 24 hours  cefTRIAXone   IVPB      ferrous    sulfate 325 milliGRAM(s) Oral daily  folic acid 1 milliGRAM(s) Oral daily  furosemide   Injectable 40 milliGRAM(s) IV Push daily  heparin  Injectable 5000 Unit(s) SubCutaneous every 12 hours  levothyroxine Injectable 37.5 MICROGram(s) IV Push at bedtime  lisinopril 2.5 milliGRAM(s) Oral daily  lithium citrate Solution 300 milliGRAM(s) Oral daily  metoprolol tartrate Injectable 2.5 milliGRAM(s) IV Push every 6 hours  ofloxacin 0.3% Solution 1 Drop(s) Both EYES four times a day  pantoprazole  Injectable 40 milliGRAM(s) IV Push two times a day  valACYclovir 500 milliGRAM(s) Oral two times a day  vancomycin    Solution 125 milliGRAM(s) Oral every 6 hours    MEDICATIONS  (PRN):  aluminum hydroxide/magnesium hydroxide/simethicone Suspension 30 milliLiter(s) Oral every 4 hours PRN Dyspepsia  diphenhydrAMINE   Injectable 50 milliGRAM(s) IntraMuscular every 4 hours PRN Rash and/or Itching  haloperidol    Injectable 5 milliGRAM(s) IntraMuscular every 6 hours PRN agitation  LORazepam   Injectable 0.5 milliGRAM(s) IV Push every 4 hours PRN Anxiety      RADIOLOGY & ADDITIONAL TESTS: CC: abdominal pain/NSTEMI/C  diff/Abdominal mass    INTERVAL HPI/OVERNIGHT EVENTS: Patient seen and examined. Rapid AFIBB noted on CM this am. Patient has been refusing all oral medications, including Coreg. Received 2.5 mg IV lopressor. Patient currently in NSR. Swatting and spitting at nurse. Wants to be left alone.     Vital Signs Last 24 Hrs  T(C): 36.5 (16 Dec 2019 00:51), Max: 36.8 (15 Dec 2019 17:31)  T(F): 97.7 (16 Dec 2019 00:51), Max: 98.2 (15 Dec 2019 17:31)  HR: 83 (16 Dec 2019 00:51) (78 - 83)  BP: 113/60 (16 Dec 2019 00:51) (113/60 - 119/62)  BP(mean): --  RR: 18 (16 Dec 2019 00:51) (18 - 18)  SpO2: 93% (16 Dec 2019 00:51) (93% - 99%)    ROS:  Limited due to patient lack of cooperation    PHYSICAL EXAM:  Appearance: NAD  HEENT:   Atraumatic  Cardiovascular: +S1/S2  Respiratory: Lungs clear to auscultation	  Gastrointestinal:  Soft, Non-tender, + BS	  Skin: No rashes, No ecchymoses, No cyanosis  Neurologic: Alert and awake, able to move extremities  Extremities: No edema    I&O's Detail    15 Dec 2019 07:01  -  16 Dec 2019 07:00  --------------------------------------------------------  IN:  Total IN: 0 mL    OUT:    Voided: 450 mL  Total OUT: 450 mL    Total NET: -450 mL                                    9.0    6.28  )-----------( 194      ( 16 Dec 2019 08:28 )             28.7             CAPILLARY BLOOD GLUCOSE            Hemoglobin A1C, Whole Blood: 4.7 % (12-12-19 @ 04:43)    MEDICATIONS  (STANDING):  aspirin  chewable 81 milliGRAM(s) Oral daily  benztropine 0.5 milliGRAM(s) Oral at bedtime  cefTRIAXone   IVPB 1000 milliGRAM(s) IV Intermittent every 24 hours  cefTRIAXone   IVPB      ferrous    sulfate 325 milliGRAM(s) Oral daily  folic acid 1 milliGRAM(s) Oral daily  furosemide   Injectable 40 milliGRAM(s) IV Push daily  heparin  Injectable 5000 Unit(s) SubCutaneous every 12 hours  levothyroxine Injectable 37.5 MICROGram(s) IV Push at bedtime  lisinopril 2.5 milliGRAM(s) Oral daily  lithium citrate Solution 300 milliGRAM(s) Oral daily  metoprolol tartrate Injectable 2.5 milliGRAM(s) IV Push every 6 hours  ofloxacin 0.3% Solution 1 Drop(s) Both EYES four times a day  pantoprazole  Injectable 40 milliGRAM(s) IV Push two times a day  valACYclovir 500 milliGRAM(s) Oral two times a day  vancomycin    Solution 125 milliGRAM(s) Oral every 6 hours    MEDICATIONS  (PRN):  aluminum hydroxide/magnesium hydroxide/simethicone Suspension 30 milliLiter(s) Oral every 4 hours PRN Dyspepsia  diphenhydrAMINE   Injectable 50 milliGRAM(s) IntraMuscular every 4 hours PRN Rash and/or Itching  haloperidol    Injectable 5 milliGRAM(s) IntraMuscular every 6 hours PRN agitation  LORazepam   Injectable 0.5 milliGRAM(s) IV Push every 4 hours PRN Anxiety      RADIOLOGY & ADDITIONAL TESTS: CC: abdominal pain/NSTEMI/C  diff/Abdominal mass    INTERVAL HPI/OVERNIGHT EVENTS: Patient seen and examined. Rapid AFIBB noted on CM this am. Patient has been refusing all oral medications, including Coreg. Received 2.5 mg IV lopressor. Patient currently in NSR. Swatting and spitting at nurse. Wants to be left alone.     Vital Signs Last 24 Hrs  T(C): 36.5 (16 Dec 2019 00:51), Max: 36.8 (15 Dec 2019 17:31)  T(F): 97.7 (16 Dec 2019 00:51), Max: 98.2 (15 Dec 2019 17:31)  HR: 83 (16 Dec 2019 00:51) (78 - 83)  BP: 113/60 (16 Dec 2019 00:51) (113/60 - 119/62)  BP(mean): --  RR: 18 (16 Dec 2019 00:51) (18 - 18)  SpO2: 93% (16 Dec 2019 00:51) (93% - 99%)    ROS:  Limited due to patient lack of cooperation    PHYSICAL EXAM:  Appearance: NAD  HEENT:   Atraumatic  Cardiovascular: +S1/S2  Respiratory: Lungs clear to auscultation	  Gastrointestinal:  Soft, Non-tender, + BS	  Skin: No rashes, No ecchymoses, No cyanosis  Neurologic: Alert and awake, able to move extremities  Extremities: No edema    I&O's Detail    15 Dec 2019 07:01  -  16 Dec 2019 07:00  --------------------------------------------------------  IN:  Total IN: 0 mL    OUT:    Voided: 450 mL  Total OUT: 450 mL    Total NET: -450 mL                          9.0    6.28  )-----------( 194      ( 16 Dec 2019 08:28 )             28.7     Hemoglobin A1C, Whole Blood: 4.7 % (12-12-19 @ 04:43)    MEDICATIONS  (STANDING):  aspirin  chewable 81 milliGRAM(s) Oral daily  benztropine 0.5 milliGRAM(s) Oral at bedtime  cefTRIAXone   IVPB 1000 milliGRAM(s) IV Intermittent every 24 hours  cefTRIAXone   IVPB      ferrous    sulfate 325 milliGRAM(s) Oral daily  folic acid 1 milliGRAM(s) Oral daily  furosemide   Injectable 40 milliGRAM(s) IV Push daily  heparin  Injectable 5000 Unit(s) SubCutaneous every 12 hours  levothyroxine Injectable 37.5 MICROGram(s) IV Push at bedtime  lisinopril 2.5 milliGRAM(s) Oral daily  lithium citrate Solution 300 milliGRAM(s) Oral daily  metoprolol tartrate Injectable 2.5 milliGRAM(s) IV Push every 6 hours  ofloxacin 0.3% Solution 1 Drop(s) Both EYES four times a day  pantoprazole  Injectable 40 milliGRAM(s) IV Push two times a day  valACYclovir 500 milliGRAM(s) Oral two times a day  vancomycin    Solution 125 milliGRAM(s) Oral every 6 hours    MEDICATIONS  (PRN):  aluminum hydroxide/magnesium hydroxide/simethicone Suspension 30 milliLiter(s) Oral every 4 hours PRN Dyspepsia  diphenhydrAMINE   Injectable 50 milliGRAM(s) IntraMuscular every 4 hours PRN Rash and/or Itching  haloperidol    Injectable 5 milliGRAM(s) IntraMuscular every 6 hours PRN agitation  LORazepam   Injectable 0.5 milliGRAM(s) IV Push every 4 hours PRN Anxiety

## 2019-12-16 NOTE — PROGRESS NOTE ADULT - ATTENDING COMMENTS
I have personally seen and examined patient on the above date.  I discussed the case with MARCIANO Sloan and I agree with findings and plan as detailed per note above, which I have amended where appropriate.

## 2019-12-16 NOTE — PROGRESS NOTE BEHAVIORAL HEALTH - NSBHFUPINTERVALHXFT_PSY_A_CORE
refusing care irritable Has recurrent diarrhea On contact precautions due to clostridium difficile  heme / oncology note appreciated which indicates discussion w sister who would be her surrogate

## 2019-12-17 LAB
ANION GAP SERPL CALC-SCNC: 12 MMOL/L — SIGNIFICANT CHANGE UP (ref 5–17)
BUN SERPL-MCNC: 27 MG/DL — HIGH (ref 8–20)
CALCIUM SERPL-MCNC: 8 MG/DL — LOW (ref 8.6–10.2)
CHLORIDE SERPL-SCNC: 106 MMOL/L — SIGNIFICANT CHANGE UP (ref 98–107)
CO2 SERPL-SCNC: 19 MMOL/L — LOW (ref 22–29)
CREAT SERPL-MCNC: 1.12 MG/DL — SIGNIFICANT CHANGE UP (ref 0.5–1.3)
GLUCOSE SERPL-MCNC: 98 MG/DL — SIGNIFICANT CHANGE UP (ref 70–115)
HCT VFR BLD CALC: 30.9 % — LOW (ref 34.5–45)
HGB BLD-MCNC: 9.3 G/DL — LOW (ref 11.5–15.5)
MCHC RBC-ENTMCNC: 30.1 GM/DL — LOW (ref 32–36)
MCHC RBC-ENTMCNC: 30.8 PG — SIGNIFICANT CHANGE UP (ref 27–34)
MCV RBC AUTO: 102.3 FL — HIGH (ref 80–100)
PLATELET # BLD AUTO: 183 K/UL — SIGNIFICANT CHANGE UP (ref 150–400)
POTASSIUM SERPL-MCNC: 4.1 MMOL/L — SIGNIFICANT CHANGE UP (ref 3.5–5.3)
POTASSIUM SERPL-SCNC: 4.1 MMOL/L — SIGNIFICANT CHANGE UP (ref 3.5–5.3)
RBC # BLD: 3.02 M/UL — LOW (ref 3.8–5.2)
RBC # FLD: 15 % — HIGH (ref 10.3–14.5)
SODIUM SERPL-SCNC: 137 MMOL/L — SIGNIFICANT CHANGE UP (ref 135–145)
WBC # BLD: 5.92 K/UL — SIGNIFICANT CHANGE UP (ref 3.8–10.5)
WBC # FLD AUTO: 5.92 K/UL — SIGNIFICANT CHANGE UP (ref 3.8–10.5)

## 2019-12-17 PROCEDURE — 99232 SBSQ HOSP IP/OBS MODERATE 35: CPT

## 2019-12-17 PROCEDURE — 99223 1ST HOSP IP/OBS HIGH 75: CPT

## 2019-12-17 PROCEDURE — 99497 ADVNCD CARE PLAN 30 MIN: CPT | Mod: 25

## 2019-12-17 RX ORDER — MORPHINE SULFATE 50 MG/1
2 CAPSULE, EXTENDED RELEASE ORAL EVERY 4 HOURS
Refills: 0 | Status: DISCONTINUED | OUTPATIENT
Start: 2019-12-17 | End: 2019-12-19

## 2019-12-17 RX ORDER — ACETAMINOPHEN 500 MG
1000 TABLET ORAL ONCE
Refills: 0 | Status: COMPLETED | OUTPATIENT
Start: 2019-12-17 | End: 2019-12-17

## 2019-12-17 RX ORDER — GABAPENTIN 400 MG/1
100 CAPSULE ORAL AT BEDTIME
Refills: 0 | Status: DISCONTINUED | OUTPATIENT
Start: 2019-12-17 | End: 2019-12-19

## 2019-12-17 RX ORDER — HALOPERIDOL DECANOATE 100 MG/ML
5 INJECTION INTRAMUSCULAR EVERY 6 HOURS
Refills: 0 | Status: DISCONTINUED | OUTPATIENT
Start: 2019-12-17 | End: 2019-12-19

## 2019-12-17 RX ORDER — FENTANYL CITRATE 50 UG/ML
1 INJECTION INTRAVENOUS
Refills: 0 | Status: DISCONTINUED | OUTPATIENT
Start: 2019-12-17 | End: 2019-12-19

## 2019-12-17 RX ADMIN — LITHIUM CARBONATE 300 MILLIGRAM(S): 300 TABLET, EXTENDED RELEASE ORAL at 19:57

## 2019-12-17 RX ADMIN — Medication 0.5 MILLIGRAM(S): at 19:52

## 2019-12-17 RX ADMIN — Medication 325 MILLIGRAM(S): at 13:00

## 2019-12-17 RX ADMIN — FENTANYL CITRATE 1 PATCH: 50 INJECTION INTRAVENOUS at 12:58

## 2019-12-17 RX ADMIN — CEFTRIAXONE 100 MILLIGRAM(S): 500 INJECTION, POWDER, FOR SOLUTION INTRAMUSCULAR; INTRAVENOUS at 10:00

## 2019-12-17 RX ADMIN — FENTANYL CITRATE 1 PATCH: 50 INJECTION INTRAVENOUS at 20:00

## 2019-12-17 RX ADMIN — VALACYCLOVIR 500 MILLIGRAM(S): 500 TABLET, FILM COATED ORAL at 05:56

## 2019-12-17 RX ADMIN — Medication 0.5 MILLIGRAM(S): at 12:44

## 2019-12-17 RX ADMIN — LISINOPRIL 2.5 MILLIGRAM(S): 2.5 TABLET ORAL at 05:57

## 2019-12-17 RX ADMIN — Medication 125 MILLIGRAM(S): at 13:00

## 2019-12-17 RX ADMIN — Medication 0.5 MILLIGRAM(S): at 23:51

## 2019-12-17 RX ADMIN — Medication 125 MILLIGRAM(S): at 05:58

## 2019-12-17 RX ADMIN — HEPARIN SODIUM 5000 UNIT(S): 5000 INJECTION INTRAVENOUS; SUBCUTANEOUS at 23:53

## 2019-12-17 RX ADMIN — Medication 1 DROP(S): at 05:57

## 2019-12-17 RX ADMIN — PANTOPRAZOLE SODIUM 40 MILLIGRAM(S): 20 TABLET, DELAYED RELEASE ORAL at 19:15

## 2019-12-17 RX ADMIN — Medication 1 DROP(S): at 23:55

## 2019-12-17 RX ADMIN — Medication 40 MILLIGRAM(S): at 05:57

## 2019-12-17 RX ADMIN — Medication 2.5 MILLIGRAM(S): at 05:57

## 2019-12-17 RX ADMIN — Medication 2.5 MILLIGRAM(S): at 19:30

## 2019-12-17 RX ADMIN — HEPARIN SODIUM 5000 UNIT(S): 5000 INJECTION INTRAVENOUS; SUBCUTANEOUS at 05:57

## 2019-12-17 RX ADMIN — GABAPENTIN 100 MILLIGRAM(S): 400 CAPSULE ORAL at 23:53

## 2019-12-17 RX ADMIN — Medication 81 MILLIGRAM(S): at 12:43

## 2019-12-17 RX ADMIN — Medication 125 MILLIGRAM(S): at 23:59

## 2019-12-17 RX ADMIN — Medication 1 DROP(S): at 12:43

## 2019-12-17 RX ADMIN — Medication 2.5 MILLIGRAM(S): at 13:00

## 2019-12-17 RX ADMIN — PANTOPRAZOLE SODIUM 40 MILLIGRAM(S): 20 TABLET, DELAYED RELEASE ORAL at 05:58

## 2019-12-17 RX ADMIN — Medication 125 MILLIGRAM(S): at 19:13

## 2019-12-17 RX ADMIN — Medication 1 DROP(S): at 19:13

## 2019-12-17 RX ADMIN — Medication 37.5 MICROGRAM(S): at 23:55

## 2019-12-17 NOTE — CONSULT NOTE ADULT - REASON FOR ADMISSION
abdominal pain. NSTEMI

## 2019-12-17 NOTE — CONSULT NOTE ADULT - ATTENDING COMMENTS
COUNSELING:    Face to face meeting to discuss Advanced Care Planning - Time Spent ______ Minutes.  See goals of care note.    More than 50% time spent in counseling and coordinating care. ___60___ Minutes.     Thank you for the opportunity to assist with the care of this patient.   [facility] [phone]. COUNSELING:    Face to face meeting to discuss Advanced Care Planning - Time Spent ___45  [facility] [phone].

## 2019-12-17 NOTE — PROGRESS NOTE ADULT - SUBJECTIVE AND OBJECTIVE BOX
chart reviewed patient seen She feels better asked me to be her boyfriend otherwise had no issues  Asking who was in her room ( no one else there) Expressed delusion that her sister was next to her " She is a witch"  notes indicate refusal of care meds treatment Appreciated notes regarding  sister involvement  Vital Signs Last 24 Hrs  T(C): 36.6 (17 Dec 2019 04:53), Max: 36.6 (17 Dec 2019 04:53)  T(F): 97.8 (17 Dec 2019 04:53), Max: 97.8 (17 Dec 2019 04:53)  HR: 80 (17 Dec 2019 04:53) (77 - 91)  BP: 120/68 (17 Dec 2019 04:53) (120/68 - 126/58)  BP(mean): --  RR: 17 (17 Dec 2019 04:53) (17 - 18)  SpO2: 96% (17 Dec 2019 04:53) (94% - 96%)                        9.3    5.92  )-----------( 183      ( 17 Dec 2019 07:37 )             30.9     12-17    137  |  106  |  27.0<H>  ----------------------------<  98  4.1   |  19.0<L>  |  1.12    Ca    8.0<L>      17 Dec 2019 07:35

## 2019-12-17 NOTE — CONSULT NOTE ADULT - SUBJECTIVE AND OBJECTIVE BOX
HPI:This is a 75 yo F PMH of Bipolar Disorder, Multiple Myeloma in remission, CHF, HTN, Hypothyroid admitted with lengthy bout of diarrhea (+C-Diff) hypotension Chest abdomen and pelvic  Lytic lesions. Found to have NSTEMI and UTI w/o hematuria.  Speaks with her eyes closed, admits to having pain all the time stated:  "Are you stupid?" She is awake and alert refusing most PO medications. Paranoid and distrustful-thought a clean laundered gown was dirty and insisted it be taken off-leaving her naked  lower body covered by sheet.  Passing frequent liquid stool, demanding incontinence pad to be changed frequently. Appetite fair, will eat  "if I am fed" Abdomen firm and distended. No CP. palpitations N/V/SOB    77 y/o female with h/o multiple myeloma, CHF, HTN, Bipolar disorder, was referred from [facility] for abdominal pain and diarrhea for >1 month. Hypotension. no fever. CTA chest abdomen and pelvis showed numerous lytic lesions and a large infiltrating pelvic mass encasing rectum, uterus, bladder and iliac vessels. Labs sgowed Hgb: 9.7 (was 10.7 on 11/4/19). Trop: 0.23-0.26-0.26. no chest pain, SOB or Palpitations. no orthopnea. o/e: abdominal distension with epigastric/RUQ tenderness and multiple enlarged firm fixed left supraclavicular lymph nodes (12 Dec 2019 10:03)      PERTINENT PMH REVIEWED: Yes     PAST MEDICAL & SURGICAL HISTORY:  Congestive heart failure (CHF)  Multiple myeloma  Mental disorder  Bone cancer  Scoliosis  Hypercholesteremia  Hypertension  Hypothyroid  No significant past surgical history      SOCIAL HISTORY:  EtOH  No                                    Drugs    No                                    smoker  nonsmoker                                    Admitted from:  [facility]______Surrogate-HCP Kim Chambers (sister) Phone#:[phone]    FAMILY HISTORY:  Non contributory    Allergies  ixazomib (Unknown)  NSAIDs (Unknown)  penicillins (Unknown)  sulfa drugs (Unknown)    Intolerances    Baseline ADLs (prior to admission):   Dependent      Present Symptoms:   Dyspnea: 0    Nausea/Vomiting: No  Anxiety:  Yes  BIPOLAR DISORDER  Depression: Yes   Fatigue: Yes   Loss of appetite:  No sHE WILL EAT IF FED AS PER PATIENT    Pain: ABDOMINAL             Character-            Duration-            Effect-            Factors-            Frequency-            Location-pelvic (lytic lesions) and abdominal epigastric RUQ tenderness            Severity-    Review of Systems: Reviewed                     Unable to obtain due to poor mentation       MEDICATIONS  (STANDING):  acetaminophen  IVPB .. 1000 milliGRAM(s) IV Intermittent once  aspirin  chewable 81 milliGRAM(s) Oral daily  benztropine 0.5 milliGRAM(s) Oral at bedtime  cefTRIAXone   IVPB 1000 milliGRAM(s) IV Intermittent every 24 hours  cefTRIAXone   IVPB      fentaNYL   Patch  12 MICROgram(s)/Hr 1 Patch Transdermal every 72 hours  ferrous    sulfate 325 milliGRAM(s) Oral daily  folic acid 1 milliGRAM(s) Oral daily  furosemide   Injectable 40 milliGRAM(s) IV Push daily  gabapentin 100 milliGRAM(s) Oral at bedtime  heparin  Injectable 5000 Unit(s) SubCutaneous every 12 hours  levothyroxine Injectable 37.5 MICROGram(s) IV Push at bedtime  lisinopril 2.5 milliGRAM(s) Oral daily  lithium citrate Solution 300 milliGRAM(s) Oral daily  LORazepam   Injectable 0.5 milliGRAM(s) IV Push two times a day  metoprolol tartrate Injectable 2.5 milliGRAM(s) IV Push every 6 hours  ofloxacin 0.3% Solution 1 Drop(s) Both EYES four times a day  pantoprazole  Injectable 40 milliGRAM(s) IV Push two times a day  valACYclovir 500 milliGRAM(s) Oral two times a day  vancomycin    Solution 125 milliGRAM(s) Oral every 6 hours    MEDICATIONS  (PRN):  aluminum hydroxide/magnesium hydroxide/simethicone Suspension 30 milliLiter(s) Oral every 4 hours PRN Dyspepsia  diphenhydrAMINE   Injectable 50 milliGRAM(s) IntraMuscular every 4 hours PRN Rash and/or Itching  haloperidol    Injectable 5 milliGRAM(s) IV Push every 6 hours PRN Agitation  morphine  - Injectable 2 milliGRAM(s) IV Push every 4 hours PRN Moderate Pain (4 - 6)      PHYSICAL EXAM:    Vital Signs Last 24 Hrs  T(C): 36.7 (17 Dec 2019 09:21), Max: 36.7 (17 Dec 2019 09:21)  T(F): 98.1 (17 Dec 2019 09:21), Max: 98.1 (17 Dec 2019 09:21)  HR: 73 (17 Dec 2019 09:21) (73 - 91)  BP: 130/65 (17 Dec 2019 09:21) (120/68 - 130/65)  BP(mean): --  RR: 18 (17 Dec 2019 09:21) (17 - 18)  SpO2: 93% (17 Dec 2019 09:21) (93% - 96%)    General: alert  uncooperative-Psychiatric Mental Disorder-affect angry and flat__ lethargic agitated                    HEENT: dry mouth      Lungs: comfortable    CV: normal      GI:  distended  tender  +CDIFF liquid stools                 constipation  lfrequent      : normal  incontinent      MSK:  weakness               bedbound/wheelchair bound    Skin: normal _  no rash    LABS:                        9.3    5.92  )-----------( 183      ( 17 Dec 2019 07:37 )             30.9     12-17    137  |  106  |  27.0<H>  ----------------------------<  98  4.1   |  19.0<L>  |  1.12    Ca    8.0<L>      17 Dec 2019 07:35    I&O's Summary    RADIOLOGY & ADDITIONAL STUDIES:      ADVANCE DIRECTIVES:   DNR YES NO  Complete< from: CT Abdomen and Pelvis w/ IV Cont (12.12.19 @ 02:25) >  Bones/joints: Destructive lesion   involving acromion process-left scapula. Lytic lesions involving lateral   aspect 2nd left rib and at multiple levels in the thoracic spine, for   example T2, T4, T8, T9 and T10. Stable moderate T9 and mild T10   compression deformities. Old bilateral rib fractures  Soft tissues: Anasarca.    IMPRESSION:   1. No evidence of pulmonary embolism.   2. Pulmonary edema and small bilateral pleural effusions.  3.  Lytic lesions involving the acromion process-left scapula, left   second rib and multiple vertebral bodies suspicious for metastatic   disease.    DNR: Yes  MOLST  YES NO   Completed on:  Living Will  YES NO   Completed on: HPI:This is a 75 yo F PMH of Bipolar Disorder, Multiple Myeloma in remission, CHF, HTN, Hypothyroid admitted with lengthy bout of diarrhea (+C-Diff) hypotension Chest abdomen and pelvic  Lytic lesions. Found to have NSTEMI and UTI w/o hematuria.  Speaks with her eyes closed, admits to having pain all the time stated:  "Are you stupid?" She is awake and alert refusing most PO medications. Paranoid and distrustful-thought a clean laundered gown was dirty and insisted it be taken off-leaving her naked  lower body covered by sheet.  Passing frequent liquid stool, demanding incontinence pad to be changed frequently. Appetite fair, will eat  "if I am fed" Abdomen firm and distended. No CP. palpitations N/V/SOB    77 y/o female with h/o multiple myeloma, CHF, HTN, Bipolar disorder, was referred from [facility] for abdominal pain and diarrhea for >1 month. Hypotension. no fever. CTA chest abdomen and pelvis showed numerous lytic lesions and a large infiltrating pelvic mass encasing rectum, uterus, bladder and iliac vessels. Labs sgowed Hgb: 9.7 (was 10.7 on 11/4/19). Trop: 0.23-0.26-0.26. no chest pain, SOB or Palpitations. no orthopnea. o/e: abdominal distension with epigastric/RUQ tenderness and multiple enlarged firm fixed left supraclavicular lymph nodes (12 Dec 2019 10:03)      PERTINENT PMH REVIEWED: Yes     PAST MEDICAL & SURGICAL HISTORY:  Congestive heart failure (CHF)  Multiple myeloma  Mental disorder  Bone cancer  Scoliosis  Hypercholesteremia  Hypertension  Hypothyroid  No significant past surgical history      SOCIAL HISTORY:  EtOH  No                                    Drugs    No                                    smoker  nonsmoker                                    Admitted from:  [facility]______Surrogate-HCP Kim Chambers (sister) Phone#:[phone]    FAMILY HISTORY:  Non contributory    Allergies  ixazomib (Unknown)  NSAIDs (Unknown)  penicillins (Unknown)  sulfa drugs (Unknown)    Intolerances    Baseline ADLs (prior to admission):   Dependent      Present Symptoms:   Dyspnea: 0    Nausea/Vomiting: No  Anxiety:  Yes  BIPOLAR DISORDER  Depression: Yes   Fatigue: Yes   Loss of appetite:  No sHE WILL EAT IF FED AS PER PATIENT    Pain: ABDOMINAL             Character-            Duration-            Effect-            Factors-            Frequency-            Location-pelvic (lytic lesions) and abdominal epigastric RUQ tenderness            Severity-    Review of Systems: Reviewed                     Unable to obtain due to poor mentation       MEDICATIONS  (STANDING):  acetaminophen  IVPB .. 1000 milliGRAM(s) IV Intermittent once  aspirin  chewable 81 milliGRAM(s) Oral daily  benztropine 0.5 milliGRAM(s) Oral at bedtime  cefTRIAXone   IVPB 1000 milliGRAM(s) IV Intermittent every 24 hours  cefTRIAXone   IVPB      fentaNYL   Patch  12 MICROgram(s)/Hr 1 Patch Transdermal every 72 hours  ferrous    sulfate 325 milliGRAM(s) Oral daily  folic acid 1 milliGRAM(s) Oral daily  furosemide   Injectable 40 milliGRAM(s) IV Push daily  gabapentin 100 milliGRAM(s) Oral at bedtime  heparin  Injectable 5000 Unit(s) SubCutaneous every 12 hours  levothyroxine Injectable 37.5 MICROGram(s) IV Push at bedtime  lisinopril 2.5 milliGRAM(s) Oral daily  lithium citrate Solution 300 milliGRAM(s) Oral daily  LORazepam   Injectable 0.5 milliGRAM(s) IV Push two times a day  metoprolol tartrate Injectable 2.5 milliGRAM(s) IV Push every 6 hours  ofloxacin 0.3% Solution 1 Drop(s) Both EYES four times a day  pantoprazole  Injectable 40 milliGRAM(s) IV Push two times a day  valACYclovir 500 milliGRAM(s) Oral two times a day  vancomycin    Solution 125 milliGRAM(s) Oral every 6 hours    MEDICATIONS  (PRN):  aluminum hydroxide/magnesium hydroxide/simethicone Suspension 30 milliLiter(s) Oral every 4 hours PRN Dyspepsia  diphenhydrAMINE   Injectable 50 milliGRAM(s) IntraMuscular every 4 hours PRN Rash and/or Itching  haloperidol    Injectable 5 milliGRAM(s) IV Push every 6 hours PRN Agitation  morphine  - Injectable 2 milliGRAM(s) IV Push every 4 hours PRN Moderate Pain (4 - 6)      PHYSICAL EXAM:    Vital Signs Last 24 Hrs  T(C): 36.7 (17 Dec 2019 09:21), Max: 36.7 (17 Dec 2019 09:21)  T(F): 98.1 (17 Dec 2019 09:21), Max: 98.1 (17 Dec 2019 09:21)  HR: 73 (17 Dec 2019 09:21) (73 - 91)  BP: 130/65 (17 Dec 2019 09:21) (120/68 - 130/65)  BP(mean): --  RR: 18 (17 Dec 2019 09:21) (17 - 18)  SpO2: 93% (17 Dec 2019 09:21) (93% - 96%)    General: alert  uncooperative-Psychiatric Mental Disorder-affect angry and flat__ lethargic agitated                    HEENT: dry mouth      Lungs: comfortable    CV: normal      GI:  distended  tender  +CDIFF liquid stools                 constipation  lfrequent      : normal  incontinent      MSK:  weakness               bedbound/wheelchair bound    Skin: normal _  no rash    LABS:                        9.3    5.92  )-----------( 183      ( 17 Dec 2019 07:37 )             30.9     12-17    137  |  106  |  27.0<H>  ----------------------------<  98  4.1   |  19.0<L>  |  1.12    Ca    8.0<L>      17 Dec 2019 07:35    I&O's Summary    RADIOLOGY & ADDITIONAL STUDIES:      ADVANCE DIRECTIVES:   DNR YES NO  Complete< from: CT Abdomen and Pelvis w/ IV Cont (12.12.19 @ 02:25) >  Bones/joints: Destructive lesion   involving acromion process-left scapula. Lytic lesions involving lateral   aspect 2nd left rib and at multiple levels in the thoracic spine, for   example T2, T4, T8, T9 and T10. Stable moderate T9 and mild T10   compression deformities. Old bilateral rib fractures  Soft tissues: Anasarca.    IMPRESSION:   1. No evidence of pulmonary embolism.   2. Pulmonary edema and small bilateral pleural effusions.  3.  Lytic lesions involving the acromion process-left scapula, left   second rib and multiple vertebral bodies suspicious for metastatic   disease.    DNR: Yes  MOLST  YES    Completed on: Surrogate (sister Kim signed)  Living Will   NO   Completed on:

## 2019-12-17 NOTE — GOALS OF CARE CONVERSATION - ADVANCED CARE PLANNING - WHAT MATTERS MOST
That her symptoms be managed, not suffer in pain-treating acutely  respecting Patient's wishes to refuse diagnostics and medications- as it is her right

## 2019-12-17 NOTE — PROGRESS NOTE ADULT - SUBJECTIVE AND OBJECTIVE BOX
CC: abdominal pain/NSTEMI/C -diff/Abdominal mass    INTERVAL HPI/OVERNIGHT EVENTS: Patient seen and examined. Still refusing most oral medications. Having diarrhea. Taking small amounts po. Still having abdominal pain. Uncooperative with care.     Vital Signs Last 24 Hrs  T(C): 36.7 (17 Dec 2019 09:21), Max: 36.7 (17 Dec 2019 09:21)  T(F): 98.1 (17 Dec 2019 09:21), Max: 98.1 (17 Dec 2019 09:21)  HR: 73 (17 Dec 2019 09:21) (73 - 91)  BP: 130/65 (17 Dec 2019 09:21) (120/68 - 130/65)  BP(mean): --  RR: 18 (17 Dec 2019 09:21) (17 - 18)  SpO2: 93% (17 Dec 2019 09:21) (93% - 96%)    ROS:  Limited, patient uncooperative.       PHYSICAL EXAM:  Appearance: NAD  HEENT:   Atraumatic  Cardiovascular: +S1/S2  Respiratory: Lungs clear to auscultation	  Gastrointestinal:  +distention, + BS	  Skin: No rashes, No ecchymoses, No cyanosis  Neurologic: Alert and awake, able to move extremities  Extremities: No edema  I&O's Detail                                9.3    5.92  )-----------( 183      ( 17 Dec 2019 07:37 )             30.9     17 Dec 2019 07:35    137    |  106    |  27.0   ----------------------------<  98     4.1     |  19.0   |  1.12     Ca    8.0        17 Dec 2019 07:35        CAPILLARY BLOOD GLUCOSE            Hemoglobin A1C, Whole Blood: 4.7 % (12-12-19 @ 04:43)    MEDICATIONS  (STANDING):  acetaminophen  IVPB .. 1000 milliGRAM(s) IV Intermittent once  aspirin  chewable 81 milliGRAM(s) Oral daily  benztropine 0.5 milliGRAM(s) Oral at bedtime  cefTRIAXone   IVPB 1000 milliGRAM(s) IV Intermittent every 24 hours  cefTRIAXone   IVPB      fentaNYL   Patch  12 MICROgram(s)/Hr 1 Patch Transdermal every 72 hours  ferrous    sulfate 325 milliGRAM(s) Oral daily  folic acid 1 milliGRAM(s) Oral daily  furosemide   Injectable 40 milliGRAM(s) IV Push daily  gabapentin 100 milliGRAM(s) Oral at bedtime  heparin  Injectable 5000 Unit(s) SubCutaneous every 12 hours  levothyroxine Injectable 37.5 MICROGram(s) IV Push at bedtime  lisinopril 2.5 milliGRAM(s) Oral daily  lithium citrate Solution 300 milliGRAM(s) Oral daily  LORazepam   Injectable 0.5 milliGRAM(s) IV Push two times a day  metoprolol tartrate Injectable 2.5 milliGRAM(s) IV Push every 6 hours  ofloxacin 0.3% Solution 1 Drop(s) Both EYES four times a day  pantoprazole  Injectable 40 milliGRAM(s) IV Push two times a day  valACYclovir 500 milliGRAM(s) Oral two times a day  vancomycin    Solution 125 milliGRAM(s) Oral every 6 hours    MEDICATIONS  (PRN):  aluminum hydroxide/magnesium hydroxide/simethicone Suspension 30 milliLiter(s) Oral every 4 hours PRN Dyspepsia  diphenhydrAMINE   Injectable 50 milliGRAM(s) IntraMuscular every 4 hours PRN Rash and/or Itching  haloperidol    Injectable 5 milliGRAM(s) IV Push every 6 hours PRN Agitation  morphine  - Injectable 2 milliGRAM(s) IV Push every 4 hours PRN Moderate Pain (4 - 6)      RADIOLOGY & ADDITIONAL TESTS:

## 2019-12-17 NOTE — CONSULT NOTE ADULT - ASSESSMENT
Assessment and Plan:   · Assessment		  77 y/o female with h/o multiple myeloma, CHF, HTN, Bipolar disorder, was referred from [facility] for abdominal pain and diarrhea for >1 month. Hypotension. no fever. CTA chest abdomen and pelvis showed numerous lytic lesions involving ribs. vertebrae and scapula and a large infiltrating pelvic mass encasing rectum, uterus, bladder and iliac vessels. Labs showed Hgb: 9.7 (was 10.7 on 11/4/19, troponin positive , pt is evaluated by surgery , GI and cardiology   she has no capacity to make any decision , psychiatry consult appreciated     1- Abd pain   diarrhea likely due to colitis /c diff colitis and pelvic mass   Continue PO vanco for c diff infection  Biopsy by IR for mass- Sister Kim is Surrogate HCP does not want any invasive intervention including Biopsy       2- Pelvis mass   need tissue dx biopsy  [place] to assist to found out who is surrogate   and decided for further testing   she is currently refusing  ( impaired decision making capacity )    3-  NSTEMI   Abnormal  ECG and positive troponin   cont medical therapy , encourage to take medicine   d/w cardiology   Added Morphine 2 mg IVP prn for CP or abdominal breakthrough pain  Neurontin 100 mg at bedtime for abdominal and neuropathic pain  Decision needed re medical therapy vs ischemic work up?     4- Bipolar disorder with delirium multifactorial   +UTI treating  Ativan 0.5mg IVP BID ordered standing as was her medications given at [facility] current meds per psychiatry    she is refusing to take meds at times   she is with impaired decision making capacity     5- MM - has been  refusing  therapy per oncologist   CT report of pelvis and abdomen copied above  lytic lesions probably secondary to MM   Lytic Lesions Pain  Poor self advocate-cannot self report her pain intensity  Start Fentanyl 12 mcg/hr for long acting pain relief since patient refusing most PO meds  Morphine 2mg IVP Q4 prn for breakthrough pain  Ofirmev 1 Gm IV ordered x 1    Per Dr Smyth psychiatry input   surrogate consent for procedures needing informed consent as patient has impaired decision making capacity  Asked [place] to assist in identifying either proxy or available surrogate.    [facility]  MOLST completed DNR/DNI  treat acutely   Consider [place] for post hospital Palliative Hospice Care  Left message with Sister Kim's phone to return my call to update clinical information  and ask about possibility of [place] as a safe discharge p[carmelita Assessment and Plan:   · Assessment		  77 y/o female with h/o multiple myeloma, CHF, HTN, Bipolar disorder, was referred from [facility] for abdominal pain and diarrhea for >1 month. Hypotension. no fever. CTA chest abdomen and pelvis showed numerous lytic lesions involving ribs. vertebrae and scapula and a large infiltrating pelvic mass encasing rectum, uterus, bladder and iliac vessels. Labs showed Hgb: 9.7 (was 10.7 on 11/4/19, troponin positive , pt is evaluated by surgery , GI and cardiology   she has no capacity to make any decision , psychiatry consult appreciated     1- Abd pain   diarrhea likely due to colitis /c diff colitis and pelvic mass   Continue PO vanco for c diff infection  Biopsy by IR for mass- Sister Kim is Surrogate HCP does not want any invasive intervention including Biopsy       2- Pelvis mass   need tissue dx biopsy.  Refusing surgical intervention  Abdominal distention and pressure pain  Treat pain-poor self advocate    3-  NSTEMI   Abnormal  ECG and positive troponin   cont medical therapy , encourage to take medicine   d/w cardiology   Added Morphine 2 mg IVP prn for CP or abdominal breakthrough pain  Neurontin 100 mg at bedtime for abdominal and neuropathic pain  Decision needed re medical therapy vs ischemic work up?     4- Bipolar disorder with delirium multifactorial   +UTI treating  Ativan 0.5mg IVP BID ordered standing as was her medications given at [facility] current meds per psychiatry    she is refusing to take oral  meds most  times   ( impaired decision making capacity )    5- MM - has been  refusing  therapy per oncologist   CT report of pelvis and abdomen copied above  lytic lesions probably secondary to MM   Lytic Lesions Pain  Poor self advocate-cannot self report her pain intensity  Start Fentanyl 12 mcg/hr for long acting pain relief since patient refusing most PO meds  Morphine 2mg IVP Q4 prn for breakthrough pain  Ofirmev 1 Gm IV ordered x 1    Per Dr Smyth psychiatry input   surrogate consent for procedures needing informed consent as patient has impaired decision making capacity  Sister Kim is her Sister and Surrogate proxy, who knows her history, and has been advocating for Patient for many years    [facility]  SKYLER completed DNR/DNI   for State visited Patient's bedside and had a conversation with her about resusitation-Patient stated she wanted resusitation  not sure about ventilator and keeping alive on machines  Continue to treat acutely   Consider [place] for post hospital Palliative Hospice Care  Met with Kim this afternoon see separate GOC note  Left message with Sister Kim's phone to return my call to update clinical information  and ask about possibility of [place] as a safe discharge p[carmelita Assessment and Plan:   · Assessment		  75 y/o female with h/o multiple myeloma, CHF, HTN, Bipolar disorder, was referred from [facility] for abdominal pain and diarrhea for >1 month. Hypotension. no fever. CTA chest abdomen and pelvis showed numerous lytic lesions involving ribs. vertebrae and scapula and a large infiltrating pelvic mass encasing rectum, uterus, bladder and iliac vessels. Labs showed Hgb: 9.7 (was 10.7 on 11/4/19, troponin positive , pt is evaluated by surgery , GI and cardiology   she has no capacity to make any decision , psychiatry consult appreciated     1- PAIN  Abdominal    Abdominal distention and pressure pain  Treat pain-poor self advocate  Continue PO vanco for c diff infection          Pelvis mass          need tissue dx biopsy.         Biopsy by IR for mass- Sister Kim is Surrogate HCP does not want any invasive intervention including Biopsy    Bone Lytic Lesions Pain-would benefit from continuing decadron  Poor self advocate-cannot self report her pain intensity  Start Fentanyl 12 mcg/hr for long acting pain relief since patient refusing most PO meds  Morphine 2mg IVP Q4 prn for breakthrough pain  Ofirmev 1 Gm IV ordered x 1      - Bipolar disorder with delirium multifactorial   +UTI treating  Ativan 0.5mg IVP BID ordered standing as was her medications given at [place]  cont current meds per psychiatry    she is refusing to take oral  meds most  times   ( impaired decision making capacity )  Per Dr Smyth psychiatry input   surrogate consent for procedures needing informed consent as patient has impaired decision making capacity  Sister Kim is her Sister and Surrogate proxy, who knows her history, and has been advocating for Patient for many years    - Multiple Myeloma - Treated in past with chemo RT.  Knowing she is no longer in remission- she has been  refusing  therapy per oncologist for some time  CT report of pelvis and abdomen copied above  Lytic lesions probably secondary to MM-Metastatic Disease     DIARRHEA   likely due to colitis /c diff colitis and pelvic mass.  Skin care-Zinc Oxide paste  Frequent perianal care         NSTEMI   Abnormal  ECG and positive troponin   cont medical therapy , encourage to take medicine   Added Morphine 2 mg IVP prn for CP or abdominal breakthrough pain  Neurontin 100 mg at bedtime for abdominal and neuropathic pain  Decision refusing   ischemic work up?     [facility]  MOLST completed DNR/DNI   for S Arthur Garvey involved with MOLST Checklist #3  He visited Patient's bedside and had a conversation with her about resusitation-Patient who does not have capacity to make medical decisions  replied she would  want resusitation-but was undecided about intubation and vent dependence    Continue to treat acutely   Consider [place] for post hospital Palliative Hospice Care  Met with Kim this afternoon see separate GOC note

## 2019-12-18 LAB
HCT VFR BLD CALC: 29.8 % — LOW (ref 34.5–45)
HGB BLD-MCNC: 9.3 G/DL — LOW (ref 11.5–15.5)
MCHC RBC-ENTMCNC: 31.2 GM/DL — LOW (ref 32–36)
MCHC RBC-ENTMCNC: 31.8 PG — SIGNIFICANT CHANGE UP (ref 27–34)
MCV RBC AUTO: 102.1 FL — HIGH (ref 80–100)
PLATELET # BLD AUTO: 188 K/UL — SIGNIFICANT CHANGE UP (ref 150–400)
RBC # BLD: 2.92 M/UL — LOW (ref 3.8–5.2)
RBC # FLD: 15 % — HIGH (ref 10.3–14.5)
WBC # BLD: 5.58 K/UL — SIGNIFICANT CHANGE UP (ref 3.8–10.5)
WBC # FLD AUTO: 5.58 K/UL — SIGNIFICANT CHANGE UP (ref 3.8–10.5)

## 2019-12-18 PROCEDURE — 99232 SBSQ HOSP IP/OBS MODERATE 35: CPT

## 2019-12-18 RX ORDER — CARVEDILOL PHOSPHATE 80 MG/1
3.12 CAPSULE, EXTENDED RELEASE ORAL EVERY 12 HOURS
Refills: 0 | Status: DISCONTINUED | OUTPATIENT
Start: 2019-12-18 | End: 2019-12-19

## 2019-12-18 RX ADMIN — Medication 125 MILLIGRAM(S): at 06:20

## 2019-12-18 RX ADMIN — Medication 1 DROP(S): at 13:52

## 2019-12-18 RX ADMIN — PANTOPRAZOLE SODIUM 40 MILLIGRAM(S): 20 TABLET, DELAYED RELEASE ORAL at 18:54

## 2019-12-18 RX ADMIN — GABAPENTIN 100 MILLIGRAM(S): 400 CAPSULE ORAL at 22:37

## 2019-12-18 RX ADMIN — Medication 0.5 MILLIGRAM(S): at 18:50

## 2019-12-18 RX ADMIN — Medication 81 MILLIGRAM(S): at 18:53

## 2019-12-18 RX ADMIN — FENTANYL CITRATE 1 PATCH: 50 INJECTION INTRAVENOUS at 19:00

## 2019-12-18 RX ADMIN — HEPARIN SODIUM 5000 UNIT(S): 5000 INJECTION INTRAVENOUS; SUBCUTANEOUS at 06:19

## 2019-12-18 RX ADMIN — Medication 0.5 MILLIGRAM(S): at 22:38

## 2019-12-18 RX ADMIN — HEPARIN SODIUM 5000 UNIT(S): 5000 INJECTION INTRAVENOUS; SUBCUTANEOUS at 18:55

## 2019-12-18 RX ADMIN — Medication 0.5 MILLIGRAM(S): at 06:19

## 2019-12-18 RX ADMIN — PANTOPRAZOLE SODIUM 40 MILLIGRAM(S): 20 TABLET, DELAYED RELEASE ORAL at 06:19

## 2019-12-18 RX ADMIN — VALACYCLOVIR 500 MILLIGRAM(S): 500 TABLET, FILM COATED ORAL at 06:20

## 2019-12-18 RX ADMIN — FENTANYL CITRATE 1 PATCH: 50 INJECTION INTRAVENOUS at 15:55

## 2019-12-18 RX ADMIN — Medication 37.5 MICROGRAM(S): at 22:38

## 2019-12-18 RX ADMIN — Medication 40 MILLIGRAM(S): at 06:16

## 2019-12-18 RX ADMIN — Medication 1 MILLIGRAM(S): at 18:53

## 2019-12-18 RX ADMIN — CARVEDILOL PHOSPHATE 3.12 MILLIGRAM(S): 80 CAPSULE, EXTENDED RELEASE ORAL at 14:26

## 2019-12-18 RX ADMIN — Medication 125 MILLIGRAM(S): at 14:19

## 2019-12-18 RX ADMIN — Medication 1 DROP(S): at 06:20

## 2019-12-18 RX ADMIN — CEFTRIAXONE 100 MILLIGRAM(S): 500 INJECTION, POWDER, FOR SOLUTION INTRAMUSCULAR; INTRAVENOUS at 14:20

## 2019-12-18 RX ADMIN — Medication 325 MILLIGRAM(S): at 13:53

## 2019-12-18 RX ADMIN — Medication 1 DROP(S): at 18:55

## 2019-12-18 RX ADMIN — Medication 125 MILLIGRAM(S): at 18:57

## 2019-12-18 RX ADMIN — LITHIUM CARBONATE 300 MILLIGRAM(S): 300 TABLET, EXTENDED RELEASE ORAL at 13:56

## 2019-12-18 RX ADMIN — LISINOPRIL 2.5 MILLIGRAM(S): 2.5 TABLET ORAL at 06:17

## 2019-12-18 NOTE — PROGRESS NOTE ADULT - SUBJECTIVE AND OBJECTIVE BOX
KRYSTA HERRMANN    [id number]    74y      Female    Patient is a 74y old  Female who presents with a chief complaint of abdominal pain. NSTEMI (18 Dec 2019 17:45)      INTERVAL HPI/OVERNIGHT EVENTS:    Unable to get much info from the patient as she did not talked with me at all, as per nursing staff she has no fever or chills  REVIEW OF SYSTEMS:    unable to get much info       Vital Signs Last 24 Hrs  T(C): 36.9 (18 Dec 2019 08:45), Max: 36.9 (18 Dec 2019 08:45)  T(F): 98.4 (18 Dec 2019 08:45), Max: 98.4 (18 Dec 2019 08:45)  HR: 72 (18 Dec 2019 08:45) (71 - 81)  BP: 122/63 (18 Dec 2019 08:45) (94/49 - 166/67)  RR: 18 (18 Dec 2019 08:45) (18 - 20)  SpO2: 96% (18 Dec 2019 08:45) (95% - 96%)    PHYSICAL EXAM:    GENERAL: Elderly female looking comfortable   HEENT: PERRL, +EOMI  NECK: soft, Supple, No JVD,   CHEST/LUNG: Decrease air entry bilaterally; No wheezing  HEART: S1S2+, Regular rate and rhythm; No murmurs  ABDOMEN: Soft, Nontender, Nondistended; Bowel sounds present  EXTREMITIES:  1+ Peripheral Pulses, No edema  SKIN: No rashes or lesions  NEURO: AAOX3, no focal deficits, no motor r sensory loss  PSYCH: normal mood      LABS:                        9.3    5.58  )-----------( 188      ( 18 Dec 2019 06:59 )             29.8     12-17    137  |  106  |  27.0<H>  ----------------------------<  98  4.1   |  19.0<L>  |  1.12    Ca    8.0<L>      17 Dec 2019 07:35              I&O's Summary    18 Dec 2019 07:01  -  18 Dec 2019 19:20  --------------------------------------------------------  IN: 0 mL / OUT: 2 mL / NET: -2 mL        MEDICATIONS  (STANDING):  aspirin  chewable 81 milliGRAM(s) Oral daily  benztropine 0.5 milliGRAM(s) Oral at bedtime  carvedilol 3.125 milliGRAM(s) Oral every 12 hours  cefTRIAXone   IVPB 1000 milliGRAM(s) IV Intermittent every 24 hours  cefTRIAXone   IVPB      fentaNYL   Patch  12 MICROgram(s)/Hr 1 Patch Transdermal every 72 hours  ferrous    sulfate 325 milliGRAM(s) Oral daily  folic acid 1 milliGRAM(s) Oral daily  furosemide   Injectable 40 milliGRAM(s) IV Push daily  gabapentin 100 milliGRAM(s) Oral at bedtime  heparin  Injectable 5000 Unit(s) SubCutaneous every 12 hours  levothyroxine Injectable 37.5 MICROGram(s) IV Push at bedtime  lisinopril 2.5 milliGRAM(s) Oral daily  lithium citrate Solution 300 milliGRAM(s) Oral daily  LORazepam   Injectable 0.5 milliGRAM(s) IV Push two times a day  ofloxacin 0.3% Solution 1 Drop(s) Both EYES four times a day  pantoprazole  Injectable 40 milliGRAM(s) IV Push two times a day  valACYclovir 500 milliGRAM(s) Oral two times a day  vancomycin    Solution 125 milliGRAM(s) Oral every 6 hours    MEDICATIONS  (PRN):  aluminum hydroxide/magnesium hydroxide/simethicone Suspension 30 milliLiter(s) Oral every 4 hours PRN Dyspepsia  benzonatate 100 milliGRAM(s) Oral three times a day PRN Cough  diphenhydrAMINE   Injectable 50 milliGRAM(s) IntraMuscular every 4 hours PRN Rash and/or Itching  haloperidol    Injectable 5 milliGRAM(s) IV Push every 6 hours PRN Agitation  morphine  - Injectable 2 milliGRAM(s) IV Push every 4 hours PRN Moderate Pain (4 - 6)

## 2019-12-18 NOTE — PROGRESS NOTE ADULT - SUBJECTIVE AND OBJECTIVE BOX
INTERVAL HPI/OVERNIGHT EVENTS: 75yo Female Patient PMH of BIpolar Disorder, institutionalized at [facility] past few years.  Admitted with increasing SOB, abdominal distention/pain. as well as CP. PMH of Multiple Myeloma which has spread to many to  areas as seen on CT Scan. Pelvic Mass also discovered, bit Patient and Sister Kim her surrogate are refusing  BX or any invasive intervention.  Patient is bedridden, eyes stuck shut with conjunctivitis drainage. Seems more confused today, hallucinating not making sense.  She has a coarse rhonchorus cough, not identifed yesterday.Is more comfortable since Fentanyl patch applied yesterday.  Still having liquid stools not as frequent.    74y old  Female who presents with a chief complaint of abdominal pain. NSTEMI (17 Dec 2019 13:04)    PAST MEDICAL & SURGICAL HISTORY:  Congestive heart failure (CHF)  Multiple myeloma  Mental disorder  Bone cancer  Scoliosis  Hypercholesteremia  Hypertension  Hypothyroid  No significant past surgical history    Present Symptoms:     Dyspnea: 0 1    Nausea/Vomiting:  No  Anxiety:   No  Depression: No  Fatigue: Yes   Loss of appetite: Yes     Pain:  Not exhibiting pain behaviors at time of my assessment            Character-            Duration-            Effect-            Factors-            Frequency-            Location-CP- none Abdominal when turning and moving            Severity-    Review of Systems: Reviewed                    Unable to obtain due to poor mentation       MEDICATIONS  (STANDING):  aspirin  chewable 81 milliGRAM(s) Oral daily  benztropine 0.5 milliGRAM(s) Oral at bedtime  carvedilol 3.125 milliGRAM(s) Oral every 12 hours  cefTRIAXone   IVPB 1000 milliGRAM(s) IV Intermittent every 24 hours  cefTRIAXone   IVPB      fentaNYL   Patch  12 MICROgram(s)/Hr 1 Patch Transdermal every 72 hours  ferrous    sulfate 325 milliGRAM(s) Oral daily  folic acid 1 milliGRAM(s) Oral daily  furosemide   Injectable 40 milliGRAM(s) IV Push daily  gabapentin 100 milliGRAM(s) Oral at bedtime  heparin  Injectable 5000 Unit(s) SubCutaneous every 12 hours  levothyroxine Injectable 37.5 MICROGram(s) IV Push at bedtime  lisinopril 2.5 milliGRAM(s) Oral daily  lithium citrate Solution 300 milliGRAM(s) Oral daily  LORazepam   Injectable 0.5 milliGRAM(s) IV Push two times a day  ofloxacin 0.3% Solution 1 Drop(s) Both EYES four times a day  pantoprazole  Injectable 40 milliGRAM(s) IV Push two times a day  valACYclovir 500 milliGRAM(s) Oral two times a day  vancomycin    Solution 125 milliGRAM(s) Oral every 6 hours    MEDICATIONS  (PRN):  aluminum hydroxide/magnesium hydroxide/simethicone Suspension 30 milliLiter(s) Oral every 4 hours PRN Dyspepsia  benzonatate 100 milliGRAM(s) Oral three times a day PRN Cough  diphenhydrAMINE   Injectable 50 milliGRAM(s) IntraMuscular every 4 hours PRN Rash and/or Itching  haloperidol    Injectable 5 milliGRAM(s) IV Push every 6 hours PRN Agitation  morphine  - Injectable 2 milliGRAM(s) IV Push every 4 hours PRN Moderate Pain (4 - 6)      PHYSICAL EXAM:    Vital Signs Last 24 Hrs  T(C): 36.9 (18 Dec 2019 08:45), Max: 36.9 (18 Dec 2019 08:45)  T(F): 98.4 (18 Dec 2019 08:45), Max: 98.4 (18 Dec 2019 08:45)  HR: 72 (18 Dec 2019 08:45) (71 - 81)  BP: 122/63 (18 Dec 2019 08:45) (94/49 - 166/67)  BP(mean): --  RR: 18 (18 Dec 2019 08:45) (18 - 20)  SpO2: 96% (18 Dec 2019 08:45) (95% - 96%)    General:  ____ lethargic confused, incoherent, hallucinating                  HEENT: normal  dry mouth     Lungs: comfortable cough      CV: normal      GI: distended lower abdomne               constipation  last BM: today    :  incontinent      MSK:  weakness               bedbound/wheelchair bound    Skin: normal  _  no rash    LABS:                        9.3    5.58  )-----------( 188      ( 18 Dec 2019 06:59 )             29.8     12-17    137  |  106  |  27.0<H>  ----------------------------<  98  4.1   |  19.0<L>  |  1.12    Ca    8.0<L>      17 Dec 2019 07:35    I&O's Summary    18 Dec 2019 07:01  -  18 Dec 2019 17:46  --------------------------------------------------------  IN: 0 mL / OUT: 2 mL / NET: -2 mL    RADIOLOGY & ADDITIONAL STUDIES:    ADVANCE DIRECTIVES:   State  and Pt Advocate was reviewing MOLST checklist and planning another visit to speak with Patient and her thoughts on   being resusitated at time of death; even though it has been determined that patient does not have capacity to make medical decisions herself. Sister Kim  supporting acute care, but allowing a natural death when it is her time. She has terminal cancer  DNR YES   Completed on:                     MOLST  YES   Completed on:  Living Will  NO   Completed on:    COUNSELING:    Face to face meeting to discuss Advanced Care Planning - Time Spent ______ Minutes.  See goals of care note.    More than 50% time spent in counseling and coordinating care. ______ Minutes.     Thank you for the opportunity to assist with the care of this patient.   [place] [facility] Consult [facility] [phone].

## 2019-12-19 ENCOUNTER — TRANSCRIPTION ENCOUNTER (OUTPATIENT)
Age: 74
End: 2019-12-19

## 2019-12-19 VITALS
HEART RATE: 89 BPM | OXYGEN SATURATION: 97 % | DIASTOLIC BLOOD PRESSURE: 61 MMHG | TEMPERATURE: 98 F | RESPIRATION RATE: 18 BRPM | SYSTOLIC BLOOD PRESSURE: 104 MMHG

## 2019-12-19 LAB
HCT VFR BLD CALC: 32.1 % — LOW (ref 34.5–45)
HGB BLD-MCNC: 9.7 G/DL — LOW (ref 11.5–15.5)
MCHC RBC-ENTMCNC: 30.2 GM/DL — LOW (ref 32–36)
MCHC RBC-ENTMCNC: 31 PG — SIGNIFICANT CHANGE UP (ref 27–34)
MCV RBC AUTO: 102.6 FL — HIGH (ref 80–100)
PLATELET # BLD AUTO: 188 K/UL — SIGNIFICANT CHANGE UP (ref 150–400)
RBC # BLD: 3.13 M/UL — LOW (ref 3.8–5.2)
RBC # FLD: 15.1 % — HIGH (ref 10.3–14.5)
WBC # BLD: 5.43 K/UL — SIGNIFICANT CHANGE UP (ref 3.8–10.5)
WBC # FLD AUTO: 5.43 K/UL — SIGNIFICANT CHANGE UP (ref 3.8–10.5)

## 2019-12-19 PROCEDURE — 87493 C DIFF AMPLIFIED PROBE: CPT

## 2019-12-19 PROCEDURE — 99231 SBSQ HOSP IP/OBS SF/LOW 25: CPT

## 2019-12-19 PROCEDURE — 85610 PROTHROMBIN TIME: CPT

## 2019-12-19 PROCEDURE — 87086 URINE CULTURE/COLONY COUNT: CPT

## 2019-12-19 PROCEDURE — 83880 ASSAY OF NATRIURETIC PEPTIDE: CPT

## 2019-12-19 PROCEDURE — 96374 THER/PROPH/DIAG INJ IV PUSH: CPT | Mod: XU

## 2019-12-19 PROCEDURE — 82550 ASSAY OF CK (CPK): CPT

## 2019-12-19 PROCEDURE — 85652 RBC SED RATE AUTOMATED: CPT

## 2019-12-19 PROCEDURE — 82728 ASSAY OF FERRITIN: CPT

## 2019-12-19 PROCEDURE — 81001 URINALYSIS AUTO W/SCOPE: CPT

## 2019-12-19 PROCEDURE — 99285 EMERGENCY DEPT VISIT HI MDM: CPT | Mod: 25

## 2019-12-19 PROCEDURE — 96361 HYDRATE IV INFUSION ADD-ON: CPT | Mod: XU

## 2019-12-19 PROCEDURE — 83550 IRON BINDING TEST: CPT

## 2019-12-19 PROCEDURE — 93306 TTE W/DOPPLER COMPLETE: CPT

## 2019-12-19 PROCEDURE — 71275 CT ANGIOGRAPHY CHEST: CPT

## 2019-12-19 PROCEDURE — 83690 ASSAY OF LIPASE: CPT

## 2019-12-19 PROCEDURE — 82784 ASSAY IGA/IGD/IGG/IGM EACH: CPT

## 2019-12-19 PROCEDURE — 84466 ASSAY OF TRANSFERRIN: CPT

## 2019-12-19 PROCEDURE — 80178 ASSAY OF LITHIUM: CPT

## 2019-12-19 PROCEDURE — 93005 ELECTROCARDIOGRAM TRACING: CPT

## 2019-12-19 PROCEDURE — 80061 LIPID PANEL: CPT

## 2019-12-19 PROCEDURE — 85027 COMPLETE CBC AUTOMATED: CPT

## 2019-12-19 PROCEDURE — 84484 ASSAY OF TROPONIN QUANT: CPT

## 2019-12-19 PROCEDURE — 74177 CT ABD & PELVIS W/CONTRAST: CPT

## 2019-12-19 PROCEDURE — 71045 X-RAY EXAM CHEST 1 VIEW: CPT

## 2019-12-19 PROCEDURE — 83540 ASSAY OF IRON: CPT

## 2019-12-19 PROCEDURE — 36415 COLL VENOUS BLD VENIPUNCTURE: CPT

## 2019-12-19 PROCEDURE — 83036 HEMOGLOBIN GLYCOSYLATED A1C: CPT

## 2019-12-19 PROCEDURE — 83735 ASSAY OF MAGNESIUM: CPT

## 2019-12-19 PROCEDURE — 84165 PROTEIN E-PHORESIS SERUM: CPT

## 2019-12-19 PROCEDURE — 99239 HOSP IP/OBS DSCHRG MGMT >30: CPT

## 2019-12-19 PROCEDURE — 80053 COMPREHEN METABOLIC PANEL: CPT

## 2019-12-19 PROCEDURE — 85730 THROMBOPLASTIN TIME PARTIAL: CPT

## 2019-12-19 PROCEDURE — 80048 BASIC METABOLIC PNL TOTAL CA: CPT

## 2019-12-19 RX ORDER — CARVEDILOL PHOSPHATE 80 MG/1
1 CAPSULE, EXTENDED RELEASE ORAL
Qty: 0 | Refills: 0 | DISCHARGE
Start: 2019-12-19

## 2019-12-19 RX ORDER — CALCIUM CARBONATE 500(1250)
1 TABLET ORAL
Qty: 0 | Refills: 0 | DISCHARGE

## 2019-12-19 RX ORDER — LISINOPRIL 2.5 MG/1
1 TABLET ORAL
Qty: 0 | Refills: 0 | DISCHARGE
Start: 2019-12-19

## 2019-12-19 RX ORDER — DEXAMETHASONE 0.5 MG/5ML
5 ELIXIR ORAL
Qty: 0 | Refills: 0 | DISCHARGE

## 2019-12-19 RX ORDER — AMLODIPINE BESYLATE 2.5 MG/1
1 TABLET ORAL
Qty: 0 | Refills: 0 | DISCHARGE

## 2019-12-19 RX ORDER — RISPERIDONE 4 MG/1
50 TABLET ORAL ONCE
Refills: 0 | Status: CANCELLED | OUTPATIENT
Start: 2019-12-21 | End: 2019-12-19

## 2019-12-19 RX ORDER — LOPERAMIDE HCL 2 MG
1 TABLET ORAL
Qty: 0 | Refills: 0 | DISCHARGE

## 2019-12-19 RX ORDER — FENTANYL CITRATE 50 UG/ML
1 INJECTION INTRAVENOUS
Qty: 0 | Refills: 0 | DISCHARGE
Start: 2019-12-19

## 2019-12-19 RX ORDER — GABAPENTIN 400 MG/1
1 CAPSULE ORAL
Qty: 0 | Refills: 0 | DISCHARGE
Start: 2019-12-19

## 2019-12-19 RX ORDER — LEVOTHYROXINE SODIUM 125 MCG
75 TABLET ORAL ONCE
Refills: 0 | Status: DISCONTINUED | OUTPATIENT
Start: 2019-12-19 | End: 2019-12-19

## 2019-12-19 RX ADMIN — LISINOPRIL 2.5 MILLIGRAM(S): 2.5 TABLET ORAL at 05:35

## 2019-12-19 RX ADMIN — Medication 0.5 MILLIGRAM(S): at 21:53

## 2019-12-19 RX ADMIN — Medication 1 MILLIGRAM(S): at 13:58

## 2019-12-19 RX ADMIN — Medication 125 MILLIGRAM(S): at 19:48

## 2019-12-19 RX ADMIN — LITHIUM CARBONATE 300 MILLIGRAM(S): 300 TABLET, EXTENDED RELEASE ORAL at 13:58

## 2019-12-19 RX ADMIN — HEPARIN SODIUM 5000 UNIT(S): 5000 INJECTION INTRAVENOUS; SUBCUTANEOUS at 19:47

## 2019-12-19 RX ADMIN — Medication 125 MILLIGRAM(S): at 00:20

## 2019-12-19 RX ADMIN — Medication 1 DROP(S): at 05:34

## 2019-12-19 RX ADMIN — Medication 1 DROP(S): at 00:19

## 2019-12-19 RX ADMIN — Medication 125 MILLIGRAM(S): at 05:35

## 2019-12-19 RX ADMIN — CARVEDILOL PHOSPHATE 3.12 MILLIGRAM(S): 80 CAPSULE, EXTENDED RELEASE ORAL at 19:48

## 2019-12-19 RX ADMIN — Medication 0.5 MILLIGRAM(S): at 05:33

## 2019-12-19 RX ADMIN — Medication 325 MILLIGRAM(S): at 14:37

## 2019-12-19 RX ADMIN — GABAPENTIN 100 MILLIGRAM(S): 400 CAPSULE ORAL at 21:53

## 2019-12-19 RX ADMIN — FENTANYL CITRATE 1 PATCH: 50 INJECTION INTRAVENOUS at 19:00

## 2019-12-19 RX ADMIN — Medication 40 MILLIGRAM(S): at 05:38

## 2019-12-19 RX ADMIN — VALACYCLOVIR 500 MILLIGRAM(S): 500 TABLET, FILM COATED ORAL at 19:48

## 2019-12-19 RX ADMIN — Medication 125 MILLIGRAM(S): at 14:37

## 2019-12-19 RX ADMIN — CARVEDILOL PHOSPHATE 3.12 MILLIGRAM(S): 80 CAPSULE, EXTENDED RELEASE ORAL at 05:34

## 2019-12-19 RX ADMIN — HEPARIN SODIUM 5000 UNIT(S): 5000 INJECTION INTRAVENOUS; SUBCUTANEOUS at 05:34

## 2019-12-19 RX ADMIN — FENTANYL CITRATE 1 PATCH: 50 INJECTION INTRAVENOUS at 14:47

## 2019-12-19 RX ADMIN — PANTOPRAZOLE SODIUM 40 MILLIGRAM(S): 20 TABLET, DELAYED RELEASE ORAL at 05:34

## 2019-12-19 RX ADMIN — Medication 81 MILLIGRAM(S): at 13:58

## 2019-12-19 NOTE — PROGRESS NOTE ADULT - SUBJECTIVE AND OBJECTIVE BOX
HPI: Patient is a 74y Female seen on consultation for the evaluation and management of Multiple Myeloma. Other med problems include CHF, HTN, Bipolar disorder Pt is under care of Dr. Castillo from [facility] for MM, known refractory to all treatments.  Last seen 12/10/19; IgG 4877, TP 9.1, alb 3, lambda light chains 1742.  Recommendations are fro new drug Selinexor with Decadron, for refractory MM.  Pt refuses to take it as it causes Diarrhea, and has not been receiving tx.  Sent to ER by [facility]  for c/o ongoing diarrhea and abdominal pain.  Has had diarrhea reportedly for 3 months.  Also c/o chest pain.  Has EKG changes and elevated enzymes; seen by Cardiology with ongoing eval, possible cath.  Also found to have large infiltrating pelvic mass encasing rectum, uterus, bladder and iliac vessels-apparently new findings.  Also has disseminated lytic lesions, possibly secondary to MM.  Hx per pt is unreliable-currently c/o abd pain and diarrhea and SOB.  Denies fevers or chills, denies bleeding.     Still c/o abdominal bloating and diarrhea.  remains difficult to speak with-refusing biopsy  Currently sleeping-not disrbed as pt refuses to speak with caregivers and becomes agitated; asked to address status      PAST MEDICAL & SURGICAL HISTORY:  Congestive heart failure (CHF)  Multiple myeloma  Mental disorder  Bone cancer  Scoliosis  Hypercholesteremia  Hypertension  Hypothyroid  No significant past surgical history      REVIEW OF SYSTEMS is unrelieable      General: appetite improved    Skin/Breast:no rash  	    Respiratory and Thorax:c/o dyspnea and chest pain  	  Cardiovascular:	chest pain    Gastrointestinal:	abdominal cramping and chronic diarrhea    Genitourinary:	denies dysuria or hematuria            Psychiatric:	Bipolar disorder;             MEDICATIONS  (STANDING):  aspirin  chewable 81 milliGRAM(s) Oral daily  benztropine 0.5 milliGRAM(s) Oral at bedtime  carvedilol 3.125 milliGRAM(s) Oral every 12 hours  cefTRIAXone   IVPB      dexAMETHasone     Tablet 20 milliGRAM(s) Oral daily  ferrous    sulfate 325 milliGRAM(s) Oral daily  folic acid 1 milliGRAM(s) Oral daily  furosemide   Injectable 40 milliGRAM(s) IV Push daily  levothyroxine 75 MICROGram(s) Oral daily  lithium citrate Solution 300 milliGRAM(s) Oral daily  LORazepam     Tablet 0.5 milliGRAM(s) Oral two times a day  ofloxacin 0.3% Solution 1 Drop(s) Both EYES four times a day  pantoprazole  Injectable 40 milliGRAM(s) IV Push two times a day  valACYclovir 500 milliGRAM(s) Oral two times a day  vancomycin    Solution 125 milliGRAM(s) Oral every 6 hours    MEDICATIONS  (PRN):  aluminum hydroxide/magnesium hydroxide/simethicone Suspension 30 milliLiter(s) Oral every 4 hours PRN Dyspepsia  diphenhydrAMINE   Injectable 50 milliGRAM(s) IntraMuscular every 4 hours PRN Rash and/or Itching  loperamide 2 milliGRAM(s) Oral two times a day PRN watery diarrhea      Allergies    ixazomib (Unknown)  NSAIDs (Unknown)  penicillins (Unknown)  sulfa drugs (Unknown)    Intolerances        SOCIAL HISTORY:    Smoking Status:denies  Alcohol:denies  Marital Status:resides at [facility]      	                  Vital Signs Last 24 Hrs  T(C): 36.7 (12 Dec 2019 16:11), Max: 37.1 (11 Dec 2019 21:41)  T(F): 98 (12 Dec 2019 16:11), Max: 98.7 (11 Dec 2019 21:41)  HR: 82 (12 Dec 2019 16:11) (75 - 86)  BP: 108/69 (12 Dec 2019 16:11) (105/75 - 115/60)  BP(mean): --  RR: 19 (12 Dec 2019 16:11) (19 - 20)  SpO2: 93% (12 Dec 2019 16:11) (93% - 100%)    PHYSICAL EXAM:      Constitutional:Awake, alert, comfortable    Eyes:anicteric    ENMT:dry mm's    Neck:no adenopathy      Respiratory:clear    Cardiovascular:RRR norm S1S2    Gastrointestinal:soft, nontender pos BS; abdomen distended, but soft      Extremities:bilateral LE edema            LABS:    12/15/19:  WBC 6.48, H/H 9.1/29.7, plt ct 203,000                    9.4    5.51  )-----------( 200      ( 12 Dec 2019 11:55 )             29.8         136  |  109<H>  |  22.0<H>  ----------------------------<  82  3.7   |  17.0<L>  |  0.84    Ca    7.7<L>      11 Dec 2019 22:55  Mg     1.8         TPro  7.8  /  Alb  2.4<L>  /  TBili  <0.2<L>  /  DBili  x   /  AST  14  /  ALT  7   /  AlkPhos  87      PT/INR - ( 12 Dec 2019 09:29 )   PT: 15.3 sec;   INR: 1.32 ratio         PTT - ( 12 Dec 2019 09:29 )  PTT:77.2 sec  Urinalysis Basic - ( 12 Dec 2019 04:52 )    Color: Yellow / Appearance: Slightly Turbid / S.020 / pH: x  Gluc: x / Ketone: Negative  / Bili: Negative / Urobili: Negative   Blood: x / Protein: 100 / Nitrite: Positive   Leuk Esterase: Small / RBC: 11-25 /HPF / WBC 26-50   Sq Epi: x / Non Sq Epi: Moderate / Bacteria: Moderate        RADIOLOGY & ADDITIONAL STUDIES:

## 2019-12-19 NOTE — CHART NOTE - NSCHARTNOTEFT_GEN_A_CORE
Called by RN after pt requiring IV meds who no longer has an IV access.  Pt to be discharged to [facility] without a peripheral IV, due to receive 37.5mcg IVP of synthroid.  Order changed to synthroid 75mcg po x1 now after consulting pharmacist.

## 2019-12-19 NOTE — PROGRESS NOTE ADULT - ASSESSMENT
1- PAIN  Abdominal    Abdominal distention and pressure pain  Treat pain-poor self advocate  Continue PO vanco for c diff infection          Pelvis mass          need tissue dx biopsy.         Biopsy by IR for mass- Sister Kim is Surrogate HCP does not want any invasive intervention including Biopsy    Bone Lytic Lesions Pain-would benefit from continuing decadron  Poor self advocate-cannot self report her pain intensity  Start Fentanyl 12 mcg/hr for long acting pain relief since patient refusing most PO meds  Morphine 2mg IVP Q4 prn for breakthrough pain  Ofirmev 1 Gm IV ordered x 1      - Bipolar disorder with delirium multifactorial   +UTI treating  Ativan 0.5mg IVP BID ordered standing as was her medications given at [facility]  cont current meds per psychiatry    she is refusing to take oral  meds most  times   ( impaired decision making capacity )  Per Dr Smyth psychiatry input   surrogate consent for procedures needing informed consent as patient has impaired decision making capacity  Sister Kim is her Sister and Surrogate proxy, who knows her history, and has been advocating for Patient for many years    - Multiple Myeloma - Treated in past with chemo RT.  Knowing she is no longer in remission- she has been  refusing  therapy per oncologist for some time  CT report of pelvis and abdomen copied above  Lytic lesions probably secondary to MM-Metastatic Disease     DIARRHEA   likely due to colitis /c diff colitis and pelvic mass.  Skin care-Zinc Oxide paste  Frequent perianal care         NSTEMI   Abnormal  ECG and positive troponin   cont medical therapy , encourage to take medicine   Added Morphine 2 mg IVP prn for CP or abdominal breakthrough pain  Neurontin 100 mg at bedtime for abdominal and neuropathic pain  Decision refusing   ischemic work up?     [facility]  MOLST completed DNR/DNI   for DAMIEN Garvey involved with MOLST Checklist #3  He visited Patient's bedside and had a conversation with her about resusitation-Patient who does not have capacity to make medical decisions  replied she would  want resusitation-but was undecided about intubation and vent dependence    Continue to treat acutely   Consider [place] for post hospital Palliative Hospice Care  Met with Kim this afternoon see separate GO note
73 yo female with known refractory Multiple Myeloma, not currently on tx, found to have non-solid appearing pelvic mass, and metastatic lytic spinal lesions. Unlikely to be an operable process.     -Recommend percutaneous biopsy to assess pathology of the mass and treat accordingly.   -No acute surgical intervention indicated at this time.
75 y/o female with h/o multiple myeloma, CHF, HTN, Bipolar disorder, was referred from [facility] for abdominal pain and diarrhea for >1 month. Hypotension. no fever. CTA chest abdomen and pelvis showed numerous lytic lesions involving ribs. vertebrae and scapula and a large infiltrating pelvic mass encasing rectum, uterus, bladder and iliac vessels. Labs showed Hgb: 9.7 (was 10.7 on 11/4/19, troponin positive , pt is evaluated by surgery , GI and cardiology   she has no capacity to make any decision , psychiatry consult appreciated     1- Abd pain diarrhea likely due to colitis /c diff and pelvic mass   cont po vanco ( pt is refusing to take oral )   biopsy by IR for mass once found about whio is surrogate to consent ( will contact [facility] on Monday )   pt does not have capacity need surrogate to  consent   GI fallow up appreciated     2- Abnormal  ECG and positive troponin / NSTEMI   cont medical therapy , encoaurage to take medicine 'will change  aspirin to [place]       3- Pelvis mass   need tissue dx biopsy  [place] to assist to found out who is surrogate      4- Bipolar disorder with delirium multifactorial   cont current meds per pschiatry rec     5- MM - has been  refusing  therapy per oncology consult note
75 y/o female with h/o multiple myeloma, CHF, HTN, Bipolar disorder, was referred from [facility] for abdominal pain and diarrhea for >1 month. Hypotension. no fever. CTA chest abdomen and pelvis showed numerous lytic lesions involving ribs. vertebrae and scapula and a large infiltrating pelvic mass encasing rectum, uterus, bladder and iliac vessels. Labs showed Hgb: 9.7 (was 10.7 on 11/4/19, troponin positive , pt is evaluated by surgery , GI and cardiology and she has no capacity to make any decision , psychiatry consult appreciated. As per discussion with next of kin, no further invasive procedures or testing is planned. Palliative care consulted for further planning on comfort/symptom control.      #Afib w/ rvr  - metoprolol 2.5mg ivp - (patient not taking oral Coreg)  -now in NSR  - hold a/c for now per sister request     #Abd pain diarrhea likely due to colitis /c diff colitis and pelvic mass   - po vanco for cdiff  - hold ir biopsy for now as patient sister does not want   - GI consult appreciated  -treat symptomatically as per Palliative    #Abnormal  ECG and positive troponin / NSTEMI   cont medical therapy , encourage to take medicine   - cardiology consult appreciated   - aspirin acei if taking po    #hypothyroidism  - levothyroxine    #Pelvis mass   - need tissue dx biopsy  - sister bedside stating does not want further workup inpatient for mass currently would like patient to be comfortable states has metastatic cancer      #Bipolar disorder with delirium multifactorial   - cont current meds per psychiatry    - she is refusing to take po meds at times   - she is with impaired decision making capacity   - continue haldol 5mg q6hprn agitation IVP  - continue  ativan 0.5mg BID IVP    #multiple myeloma  - has been  refusing  therapy per oncologist   - lytic lesions probably secondary to MM     #Advance Care Directives  - DNR/DNI   - treat acutely   - As per Palliative to discuss [place] for post hospital Palliative Hospice Care    #DVT prophylaxis  - heparin SC    Dispo: Pending arrangements for comfort care facility
75 y/o female with h/o multiple myeloma, CHF, HTN, Bipolar disorder, was referred from [facility] for abdominal pain and diarrhea for >1 month. Hypotension. no fever. CTA chest abdomen and pelvis showed numerous lytic lesions involving ribs. vertebrae and scapula and a large infiltrating pelvic mass encasing rectum, uterus, bladder and iliac vessels. Labs showed Hgb: 9.7 (was 10.7 on 11/4/19, troponin positive , pt is evaluated by surgery , GI and cardiology   she has no capacity to make any decision , psychiatry consult appreciated     1- Abd pain diarrhea likely due to colitis c diff and pelvic mass   cont po vanco   biopsy by IR for mass   pt does not have capacity need surrogate   GI input apprecaited   2- Abnormal  ECG and positive troponin / NSTEMI   cont medical therapy   cardiology on board      3- Pelvis mass   need tissue dx biopsy   4- Bipolar disorder with delirium multifactorial   cont current meds     5- MM - has been  refusing  therapy per oncology consult
75yo F from [facility] with new abdominal pain who was found to have abnormal troponin and new ECG changes for NSTEMI. CT Abdomen: Large infiltrating pelvic mass encasing the rectum, uterus, bladder and iliac vessels. 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 Other considerations include sarcoma and lymphoma. Numerous lytic bone lesions compatible with multiple myeloma, including lesions involving both femoral necks.  CT Chest: No PE. Pulmonary edema and small bilateral pleural effusions. Lytic lesions involving the acromion process-left scapula, left second rib and multiple vertebral bodies suspicious for metastatic disease.      Patient with new ECG changes and increased trops r/o NSTEMI  Echo Reviewed:  WMA+  RCA territory.  LVEF 45-50%.  DD 2.  Mod-Sev MR.  Mild to mod AS/Mod AR.  CT Chest: Pulmonary edema and small bilateral pleural effusions.  LHC d/w pt . Pt unwilling to have LHC.   Stress Test d/w pt. Pt is unwilling to have Stress Test.   Cont meds: ASA, Carvedilol, Furosemide, Lisinopril     Abd pain diarrhea likely due to colitis c diff and pelvic mass   biopsy by IR for mass: pt does not have capacity need surrogate     MM  has been  refusing  therapy per oncology consult       CARDIO MEDS:  carvedilol 3.125 milliGRAM(s) Oral every 12 hours  furosemide   Injectable 40 milliGRAM(s) IV Push daily  aspirin  chewable 81 milliGRAM(s) Oral daily  heparin  Injectable 5000 Unit(s) SubCutaneous every 12 hours  Lisinopril 2.5mg daily
77 y/o female with h/o multiple myeloma, CHF, HTN, Bipolar disorder, was referred from [facility] for abdominal pain and diarrhea for >1 month. Hypotension. no fever. CTA chest abdomen and pelvis showed numerous lytic lesions involving ribs. vertebrae and scapula and a large infiltrating pelvic mass encasing rectum, uterus, bladder and iliac vessels. Labs showed Hgb: 9.7 (was 10.7 on 11/4/19, troponin positive , pt is evaluated by surgery , GI and cardiology   she has no capacity to make any decision , psychiatry consult appreciated     1- Abd pain diarrhea likely due to colitis /c diff colitis and pelvic mass   cont po vanco for c diff infection  biopsy by IR for mass once found about whio is surrogate to consent ( need to contact [place] on Monday )     GI fallow up appreciated     2- Abnormal  ECG and positive troponin / NSTEMI   cont medical therapy , encoaurage to take medicine   d/w cardiology   decision re medical therapy vs ischemic work up?       3- Pelvis mass   need tissue dx biopsy  [place] to assist to found out who is surrogate   and decided for further testing   she is currently refusing  ( impaired decision making capacity )     4- Bipolar disorder with delirium multifactorial   cont current meds per psychiatry    she is refusing to take meds at times   she is with impaired decision making capacity     5- MM - has been  refusing  therapy per oncologist   lytic lesions probably secondary to MM     Per Dr Smyth psychiatry input   surrogate consent for procedures needing informed consent as patient has impaired decision making capacity  Asked [place] to assist in identifying either proxy or available surrogate.
77 y/o female with h/o multiple myeloma, CHF, HTN, Bipolar disorder, was referred from [facility] for abdominal pain and diarrhea for >1 month. Hypotension. no fever. CTA chest abdomen and pelvis showed numerous lytic lesions involving ribs. vertebrae and scapula and a large infiltrating pelvic mass encasing rectum, uterus, bladder and iliac vessels. Labs showed Hgb: 9.7 (was 10.7 on 11/4/19, troponin positive , pt is evaluated by surgery , GI and cardiology and she has no capacity to make any decision , psychiatry consult appreciated. As per discussion with next of kin, no further invasive procedures or testing is planned. Palliative care consulted for further planning on comfort/symptom control.      #Afib w/ rvr:  metoprolol 2.5mg ivp - (patient not taking oral Coreg)  -now in NSR, hold a/c for now per sister request, currently on coreg, will continue     #Abd pain diarrhea likely due to colitis /c diff colitis and pelvic mass   - po vanco for cdiff  - hold ir biopsy for now as patient sister does not want   - GI consult appreciated  -treat symptomatically as per Palliative.     #Abnormal  ECG and positive troponin / NSTEMI   cont medical therapy , encourage to take medicine   - cardiology consult appreciated   - aspirin acei, bb if taking po    #hypothyroidism  - levothyroxine    #Pelvis mass   - need tissue dx biopsy  - sister bedside stating does not want further workup inpatient for mass currently would like patient to be comfortable states has metastatic cancer      #Bipolar disorder with delirium multifactorial   - cont current meds per psychiatry    - she is refusing to take po meds at times   - she is with impaired decision making capacity   - continue haldol 5mg q6hprn agitation IVP  - continue  ativan 0.5mg BID IVP, continue with lithium    #multiple myeloma  - has been  refusing  therapy per oncologist   - lytic lesions probably secondary to MM     #Advance Care Directives  - DNR/DNI   - treat acutely   - As per Palliative to discuss [place] for post hospital Palliative Hospice Care  as per palliative care team discussed with patients sister her wishes and goals of care for end of life planning. Sister Kim Chambers was in agreement to pursue [facility] option for end of life planning. MAIN completed [facility] [place] application and submitted application and supporting documentation to [facility] for review. MAIN also left message for [facility] Director Jessy advising of planning options . SW spoke with  Arthur Garvey late yesterday regarding MOLST checklist #3 and oversight of MHLS and  was at bedside and vsited with patient and sister . SW awaiting attorneys decision on directives requested by sister as well s Hospice planning.    #DVT prophylaxis  - heparin SC    Dispo: Pending arrangements for comfort care facility,
Imp:  73 yo female with known refractory Multiple Myeloma, not currently on tx-refused recommended tx, admitted with ongoing abd pain and diarrhea-found to have large pelvic mass.  Also c/o chest pain, has EKG changes and abnormal enzymes, being assessed by Cardiology  Report of lytic lesions on CT's may be secondary to Myeloma, as may pelvic mass, or may be unrelated malignancy. Mass likely responsible for symptoms of discomfort and diarrhea.   Rec:  bx of mass for further recommendations; pt currently refusing bx; pt becomes argumentative when I try to explain clinical scenario-insists she wants to go to [facility]  Anemia-check iron studies, likely multifactorial, chronic disease, advanced Myeloma. Hb remains stable  Will follow.   Discussed with sister.  She is aware that pt is not   competent to sign consent, but feels that her sister should be able to make her own decisions and will abide by her sister's decisions.   Any [phone]
Imp:  73 yo female with known refractory Multiple Myeloma, not currently on tx-refused recommended tx, admitted with ongoing abd pain and diarrhea-found to have large pelvic mass.  Also c/o chest pain, has EKG changes and abnormal enzymes, being assessed by Cardiology  Report of lytic lesions on CT's may be secondary to Myeloma, as may pelvic mass, or may be unrelated malignancy. Mass likely responsible for symptoms of discomfort and diarrhea. Also tx'd for C diff  Rec:  bx of mass for further recommendations; pt currently refusing bx; pt becomes argumentative when I try to explain clinical scenario  Anemia-check iron studies, likely multifactorial, chronic disease, advanced Myeloma. Hb remains stable  Will follow.   Discussed with care team  . Pt has little/no insight into her disease.  She has been refusing tx for Multiple Myeloma.  She now has pelvic mass that may be related to myeloma or may be a new primary.  She has refused intervention during my discussions with her.  She will not allow family to make decisions.  No further recommendations can be made unless this new mass is biopsied.  An  is now involved. Apparently would need to have court appointed guardian to make decisions.   At this point would pursue supportive care, even consider Hospice    Any [phone]
Imp:  75 yo female with known refractory Multiple Myeloma, not currently on tx-refused recommended tx, admitted with ongoing abd pain and diarrhea-found to have large pelvic mass.  Also c/o chest pain, has EKG changes and abnormal enzymes, being assessed by Cardiology  Report of lytic lesions on CT's may be secondary to Myeloma, as may pelvic mass, or may be unrelated malignancy. Mass likely responsible for symptoms of discomfort and diarrhea.   Rec:  bx of mass for further recommendations; pt currently refusing bx; pt becomes argumentative when I try to explain clinical scenario-insists she wants to [facility] to [facility]  Anemia-check iron studies, likely multifactorial, chronic disease, advanced Myeloma.  Will follow.   Discussed with sister.  She is aware that pt may not be competent to sign consent, but feels that her sister should be able to make her own decisions and will abide by her sister's decisions.   Any [phone]
recent MI  atrial fibrillation w rapid response  Congestive heart failure (CHF)  Multiple myeloma  bipolar disorder  Scoliosis  Hypercholesteremia  Hypertension  Hypothyroid  colitis clostridium dificle    Impaired ability to make decisions about her care  refusing PO medications most of time
75 y/o female with h/o multiple myeloma, CHF, HTN, Bipolar disorder, was referred from [facility] for abdominal pain and diarrhea for >1 month. Hypotension. no fever. CTA chest abdomen and pelvis showed numerous lytic lesions involving ribs. vertebrae and scapula and a large infiltrating pelvic mass encasing rectum, uterus, bladder and iliac vessels. Labs showed Hgb: 9.7 (was 10.7 on 11/4/19, troponin positive , pt is evaluated by surgery , GI and cardiology   she has no capacity to make any decision , psychiatry consult appreciated     #Afib w/ rvr  - metoprolol 2.5mg ivp   - tele monitor   - hold a/c for now per sister request     #Abd pain diarrhea likely due to colitis /c diff colitis and pelvic mass   - pain resolved   - po vanco for cdiff  - hold ir biopsy for now as patient sister does not want   - GI consult appreciated    #Abnormal  ECG and positive troponin / NSTEMI   cont medical therapy , encoaurage to take medicine   - cardiology consult appreciated   - aspirin acei    #hypothyroidism  - levothyroxine    #Pelvis mass   - need tissue dx biopsy  - sister bedside stating does not want further workup inpatient for mass currently would like patient to be comfortable states has metastatic cancer      #Bipolar disorder with delirium multifactorial   - cont current meds per psychiatry    - she is refusing to take meds at times   - she is with impaired decision making capacity   - start haldol 5mg q6hprn agitation IVP  - start ativan 0.5mg q4h prn agitation IVP    #multiple myeloma  - has been  refusing  therapy per oncologist   - lytic lesions probably secondary to MM     #Advance Care Directives  - DNR/DNI no feeding tubes; ok to use abx and ivf   - call sister prior to extensive measures number on molst   - 30 minutes spent on adanced care planning     #DVT prophylaxis  - heparin SC

## 2019-12-19 NOTE — PROGRESS NOTE BEHAVIORAL HEALTH - NSBHFUPINTERVALHXFT_PSY_A_CORE
Patient seen bedside and stated "I don't want to talk", "Go away".  Patient continues to take medication as prescribed at this time.

## 2019-12-19 NOTE — PROGRESS NOTE BEHAVIORAL HEALTH - SUMMARY
no interval changes  has poor to no understanding of her medical condition and treatment options
No interval changes, patient states "I don't want to talk, Go away".  Patient has poor understanding of her medical condition and treatment options.

## 2019-12-19 NOTE — PROGRESS NOTE BEHAVIORAL HEALTH - NSBHCHARTREVIEWVS_PSY_A_CORE FT
Vital Signs Last 24 Hrs  T(C): 37 (19 Dec 2019 08:25), Max: 37.4 (19 Dec 2019 00:12)  T(F): 98.6 (19 Dec 2019 08:25), Max: 99.3 (19 Dec 2019 00:12)  HR: 79 (19 Dec 2019 08:25) (71 - 80)  BP: 112/63 (19 Dec 2019 08:25) (112/63 - 132/70)  BP(mean): --  RR: 18 (19 Dec 2019 08:25) (18 - 18)  SpO2: 97% (19 Dec 2019 08:25) (97% - 98%)

## 2019-12-19 NOTE — DISCHARGE NOTE PROVIDER - NSDCCPCAREPLAN_GEN_ALL_CORE_FT
PRINCIPAL DISCHARGE DIAGNOSIS  Diagnosis: NSTEMI (non-ST elevated myocardial infarction)  Assessment and Plan of Treatment:       SECONDARY DISCHARGE DIAGNOSES  Diagnosis: Pelvic mass  Assessment and Plan of Treatment:

## 2019-12-19 NOTE — DISCHARGE NOTE PROVIDER - CARE PROVIDER_API CALL
Usman Moser)  Medicine  [address] [place] [address]  Phone: [phone]  Fax: [phone]  Follow Up Time:     Ranjan Saba)  Cardiology; Cardiovascular Disease; Internal Medicine  [address] [place] [address]  Phone: [phone]  Fax: [phone]  Follow Up Time:

## 2019-12-19 NOTE — PROGRESS NOTE ADULT - REASON FOR ADMISSION
abdominal pain. NSTEMI
abdominal pain. NSTEMI advance multiple myeloma

## 2019-12-19 NOTE — PHARMACOTHERAPY INTERVENTION NOTE - COMMENTS
Recommended discontinuing duplicate levothyroxine order. Per MD- discontinue 25mcg levothyroxine
Patient received 7 days ceftriaxone.  Discussed duration of therapy with attending.  MD to evaluate patient and discontinue therapy if no longer required.

## 2019-12-19 NOTE — GOALS OF CARE CONVERSATION - ADVANCED CARE PLANNING - CONVERSATION DETAILS
SW aware patient from [place] and have reviewed clinical records in preparation for palliative care consult. SW reviewing surrogate for decision making. SW contacted patients sister Kim Chambers cell  and left message to obtain further hx and review palliative care services.
SW received call from [place] SW Director Jessy who was reviewing prior [place] cases with regards to directives and advised SW that [place] wanted [facility] to be notified of directives requests . MAIN contacted [employer] Legal [employer] and spoke with both attorneys Nader Garvey and Colt Krishnamurthy who reports that their office must be notified on all [place] patients and any residing in [facility] facilities for directives. SW initiated checklist #3 and sent clinical to [facility] for review.  Arthur Garvey in process of visiting with patient and then determination will be finalized . patients sister Kim as patient surrogate requesting MOLST directives.
Sw received [facility] Legal Services letter of objection to checklist 3 for directives as per patients sister as surrogate.  Arthur Garvey documents 2 meetings with patient and during those conversations patient objects to DNR and wants to be full code. As per  patient has legal right to object and as per the law her objection prevails. patient has not been deemed incompetent by  and therefore has rights to refuse and object.  also reports that patient refused to allow her sister to be her surrogate and therefore in future patients sister Kim can not make decisions on her behalf since patient objection prevails. Case discussed with DR Cage Director of Palliative Care and Dr. Smyth psychiatrist. Dr Smyth informed [facility] Director. MAIN faxed objection letter form Office of Mental Hygiene to Director of Social Work at [place] Jessy [facility].  Checklist 3 as well as objection letter placed on chart. MOLST which had been on chart was voided. SW contacted sister Kim and left message advising sister that MOLST has been voided and decision as per [facility]  Arthur Garvey. Case also discussed with hospitalist and NP Angie Orellana. [place] option for end of life care can not be pursued since patient objecting to directives and objecting to surrogate  to make decisions for [place]. Planning discussed with Dr Smyth who concurs that patient would need to return to [place] and if returns to hospital in decompensated condition and unable to verbalize objection then directives could be reapplied for with checklist 4 with ethics committee for 2 physician DNR order since it has been noted as per patient that she does not want surrogate making decisions for her. MAIN discussed case with unit MAIN [place] to advise of legal issues.
Case discussed in palliative care rounds and SW aware palliative NP discussed with patients sister her wishes and goals of care for end of life planning. Sister Kim Chambers was in agreement to pursue [facility] option for end of life planning. SW completed [place] application and submitted application and supporting documentation to [place] for review. SW also left message for [facility] Director Jessy advising of planning options . MAIN spoke with  Arthur Garvey late yesterday regarding MOLST checklist #3 and oversight of MHLS and  was at bedside and vsited with patient and sister . SW awaiting attorneys decision on directives requested by sister as well s Hospice planning.
Sisters Alicja and Kim have a strained relationship at this time, because Kim recently took patient out of [facility] and brought her home to live with her in her home [facility]. The arrangement did not work out for very long,  Patient refusing to take her medications, causing a hypertensive crisis leading to her return to [facility].    Patient is hostile and burnette around her sister, does not trust she has her best interest at heart.    Kim and myself spent at least 45 miin discussing her past mental illness, her baseline personality and how she feels about  living her life, and her health. Kim feels she does not have capacity to make complex medical decisions-as she refuses  medications, is physically aggressive with staff, cursing and uncooperative. She has said she does not want a biopsy  will not agree to any more treatment. Treated for Multiple Myeloma 10 years ago.    Sister Kim is interested in pursuing admission to [facility], as her options are few, Alicja has aged out of   [facility] policy, and her physical needs have become more complicated.    Arthur Garvey  for [facility] visited Patient's bedside had a conversation alone with her during which Patient said she would want resusitation, not sure about intubation  This conflict will be investigated further tomorrow, whereby he will contact Medical team and Kim with his decision to either allow DNR to stand or Veto its legitamacy.

## 2019-12-19 NOTE — PROGRESS NOTE BEHAVIORAL HEALTH - NSBHCONSULTMEDS_PSY_A_CORE FT
benztropine 0.5 milliGRAM(s) Oral at bedtime  lithium citrate Solution 300 milliGRAM(s) Oral daily  LORazepam     Tablet 0.5 milliGRAM(s) Oral two times a day
benztropine 0.5 milliGRAM(s) Oral at bedtime  lithium citrate Solution 300 milliGRAM(s) Oral daily  LORazepam     Tablet 0.5 milliGRAM(s) Oral two times a day  Risperdal consta 50 mg IM q 2 wks last dose 12/7 next dose 12/21

## 2019-12-19 NOTE — DISCHARGE NOTE NURSING/CASE MANAGEMENT/SOCIAL WORK - PATIENT PORTAL LINK FT
You can access the FollowMyHealth Patient Portal offered by [facility] by registering at the following website: [email or web address]. By joining [facility]’s FollowMyHealth portal, you will also be able to view your health information using other applications (apps) compatible with our system.

## 2019-12-19 NOTE — DISCHARGE NOTE PROVIDER - HOSPITAL COURSE
75 y/o Female with h/o multiple myeloma, CHF, HTN, Bipolar disorder, was referred from [facility] for abdominal pain and diarrhea for >1 month. Hypotension. no fever, CTA chest abdomen and pelvis showed numerous lytic lesions involving ribs. vertebrae and scapula and a large infiltrating pelvic mass encasing rectum, uterus, bladder and iliac vessels, Labs showed Hgb: 9.7 (was 10.7 on 11/4/19, troponin positive patient was admitted under medicine for further management of NSTEMI/ Abdominal pain, hooked up with cardiac monitor, Cardiology consulted and patient was continued with aspirin, patient refused for Cardiac cath/ nuclear stress test, her TTE done showed Severely enlarged left atrium, Segmental wall motion abnormalities in RCA territory, Left ventricular ejection fraction, by visual estimation, is 45-50%, Grade II diastolic dysfunction, Moderate-severe mitral valve regurgitation. Tethering and restricted posterior leaflet, she was started on low dose BB and ACEIs, tolerated well, her LDL was done and noted to be 38.     Patient was noted to have Afib w/ rvr, got BB and converted to sinus, held a/c for now per sister request.     Patient was came in with Abdominal pain and diarrhea, gave IV hydration, her stool studies came back with C diff colitis, started on PO Vanco, Patient was seen and followed by GI, she has no more diarrhea and her abdominal pain has been resolved.     On Her CT scan of the abdomen she was noted to have Large infiltrating pelvic mass encasing the rectum, uterus, bladder and iliac vessels. 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 Other considerations include sarcoma and lymphoma, Numerous lytic bone lesions compatible with multiple myeloma, including lesions involving both femoral necks, patient was provided with some pain meds, Oncology was consulted as per them Pt has little/no insight into her disease.  She has been refusing tx for Multiple Myeloma,  She now has pelvic mass that may be related to myeloma or may be a new primary,  She has refused intervention during my discussions with her.  She will not allow family to make decisions.  No further recommendations can be made unless this new mass is biopsied.  An  is now involved. Apparently would need to have court appointed guardian to make decisions, At this point would pursue supportive care, even consider Hospice, she need to have follow up with oncology as out patient for further plan.     Bipolar disorder with delirium multifactorial     - cont current meds per psychiatry      - she is refusing to take po meds at times     - she is with impaired decision making capacity     - continue haldol 5mg q6hprn agitation IVP    - continue  ativan 0.5mg BID IVP, continue with lithium        #multiple myeloma    - has been  refusing  therapy per oncologist     - lytic lesions probably secondary to MM         #Advance Care Directives    - DNR/DNI     - treat acutely     - As per Palliative to discuss [place] for post hospital Palliative Hospice Care            #DVT prophylaxis    - heparin SC        Dispo: Pending arrangements for comfort care facility, 77 y/o Female with h/o multiple myeloma, CHF, HTN, Bipolar disorder, was referred from [facility] for abdominal pain and diarrhea for >1 month. Hypotension. no fever, CTA chest abdomen and pelvis showed numerous lytic lesions involving ribs. vertebrae and scapula and a large infiltrating pelvic mass encasing rectum, uterus, bladder and iliac vessels, Labs showed Hgb: 9.7 (was 10.7 on 11/4/19, troponin positive patient was admitted under medicine for further management of NSTEMI/ Abdominal pain, hooked up with cardiac monitor, Cardiology consulted and patient was continued with aspirin, patient refused for Cardiac cath/ nuclear stress test, her TTE done showed Severely enlarged left atrium, Segmental wall motion abnormalities in RCA territory, Left ventricular ejection fraction, by visual estimation, is 45-50%, Grade II diastolic dysfunction, Moderate-severe mitral valve regurgitation. Tethering and restricted posterior leaflet, she was started on low dose BB and ACEIs, tolerated well, her LDL was done and noted to be 38.     Patient was noted to have Afib w/ rvr, got BB and converted to sinus, held a/c for now per sister request.     Patient was came in with Abdominal pain and diarrhea, gave IV hydration, her stool studies came back with C diff colitis, started on PO Vanco, Patient was seen and followed by GI, she has no more diarrhea and her abdominal pain has been resolved, continue Vanco oral for 10 days to finish the course.     She was also noted to have UTI, she finished course of antibiotics with ceftriaxone, urine cultures were unremarkable.      On Her CT scan of the abdomen she was noted to have Large infiltrating pelvic mass encasing the rectum, uterus, bladder and iliac vessels. 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 Other considerations include sarcoma and lymphoma, Numerous lytic bone lesions compatible with multiple myeloma, including lesions involving both femoral necks, patient was provided with some pain meds, Oncology was consulted as per them Pt has little/no insight into her disease.  She has been refusing tx for Multiple Myeloma,  She now has pelvic mass that may be related to myeloma or may be a new primary,  She has refused intervention during my discussions with her.  She will not allow family to make decisions.  No further recommendations can be made unless this new mass is biopsied.  An  is now involved. Apparently would need to have court appointed guardian to make decisions, At this point would pursue supportive care, even consider Hospice, she need to have follow up with oncology as out patient for further plan.     She has Hx of Bipolar disorder with delirium, she was continued with her Psych meds, seen and followed by Psych team.       Patient's symptoms has improved, she is medically stable and is being discharge back to [facility].     Spoke with Dr Mai and she accepted patient back in her Service.         Vital Signs Last 24 Hrs    T(C): 37 (19 Dec 2019 08:25), Max: 37.4 (19 Dec 2019 00:12)    T(F): 98.6 (19 Dec 2019 08:25), Max: 99.3 (19 Dec 2019 00:12)    HR: 79 (19 Dec 2019 08:25) (71 - 79)    BP: 112/63 (19 Dec 2019 08:25) (112/63 - 118/72)    RR: 18 (19 Dec 2019 08:25) (18 - 18)    SpO2: 97% (19 Dec 2019 08:25) (97% - 98%)            PHYSICAL EXAM:        GENERAL: Elderly female looking comfortable     HEENT: PERRL, +EOMI    NECK: soft, Supple, No JVD,     CHEST/LUNG: Decrease air entry bilaterally; No wheezing    HEART: S1S2+, Regular rate and rhythm; No murmurs    ABDOMEN: Soft, Nontender, Nondistended; Bowel sounds present    EXTREMITIES:  1+ Peripheral Pulses, No edema    SKIN: No rashes or lesions    NEURO: AAOX2, no focal deficits, no motor r sensory loss    PSYCH: normal mood.         Total time spent 38minutes

## 2019-12-21 ENCOUNTER — OUTPATIENT (OUTPATIENT)
Dept: OUTPATIENT SERVICES | Facility: HOSPITAL | Age: 74
LOS: 1 days | End: 2019-12-21
Payer: COMMERCIAL

## 2019-12-21 DIAGNOSIS — Z00.8 ENCOUNTER FOR OTHER GENERAL EXAMINATION: ICD-10-CM

## 2019-12-21 PROBLEM — I50.9 HEART FAILURE, UNSPECIFIED: Chronic | Status: ACTIVE | Noted: 2019-12-12

## 2019-12-22 PROCEDURE — 80053 COMPREHEN METABOLIC PANEL: CPT

## 2019-12-22 PROCEDURE — 85027 COMPLETE CBC AUTOMATED: CPT

## 2019-12-22 PROCEDURE — 80178 ASSAY OF LITHIUM: CPT

## 2019-12-22 PROCEDURE — 36415 COLL VENOUS BLD VENIPUNCTURE: CPT

## 2020-01-01 ENCOUNTER — EMERGENCY (EMERGENCY)
Facility: HOSPITAL | Age: 75
LOS: 1 days | Discharge: HOME | End: 2020-01-01
Attending: EMERGENCY MEDICINE
Payer: MEDICARE

## 2020-01-01 VITALS
RESPIRATION RATE: 18 BRPM | SYSTOLIC BLOOD PRESSURE: 122 MMHG | DIASTOLIC BLOOD PRESSURE: 67 MMHG | TEMPERATURE: 98 F | HEART RATE: 85 BPM | OXYGEN SATURATION: 98 %

## 2020-01-01 LAB
ALBUMIN SERPL ELPH-MCNC: 2.8 G/DL — LOW (ref 3.3–5.2)
ALP SERPL-CCNC: 106 U/L — SIGNIFICANT CHANGE UP (ref 40–120)
ALT FLD-CCNC: 8 U/L — SIGNIFICANT CHANGE UP
ANION GAP SERPL CALC-SCNC: 11 MMOL/L — SIGNIFICANT CHANGE UP (ref 5–17)
AST SERPL-CCNC: 19 U/L — SIGNIFICANT CHANGE UP
BASOPHILS # BLD AUTO: 0.02 K/UL — SIGNIFICANT CHANGE UP (ref 0–0.2)
BASOPHILS NFR BLD AUTO: 0.2 % — SIGNIFICANT CHANGE UP (ref 0–2)
BILIRUB SERPL-MCNC: 0.4 MG/DL — SIGNIFICANT CHANGE UP (ref 0.4–2)
BUN SERPL-MCNC: 30 MG/DL — HIGH (ref 8–20)
CALCIUM SERPL-MCNC: 7.8 MG/DL — LOW (ref 8.6–10.2)
CHLORIDE SERPL-SCNC: 109 MMOL/L — HIGH (ref 98–107)
CK SERPL-CCNC: 88 U/L — SIGNIFICANT CHANGE UP (ref 25–170)
CO2 SERPL-SCNC: 16 MMOL/L — LOW (ref 22–29)
CREAT SERPL-MCNC: 1.48 MG/DL — HIGH (ref 0.5–1.3)
EOSINOPHIL # BLD AUTO: 0.18 K/UL — SIGNIFICANT CHANGE UP (ref 0–0.5)
EOSINOPHIL NFR BLD AUTO: 1.7 % — SIGNIFICANT CHANGE UP (ref 0–6)
GLUCOSE SERPL-MCNC: 85 MG/DL — SIGNIFICANT CHANGE UP (ref 70–115)
HCT VFR BLD CALC: 30.4 % — LOW (ref 34.5–45)
HGB BLD-MCNC: 9.6 G/DL — LOW (ref 11.5–15.5)
IMM GRANULOCYTES NFR BLD AUTO: 0.7 % — SIGNIFICANT CHANGE UP (ref 0–1.5)
LITHIUM SERPL-MCNC: 0.48 MMOL/L — LOW (ref 0.5–1.5)
LYMPHOCYTES # BLD AUTO: 0.64 K/UL — LOW (ref 1–3.3)
LYMPHOCYTES # BLD AUTO: 6.1 % — LOW (ref 13–44)
MCHC RBC-ENTMCNC: 31.4 PG — SIGNIFICANT CHANGE UP (ref 27–34)
MCHC RBC-ENTMCNC: 31.6 GM/DL — LOW (ref 32–36)
MCV RBC AUTO: 99.3 FL — SIGNIFICANT CHANGE UP (ref 80–100)
MONOCYTES # BLD AUTO: 1.24 K/UL — HIGH (ref 0–0.9)
MONOCYTES NFR BLD AUTO: 11.9 % — SIGNIFICANT CHANGE UP (ref 2–14)
NEUTROPHILS # BLD AUTO: 8.29 K/UL — HIGH (ref 1.8–7.4)
NEUTROPHILS NFR BLD AUTO: 79.4 % — HIGH (ref 43–77)
PLATELET # BLD AUTO: 183 K/UL — SIGNIFICANT CHANGE UP (ref 150–400)
POTASSIUM SERPL-MCNC: 4.8 MMOL/L — SIGNIFICANT CHANGE UP (ref 3.5–5.3)
POTASSIUM SERPL-SCNC: 4.8 MMOL/L — SIGNIFICANT CHANGE UP (ref 3.5–5.3)
PROT SERPL-MCNC: 10 G/DL — HIGH (ref 6.6–8.7)
RBC # BLD: 3.06 M/UL — LOW (ref 3.8–5.2)
RBC # FLD: 15.2 % — HIGH (ref 10.3–14.5)
SODIUM SERPL-SCNC: 136 MMOL/L — SIGNIFICANT CHANGE UP (ref 135–145)
WBC # BLD: 10.44 K/UL — SIGNIFICANT CHANGE UP (ref 3.8–10.5)
WBC # FLD AUTO: 10.44 K/UL — SIGNIFICANT CHANGE UP (ref 3.8–10.5)

## 2020-01-01 PROCEDURE — 80178 ASSAY OF LITHIUM: CPT

## 2020-01-01 PROCEDURE — 85027 COMPLETE CBC AUTOMATED: CPT

## 2020-01-01 PROCEDURE — 36415 COLL VENOUS BLD VENIPUNCTURE: CPT

## 2020-01-01 PROCEDURE — 99283 EMERGENCY DEPT VISIT LOW MDM: CPT

## 2020-01-01 PROCEDURE — 82550 ASSAY OF CK (CPK): CPT

## 2020-01-01 PROCEDURE — 99284 EMERGENCY DEPT VISIT MOD MDM: CPT

## 2020-01-01 PROCEDURE — 80053 COMPREHEN METABOLIC PANEL: CPT

## 2020-01-01 NOTE — ED ADULT TRIAGE NOTE - CHIEF COMPLAINT QUOTE
patient from [facility] with increased lethargy x 1 day. patient refusing pain meds as per [facility] staff. patient denies any complaints of pain or discomfort at this time

## 2020-01-02 VITALS
HEART RATE: 86 BPM | RESPIRATION RATE: 18 BRPM | DIASTOLIC BLOOD PRESSURE: 66 MMHG | OXYGEN SATURATION: 96 % | SYSTOLIC BLOOD PRESSURE: 126 MMHG | TEMPERATURE: 99 F

## 2020-01-02 NOTE — ED PROVIDER NOTE - CLINICAL SUMMARY MEDICAL DECISION MAKING FREE TEXT BOX
per aide at bedside pt is at [facility] mental status bed bound labs consistent with prior no lateraling deficits labs stable unclear why she was sent here . she is not lethargic here reponssive to all commands. d/c back to [facility]

## 2020-01-02 NOTE — ED PROVIDER NOTE - OBJECTIVE STATEMENT
A 74 year old female pt presents to the ED c/o lethargy. pt is from [place] and as per aide pt has had increased weakness over the past few days. No falls or trauma. denies fever. denies HA or neck pain. no chest pain or sob. no abd pain. no n/v/d. no back pain. no motor or sensory deficits. denies illicit drug use. no recent travel. no rash. no other acute issues symptoms or concerns

## 2020-01-02 NOTE — ED PROVIDER NOTE - PATIENT PORTAL LINK FT
You can access the FollowMyHealth Patient Portal offered by [facility] by registering at the following website: [email or web address]. By joining [facility]’s FollowMyHealth portal, you will also be able to view your health information using other applications (apps) compatible with our system.

## 2020-01-02 NOTE — ED PROVIDER NOTE - PMH
Bone cancer    Congestive heart failure (CHF)    Hypercholesteremia    Hypertension    Hypothyroid    Mental disorder    Multiple myeloma    Scoliosis

## 2020-01-05 ENCOUNTER — INPATIENT (INPATIENT)
Facility: HOSPITAL | Age: 75
LOS: 9 days | DRG: 871 | End: 2020-01-15
Attending: INTERNAL MEDICINE | Admitting: HOSPITALIST
Payer: MEDICARE

## 2020-01-05 VITALS
HEIGHT: 64 IN | SYSTOLIC BLOOD PRESSURE: 112 MMHG | WEIGHT: 199.96 LBS | OXYGEN SATURATION: 99 % | DIASTOLIC BLOOD PRESSURE: 58 MMHG | RESPIRATION RATE: 20 BRPM | HEART RATE: 89 BPM

## 2020-01-05 DIAGNOSIS — J18.9 PNEUMONIA, UNSPECIFIED ORGANISM: ICD-10-CM

## 2020-01-05 LAB
ALBUMIN SERPL ELPH-MCNC: 2.8 G/DL — LOW (ref 3.3–5.2)
ALP SERPL-CCNC: 112 U/L — SIGNIFICANT CHANGE UP (ref 40–120)
ALT FLD-CCNC: 8 U/L — SIGNIFICANT CHANGE UP
ANION GAP SERPL CALC-SCNC: 15 MMOL/L — SIGNIFICANT CHANGE UP (ref 5–17)
APPEARANCE UR: CLEAR — SIGNIFICANT CHANGE UP
APTT BLD: 32.1 SEC — SIGNIFICANT CHANGE UP (ref 27.5–36.3)
AST SERPL-CCNC: 20 U/L — SIGNIFICANT CHANGE UP
BACTERIA # UR AUTO: ABNORMAL
BASOPHILS # BLD AUTO: 0.02 K/UL — SIGNIFICANT CHANGE UP (ref 0–0.2)
BASOPHILS NFR BLD AUTO: 0.2 % — SIGNIFICANT CHANGE UP (ref 0–2)
BILIRUB SERPL-MCNC: 0.3 MG/DL — LOW (ref 0.4–2)
BILIRUB UR-MCNC: NEGATIVE — SIGNIFICANT CHANGE UP
BUN SERPL-MCNC: 39 MG/DL — HIGH (ref 8–20)
CALCIUM SERPL-MCNC: 8 MG/DL — LOW (ref 8.6–10.2)
CHLORIDE SERPL-SCNC: 119 MMOL/L — HIGH (ref 98–107)
CK SERPL-CCNC: 104 U/L — SIGNIFICANT CHANGE UP (ref 25–170)
CO2 SERPL-SCNC: 16 MMOL/L — LOW (ref 22–29)
COLOR SPEC: YELLOW — SIGNIFICANT CHANGE UP
CREAT SERPL-MCNC: 1.86 MG/DL — HIGH (ref 0.5–1.3)
DIFF PNL FLD: ABNORMAL
EOSINOPHIL # BLD AUTO: 0.1 K/UL — SIGNIFICANT CHANGE UP (ref 0–0.5)
EOSINOPHIL NFR BLD AUTO: 1 % — SIGNIFICANT CHANGE UP (ref 0–6)
EPI CELLS # UR: SIGNIFICANT CHANGE UP
GLUCOSE SERPL-MCNC: 83 MG/DL — SIGNIFICANT CHANGE UP (ref 70–115)
GLUCOSE UR QL: NEGATIVE MG/DL — SIGNIFICANT CHANGE UP
HCT VFR BLD CALC: 32.5 % — LOW (ref 34.5–45)
HGB BLD-MCNC: 9.5 G/DL — LOW (ref 11.5–15.5)
IMM GRANULOCYTES NFR BLD AUTO: 1.2 % — SIGNIFICANT CHANGE UP (ref 0–1.5)
INR BLD: 1.38 RATIO — HIGH (ref 0.88–1.16)
KETONES UR-MCNC: ABNORMAL
LACTATE BLDV-MCNC: 1.4 MMOL/L — SIGNIFICANT CHANGE UP (ref 0.5–2)
LEUKOCYTE ESTERASE UR-ACNC: NEGATIVE — SIGNIFICANT CHANGE UP
LYMPHOCYTES # BLD AUTO: 0.56 K/UL — LOW (ref 1–3.3)
LYMPHOCYTES # BLD AUTO: 5.4 % — LOW (ref 13–44)
MCHC RBC-ENTMCNC: 29.2 GM/DL — LOW (ref 32–36)
MCHC RBC-ENTMCNC: 29.7 PG — SIGNIFICANT CHANGE UP (ref 27–34)
MCV RBC AUTO: 101.6 FL — HIGH (ref 80–100)
MONOCYTES # BLD AUTO: 1.46 K/UL — HIGH (ref 0–0.9)
MONOCYTES NFR BLD AUTO: 14.1 % — HIGH (ref 2–14)
NEUTROPHILS # BLD AUTO: 8.06 K/UL — HIGH (ref 1.8–7.4)
NEUTROPHILS NFR BLD AUTO: 78.1 % — HIGH (ref 43–77)
NITRITE UR-MCNC: NEGATIVE — SIGNIFICANT CHANGE UP
PH UR: 6 — SIGNIFICANT CHANGE UP (ref 5–8)
PLATELET # BLD AUTO: 162 K/UL — SIGNIFICANT CHANGE UP (ref 150–400)
POTASSIUM SERPL-MCNC: 4.4 MMOL/L — SIGNIFICANT CHANGE UP (ref 3.5–5.3)
POTASSIUM SERPL-SCNC: 4.4 MMOL/L — SIGNIFICANT CHANGE UP (ref 3.5–5.3)
PROT SERPL-MCNC: 10.5 G/DL — HIGH (ref 6.6–8.7)
PROT UR-MCNC: 100 MG/DL
PROTHROM AB SERPL-ACNC: 16 SEC — HIGH (ref 10–12.9)
RBC # BLD: 3.2 M/UL — LOW (ref 3.8–5.2)
RBC # FLD: 15.7 % — HIGH (ref 10.3–14.5)
RBC CASTS # UR COMP ASSIST: ABNORMAL /HPF (ref 0–4)
SODIUM SERPL-SCNC: 150 MMOL/L — HIGH (ref 135–145)
SP GR SPEC: 1.01 — SIGNIFICANT CHANGE UP (ref 1.01–1.02)
UROBILINOGEN FLD QL: NEGATIVE MG/DL — SIGNIFICANT CHANGE UP
WBC # BLD: 10.32 K/UL — SIGNIFICANT CHANGE UP (ref 3.8–10.5)
WBC # FLD AUTO: 10.32 K/UL — SIGNIFICANT CHANGE UP (ref 3.8–10.5)
WBC UR QL: SIGNIFICANT CHANGE UP

## 2020-01-05 PROCEDURE — 71045 X-RAY EXAM CHEST 1 VIEW: CPT | Mod: 26

## 2020-01-05 PROCEDURE — 99285 EMERGENCY DEPT VISIT HI MDM: CPT

## 2020-01-05 PROCEDURE — 93010 ELECTROCARDIOGRAM REPORT: CPT

## 2020-01-05 RX ORDER — SODIUM CHLORIDE 9 MG/ML
2800 INJECTION INTRAMUSCULAR; INTRAVENOUS; SUBCUTANEOUS ONCE
Refills: 0 | Status: DISCONTINUED | OUTPATIENT
Start: 2020-01-05 | End: 2020-01-05

## 2020-01-05 RX ORDER — VANCOMYCIN HCL 1 G
1000 VIAL (EA) INTRAVENOUS ONCE
Refills: 0 | Status: COMPLETED | OUTPATIENT
Start: 2020-01-05 | End: 2020-01-05

## 2020-01-05 RX ORDER — CEFEPIME 1 G/1
INJECTION, POWDER, FOR SOLUTION INTRAMUSCULAR; INTRAVENOUS
Refills: 0 | Status: DISCONTINUED | OUTPATIENT
Start: 2020-01-05 | End: 2020-01-06

## 2020-01-05 RX ORDER — CEFTRIAXONE 500 MG/1
1000 INJECTION, POWDER, FOR SOLUTION INTRAMUSCULAR; INTRAVENOUS ONCE
Refills: 0 | Status: DISCONTINUED | OUTPATIENT
Start: 2020-01-05 | End: 2020-01-05

## 2020-01-05 RX ORDER — CEFEPIME 1 G/1
2000 INJECTION, POWDER, FOR SOLUTION INTRAMUSCULAR; INTRAVENOUS ONCE
Refills: 0 | Status: COMPLETED | OUTPATIENT
Start: 2020-01-05 | End: 2020-01-05

## 2020-01-05 RX ORDER — ACETAMINOPHEN 500 MG
650 TABLET ORAL ONCE
Refills: 0 | Status: COMPLETED | OUTPATIENT
Start: 2020-01-05 | End: 2020-01-05

## 2020-01-05 RX ORDER — SODIUM CHLORIDE 9 MG/ML
3000 INJECTION, SOLUTION INTRAVENOUS ONCE
Refills: 0 | Status: COMPLETED | OUTPATIENT
Start: 2020-01-05 | End: 2020-01-05

## 2020-01-05 RX ORDER — CEFEPIME 1 G/1
2000 INJECTION, POWDER, FOR SOLUTION INTRAMUSCULAR; INTRAVENOUS EVERY 12 HOURS
Refills: 0 | Status: DISCONTINUED | OUTPATIENT
Start: 2020-01-06 | End: 2020-01-06

## 2020-01-05 RX ADMIN — Medication 650 MILLIGRAM(S): at 21:12

## 2020-01-05 RX ADMIN — Medication 250 MILLIGRAM(S): at 20:30

## 2020-01-05 RX ADMIN — Medication 650 MILLIGRAM(S): at 20:30

## 2020-01-05 RX ADMIN — CEFEPIME 100 MILLIGRAM(S): 1 INJECTION, POWDER, FOR SOLUTION INTRAMUSCULAR; INTRAVENOUS at 21:20

## 2020-01-05 RX ADMIN — SODIUM CHLORIDE 6000 MILLILITER(S): 9 INJECTION, SOLUTION INTRAVENOUS at 21:21

## 2020-01-05 NOTE — ED ADULT NURSE NOTE - OBJECTIVE STATEMENT
[facility] pt sent to ED from [facility] for AMS. per EMS, staff reports AMS x30 mins, pt was acting self prior to EMS being called. pt sent to ED from [facility] for AMS. per EMS, staff reports AMS x30 mins, pt was acting self prior to EMS being called. unable to obtain any additional information from patient.

## 2020-01-05 NOTE — ED PROVIDER NOTE - CLINICAL SUMMARY MEDICAL DECISION MAKING FREE TEXT BOX
Patient presenting with altered mental status, likely infectious given that pt is febrile (UTI vs PNA)  - CBC, CMP, VBG, UA, CXR, cultures, IVF  - Abx  - Reassess

## 2020-01-05 NOTE — ED PROVIDER NOTE - CARE PLAN
Principal Discharge DX:	Pneumonia  Secondary Diagnosis:	Metabolic encephalopathy  Secondary Diagnosis:	Dehydration

## 2020-01-05 NOTE — ED ADULT TRIAGE NOTE - CHIEF COMPLAINT QUOTE
Patient arrived via EMS from [place], awake, altered mental status, breathing unlabored.  As per EMS, patient SOB and AMS 30 prior to Ed arrival.  Aide states patient has been declining for 1 month.  responsive to some painful stimuli.  MD called to bedside.

## 2020-01-05 NOTE — ED PROVIDER NOTE - OBJECTIVE STATEMENT
74F h/o HTN, HLD, breast cancer, mental disorder presents from [place] with AMS. As per staff pt more quiet, not responding to staff. +fever today. Denies cough, vomiting.   History obtained from EMS.

## 2020-01-05 NOTE — ED ADULT NURSE NOTE - CHIEF COMPLAINT QUOTE
Patient arrived via EMS from [place], awake, altered mental status, breathing unlabored.  As per EMS, patient SOB and AMS 30 prior to Ed arrival.  Aide states patient has been declining for 1 month.  responsive to some painful stimuli.  MD called to bedside.

## 2020-01-05 NOTE — ED PROVIDER NOTE - PHYSICAL EXAMINATION
Gen: Responsive to voice, not answering questions appropriately    Head: NC/AT  Neck: trachea midline  Resp:  No distress, CTAB  CV: RRR  GI: +suprapubid fullness.   Ext: no deformities, moving all extremities, no bony ttp  Neuro:  Alert, not responding to questions appropriately.   Skin:  Warm and dry as visualized  Psych:  Normal affect and mood

## 2020-01-06 DIAGNOSIS — J18.9 PNEUMONIA, UNSPECIFIED ORGANISM: ICD-10-CM

## 2020-01-06 DIAGNOSIS — N17.9 ACUTE KIDNEY FAILURE, UNSPECIFIED: ICD-10-CM

## 2020-01-06 DIAGNOSIS — E86.0 DEHYDRATION: ICD-10-CM

## 2020-01-06 DIAGNOSIS — E03.9 HYPOTHYROIDISM, UNSPECIFIED: ICD-10-CM

## 2020-01-06 DIAGNOSIS — G93.41 METABOLIC ENCEPHALOPATHY: ICD-10-CM

## 2020-01-06 DIAGNOSIS — Z29.9 ENCOUNTER FOR PROPHYLACTIC MEASURES, UNSPECIFIED: ICD-10-CM

## 2020-01-06 DIAGNOSIS — E87.0 HYPEROSMOLALITY AND HYPERNATREMIA: ICD-10-CM

## 2020-01-06 LAB
ANION GAP SERPL CALC-SCNC: 10 MMOL/L — SIGNIFICANT CHANGE UP (ref 5–17)
BASOPHILS # BLD AUTO: 0.02 K/UL — SIGNIFICANT CHANGE UP (ref 0–0.2)
BASOPHILS NFR BLD AUTO: 0.2 % — SIGNIFICANT CHANGE UP (ref 0–2)
BUN SERPL-MCNC: 33 MG/DL — HIGH (ref 8–20)
CALCIUM SERPL-MCNC: 7.2 MG/DL — LOW (ref 8.6–10.2)
CHLORIDE SERPL-SCNC: 120 MMOL/L — HIGH (ref 98–107)
CO2 SERPL-SCNC: 14 MMOL/L — LOW (ref 22–29)
CREAT SERPL-MCNC: 1.58 MG/DL — HIGH (ref 0.5–1.3)
CULTURE RESULTS: SIGNIFICANT CHANGE UP
EOSINOPHIL # BLD AUTO: 0.15 K/UL — SIGNIFICANT CHANGE UP (ref 0–0.5)
EOSINOPHIL NFR BLD AUTO: 1.4 % — SIGNIFICANT CHANGE UP (ref 0–6)
GLUCOSE SERPL-MCNC: 81 MG/DL — SIGNIFICANT CHANGE UP (ref 70–115)
HCT VFR BLD CALC: 29.7 % — LOW (ref 34.5–45)
HGB BLD-MCNC: 8.6 G/DL — LOW (ref 11.5–15.5)
IMM GRANULOCYTES NFR BLD AUTO: 1.3 % — SIGNIFICANT CHANGE UP (ref 0–1.5)
LYMPHOCYTES # BLD AUTO: 0.3 K/UL — LOW (ref 1–3.3)
LYMPHOCYTES # BLD AUTO: 2.8 % — LOW (ref 13–44)
MCHC RBC-ENTMCNC: 29 GM/DL — LOW (ref 32–36)
MCHC RBC-ENTMCNC: 30 PG — SIGNIFICANT CHANGE UP (ref 27–34)
MCV RBC AUTO: 103.5 FL — HIGH (ref 80–100)
MONOCYTES # BLD AUTO: 1.18 K/UL — HIGH (ref 0–0.9)
MONOCYTES NFR BLD AUTO: 10.9 % — SIGNIFICANT CHANGE UP (ref 2–14)
NEUTROPHILS # BLD AUTO: 9.05 K/UL — HIGH (ref 1.8–7.4)
NEUTROPHILS NFR BLD AUTO: 83.4 % — HIGH (ref 43–77)
PLATELET # BLD AUTO: 134 K/UL — LOW (ref 150–400)
POTASSIUM SERPL-MCNC: 3.7 MMOL/L — SIGNIFICANT CHANGE UP (ref 3.5–5.3)
POTASSIUM SERPL-SCNC: 3.7 MMOL/L — SIGNIFICANT CHANGE UP (ref 3.5–5.3)
PROCALCITONIN SERPL-MCNC: 0.26 NG/ML — HIGH (ref 0.02–0.1)
RBC # BLD: 2.87 M/UL — LOW (ref 3.8–5.2)
RBC # FLD: 15.6 % — HIGH (ref 10.3–14.5)
SODIUM SERPL-SCNC: 144 MMOL/L — SIGNIFICANT CHANGE UP (ref 135–145)
SPECIMEN SOURCE: SIGNIFICANT CHANGE UP
WBC # BLD: 10.84 K/UL — HIGH (ref 3.8–10.5)
WBC # FLD AUTO: 10.84 K/UL — HIGH (ref 3.8–10.5)

## 2020-01-06 PROCEDURE — 99223 1ST HOSP IP/OBS HIGH 75: CPT

## 2020-01-06 RX ORDER — VALACYCLOVIR 500 MG/1
500 TABLET, FILM COATED ORAL
Refills: 0 | Status: DISCONTINUED | OUTPATIENT
Start: 2020-01-06 | End: 2020-01-13

## 2020-01-06 RX ORDER — CHOLECALCIFEROL (VITAMIN D3) 125 MCG
1000 CAPSULE ORAL DAILY
Refills: 0 | Status: DISCONTINUED | OUTPATIENT
Start: 2020-01-06 | End: 2020-01-15

## 2020-01-06 RX ORDER — CARVEDILOL PHOSPHATE 80 MG/1
3.12 CAPSULE, EXTENDED RELEASE ORAL EVERY 12 HOURS
Refills: 0 | Status: DISCONTINUED | OUTPATIENT
Start: 2020-01-06 | End: 2020-01-08

## 2020-01-06 RX ORDER — BENZTROPINE MESYLATE 1 MG
1 TABLET ORAL
Qty: 0 | Refills: 0 | DISCHARGE

## 2020-01-06 RX ORDER — LISINOPRIL 2.5 MG/1
2.5 TABLET ORAL DAILY
Refills: 0 | Status: DISCONTINUED | OUTPATIENT
Start: 2020-01-06 | End: 2020-01-10

## 2020-01-06 RX ORDER — VANCOMYCIN HCL 1 G
1 VIAL (EA) INTRAVENOUS
Qty: 0 | Refills: 0 | DISCHARGE

## 2020-01-06 RX ORDER — FOLIC ACID 0.8 MG
1 TABLET ORAL DAILY
Refills: 0 | Status: DISCONTINUED | OUTPATIENT
Start: 2020-01-06 | End: 2020-01-15

## 2020-01-06 RX ORDER — ENOXAPARIN SODIUM 100 MG/ML
40 INJECTION SUBCUTANEOUS EVERY 24 HOURS
Refills: 0 | Status: DISCONTINUED | OUTPATIENT
Start: 2020-01-06 | End: 2020-01-15

## 2020-01-06 RX ORDER — LITHIUM CARBONATE 300 MG/1
5 TABLET, EXTENDED RELEASE ORAL
Qty: 0 | Refills: 0 | DISCHARGE

## 2020-01-06 RX ORDER — FERROUS SULFATE 325(65) MG
325 TABLET ORAL DAILY
Refills: 0 | Status: DISCONTINUED | OUTPATIENT
Start: 2020-01-06 | End: 2020-01-15

## 2020-01-06 RX ORDER — LEVOTHYROXINE SODIUM 125 MCG
75 TABLET ORAL DAILY
Refills: 0 | Status: DISCONTINUED | OUTPATIENT
Start: 2020-01-06 | End: 2020-01-08

## 2020-01-06 RX ORDER — FUROSEMIDE 40 MG
40 TABLET ORAL DAILY
Refills: 0 | Status: DISCONTINUED | OUTPATIENT
Start: 2020-01-06 | End: 2020-01-07

## 2020-01-06 RX ORDER — ASPIRIN/CALCIUM CARB/MAGNESIUM 324 MG
81 TABLET ORAL DAILY
Refills: 0 | Status: DISCONTINUED | OUTPATIENT
Start: 2020-01-06 | End: 2020-01-15

## 2020-01-06 RX ORDER — RISPERIDONE 4 MG/1
50 TABLET ORAL
Qty: 0 | Refills: 0 | DISCHARGE

## 2020-01-06 RX ORDER — GABAPENTIN 400 MG/1
100 CAPSULE ORAL AT BEDTIME
Refills: 0 | Status: DISCONTINUED | OUTPATIENT
Start: 2020-01-06 | End: 2020-01-13

## 2020-01-06 RX ORDER — DEXAMETHASONE 0.5 MG/5ML
5 ELIXIR ORAL
Qty: 0 | Refills: 0 | DISCHARGE

## 2020-01-06 RX ORDER — PANTOPRAZOLE SODIUM 20 MG/1
40 TABLET, DELAYED RELEASE ORAL
Refills: 0 | Status: DISCONTINUED | OUTPATIENT
Start: 2020-01-06 | End: 2020-01-07

## 2020-01-06 RX ADMIN — ENOXAPARIN SODIUM 40 MILLIGRAM(S): 100 INJECTION SUBCUTANEOUS at 06:39

## 2020-01-06 RX ADMIN — Medication 40 MILLIGRAM(S): at 06:42

## 2020-01-06 RX ADMIN — Medication 1 MILLIGRAM(S): at 09:56

## 2020-01-06 RX ADMIN — LISINOPRIL 2.5 MILLIGRAM(S): 2.5 TABLET ORAL at 06:40

## 2020-01-06 RX ADMIN — CARVEDILOL PHOSPHATE 3.12 MILLIGRAM(S): 80 CAPSULE, EXTENDED RELEASE ORAL at 06:40

## 2020-01-06 RX ADMIN — Medication 1000 UNIT(S): at 09:56

## 2020-01-06 RX ADMIN — Medication 325 MILLIGRAM(S): at 09:56

## 2020-01-06 RX ADMIN — Medication 81 MILLIGRAM(S): at 09:56

## 2020-01-06 RX ADMIN — PANTOPRAZOLE SODIUM 40 MILLIGRAM(S): 20 TABLET, DELAYED RELEASE ORAL at 09:56

## 2020-01-06 RX ADMIN — Medication 75 MICROGRAM(S): at 06:40

## 2020-01-06 RX ADMIN — VALACYCLOVIR 500 MILLIGRAM(S): 500 TABLET, FILM COATED ORAL at 06:40

## 2020-01-06 NOTE — H&P ADULT - PROBLEM SELECTOR PLAN 3
chest xray with ?LL infiltrated  s/p cefepime and vanco in the ED  low grade fever 100.2 in the Ed, no leukocytosis  UA negative   will hold off abx for now, f/u official chest xray read

## 2020-01-06 NOTE — H&P ADULT - ASSESSMENT
75y/o female with h/o multiple myeloma, pelvic mass, CHF, HTN, Bipolar disorder, recently admission for c.diff completed coarse of antibiotic on 12/30 sent in [facility] for alter mental status. As per [place] chart review pt refusing to eat or drink for several days, today pt found lethargic only respond to names and pain, febrile 101 at the center. In the ED pt back to baseline, during my encounter to is alert and wake, noncooperative with questioning, states "I don't want see your face" but she is constantly asking if she can have coke to drink. in the ed pt with low grade fever 100.2, no leukocytosis, Na 150, BUN 39, Cr 1.8, CXR ? LL infiltrated.

## 2020-01-06 NOTE — H&P ADULT - HISTORY OF PRESENT ILLNESS
75y/o female with h/o multiple myeloma, CHF, HTN, Bipolar disorder, sent in [facility] for alter mental status. 73y/o female with h/o multiple myeloma, pelvic mass, CHF, HTN, Bipolar disorder, recently admission for c.diff completed coarse of antibiotic on 12/30 sent in [facility] for alter mental status. As per [place] chart review pt refusing to eat or drink for several days, today pt found lethargic only respond to names and pain, febrile 101 at the center. In the ED pt back to baseline, during my encounter to is alert and wake, noncooperative with questioning, states "I don't want see your face" but she is constantly asking if she can have coke to drink. in the ed pt with low grade fever 100.2, no leukocytosis, Na 150, BUN 39, Cr 1.8, CXR ? LL infiltrated.

## 2020-01-06 NOTE — H&P ADULT - NSHPPHYSICALEXAM_GEN_ALL_CORE
T(C): 37 (01-05-20 @ 21:13), Max: 37.9 (01-05-20 @ 18:46)  HR: 79 (01-05-20 @ 21:13) (79 - 90)  BP: 119/58 (01-05-20 @ 21:13) (112/58 - 131/60)  RR: 18 (01-05-20 @ 21:13) (18 - 20)  SpO2: 98% (01-05-20 @ 21:13) (96% - 99%)    minimal due to pt noncooperative   GENERAL: patient appears well, no acute distress  LUNGS: only able to exam anterior chest, clear to auscultation, symmetric breath sounds, no wheezing or rhonchi appreciated  HEART: soft S1/S2, regular rate and rhythm, no murmurs noted, no lower extremity edema  GASTROINTESTINAL: abdomen is soft, nontender, nondistended, normoactive bowel sounds, no palpable masses  MUSCULOSKELETAL: no clubbing or cyanosis, no obvious deformity  NEUROLOGIC: awake, alert, unable to obtain remainder fo exam pt non cooperative   PSYCHIATRIC: labile, aggressive toward others

## 2020-01-06 NOTE — ED ADULT NURSE REASSESSMENT NOTE - NS ED NURSE REASSESS COMMENT FT1
received report from marjan brennan, pt laying in stretcher, no s/s of acute distress, family at bedside, pending dispo.
report given to holding rn deonte, pt laying in stretcher, awake and confused with aide at bedside, no s/s of acute distress, vitals stable, pt safety maintained.

## 2020-01-06 NOTE — H&P ADULT - PROBLEM SELECTOR PLAN 1
likely due to dehydration vs presumed pneumonia    s/p 3L NS bolus   mentation improved, pt is alert and awake refusing to be questioned   cont to monitor

## 2020-01-07 LAB
ANION GAP SERPL CALC-SCNC: 14 MMOL/L — SIGNIFICANT CHANGE UP (ref 5–17)
BASOPHILS # BLD AUTO: 0.02 K/UL — SIGNIFICANT CHANGE UP (ref 0–0.2)
BASOPHILS NFR BLD AUTO: 0.2 % — SIGNIFICANT CHANGE UP (ref 0–2)
BUN SERPL-MCNC: 36 MG/DL — HIGH (ref 8–20)
CALCIUM SERPL-MCNC: 7.5 MG/DL — LOW (ref 8.6–10.2)
CHLORIDE SERPL-SCNC: 121 MMOL/L — HIGH (ref 98–107)
CK SERPL-CCNC: 108 U/L — SIGNIFICANT CHANGE UP (ref 25–170)
CO2 SERPL-SCNC: 15 MMOL/L — LOW (ref 22–29)
CREAT SERPL-MCNC: 1.6 MG/DL — HIGH (ref 0.5–1.3)
EOSINOPHIL # BLD AUTO: 0.25 K/UL — SIGNIFICANT CHANGE UP (ref 0–0.5)
EOSINOPHIL NFR BLD AUTO: 2.8 % — SIGNIFICANT CHANGE UP (ref 0–6)
GLUCOSE SERPL-MCNC: 71 MG/DL — SIGNIFICANT CHANGE UP (ref 70–115)
HCT VFR BLD CALC: 31.3 % — LOW (ref 34.5–45)
HGB BLD-MCNC: 8.9 G/DL — LOW (ref 11.5–15.5)
IMM GRANULOCYTES NFR BLD AUTO: 1.1 % — SIGNIFICANT CHANGE UP (ref 0–1.5)
LYMPHOCYTES # BLD AUTO: 0.54 K/UL — LOW (ref 1–3.3)
LYMPHOCYTES # BLD AUTO: 6 % — LOW (ref 13–44)
MCHC RBC-ENTMCNC: 28.4 GM/DL — LOW (ref 32–36)
MCHC RBC-ENTMCNC: 29.7 PG — SIGNIFICANT CHANGE UP (ref 27–34)
MCV RBC AUTO: 104.3 FL — HIGH (ref 80–100)
MONOCYTES # BLD AUTO: 1.01 K/UL — HIGH (ref 0–0.9)
MONOCYTES NFR BLD AUTO: 11.3 % — SIGNIFICANT CHANGE UP (ref 2–14)
NEUTROPHILS # BLD AUTO: 7.02 K/UL — SIGNIFICANT CHANGE UP (ref 1.8–7.4)
NEUTROPHILS NFR BLD AUTO: 78.6 % — HIGH (ref 43–77)
PLATELET # BLD AUTO: 137 K/UL — LOW (ref 150–400)
POTASSIUM SERPL-MCNC: 4.1 MMOL/L — SIGNIFICANT CHANGE UP (ref 3.5–5.3)
POTASSIUM SERPL-SCNC: 4.1 MMOL/L — SIGNIFICANT CHANGE UP (ref 3.5–5.3)
RBC # BLD: 3 M/UL — LOW (ref 3.8–5.2)
RBC # FLD: 15.8 % — HIGH (ref 10.3–14.5)
SODIUM SERPL-SCNC: 150 MMOL/L — HIGH (ref 135–145)
TROPONIN T SERPL-MCNC: 0.09 NG/ML — HIGH (ref 0–0.06)
TROPONIN T SERPL-MCNC: 0.09 NG/ML — HIGH (ref 0–0.06)
WBC # BLD: 8.94 K/UL — SIGNIFICANT CHANGE UP (ref 3.8–10.5)
WBC # FLD AUTO: 8.94 K/UL — SIGNIFICANT CHANGE UP (ref 3.8–10.5)

## 2020-01-07 PROCEDURE — 99223 1ST HOSP IP/OBS HIGH 75: CPT

## 2020-01-07 PROCEDURE — 76770 US EXAM ABDO BACK WALL COMP: CPT | Mod: 26

## 2020-01-07 PROCEDURE — 96156 HLTH BHV ASSMT/REASSESSMENT: CPT

## 2020-01-07 PROCEDURE — 71250 CT THORAX DX C-: CPT | Mod: 26

## 2020-01-07 PROCEDURE — 93010 ELECTROCARDIOGRAM REPORT: CPT

## 2020-01-07 PROCEDURE — 99233 SBSQ HOSP IP/OBS HIGH 50: CPT

## 2020-01-07 RX ORDER — ASPIRIN/CALCIUM CARB/MAGNESIUM 324 MG
1 TABLET ORAL
Qty: 0 | Refills: 0 | DISCHARGE

## 2020-01-07 RX ORDER — SELINEXOR 20 MG/1
4 TABLET, FILM COATED ORAL
Qty: 0 | Refills: 0 | DISCHARGE

## 2020-01-07 RX ORDER — SODIUM CHLORIDE 9 MG/ML
1000 INJECTION, SOLUTION INTRAVENOUS
Refills: 0 | Status: DISCONTINUED | OUTPATIENT
Start: 2020-01-07 | End: 2020-01-08

## 2020-01-07 RX ORDER — DEXAMETHASONE 0.5 MG/5ML
20 ELIXIR ORAL
Qty: 0 | Refills: 0 | DISCHARGE

## 2020-01-07 RX ORDER — DEXAMETHASONE 0.5 MG/5ML
1 ELIXIR ORAL
Qty: 0 | Refills: 0 | DISCHARGE

## 2020-01-07 RX ORDER — PANTOPRAZOLE SODIUM 20 MG/1
0 TABLET, DELAYED RELEASE ORAL
Qty: 0 | Refills: 0 | DISCHARGE

## 2020-01-07 RX ORDER — AZITHROMYCIN 500 MG/1
500 TABLET, FILM COATED ORAL DAILY
Refills: 0 | Status: DISCONTINUED | OUTPATIENT
Start: 2020-01-07 | End: 2020-01-08

## 2020-01-07 RX ORDER — CEFTRIAXONE 500 MG/1
1000 INJECTION, POWDER, FOR SOLUTION INTRAMUSCULAR; INTRAVENOUS EVERY 24 HOURS
Refills: 0 | Status: DISCONTINUED | OUTPATIENT
Start: 2020-01-07 | End: 2020-01-08

## 2020-01-07 RX ORDER — FUROSEMIDE 40 MG
40 TABLET ORAL
Refills: 0 | Status: DISCONTINUED | OUTPATIENT
Start: 2020-01-07 | End: 2020-01-07

## 2020-01-07 RX ORDER — PANTOPRAZOLE SODIUM 20 MG/1
1 TABLET, DELAYED RELEASE ORAL
Qty: 0 | Refills: 0 | DISCHARGE

## 2020-01-07 RX ORDER — ONDANSETRON 8 MG/1
1 TABLET, FILM COATED ORAL
Qty: 0 | Refills: 0 | DISCHARGE

## 2020-01-07 RX ORDER — FERROUS SULFATE 325(65) MG
1 TABLET ORAL
Qty: 0 | Refills: 0 | DISCHARGE

## 2020-01-07 RX ORDER — CHOLECALCIFEROL (VITAMIN D3) 125 MCG
1 CAPSULE ORAL
Qty: 0 | Refills: 0 | DISCHARGE

## 2020-01-07 RX ORDER — ACETAMINOPHEN 500 MG
2 TABLET ORAL
Qty: 0 | Refills: 0 | DISCHARGE

## 2020-01-07 RX ORDER — PANTOPRAZOLE SODIUM 20 MG/1
40 TABLET, DELAYED RELEASE ORAL DAILY
Refills: 0 | Status: DISCONTINUED | OUTPATIENT
Start: 2020-01-07 | End: 2020-01-15

## 2020-01-07 RX ORDER — SODIUM CHLORIDE 9 MG/ML
1000 INJECTION INTRAMUSCULAR; INTRAVENOUS; SUBCUTANEOUS
Refills: 0 | Status: DISCONTINUED | OUTPATIENT
Start: 2020-01-07 | End: 2020-01-07

## 2020-01-07 RX ORDER — SACCHAROMYCES BOULARDII 250 MG
250 POWDER IN PACKET (EA) ORAL
Refills: 0 | Status: DISCONTINUED | OUTPATIENT
Start: 2020-01-07 | End: 2020-01-08

## 2020-01-07 RX ORDER — VALACYCLOVIR 500 MG/1
1 TABLET, FILM COATED ORAL
Qty: 0 | Refills: 0 | DISCHARGE

## 2020-01-07 RX ORDER — LEVOTHYROXINE SODIUM 125 MCG
1 TABLET ORAL
Qty: 0 | Refills: 0 | DISCHARGE

## 2020-01-07 RX ORDER — ACETAMINOPHEN 500 MG
650 TABLET ORAL EVERY 6 HOURS
Refills: 0 | Status: DISCONTINUED | OUTPATIENT
Start: 2020-01-07 | End: 2020-01-15

## 2020-01-07 RX ADMIN — Medication 650 MILLIGRAM(S): at 08:33

## 2020-01-07 RX ADMIN — ENOXAPARIN SODIUM 40 MILLIGRAM(S): 100 INJECTION SUBCUTANEOUS at 05:27

## 2020-01-07 RX ADMIN — CEFTRIAXONE 100 MILLIGRAM(S): 500 INJECTION, POWDER, FOR SOLUTION INTRAMUSCULAR; INTRAVENOUS at 11:54

## 2020-01-07 RX ADMIN — Medication 650 MILLIGRAM(S): at 09:17

## 2020-01-07 RX ADMIN — PANTOPRAZOLE SODIUM 40 MILLIGRAM(S): 20 TABLET, DELAYED RELEASE ORAL at 11:53

## 2020-01-07 RX ADMIN — SODIUM CHLORIDE 60 MILLILITER(S): 9 INJECTION, SOLUTION INTRAVENOUS at 21:51

## 2020-01-07 NOTE — PROGRESS NOTE BEHAVIORAL HEALTH - NSBHCHARTREVIEWLAB_PSY_A_CORE FT
8.6    10.84 )-----------( 134      ( 2020 08:09 )             29.7     01-06    144  |  120<H>  |  33.0<H>  ----------------------------<  81  3.7   |  14.0<L>  |  1.58<H>    Ca    7.2<L>      2020 08:09    TPro  10.5<H>  /  Alb  2.8<L>  /  TBili  0.3<L>  /  DBili  x   /  AST  20  /  ALT  8   /  AlkPhos  112  01-05    LIVER FUNCTIONS - ( 2020 19:06 )  Alb: 2.8 g/dL / Pro: 10.5 g/dL / ALK PHOS: 112 U/L / ALT: 8 U/L / AST: 20 U/L / GGT: x           PT/INR - ( 2020 19:06 )   PT: 16.0 sec;   INR: 1.38 ratio         PTT - ( 2020 19:06 )  PTT:32.1 sec  Urinalysis Basic - ( 2020 19:06 )    Color: Yellow / Appearance: Clear / S.015 / pH: x  Gluc: x / Ketone: Trace  / Bili: Negative / Urobili: Negative mg/dL   Blood: x / Protein: 100 mg/dL / Nitrite: Negative   Leuk Esterase: Negative / RBC: 11-25 /HPF / WBC 0-2   Sq Epi: x / Non Sq Epi: Few / Bacteria: Occasional

## 2020-01-07 NOTE — CHART NOTE - NSCHARTNOTEFT_GEN_A_CORE
Called by nurse with + Troponin 0.9.   Pt poor historian resides at [place], pt without complaints. Denies CP. States she wants coffee  VSS B/P 103/58 P 83 T 98.2 R 20 PO 97  Alert, not oriented  Heart RR  Lungs clejuan daniel Ramey  at bedside  repeat EKG done and reviewed by cardiologist  As per cardiologist pt may remain on 6T at this time, D/C lasix and start pt on D5 at 60cc/hr, stat Troponin and CPK oedered  Continue to monitor  call PA if any changes in pts condition Called by nurse with + Troponin 0.9.   Pt poor historian resides at [place], pt without complaints. Denies CP. States she wants coffee  VSS B/P 103/58 P 83 T 98.2 R 20 PO 97  Alert, not oriented  Heart RR  Lungs clear  Dr Ramey  at bedside  repeat EKG done and reviewed by cardiologist  As per cardiologist pt may remain on 6T at this time, D/C lasix and start pt on D5 at 60cc/hr, stat Troponin and CPK ordered  Continue to monitor  call PA if any changes in pts condition Called by nurse with + Troponin 0.9.   Pt poor historian resides at [place], pt without complaints. Denies CP. States she wants coffee  VSS B/P 103/58 P 83 T 98.2 R 20 PO 97  Alert, not oriented  Heart RR  Lungs clear  Dr Ramey  at bedside  repeat EKG done and reviewed by cardiologist  As per cardiologist pt may remain on 6T at this time, D/C lasix and start pt on D5 at 60cc/hr, stat Troponin and CPK ordered  Continue to monitor  call PA if any changes in pts condition    23:00 Repeat Troponin 0.09   Called by nurse with + Troponin 0.9.   Pt poor historian resides at [place], pt without complaints. Denies CP. States she wants coffee  VSS B/P 103/58 P 83 T 98.2 R 20 PO 97  Alert, not oriented  Heart RR  Lungs clear  Dr Ramey  at bedside  repeat EKG done and reviewed by cardiologist  As per cardiologist pt may remain on 6T at this time, D/C lasix and start pt on D5 at 60cc/hr, stat Troponin and CPK ordered. No further workup at this time if troponin same and CPK normal  Continue to monitor  call PA if any changes in pts condition    23:00 Repeat Troponin 0.09

## 2020-01-07 NOTE — PROGRESS NOTE BEHAVIORAL HEALTH - NSBHFUPINTERVALHXFT_PSY_A_CORE
73y/o female sent from [place] with h/o multiple myeloma, pelvic mass, CHF, HTN, Bipolar disorder was seen Psychiatry. Patient was seen at bedside and was slightly arousal to tactile stimuli. The patient only mumbled for one second during the interview. Unable to asses much of the interview to due to patient sleeping.

## 2020-01-07 NOTE — PROGRESS NOTE BEHAVIORAL HEALTH - NSBHCHARTREVIEWVS_PSY_A_CORE FT
Vital Signs Last 24 Hrs  T(C): 36.8 (07 Jan 2020 01:34), Max: 37.2 (06 Jan 2020 22:17)  T(F): 98.3 (07 Jan 2020 01:34), Max: 99 (06 Jan 2020 22:17)  HR: 87 (07 Jan 2020 01:34) (85 - 90)  BP: 97/55 (07 Jan 2020 01:34) (97/55 - 110/59)  BP(mean): --  RR: 18 (07 Jan 2020 01:34) (18 - 28)  SpO2: 97% (06 Jan 2020 22:17) (96% - 97%)

## 2020-01-07 NOTE — PROGRESS NOTE ADULT - ASSESSMENT
73y/o female with h/o multiple myeloma, pelvic mass, CHF, HTN, Bipolar disorder, recently admission for c.diff completed coarse of antibiotic on 12/30 sent in [facility] for alter mental status + fever 101F.    #SIRS, persistent  -blood cx pending  -ua ruled out, most likely source is pna, ct chest pending  -empiric abx, rocephin + zithro started 1/7  -trend procal  -rectal + iv meds while lethargic    #chronic combined HF, w/o exac, stable  -ef 45-50%, [id number], mod-severe MVR, mild to mod AS, mod AR - echo 12/2019    #htn,     #dvt ppx. lovenox    #dispo. pending infectious w/u, tx, likely in 3-4d    #outpt fu. pmd 75y/o female with h/o multiple myeloma, pelvic mass, CHF, HTN, Bipolar disorder, recently admission for c.diff completed coarse of antibiotic on 12/30 sent in [facility] for alter mental status + fever 101F.    #SIRS, persistent  -blood cx pending  -uti ruled out, most likely source is pna, ct chest pending  -empiric abx, rocephin + zithro started 1/7  -trend procal  -rectal + iv meds while lethargic  -TOV, strict i/o    #metabolic acidosis  -likely sec to poor intake + sirs, trend, if worsening will give nabicarb    #calvin, on admit  -baseline cr 0.5-0.7  -likely due to sirs  -gentle ivf, hold lasix, monitor for volume overload in setting of hf, serial bmp, avoid nephrotoxic meds, cont low dose acei for now - hold if renal function fails to improve  #chronic combined HF, w/o exac, stable  -ef 45-50%, [id number], mod-severe MVR, mild to mod AS, mod AR - echo 12/2019    #htn, controlled    #bipolar disorder w/o behavioral disturbance, stable  -psych eval appreciated, no contraindication to dc back to [facility] when stable    #obesity, bmi 34, wt loss advisable, outpt fu    #anemia, chronic, stable  -no acute/active s/s of blood loss, baseline 9, trend if it becomes clinically warranted otherwise outpt fu    #dvt ppx. lovenox    #dispo. pending infectious w/u, tx, likely in 3-4d    #outpt fu. pmd 73y/o female with h/o multiple myeloma, pelvic mass, CHF, HTN, Bipolar disorder, recently admission for c.diff completed coarse of antibiotic on 12/30 sent in [facility] for alter mental status + fever 101F.    #SIRS, persistent  -blood cx pending  -uti ruled out, no pna on ct chest  -empiric abx, rocephin + zithro started 1/7, cont until bld cx neg  -trend procal  -rectal + iv meds while lethargic  -rvp pending    #acute on chronic combined HF  -ef 45-50%, [id number], mod-severe MVR, mild to mod AS, mod AR - echo 12/2019  -evidence of pulm vasc congestion + small BL effusions on imaging  -iv diuresis - taper to po when euvolemic, daily wt, strict i/o  -stat trop, trend + place on tele if elevated  -refused nst + [facility] in 12/2019, ss cardio eval pending    #metabolic acidosis  -likely sec to poor intake + sirs, trend, if worsening will give nabicarb    #calvin, on admit, worsening  -baseline cr 0.5-0.7  -likely due to sirs  -diuresis as above, must tolerate some azotemia  -serial bmp, avoid nephrotoxic meds, cont low dose acei for now - hold if renal function fails to improve  -nephro cs if significant worsening    #htn, controlled    #bipolar disorder w/o behavioral disturbance, stable  -psych eval appreciated, no contraindication to dc back to [facility] when stable    #obesity, bmi 34, wt loss advisable, outpt fu    #anemia, chronic, stable  -no acute/active s/s of blood loss, baseline 9, trend if it becomes clinically warranted otherwise outpt fu    #MM, stable  -mult areas of osseous mets noted on imaging, outpt fu w/ onc, known dx    #BL Hydronephrosis  -incidentally noted on imaging, renal US pending    #dvt ppx. lovenox    #dispo. pending infectious w/u, tx, + resolution of hf exac, likely in 3-4d    #outpt fu. pmd

## 2020-01-07 NOTE — CONSULT NOTE ADULT - SUBJECTIVE AND OBJECTIVE BOX
Patient is a 74y old  Female who presents with a chief complaint of AMS, LL infiltrate (07 Jan 2020 08:14)      HPI:  73y/o female with h/o multiple myeloma, pelvic mass, CHF, HTN, Bipolar disorder, recently admission for c.diff completed coarse of antibiotic on 12/30 sent in [facility] for alter mental status. As per [place] chart review pt refusing to eat or drink for several days, today pt found lethargic only respond to names and pain, febrile 101 at the center. In the ED pt back to baseline, during my encounter to is alert and wake, noncooperative with questioning, states "I don't want see your face" but she is constantly asking if she can have coke to drink. in the ed pt with low grade fever 100.2, no leukocytosis, Na 150, BUN 39, Cr 1.8, CXR ? LL infiltrated. (06 Jan 2020 00:48)    Above noted. pt with new regional wall motion abnormality but did not want cardiac cath on last admission. It is difficult to understand her words but she denies any cp or sob. she is comfortable and want to drink coffee.   She has a Hx of MM and required transfusion.     PAST MEDICAL & SURGICAL HISTORY:  Congestive heart failure (CHF)  Multiple myeloma  Mental disorder  Bone cancer  Scoliosis  Hypercholesteremia  Hypertension  Hypothyroid  No significant past surgical history    Allergies  ixazomib (Unknown)  NSAIDs (Unknown)  penicillins (Unknown)  sulfa drugs (Unknown)    MEDICATIONS  (STANDING):  aspirin enteric coated 81 milliGRAM(s) Oral daily  azithromycin   Tablet 500 milliGRAM(s) Oral daily  carvedilol 3.125 milliGRAM(s) Oral every 12 hours  cefTRIAXone   IVPB 1000 milliGRAM(s) IV Intermittent every 24 hours  cholecalciferol 1000 Unit(s) Oral daily  enoxaparin Injectable 40 milliGRAM(s) SubCutaneous every 24 hours  ferrous sulfate Oral Tab/Cap - Peds 325 milliGRAM(s) Oral daily  folic acid 1 milliGRAM(s) Oral daily  furosemide   Injectable 40 milliGRAM(s) IV Push two times a day  gabapentin 100 milliGRAM(s) Oral at bedtime  levothyroxine 75 MICROGram(s) Oral daily  lisinopril 2.5 milliGRAM(s) Oral daily  pantoprazole  Injectable 40 milliGRAM(s) IV Push daily  saccharomyces boulardii 250 milliGRAM(s) Oral two times a day  valACYclovir 500 milliGRAM(s) Oral two times a day    MEDICATIONS  (PRN):  acetaminophen  Suppository .. 650 milliGRAM(s) Rectal every 6 hours PRN Temp greater or equal to 38C (100.4F), Mild Pain (1 - 3), Moderate Pain (4 - 6), Severe Pain (7 - 10)    FAMILY HISTORY:  No pertinent family history    SOCIAL HISTORY:  Unknown     REVIEW OF SYSTEMS:  unable to obtain.    Vital Signs Last 24 Hrs  T(C): 36.8 (07 Jan 2020 20:38), Max: 38.3 (07 Jan 2020 08:06)  T(F): 98.2 (07 Jan 2020 20:38), Max: 100.9 (07 Jan 2020 08:06)  HR: 83 (07 Jan 2020 20:38) (83 - 96)  BP: 103/58 (07 Jan 2020 20:38) (97/55 - 110/65)  BP(mean): --  RR: 20 (07 Jan 2020 20:38) (18 - 20)  SpO2: 97% (07 Jan 2020 20:38) (90% - 97%)    Daily     Daily     I&O's Detail    06 Jan 2020 07:01  -  07 Jan 2020 07:00  --------------------------------------------------------  IN:  Total IN: 0 mL    OUT:    Indwelling Catheter - Urethral: 450 mL  Total OUT: 450 mL    Total NET: -450 mL      07 Jan 2020 07:01  -  07 Jan 2020 21:09  --------------------------------------------------------  IN:    sodium chloride 0.9%: 450 mL  Total IN: 450 mL    OUT:    Indwelling Catheter - Urethral: 325 mL  Total OUT: 325 mL    Total NET: 125 mL  PHYSICAL EXAM:  Appearance: comfortable	  HEENT:   dry oral mucose  Lung: decrease air entry at bases  CVS: RRR, 3/6 at apex, no edema  Abd: soft, nt  Ext: contracted,   Neuro:A&O to place    ECG: ST/T changes AL leads.     LABS:                        8.9    8.94  )-----------( 137      ( 07 Jan 2020 09:36 )             31.3     01-07    150<H>  |  121<H>  |  36.0<H>  ----------------------------<  71  4.1   |  15.0<L>  |  1.60<H>    Ca    7.5<L>      07 Jan 2020 09:36      CARDIAC MARKERS ( 07 Jan 2020 19:31 )  x     / 0.09 ng/mL / x     / x     / x      CARDIAC MARKERS ( 05 Jan 2020 21:33 )  x     / x     / 104 U/L / x     / x          I&O's Summary    06 Jan 2020 07:01  -  07 Jan 2020 07:00  --------------------------------------------------------  IN: 0 mL / OUT: 450 mL / NET: -450 mL    07 Jan 2020 07:01  -  07 Jan 2020 21:09  --------------------------------------------------------  IN: 450 mL / OUT: 325 mL / NET: 125 mL      BNP    RADIOLOGY & ADDITIONAL STUDIES:

## 2020-01-07 NOTE — PROGRESS NOTE ADULT - SUBJECTIVE AND OBJECTIVE BOX
CC: sirs    Patient seen and examined at the bedside. No acute overnight events. - yesterday. Complaining of - today. Denies fever/chills, headache, lightheadedness, dizziness, chest pain, palpitations, shortness of breath, cough, abd pain, nausea/vomiting/diarrhea, muscle pain.      =========================================================================================  T(C): 38.3 (20 @ 08:06), Max: 38.3 (20 @ 08:06)  HR: 96 (20 @ 08:06) (86 - 96)  BP: 104/61 (20 @ 08:06) (97/55 - 110/59)  RR: 20 (20 @ 08:06) (18 - 20)  SpO2: 90% (20 @ 08:06) (90% - 97%)    PHYSICAL EXAM.    GEN - appears age appropriate. well nourished. pleasant. no distress.   HEENT - NCAT, EOMI, HOOD  RESP - CTA BL, no wheeze/stridor/rhonchi/crackles. not on supplemental O2. able to speak in full sentences without distress.   CARDIO - NS1S2, RRR. No murmurs/rubs/gallops.  ABD - Soft/Non tender/Non distended. Normal BS x4 quadrants. no guarding/rebound tenderness.  Ext - No TIM.  MSK - BL 5/5 strength on upper and lower extremities.   Neuro - AAOx3. cn 2-12 grossly intact  Psych - normal affect  Skin - c/d/i. no rashes/lesions      I&O's Summary    2020 07:01  -  2020 07:00  --------------------------------------------------------  IN: 0 mL / OUT: 450 mL / NET: -450 mL      Daily     Daily     =========================================================================================  LABS.    06    144  |  120<H>  |  33.0<H>  ----------------------------<  81  3.7   |  14.0<L>  |  1.58<H>    Ca    7.2<L>      2020 08:09    TPro  10.5<H>  /  Alb  2.8<L>  /  TBili  0.3<L>  /  DBili  x   /  AST  20  /  ALT  8   /  AlkPhos  112                            8.6    10.84 )-----------( 134      ( 2020 08:09 )             29.7     LIVER FUNCTIONS - ( 2020 19:06 )  Alb: 2.8 g/dL / Pro: 10.5 g/dL / ALK PHOS: 112 U/L / ALT: 8 U/L / AST: 20 U/L / GGT: x           PT/INR - ( 2020 19:06 )   PT: 16.0 sec;   INR: 1.38 ratio         PTT - ( 2020 19:06 )  PTT:32.1 sec  CARDIAC MARKERS ( 2020 21:33 )  x     / x     / 104 U/L / x     / x          Urinalysis Basic - ( 2020 19:06 )    Color: Yellow / Appearance: Clear / S.015 / pH: x  Gluc: x / Ketone: Trace  / Bili: Negative / Urobili: Negative mg/dL   Blood: x / Protein: 100 mg/dL / Nitrite: Negative   Leuk Esterase: Negative / RBC: 11-25 /HPF / WBC 0-2   Sq Epi: x / Non Sq Epi: Few / Bacteria: Occasional          =========================================================================================  IMAGING.     =========================================================================================    HOME MEDS.    Home Medications:  aspirin 81 mg oral tablet: 1 tab(s) orally once a day ()  carvedilol 3.125 mg oral tablet: 1 tab(s) orally every 12 hours ()  dexamethasone 4 mg oral tablet: 1 tab(s) orally once a day with chemo  ()  fentaNYL 12 mcg/hr transdermal film, extended release: 1 patch transdermal every 72 hours ()  ferrous sulfate 325 mg (65 mg elemental iron) oral tablet: 1 tab(s) orally once a day ()  folic acid 1 mg oral tablet: 1 tab(s) orally once a day ()  furosemide 40 mg oral tablet: 1 tab(s) orally once a day ()  gabapentin 100 mg oral capsule: 1 cap(s) orally once a day (at bedtime) ()  levothyroxine 75 mcg (0.075 mg) oral tablet: 1 tab(s) orally once a day ()  lisinopril 2.5 mg oral tablet: 1 tab(s) orally once a day ()  Protonix 40 mg oral delayed release tablet: orally once a day for 7 days then stop  ()  selinexor 20 mg oral tablet: 4 tab(s) orally once a day ()  valACYclovir 500 mg oral tablet: 1 tab(s) orally 2 times a day (2020 03:13)  Vitamin D3 1000 intl units oral tablet: 1 tab(s) orally once a day (2020 03:13)      =========================================================================================    HOSPITAL MEDS.    MEDICATIONS  (STANDING):  aspirin enteric coated 81 milliGRAM(s) Oral daily  azithromycin   Tablet 500 milliGRAM(s) Oral daily  carvedilol 3.125 milliGRAM(s) Oral every 12 hours  cefTRIAXone   IVPB 1000 milliGRAM(s) IV Intermittent every 24 hours  cholecalciferol 1000 Unit(s) Oral daily  enoxaparin Injectable 40 milliGRAM(s) SubCutaneous every 24 hours  ferrous sulfate Oral Tab/Cap - Peds 325 milliGRAM(s) Oral daily  folic acid 1 milliGRAM(s) Oral daily  furosemide    Tablet 40 milliGRAM(s) Oral daily  gabapentin 100 milliGRAM(s) Oral at bedtime  levothyroxine 75 MICROGram(s) Oral daily  lisinopril 2.5 milliGRAM(s) Oral daily  pantoprazole    Tablet 40 milliGRAM(s) Oral before breakfast  saccharomyces boulardii 250 milliGRAM(s) Oral two times a day  valACYclovir 500 milliGRAM(s) Oral two times a day    MEDICATIONS  (PRN):  acetaminophen  Suppository .. 650 milliGRAM(s) Rectal every 6 hours PRN Temp greater or equal to 38C (100.4F), Mild Pain (1 - 3), Moderate Pain (4 - 6), Severe Pain (7 - 10) CC: sirs    Patient seen and examined at the bedside. No acute overnight events. admitted yesterday. Complaining of nothing today but lethargic and not cooperative w/ ROS: unable to assess for absence/presence of: fever/chills, headache, lightheadedness, dizziness, chest pain, palpitations, shortness of breath, cough, abd pain, nausea/vomiting/diarrhea, muscle pain.      =========================================================================================  T(C): 38.3 (20 @ 08:06), Max: 38.3 (20 @ 08:06)  HR: 96 (20 @ 08:06) (86 - 96)  BP: 104/61 (20 @ 08:06) (97/55 - 110/59)  RR: 20 (20 @ 08:06) (18 - 20)  SpO2: 90% (20 @ 08:06) (90% - 97%)    PHYSICAL EXAM.    GEN - appears age appropriate. well nourished. no distress.   HEENT - NCAT  RESP - CTA BL, no wheeze/stridor/rhonchi/crackles. not on supplemental O2.   CARDIO - NS1S2, RRR. No murmurs/rubs/gallops.  ABD - Soft/Non tender/Non distended. Normal BS x4 quadrants. no guarding/rebound tenderness.  Ext - No TIM.  MSK unable to assess  Neuro - unable to assess  Psych - lethargic  Skin - c/d/i. no rashes/lesions      I&O's Summary    2020 07:01  -  2020 07:00  --------------------------------------------------------  IN: 0 mL / OUT: 450 mL / NET: -450 mL      Daily     Daily     =========================================================================================  LABS.    -06    144  |  120<H>  |  33.0<H>  ----------------------------<  81  3.7   |  14.0<L>  |  1.58<H>    Ca    7.2<L>      2020 08:09    TPro  10.5<H>  /  Alb  2.8<L>  /  TBili  0.3<L>  /  DBili  x   /  AST  20  /  ALT  8   /  AlkPhos  112                            8.6    10.84 )-----------( 134      ( 2020 08:09 )             29.7     LIVER FUNCTIONS - ( 2020 19:06 )  Alb: 2.8 g/dL / Pro: 10.5 g/dL / ALK PHOS: 112 U/L / ALT: 8 U/L / AST: 20 U/L / GGT: x           PT/INR - ( 2020 19:06 )   PT: 16.0 sec;   INR: 1.38 ratio         PTT - ( 2020 19:06 )  PTT:32.1 sec  CARDIAC MARKERS ( 2020 21:33 )  x     / x     / 104 U/L / x     / x          Urinalysis Basic - ( 2020 19:06 )    Color: Yellow / Appearance: Clear / S.015 / pH: x  Gluc: x / Ketone: Trace  / Bili: Negative / Urobili: Negative mg/dL   Blood: x / Protein: 100 mg/dL / Nitrite: Negative   Leuk Esterase: Negative / RBC: 11-25 /HPF / WBC 0-2   Sq Epi: x / Non Sq Epi: Few / Bacteria: Occasional          =========================================================================================  IMAGING.     =========================================================================================    HOME MEDS.    Home Medications:  aspirin 81 mg oral tablet: 1 tab(s) orally once a day ()  carvedilol 3.125 mg oral tablet: 1 tab(s) orally every 12 hours ()  dexamethasone 4 mg oral tablet: 1 tab(s) orally once a day with chemo  ()  fentaNYL 12 mcg/hr transdermal film, extended release: 1 patch transdermal every 72 hours ()  ferrous sulfate 325 mg (65 mg elemental iron) oral tablet: 1 tab(s) orally once a day ()  folic acid 1 mg oral tablet: 1 tab(s) orally once a day ()  furosemide 40 mg oral tablet: 1 tab(s) orally once a day ()  gabapentin 100 mg oral capsule: 1 cap(s) orally once a day (at bedtime) ()  levothyroxine 75 mcg (0.075 mg) oral tablet: 1 tab(s) orally once a day ()  lisinopril 2.5 mg oral tablet: 1 tab(s) orally once a day ()  Protonix 40 mg oral delayed release tablet: orally once a day for 7 days then stop  ()  selinexor 20 mg oral tablet: 4 tab(s) orally once a day ()  valACYclovir 500 mg oral tablet: 1 tab(s) orally 2 times a day ()  Vitamin D3 1000 intl units oral tablet: 1 tab(s) orally once a day (2020 03:13)      =========================================================================================    HOSPITAL MEDS.    MEDICATIONS  (STANDING):  aspirin enteric coated 81 milliGRAM(s) Oral daily  azithromycin   Tablet 500 milliGRAM(s) Oral daily  carvedilol 3.125 milliGRAM(s) Oral every 12 hours  cefTRIAXone   IVPB 1000 milliGRAM(s) IV Intermittent every 24 hours  cholecalciferol 1000 Unit(s) Oral daily  enoxaparin Injectable 40 milliGRAM(s) SubCutaneous every 24 hours  ferrous sulfate Oral Tab/Cap - Peds 325 milliGRAM(s) Oral daily  folic acid 1 milliGRAM(s) Oral daily  furosemide    Tablet 40 milliGRAM(s) Oral daily  gabapentin 100 milliGRAM(s) Oral at bedtime  levothyroxine 75 MICROGram(s) Oral daily  lisinopril 2.5 milliGRAM(s) Oral daily  pantoprazole    Tablet 40 milliGRAM(s) Oral before breakfast  saccharomyces boulardii 250 milliGRAM(s) Oral two times a day  valACYclovir 500 milliGRAM(s) Oral two times a day    MEDICATIONS  (PRN):  acetaminophen  Suppository .. 650 milliGRAM(s) Rectal every 6 hours PRN Temp greater or equal to 38C (100.4F), Mild Pain (1 - 3), Moderate Pain (4 - 6), Severe Pain (7 - 10)

## 2020-01-07 NOTE — CONSULT NOTE ADULT - ASSESSMENT
Pt was seen and examined at bedside. She has no chest pain and has advanced MM with bo0ne findings on CT scan.   She has refused as per notes  cardiac workup last month. I am not sure of her ability to make medical decision at this time.  With lack of CP and lack of new EKG changes, I do not think elevated troponin is due to unstalbe angina. She is anemic, possibly has a supply demand missmatch.  Obtain CPK and recycle enzymes. If elevated we can transfer to 4t.   Medical mgmt for now.  DC lasix  IV hydration, with hypernatremia.  We will follow with you,  thank you.

## 2020-01-08 LAB
ANION GAP SERPL CALC-SCNC: 12 MMOL/L — SIGNIFICANT CHANGE UP (ref 5–17)
BASOPHILS # BLD AUTO: 0.01 K/UL — SIGNIFICANT CHANGE UP (ref 0–0.2)
BASOPHILS NFR BLD AUTO: 0.1 % — SIGNIFICANT CHANGE UP (ref 0–2)
BUN SERPL-MCNC: 36 MG/DL — HIGH (ref 8–20)
CALCIUM SERPL-MCNC: 7.3 MG/DL — LOW (ref 8.6–10.2)
CHLORIDE SERPL-SCNC: 121 MMOL/L — HIGH (ref 98–107)
CO2 SERPL-SCNC: 15 MMOL/L — LOW (ref 22–29)
CREAT SERPL-MCNC: 1.32 MG/DL — HIGH (ref 0.5–1.3)
EOSINOPHIL # BLD AUTO: 0.26 K/UL — SIGNIFICANT CHANGE UP (ref 0–0.5)
EOSINOPHIL NFR BLD AUTO: 3 % — SIGNIFICANT CHANGE UP (ref 0–6)
GLUCOSE SERPL-MCNC: 84 MG/DL — SIGNIFICANT CHANGE UP (ref 70–115)
HCT VFR BLD CALC: 27.5 % — LOW (ref 34.5–45)
HGB BLD-MCNC: 8.1 G/DL — LOW (ref 11.5–15.5)
IMM GRANULOCYTES NFR BLD AUTO: 1.2 % — SIGNIFICANT CHANGE UP (ref 0–1.5)
LYMPHOCYTES # BLD AUTO: 0.41 K/UL — LOW (ref 1–3.3)
LYMPHOCYTES # BLD AUTO: 4.8 % — LOW (ref 13–44)
MAGNESIUM SERPL-MCNC: 2.5 MG/DL — SIGNIFICANT CHANGE UP (ref 1.8–2.6)
MCHC RBC-ENTMCNC: 29.5 GM/DL — LOW (ref 32–36)
MCHC RBC-ENTMCNC: 30.8 PG — SIGNIFICANT CHANGE UP (ref 27–34)
MCV RBC AUTO: 104.6 FL — HIGH (ref 80–100)
MONOCYTES # BLD AUTO: 0.97 K/UL — HIGH (ref 0–0.9)
MONOCYTES NFR BLD AUTO: 11.3 % — SIGNIFICANT CHANGE UP (ref 2–14)
NEUTROPHILS # BLD AUTO: 6.84 K/UL — SIGNIFICANT CHANGE UP (ref 1.8–7.4)
NEUTROPHILS NFR BLD AUTO: 79.6 % — HIGH (ref 43–77)
NT-PROBNP SERPL-SCNC: HIGH PG/ML (ref 0–300)
PHOSPHATE SERPL-MCNC: 4 MG/DL — SIGNIFICANT CHANGE UP (ref 2.4–4.7)
PLATELET # BLD AUTO: 119 K/UL — LOW (ref 150–400)
POTASSIUM SERPL-MCNC: 3.7 MMOL/L — SIGNIFICANT CHANGE UP (ref 3.5–5.3)
POTASSIUM SERPL-SCNC: 3.7 MMOL/L — SIGNIFICANT CHANGE UP (ref 3.5–5.3)
RAPID RVP RESULT: SIGNIFICANT CHANGE UP
RBC # BLD: 2.63 M/UL — LOW (ref 3.8–5.2)
RBC # FLD: 15.9 % — HIGH (ref 10.3–14.5)
SODIUM SERPL-SCNC: 148 MMOL/L — HIGH (ref 135–145)
TROPONIN T SERPL-MCNC: 0.1 NG/ML — HIGH (ref 0–0.06)
WBC # BLD: 8.59 K/UL — SIGNIFICANT CHANGE UP (ref 3.8–10.5)
WBC # FLD AUTO: 8.59 K/UL — SIGNIFICANT CHANGE UP (ref 3.8–10.5)

## 2020-01-08 PROCEDURE — 99233 SBSQ HOSP IP/OBS HIGH 50: CPT

## 2020-01-08 PROCEDURE — 99232 SBSQ HOSP IP/OBS MODERATE 35: CPT

## 2020-01-08 RX ORDER — TAMSULOSIN HYDROCHLORIDE 0.4 MG/1
0.4 CAPSULE ORAL AT BEDTIME
Refills: 0 | Status: DISCONTINUED | OUTPATIENT
Start: 2020-01-08 | End: 2020-01-15

## 2020-01-08 RX ORDER — ASCORBIC ACID 60 MG
500 TABLET,CHEWABLE ORAL DAILY
Refills: 0 | Status: DISCONTINUED | OUTPATIENT
Start: 2020-01-08 | End: 2020-01-15

## 2020-01-08 RX ORDER — LEVOTHYROXINE SODIUM 125 MCG
37.5 TABLET ORAL AT BEDTIME
Refills: 0 | Status: DISCONTINUED | OUTPATIENT
Start: 2020-01-08 | End: 2020-01-15

## 2020-01-08 RX ORDER — FUROSEMIDE 40 MG
40 TABLET ORAL
Refills: 0 | Status: DISCONTINUED | OUTPATIENT
Start: 2020-01-08 | End: 2020-01-08

## 2020-01-08 RX ORDER — CARVEDILOL PHOSPHATE 80 MG/1
6.25 CAPSULE, EXTENDED RELEASE ORAL EVERY 12 HOURS
Refills: 0 | Status: DISCONTINUED | OUTPATIENT
Start: 2020-01-08 | End: 2020-01-15

## 2020-01-08 RX ORDER — SODIUM BICARBONATE 1 MEQ/ML
0.12 SYRINGE (ML) INTRAVENOUS
Qty: 150 | Refills: 0 | Status: DISCONTINUED | OUTPATIENT
Start: 2020-01-08 | End: 2020-01-11

## 2020-01-08 RX ORDER — FUROSEMIDE 40 MG
40 TABLET ORAL ONCE
Refills: 0 | Status: COMPLETED | OUTPATIENT
Start: 2020-01-08 | End: 2020-01-08

## 2020-01-08 RX ADMIN — PANTOPRAZOLE SODIUM 40 MILLIGRAM(S): 20 TABLET, DELAYED RELEASE ORAL at 11:13

## 2020-01-08 RX ADMIN — Medication 75 MEQ/KG/HR: at 22:29

## 2020-01-08 RX ADMIN — ENOXAPARIN SODIUM 40 MILLIGRAM(S): 100 INJECTION SUBCUTANEOUS at 05:08

## 2020-01-08 RX ADMIN — Medication 40 MILLIGRAM(S): at 17:32

## 2020-01-08 RX ADMIN — Medication 650 MILLIGRAM(S): at 17:32

## 2020-01-08 RX ADMIN — Medication 37.5 MICROGRAM(S): at 22:09

## 2020-01-08 NOTE — PROGRESS NOTE ADULT - SUBJECTIVE AND OBJECTIVE BOX
CC: sirs    Patient seen and examined at the bedside. No acute overnight events. Complaining of nothing today but lethargic and not cooperative w/ ROS or physical exam.  She will answer some questions with yes or no replys.  Refusing to open eyes.     =========================================================================================  Vital Signs Last 24 Hrs  T(C): 36.4 (08 Jan 2020 08:01), Max: 37.2 (07 Jan 2020 17:01)  T(F): 97.6 (08 Jan 2020 08:01), Max: 99 (07 Jan 2020 17:01)  HR: 80 (08 Jan 2020 08:01) (80 - 87)  BP: 101/53 (08 Jan 2020 08:01) (101/53 - 110/65)  BP(mean): --  RR: 17 (08 Jan 2020 08:01) (17 - 20)  SpO2: 94% (08 Jan 2020 08:01) (94% - 97%)    PHYSICAL EXAM.    GEN - appears age appropriate. well nourished. no distress.   HEENT - NCAT  RESP - CTA BL, no wheeze/stridor/rhonchi/crackles. not on supplemental O2.   CARDIO - NS1S2, RRR. No murmurs/rubs/gallops.  ABD - Soft/Non tender/Non distended.   Ext - No TIM.  MSK unable to assess  Neuro - unable to assess  Psych - lethargic  Skin - c/d/i. no rashes/lesions                            8.1    8.59  )-----------( 119      ( 08 Jan 2020 10:25 )             27.5     01-08    148<H>  |  121<H>  |  36.0<H>  ----------------------------<  84  3.7   |  15.0<L>  |  1.32<H>    Ca    7.3<L>      08 Jan 2020 10:25  Phos  4.0     01-08  Mg     2.5     01-08                               BNP 91484  =========================================================================================    MEDICATIONS  (STANDING):  ascorbic acid 500 milliGRAM(s) Oral daily  aspirin enteric coated 81 milliGRAM(s) Oral daily  carvedilol 3.125 milliGRAM(s) Oral every 12 hours  cholecalciferol 1000 Unit(s) Oral daily  enoxaparin Injectable 40 milliGRAM(s) SubCutaneous every 24 hours  ferrous sulfate Oral Tab/Cap - Peds 325 milliGRAM(s) Oral daily  folic acid 1 milliGRAM(s) Oral daily  gabapentin 100 milliGRAM(s) Oral at bedtime  levothyroxine Injectable 37.5 MICROGram(s) IV Push at bedtime  lisinopril 2.5 milliGRAM(s) Oral daily  pantoprazole  Injectable 40 milliGRAM(s) IV Push daily  valACYclovir 500 milliGRAM(s) Oral two times a day    MEDICATIONS  (PRN):  acetaminophen  Suppository .. 650 milliGRAM(s) Rectal every 6 hours PRN Temp greater or equal to 38C (100.4F), Mild Pain (1 - 3), Moderate Pain (4 - 6), Severe Pain (7 - 10)

## 2020-01-08 NOTE — PROGRESS NOTE ADULT - SUBJECTIVE AND OBJECTIVE BOX
CHIEF COMPLAINT:Patient is a 74y old  Female who presents with a chief complaint of AMS, LL infiltrate (08 Jan 2020 14:10)      INTERVAL HISTORY:  no new complains. looks more comfortable.     Allergies  ixazomib (Unknown)  NSAIDs (Unknown)  penicillins (Unknown)  sulfa drugs (Unknown)      MEDICATIONS:  carvedilol 3.125 milliGRAM(s) Oral every 12 hours  lisinopril 2.5 milliGRAM(s) Oral daily  valACYclovir 500 milliGRAM(s) Oral two times a day  acetaminophen  Suppository .. 650 milliGRAM(s) Rectal every 6 hours PRN  gabapentin 100 milliGRAM(s) Oral at bedtime  pantoprazole  Injectable 40 milliGRAM(s) IV Push daily  levothyroxine Injectable 37.5 MICROGram(s) IV Push at bedtime  ascorbic acid 500 milliGRAM(s) Oral daily  aspirin enteric coated 81 milliGRAM(s) Oral daily  cholecalciferol 1000 Unit(s) Oral daily  enoxaparin Injectable 40 milliGRAM(s) SubCutaneous every 24 hours  ferrous sulfate Oral Tab/Cap - Peds 325 milliGRAM(s) Oral daily  folic acid 1 milliGRAM(s) Oral daily    PHYSICAL EXAM:  T(C): 38 (01-08-20 @ 16:35), Max: 38 (01-08-20 @ 16:35)  HR: 88 (01-08-20 @ 16:35) (80 - 88)  BP: 118/58 (01-08-20 @ 16:35) (101/53 - 118/58)  RR: 18 (01-08-20 @ 16:35) (17 - 20)  SpO2: 98% (01-08-20 @ 16:35) (94% - 98%)  I&O's Summary    07 Jan 2020 07:01  -  08 Jan 2020 07:00  --------------------------------------------------------  IN: 1170 mL / OUT: 625 mL / NET: 545 mL    08 Jan 2020 07:01  -  08 Jan 2020 17:59  --------------------------------------------------------  IN: 300 mL / OUT: 0 mL / NET: 300 mL    Appearance: Normal	  HEENT:   NC/AT  Eye: Pink Conjunctiva  Lungs: CTA B/L  CVS: RRR, Normal S1 and S2, No Edema  Pulses: Normal distal pulses.  Neuro: A&O x3    LABS:	 	    CARDIAC MARKERS:                            8.1    8.59  )-----------( 119      ( 08 Jan 2020 10:25 )             27.5     01-08    148<H>  |  121<H>  |  36.0<H>  ----------------------------<  84  3.7   |  15.0<L>  |  1.32<H>    Ca    7.3<L>      08 Jan 2020 10:25  Phos  4.0     01-08  Mg     2.5     01-08      proBNP: Serum Pro-Brain Natriuretic Peptide: 72164 pg/mL (01-08 @ 10:25)    ASSESSMENT/PLAN: CHIEF COMPLAINT:Patient is a 74y old  Female who presents with a chief complaint of AMS, LL infiltrate (08 Jan 2020 14:10)      INTERVAL HISTORY:  no new complains. looks more comfortable.     Allergies  ixazomib (Unknown)  NSAIDs (Unknown)  penicillins (Unknown)  sulfa drugs (Unknown)      MEDICATIONS:  carvedilol 3.125 milliGRAM(s) Oral every 12 hours  lisinopril 2.5 milliGRAM(s) Oral daily  valACYclovir 500 milliGRAM(s) Oral two times a day  acetaminophen  Suppository .. 650 milliGRAM(s) Rectal every 6 hours PRN  gabapentin 100 milliGRAM(s) Oral at bedtime  pantoprazole  Injectable 40 milliGRAM(s) IV Push daily  levothyroxine Injectable 37.5 MICROGram(s) IV Push at bedtime  ascorbic acid 500 milliGRAM(s) Oral daily  aspirin enteric coated 81 milliGRAM(s) Oral daily  cholecalciferol 1000 Unit(s) Oral daily  enoxaparin Injectable 40 milliGRAM(s) SubCutaneous every 24 hours  ferrous sulfate Oral Tab/Cap - Peds 325 milliGRAM(s) Oral daily  folic acid 1 milliGRAM(s) Oral daily    PHYSICAL EXAM:  T(C): 38 (01-08-20 @ 16:35), Max: 38 (01-08-20 @ 16:35)  HR: 88 (01-08-20 @ 16:35) (80 - 88)  BP: 118/58 (01-08-20 @ 16:35) (101/53 - 118/58)  RR: 18 (01-08-20 @ 16:35) (17 - 20)  SpO2: 98% (01-08-20 @ 16:35) (94% - 98%)  I&O's Summary    07 Jan 2020 07:01  -  08 Jan 2020 07:00  --------------------------------------------------------  IN: 1170 mL / OUT: 625 mL / NET: 545 mL    08 Jan 2020 07:01  -  08 Jan 2020 17:59  --------------------------------------------------------  IN: 300 mL / OUT: 0 mL / NET: 300 mL    Appearance: Normal	  HEENT:   NC/AT  Eye: Pink Conjunctiva  Lungs: decrease air entry b/l base  CVS: RRR, Normal S1 and S2, No Edema, 3/6 sm lsb	  Pulses: Normal distal pulses.  Neuro: A&O x3    LABS:	 	    CARDIAC MARKERS:                            8.1    8.59  )-----------( 119      ( 08 Jan 2020 10:25 )             27.5     01-08    148<H>  |  121<H>  |  36.0<H>  ----------------------------<  84  3.7   |  15.0<L>  |  1.32<H>    Ca    7.3<L>      08 Jan 2020 10:25  Phos  4.0     01-08  Mg     2.5     01-08      proBNP: Serum Pro-Brain Natriuretic Peptide: 50738 pg/mL (01-08 @ 10:25)    ASSESSMENT/PLAN:

## 2020-01-08 NOTE — PROGRESS NOTE ADULT - ASSESSMENT
pt with anemia. May need more transfusion  Elevated CE with demand ischemia as well as HF  Increase coreg and lisinopril  Pt will need more water by mouth. DC lasix for now, pt with hypernatremia.   No indication for cath in this pt with MM and mets with anemia.

## 2020-01-08 NOTE — PROGRESS NOTE ADULT - ASSESSMENT
73y/o female with h/o multiple myeloma, pelvic mass, CHF, HTN, Bipolar disorder, recently admission for c.diff completed coarse of antibiotic on 12/30 sent in [facility] for alter mental status + fever 101F.    #SIRS, persistent  -blood cx pending  -uti ruled out, no pna on ct chest  -empiric abx, rocephin + zithro started 1/7, cont until bld cx neg  -trend procal  -rectal + iv meds while lethargic  -rvp pending    #acute on chronic combined HF  -ef 45-50%, [id number], mod-severe MVR, mild to mod AS, mod AR - echo 12/2019  -evidence of pulm vasc congestion + small BL effusions on imaging  -iv diuresis - lasix d/c'd by Cardio- but today BNP- 19533, d/w Dr. Jo- will restart lasix for now and await further recommendations from Cardio  -trend trop. 0.09 to 0.1  -refused nst + lhc in 12/2019,  -Cardio following    #metabolic acidosis  -likely sec to poor intake + sirs, trend- stable    #calvin, on admit, slightly improved  -baseline cr 0.5-0.7  -likely due to sirs  -diuresis as above, must tolerate some azotemia  -serial bmp, avoid nephrotoxic meds, cont low dose acei for now - hold if renal function fails to improve  -nephro cs if significant worsening    #htn, controlled    #bipolar disorder w/o behavioral disturbance, stable  -psych eval appreciated, no contraindication to dc back to [place] when stable    #obesity, bmi 34, wt loss advisable, outpt fu    #anemia, chronic, stable  -no acute/active s/s of blood loss, baseline 9, trend if it becomes clinically warranted otherwise outpt fu    #MM, stable  -mult areas of osseous mets noted on imaging, outpt fu w/ onc, known dx    #BL Hydronephrosis  -incidentally noted on imaging, renal US pending    #dvt ppx. lovenox    #dispo. pending infectious w/u, tx, + resolution of hf exac, likely in 3-4d    #outpt fu. pmd

## 2020-01-09 LAB
ANION GAP SERPL CALC-SCNC: 15 MMOL/L — SIGNIFICANT CHANGE UP (ref 5–17)
BUN SERPL-MCNC: 37 MG/DL — HIGH (ref 8–20)
CALCIUM SERPL-MCNC: 7.5 MG/DL — LOW (ref 8.6–10.2)
CHLORIDE SERPL-SCNC: 122 MMOL/L — HIGH (ref 98–107)
CO2 SERPL-SCNC: 17 MMOL/L — LOW (ref 22–29)
CREAT SERPL-MCNC: 1.25 MG/DL — SIGNIFICANT CHANGE UP (ref 0.5–1.3)
GLUCOSE SERPL-MCNC: 102 MG/DL — SIGNIFICANT CHANGE UP (ref 70–115)
MAGNESIUM SERPL-MCNC: 2.4 MG/DL — SIGNIFICANT CHANGE UP (ref 1.8–2.6)
PHOSPHATE SERPL-MCNC: 3.8 MG/DL — SIGNIFICANT CHANGE UP (ref 2.4–4.7)
POTASSIUM SERPL-MCNC: 4 MMOL/L — SIGNIFICANT CHANGE UP (ref 3.5–5.3)
POTASSIUM SERPL-SCNC: 4 MMOL/L — SIGNIFICANT CHANGE UP (ref 3.5–5.3)
PROCALCITONIN SERPL-MCNC: 0.23 NG/ML — HIGH (ref 0.02–0.1)
SODIUM SERPL-SCNC: 154 MMOL/L — HIGH (ref 135–145)

## 2020-01-09 PROCEDURE — 99233 SBSQ HOSP IP/OBS HIGH 50: CPT

## 2020-01-09 PROCEDURE — 93306 TTE W/DOPPLER COMPLETE: CPT | Mod: 26

## 2020-01-09 PROCEDURE — 93010 ELECTROCARDIOGRAM REPORT: CPT

## 2020-01-09 RX ORDER — VANCOMYCIN HCL 1 G
750 VIAL (EA) INTRAVENOUS EVERY 12 HOURS
Refills: 0 | Status: DISCONTINUED | OUTPATIENT
Start: 2020-01-10 | End: 2020-01-10

## 2020-01-09 RX ORDER — VANCOMYCIN HCL 1 G
750 VIAL (EA) INTRAVENOUS ONCE
Refills: 0 | Status: COMPLETED | OUTPATIENT
Start: 2020-01-09 | End: 2020-01-09

## 2020-01-09 RX ORDER — VANCOMYCIN HCL 1 G
VIAL (EA) INTRAVENOUS
Refills: 0 | Status: DISCONTINUED | OUTPATIENT
Start: 2020-01-09 | End: 2020-01-09

## 2020-01-09 RX ORDER — VANCOMYCIN HCL 1 G
VIAL (EA) INTRAVENOUS
Refills: 0 | Status: DISCONTINUED | OUTPATIENT
Start: 2020-01-09 | End: 2020-01-10

## 2020-01-09 RX ORDER — CEFEPIME 1 G/1
2000 INJECTION, POWDER, FOR SOLUTION INTRAMUSCULAR; INTRAVENOUS EVERY 8 HOURS
Refills: 0 | Status: DISCONTINUED | OUTPATIENT
Start: 2020-01-09 | End: 2020-01-10

## 2020-01-09 RX ADMIN — Medication 37.5 MICROGRAM(S): at 22:50

## 2020-01-09 RX ADMIN — Medication 250 MILLIGRAM(S): at 18:48

## 2020-01-09 RX ADMIN — Medication 75 MEQ/KG/HR: at 13:05

## 2020-01-09 RX ADMIN — CEFEPIME 100 MILLIGRAM(S): 1 INJECTION, POWDER, FOR SOLUTION INTRAMUSCULAR; INTRAVENOUS at 18:48

## 2020-01-09 RX ADMIN — PANTOPRAZOLE SODIUM 40 MILLIGRAM(S): 20 TABLET, DELAYED RELEASE ORAL at 13:05

## 2020-01-09 RX ADMIN — CARVEDILOL PHOSPHATE 6.25 MILLIGRAM(S): 80 CAPSULE, EXTENDED RELEASE ORAL at 17:22

## 2020-01-09 RX ADMIN — ENOXAPARIN SODIUM 40 MILLIGRAM(S): 100 INJECTION SUBCUTANEOUS at 05:07

## 2020-01-09 RX ADMIN — VALACYCLOVIR 500 MILLIGRAM(S): 500 TABLET, FILM COATED ORAL at 17:22

## 2020-01-09 NOTE — PHYSICAL THERAPY INITIAL EVALUATION ADULT - LEVEL OF INDEPENDENCE, REHAB EVAL
pt resisting assistance at times, not willing to assist in transfer, unsafe to continue/dependent (less than 25% patients effort)

## 2020-01-09 NOTE — DIETITIAN INITIAL EVALUATION ADULT. - SIGNS/SYMPTOMS
as evidenced by pt consuming <25% of meals As evidenced by pt likely meeting <75% est energy needs [id number], severe muscle wasting and fat loss

## 2020-01-09 NOTE — DIETITIAN INITIAL EVALUATION ADULT. - OTHER INFO
75y/o female with h/o multiple myeloma, pelvic mass, CHF, HTN, Bipolar disorder, recently admission for c.diff completed coarse of antibiotic on 12/30 sent in [facility] for alter mental status + fever 101F. Pt was sleeping soundly during assessment. Nursing reported pt has poor po intake. Observed breakfast tray untouched by bedside. Nursing reported pt enjoys Ensure. General trace edema noted per EMR. RD to remain available. 75y/o female with h/o multiple myeloma, pelvic mass, CHF, HTN, Bipolar disorder, recently admission for c.diff completed coarse of antibiotic on 12/30 sent in [facility] for alter mental status + fever 101F. Pt was sleeping soundly during assessment. Nursing reported pt has poor po intake. Nursing reported pt enjoys Ensure. General trace edema noted per EMR. Question accuracy of wt per EMR, pt 62kg per bed scale (1/9). 13# wt loss noted from 1/2018 admission. RD to remain available.

## 2020-01-09 NOTE — PROGRESS NOTE ADULT - ASSESSMENT
73y/o female with h/o multiple myeloma, pelvic mass, CHF, HTN, Bipolar disorder, recently admission for c.diff completed coarse of antibiotic on 12/30 sent in [facility] for alter mental status + fever 101F.    #SIRS, persistent- currently vitals stable afebrile  -blood cx from 1/5 shows no growth  -uti ruled out, no pna on ct chest  -empiric abx, rocephin + zithro started 1/7, discontinued when bld cx neg.  -trend procal  -rectal + iv meds while lethargic  -rvp neg.     #acute on chronic combined HF  -ef 45-50%, [id number], mod-severe MVR, mild to mod AS, mod AR - echo 12/2019  -evidence of pulm vasc congestion + small BL effusions on imaging  -iv diuresis - lasix d/c'd by Cardio- but today BNP- 29865- per Cardio no lasix  -trend trop. 0.09 to 0.1  -refused nst + lhc in 12/2019,  -Cardio following---- recommend no lasix, increasing Na+,  increased Coreg and lisinopril,  -TTE performed- awaiting reading    #hypernatremia  - Renal consult,   -pt non-compliant, somnulent- Psych with no new input. Pt with poor po intake    #calvin, on admit, slightly improved down to 1.25  -baseline cr 0.5-0.7  -likely due to sirs, dehydration  - pt with poor po intake  -Renal consult  -serial bmp, avoid nephrotoxic meds, cont low dose acei for now - hold if renal function fails to improve      #htn, controlled    #bipolar disorder w/o behavioral disturbance, stable  -psych eval appreciated, no contraindication to dc back to [facility] when stable    #obesity, bmi 34, wt loss advisable, outpt fu    #anemia, chronic, stable  -no acute/active s/s of blood loss, baseline 9, trend if it becomes clinically warranted otherwise outpt fu    #MM, stable  -mult areas of osseous mets noted on imaging, outpt fu w/ onc, known dx    #BL Hydronephrosis  -incidentally noted on imaging, renal US pending    #dvt ppx. lovenox    #dispo. pending improvement of Na+, CHF- undetermined    #outpt fu. pmd 75y/o female with h/o multiple myeloma, pelvic mass, CHF, HTN, Bipolar disorder, recently admission for c.diff completed coarse of antibiotic on 12/30 sent in [facility] for alter mental status + fever 101F.    #SIRS, persistent- currently vitals stable afebrile  -blood cx from 1/5 shows no growth  -uti ruled out, no pna on ct chest  -empiric abx, rocephin + zithro started 1/7, discontinued when bld cx neg.  -trend procal - still elevated  -rectal + iv meds while lethargic  -rvp neg.     #acute on chronic combined HF  -ef 45-50%, [id number], mod-severe MVR, mild to mod AS, mod AR - echo 12/2019  -evidence of pulm vasc congestion + small BL effusions on imaging  -iv diuresis - lasix d/c'd by Cardio- but today BNP- 70706- per Cardio no lasix  -trend trop. 0.09 to 0.1  -refused nst + lhc in 12/2019,  -Cardio following---- recommend no lasix, increasing Na+,  increased Coreg and lisinopril,  -TTE performed- awaiting reading    #hypernatremia  - Renal consult,   -pt non-compliant, somnulent- Psych with no new input. Pt with poor po intake    #calvin, on admit, slightly improved down to 1.25  -baseline cr 0.5-0.7  -likely due to sirs, dehydration  - pt with poor po intake  -Renal consult  -serial bmp, avoid nephrotoxic meds, cont low dose acei for now - hold if renal function fails to improve      #htn, controlled    #bipolar disorder w/o behavioral disturbance, stable  -psych eval appreciated, no contraindication to dc back to [facility] when stable    #obesity, bmi 34, wt loss advisable, outpt fu    #anemia, chronic, stable  -no acute/active s/s of blood loss, baseline 9, trend if it becomes clinically warranted otherwise outpt fu    #MM, stable  -mult areas of osseous mets noted on imaging, outpt fu w/ onc, known dx    #BL Hydronephrosis  -incidentally noted on imaging, renal US pending    #dvt ppx. lovenox    #dispo. pending improvement of Na+, CHF- undetermined    #outpt fu. pmd

## 2020-01-09 NOTE — CHART NOTE - NSCHARTNOTEFT_GEN_A_CORE
Upon Nutritional Assessment by the Registered Dietitian your patient was determined to meet criteria / has evidence of the following diagnosis/diagnoses:            [x ] Severe Protein Calorie Malnutrition           Findings as based on:  •  Comprehensive nutrition assessment and consultation  •  Calorie counts (nutrient intake analysis)  •  Food acceptance and intake status from observations by staff  •  Follow up  •  Patient education  •  Intervention secondary to interdisciplinary rounds  •   concerns      Treatment:    The following diet has been recommended:      PROVIDER Section:     By signing this assessment you are acknowledging and agree with the diagnosis/diagnoses assigned by the Registered Dietitian    Comments:    RX; Add Ensure Enlive BID to optimize po intake and provide an additional 350kcal, 20g protein per serving

## 2020-01-09 NOTE — PROGRESS NOTE ADULT - SUBJECTIVE AND OBJECTIVE BOX
CC: sirs    Patient seen and examined at the bedside. No acute overnight events. Pt somnolent.  Barley opens eyes.  Mumbles few words. Does not follow commands or move extremities.  Pt is not cooperative.     =========================================================================================  Vital Signs Last 24 Hrs  T(C): 36.8 (09 Jan 2020 07:47), Max: 38 (08 Jan 2020 16:35)  T(F): 98.2 (09 Jan 2020 07:47), Max: 100.4 (08 Jan 2020 16:35)  HR: 90 (09 Jan 2020 07:47) (88 - 90)  BP: 108/57 (09 Jan 2020 07:47) (104/58 - 118/58)  BP(mean): --  RR: 17 (09 Jan 2020 07:47) (17 - 18)  SpO2: 93% (09 Jan 2020 07:47) (93% - 98%)    PHYSICAL EXAM.    GEN - appears age appropriate. well nourished. no distress.   HEENT - NCAT  RESP - CTA BL, no wheeze/stridor/rhonchi/crackles. not on supplemental O2.   CARDIO - NS1S2, RRR. No murmurs/rubs/gallops.  ABD - Soft/Non tender/Non distended.   Ext - No ITM.  MSK unable to assess  Neuro - unable to assess  Psych - lethargic  Skin - c/d/i. no rashes/lesions                            8.1    8.59  )-----------( 119      ( 08 Jan 2020 10:25 )             27.5     01-09    154<H>  |  122<H>  |  37.0<H>  ----------------------------<  102  4.0   |  17.0<L>  |  1.25    Ca    7.5<L>      09 Jan 2020 10:04  Phos  3.8     01-09  Mg     2.4     01-09               RVP- not detected                        =========================================================================================    MEDICATIONS  (STANDING):  ascorbic acid 500 milliGRAM(s) Oral daily  aspirin enteric coated 81 milliGRAM(s) Oral daily  carvedilol 6.25 milliGRAM(s) Oral every 12 hours  cholecalciferol 1000 Unit(s) Oral daily  enoxaparin Injectable 40 milliGRAM(s) SubCutaneous every 24 hours  ferrous sulfate Oral Tab/Cap - Peds 325 milliGRAM(s) Oral daily  folic acid 1 milliGRAM(s) Oral daily  gabapentin 100 milliGRAM(s) Oral at bedtime  levothyroxine Injectable 37.5 MICROGram(s) IV Push at bedtime  lisinopril 2.5 milliGRAM(s) Oral daily  pantoprazole  Injectable 40 milliGRAM(s) IV Push daily  sodium bicarbonate  Infusion 0.124 mEq/kG/Hr (75 mL/Hr) IV Continuous <Continuous>  tamsulosin 0.4 milliGRAM(s) Oral at bedtime  valACYclovir 500 milliGRAM(s) Oral two times a day    MEDICATIONS  (PRN):  acetaminophen  Suppository .. 650 milliGRAM(s) Rectal every 6 hours PRN Temp greater or equal to 38C (100.4F), Mild Pain (1 - 3), Moderate Pain (4 - 6), Severe Pain (7 - 10)

## 2020-01-09 NOTE — PHYSICAL THERAPY INITIAL EVALUATION ADULT - PERTINENT HX OF CURRENT PROBLEM, REHAB EVAL
pt presents to [facility] due to AMS, fever, SIRS, dehydration, metabolic encephalopathy, PNA

## 2020-01-09 NOTE — DIETITIAN INITIAL EVALUATION ADULT. - ETIOLOGY
related to pt with poor po intake related to inability to meet sufficient protein-energy in setting of poor po intake

## 2020-01-09 NOTE — DIETITIAN INITIAL EVALUATION ADULT. - ADD RECOMMEND
RX: Add Ensure Enlive BID to optimize po intake and provide an additional 350kcal, 20g protein per serving per pt request. Monitor po intake, weights and labs. Encourage po intake. RX: Add Ensure Enlive BID to optimize po intake and provide an additional 350kcal, 20g protein per serving per pt request. Monitor po intake, weights and labs. Encourage po intake and HBV protein.

## 2020-01-09 NOTE — DIETITIAN INITIAL EVALUATION ADULT. - MALNUTRITION
Brief NFPE performed. Severe muscle wasting at temples, clavicle, shoulder. Severe fat loss in orbital region, buccal pads, triceps. Severe (chronic)

## 2020-01-10 DIAGNOSIS — F31.9 BIPOLAR DISORDER, UNSPECIFIED: ICD-10-CM

## 2020-01-10 DIAGNOSIS — F20.9 SCHIZOPHRENIA, UNSPECIFIED: ICD-10-CM

## 2020-01-10 LAB
ANION GAP SERPL CALC-SCNC: 12 MMOL/L — SIGNIFICANT CHANGE UP (ref 5–17)
BASOPHILS # BLD AUTO: 0.01 K/UL — SIGNIFICANT CHANGE UP (ref 0–0.2)
BASOPHILS NFR BLD AUTO: 0.1 % — SIGNIFICANT CHANGE UP (ref 0–2)
BUN SERPL-MCNC: 38 MG/DL — HIGH (ref 8–20)
CALCIUM SERPL-MCNC: 7.1 MG/DL — LOW (ref 8.6–10.2)
CHLORIDE SERPL-SCNC: 117 MMOL/L — HIGH (ref 98–107)
CO2 SERPL-SCNC: 21 MMOL/L — LOW (ref 22–29)
CREAT SERPL-MCNC: 1.44 MG/DL — HIGH (ref 0.5–1.3)
CULTURE RESULTS: SIGNIFICANT CHANGE UP
CULTURE RESULTS: SIGNIFICANT CHANGE UP
EOSINOPHIL # BLD AUTO: 0.11 K/UL — SIGNIFICANT CHANGE UP (ref 0–0.5)
EOSINOPHIL NFR BLD AUTO: 1.5 % — SIGNIFICANT CHANGE UP (ref 0–6)
GLUCOSE SERPL-MCNC: 114 MG/DL — SIGNIFICANT CHANGE UP (ref 70–115)
HCT VFR BLD CALC: 28 % — LOW (ref 34.5–45)
HGB BLD-MCNC: 8.1 G/DL — LOW (ref 11.5–15.5)
IMM GRANULOCYTES NFR BLD AUTO: 1.5 % — SIGNIFICANT CHANGE UP (ref 0–1.5)
LYMPHOCYTES # BLD AUTO: 0.61 K/UL — LOW (ref 1–3.3)
LYMPHOCYTES # BLD AUTO: 8.4 % — LOW (ref 13–44)
MAGNESIUM SERPL-MCNC: 2.4 MG/DL — SIGNIFICANT CHANGE UP (ref 1.8–2.6)
MCHC RBC-ENTMCNC: 28.9 GM/DL — LOW (ref 32–36)
MCHC RBC-ENTMCNC: 29.8 PG — SIGNIFICANT CHANGE UP (ref 27–34)
MCV RBC AUTO: 102.9 FL — HIGH (ref 80–100)
MONOCYTES # BLD AUTO: 1.1 K/UL — HIGH (ref 0–0.9)
MONOCYTES NFR BLD AUTO: 15.2 % — HIGH (ref 2–14)
NEUTROPHILS # BLD AUTO: 5.31 K/UL — SIGNIFICANT CHANGE UP (ref 1.8–7.4)
NEUTROPHILS NFR BLD AUTO: 73.3 % — SIGNIFICANT CHANGE UP (ref 43–77)
PHOSPHATE SERPL-MCNC: 3.8 MG/DL — SIGNIFICANT CHANGE UP (ref 2.4–4.7)
PLATELET # BLD AUTO: 103 K/UL — LOW (ref 150–400)
POTASSIUM SERPL-MCNC: 3.7 MMOL/L — SIGNIFICANT CHANGE UP (ref 3.5–5.3)
POTASSIUM SERPL-SCNC: 3.7 MMOL/L — SIGNIFICANT CHANGE UP (ref 3.5–5.3)
RBC # BLD: 2.72 M/UL — LOW (ref 3.8–5.2)
RBC # FLD: 15.8 % — HIGH (ref 10.3–14.5)
SODIUM SERPL-SCNC: 150 MMOL/L — HIGH (ref 135–145)
SPECIMEN SOURCE: SIGNIFICANT CHANGE UP
SPECIMEN SOURCE: SIGNIFICANT CHANGE UP
WBC # BLD: 7.25 K/UL — SIGNIFICANT CHANGE UP (ref 3.8–10.5)
WBC # FLD AUTO: 7.25 K/UL — SIGNIFICANT CHANGE UP (ref 3.8–10.5)

## 2020-01-10 PROCEDURE — 76775 US EXAM ABDO BACK WALL LIM: CPT | Mod: 26

## 2020-01-10 PROCEDURE — 99223 1ST HOSP IP/OBS HIGH 75: CPT

## 2020-01-10 PROCEDURE — 76770 US EXAM ABDO BACK WALL COMP: CPT | Mod: 26

## 2020-01-10 PROCEDURE — 99233 SBSQ HOSP IP/OBS HIGH 50: CPT

## 2020-01-10 PROCEDURE — 99232 SBSQ HOSP IP/OBS MODERATE 35: CPT

## 2020-01-10 PROCEDURE — 93971 EXTREMITY STUDY: CPT | Mod: 26,LT

## 2020-01-10 RX ORDER — POTASSIUM CHLORIDE 20 MEQ
40 PACKET (EA) ORAL ONCE
Refills: 0 | Status: COMPLETED | OUTPATIENT
Start: 2020-01-10 | End: 2020-01-10

## 2020-01-10 RX ADMIN — Medication 250 MILLIGRAM(S): at 05:06

## 2020-01-10 RX ADMIN — PANTOPRAZOLE SODIUM 40 MILLIGRAM(S): 20 TABLET, DELAYED RELEASE ORAL at 13:10

## 2020-01-10 RX ADMIN — Medication 37.5 MICROGRAM(S): at 22:37

## 2020-01-10 RX ADMIN — CEFEPIME 100 MILLIGRAM(S): 1 INJECTION, POWDER, FOR SOLUTION INTRAMUSCULAR; INTRAVENOUS at 06:21

## 2020-01-10 RX ADMIN — ENOXAPARIN SODIUM 40 MILLIGRAM(S): 100 INJECTION SUBCUTANEOUS at 05:06

## 2020-01-10 NOTE — PROGRESS NOTE ADULT - ASSESSMENT
Pt is stable from cardiac stand point. Despite decrease in LVEF and severe MR she is comfortable and not volume overloaded. She is on IVF with elevated Na.  She is not a candidate for AC with anemia and multiple transfusions.  She is also not willing to proceed with any procedures or stress testing  cont with coreg and ACEI  Please call me if needed.  Thank you

## 2020-01-10 NOTE — CONSULT NOTE ADULT - ASSESSMENT
1) Hypernatremia  2) LUNA- previously with normal kidney function  3) Hyperchloremic metabolic acidosis  4) Anemia      d/c bicarb gtt  Hold lasix for now  d/c ACE-I   Start D5W at 100 cc/hr  Obtain UA, urine lytes  Bladder scan  Renal US  anemia work up  Will follow 1) Hypernatremia  2) LUNA- previously with normal kidney function  3) Hyperchloremic metabolic acidosis  4) Anemia      Start D5W at 100 cc/hr  d/c bicarb gtt, start po sodium bicarb 650 mg bid  Hold lasix for now  d/c ACE-I   Obtain UA, urine lytes  Bladder scan  Renal US  anemia work up  Will follow 1) Hypernatremia  2) LUNA- previously with normal kidney function  3) Hyperchloremic metabolic acidosis  4) Anemia      Start D5W at 100 cc/hr  d/c bicarb gtt, start po sodium bicarb 650 mg bid  Hold lasix for now  d/c ACE-I   Obtain UA, urine lytes, TP/Cr ratio  Bladder scan  Renal US  Will follow

## 2020-01-10 NOTE — CONSULT NOTE ADULT - SUBJECTIVE AND OBJECTIVE BOX
[facility]  INFECTIOUS DISEASES AND INTERNAL MEDICINE at [place]  =======================================================  Blu Vasquez MD  Diplomates American Board of Internal Medicine and Infectious Diseases  Tel: [phone]      Fax: [phone]  =======================================================      [id number]  KRYSTA HERRMANN     History obtained from the chart. Patient not able to provide history.     CC: Patient is a 74y old  Female who presents with a chief complaint of AMS, LL infiltrate (10 Robe 2020 11:30)    73y/o  Female with a h/o multiple myeloma, pelvic mass, CHF, HTN, Bipolar disorder. Patient was recently discharged 12/19 after being treated for c diff completed coarse of Po Vancomycin on 12/30. Patient was sent in from [facility] for altered mental status. Here patient is afebrile, no leukocytosis, CXR ? LL infiltrated so started on IV Vancomycin and Cefepime on 1/5/20. ID input requested.       Past Medical & Surgical Hx:  Congestive heart failure (CHF)  Multiple myeloma  Mental disorder  Bone cancer  Scoliosis  Hypercholesteremia  Hypertension  Hypothyroid  No significant past surgical history      Social Hx:  Lives in [place]       FAMILY HISTORY:  Unable to obtain, patient not able to provide history      Allergies  ixazomib (Unknown)  NSAIDs (Unknown)  penicillins (Unknown)  sulfa drugs (Unknown)      Antibiotics:  cefepime   IVPB 2000 milliGRAM(s) IV Intermittent every 8 hours       REVIEW OF SYSTEMS:  Unable to obtain. Patient unable to provide history.       Physical Exam:  Vital Signs Last 24 Hrs  T(C): 36.5 (10 Robe 2020 07:36), Max: 37.8 (09 Jan 2020 16:20)  T(F): 97.7 (10 Robe 2020 07:36), Max: 100 (09 Jan 2020 16:20)  HR: 96 (10 Robe 2020 07:36) (73 - 96)  BP: 122/88 (10 Robe 2020 07:36) (115/62 - 122/88)  RR: 18 (10 Robe 2020 07:36) (18 - 18)  SpO2: 93% (10 Robe 2020 07:36) (93% - 96%)      GEN: NAD, not responsive   HEENT: normocephalic and atraumatic.  Anicteric  NECK: Supple.   LUNGS: Clear to auscultation.  HEART: Regular rate and rhythm   ABDOMEN: Soft, nontender, and nondistended.  Positive bowel sounds.    : No CVA tenderness  EXTREMITIES: Without any edema.  MSK: No joint swelling  NEUROLOGIC: No meaningful communication   PSYCHIATRIC: No meaningful communication   SKIN: No Rash      Labs:  01-10    150<H>  |  117<H>  |  38.0<H>  ----------------------------<  114  3.7   |  21.0<L>  |  1.44<H>    Ca    7.1<L>      10 Robe 2020 07:30  Phos  3.8     01-10  Mg     2.4     01-10                 8.1    7.25  )-----------( 103      ( 10 Robe 2020 07:30 )             28.0       Procalcitonin, Serum: 0.23 ng/mL (01-09-20 @ 10:04)  Procalcitonin, Serum: 0.26 ng/mL (01-06-20 @ 08:09)      RECENT CULTURES:  01-08 @ 08:12    [facility]      01-06 @ 01:29 .Urine     <10,000 CFU/mL Normal Urogenital Sarah      01-05 @ 19:32 .Blood     No growth at 48 hours      01-05 @ 19:31 .Blood     No growth at 48 hours          < from: US Kidney and Bladder (01.07.20 @ 19:24) >  EXAM:  US KIDNEYS AND BLADDER                          PROCEDURE DATE:  01/07/2020      INTERPRETATION:  CLINICAL INFORMATION: Hydronephrosis seen on CT    COMPARISON: CT chest performed on 01/07/2020 and CT abdomen pelvis dated 12/12/2019    TECHNIQUE: Sonography of the kidneys and bladder.     FINDINGS:    Right kidney:  11.8 cm. Moderate hydronephrosis. 6.4 cm partially exophytic cyst in the interpolar region    Left kidney:  10.5 cm. Moderate hydronephrosis.     Increased echogenicity of the perinephric fat bilaterally, left greater than right.    Urinary bladder: Underdistended with large diffuse bladder wall thickening bilateral ureteral jets are seen..    IMPRESSION:     Moderate bilateral hydronephrosis. Bilateral perinephric inflammatory change, left greater than right.    Marked urinary bladder wall thickening compatible with cystitis. Correlate with urinalysis.    < end of copied text >        < from: CT Chest No Cont (01.07.20 @ 10:03) >   EXAM:  CT CHEST                          PROCEDURE DATE:  01/07/2020      INTERPRETATION:  CLINICAL INFORMATION: Cough and fever, suspected pneumonia.    COMPARISON: CT chest, abdomen and pelvis 12/12/2019.    PROCEDURE:   CT of the Chest was performed without intravenous contrast.  Sagittal and coronal reformats were performed.    FINDINGS:    LUNGS AND AIRWAYS: Patent central airways.  Diffuse intralobular septal thickening with areas of groundglass attenuation, compatible with pulmonary vascular congestion. There is compressive atelectasis of the posterior lungs related to the pleural effusions.    PLEURA: Small bilateral pleural effusions.    MEDIASTINUM AND VALERIE: No lymphadenopathy.    VESSELS: The thoracic aorta and main pulmonary artery are normal in caliber. There is scattered coronary artery calcification. Distal tip of the MediPort is located in the SVC.    HEART: Cardiomegaly. No pericardial effusion.    CHEST WALL AND LOWER NECK: The thyroid gland is within normal limits. There is no definite supraclavicular or axillary lymphadenopathy. There is a Mediport in the right anterior chest wall.    VISUALIZED UPPER ABDOMEN: The adrenal glands are within normal limits. The imaged portions of the unenhanced liver, spleen,are within normal limits. There is bilateral hydronephrosis. There is diffuse infiltration of the intra-abdominal fat as well as the overlying subcutaneous soft tissues.     BONES: Destructive changes in the left acromial process. Degenerative changeof bilateral shoulders. Multilevel degenerative change of the thoracolumbar spine. There are multiple lytic lesions in the spine, the largest involves partially imaged L1 vertebral body. There is a lesion in the left second rib. Fracture of the left lateral sixth rib. Multiple old healed fractures and sclerotic lesions are noted in the bilateral ribs.    IMPRESSION:     Osseous metastasis, correlate with history of malignancy.    Pulmonary findings compatible with pulmonary vascular congestion. Cardiomegaly, bilateral pleural effusions.    Bilateral hydronephrosis of the partially imaged kidneys.    < end of copied text >

## 2020-01-10 NOTE — PROGRESS NOTE ADULT - SUBJECTIVE AND OBJECTIVE BOX
CHIEF COMPLAINT:Patient is a 74y old  Female who presents with a chief complaint of AMS, LL infiltrate (10 Robe 2020 07:55)    INTERVAL HISTORY:  pt is afebrile, not responding to commands.   Comfortable, on IVF  No reported cp or sob    Allergies  ixazomib (Unknown)  NSAIDs (Unknown)  penicillins (Unknown)  sulfa drugs (Unknown)  	  MEDICATIONS:  carvedilol 6.25 milliGRAM(s) Oral every 12 hours  lisinopril 2.5 milliGRAM(s) Oral daily  tamsulosin 0.4 milliGRAM(s) Oral at bedtime  cefepime   IVPB 2000 milliGRAM(s) IV Intermittent every 8 hours  valACYclovir 500 milliGRAM(s) Oral two times a day  vancomycin  IVPB 750 milliGRAM(s) IV Intermittent every 12 hours  vancomycin  IVPB  acetaminophen  Suppository .. 650 milliGRAM(s) Rectal every 6 hours PRN  gabapentin 100 milliGRAM(s) Oral at bedtime  pantoprazole  Injectable 40 milliGRAM(s) IV Push daily  levothyroxine Injectable 37.5 MICROGram(s) IV Push at bedtime  ascorbic acid 500 milliGRAM(s) Oral daily  aspirin enteric coated 81 milliGRAM(s) Oral daily  cholecalciferol 1000 Unit(s) Oral daily  enoxaparin Injectable 40 milliGRAM(s) SubCutaneous every 24 hours  ferrous sulfate Oral Tab/Cap - Peds 325 milliGRAM(s) Oral daily  folic acid 1 milliGRAM(s) Oral daily  sodium bicarbonate  Infusion 0.124 mEq/kG/Hr IV Continuous <Continuous>    PHYSICAL EXAM:  T(C): 36.5 (01-10-20 @ 07:36), Max: 37.8 (01-09-20 @ 16:20)  HR: 96 (01-10-20 @ 07:36) (73 - 96)  BP: 122/88 (01-10-20 @ 07:36) (115/62 - 122/88)  RR: 18 (01-10-20 @ 07:36) (18 - 18)  SpO2: 93% (01-10-20 @ 07:36) (93% - 96%)  I&O's Summary    09 Jan 2020 07:01  -  10 Robe 2020 07:00  --------------------------------------------------------  IN: 525 mL / OUT: 550 mL / NET: -25 mL  Appearance: Normal	  HEENT:   NC/AT  Eye: Pink Conjunctiva  Lungs: CTA B/L  CVS: RRR, Normal S1 and S2, 3/6 sm lsb  Pulses: Normal distal pulses.    LABS:	 	                       8.1    7.25  )-----------( 103      ( 10 Robe 2020 07:30 )             28.0     01-10    150<H>  |  117<H>  |  38.0<H>  ----------------------------<  114  3.7   |  21.0<L>  |  1.44<H>    Ca    7.1<L>      10 Robe 2020 07:30  Phos  3.8     01-10  Mg     2.4     01-10      proBNP:   Lipid Profile:   HgA1c:   TSH:     ASSESSMENT/PLAN:

## 2020-01-10 NOTE — CONSULT NOTE ADULT - SUBJECTIVE AND OBJECTIVE BOX
[facility] DIVISION OF KIDNEY DISEASES AND HYPERTENSION -- INITIAL CONSULT NOTE  --------------------------------------------------------------------------------  HPI:  75y/o  Female pt with  h/o multiple myeloma, pelvic mass, CHF, HTN, Bipolar disorder. Patient was recently discharged 12/19 after being treated for c diff completed coarse of Po Vancomycin on 12/30. Patient was sent in from [facility] for altered mental status. Nephrology consulted for hypernatremia. pt with serum sodium ~ 150 on presentation; ranging between 150-154 since admission. Unable to obtain history/ ROS from pt as pt lethargic.    PAST HISTORY  --------------------------------------------------------------------------------  PAST MEDICAL & SURGICAL HISTORY:  Congestive heart failure (CHF)  Multiple myeloma  Mental disorder  Bone cancer  Scoliosis  Hypercholesteremia  Hypertension  Hypothyroid  No significant past surgical history    FAMILY HISTORY:  No pertinent family history    PAST SOCIAL HISTORY:    ALLERGIES & MEDICATIONS  --------------------------------------------------------------------------------  Allergies    ixazomib (Unknown)  NSAIDs (Unknown)  penicillins (Unknown)  sulfa drugs (Unknown)    Intolerances      Standing Inpatient Medications  ascorbic acid 500 milliGRAM(s) Oral daily  aspirin enteric coated 81 milliGRAM(s) Oral daily  carvedilol 6.25 milliGRAM(s) Oral every 12 hours  cholecalciferol 1000 Unit(s) Oral daily  enoxaparin Injectable 40 milliGRAM(s) SubCutaneous every 24 hours  ferrous sulfate Oral Tab/Cap - Peds 325 milliGRAM(s) Oral daily  folic acid 1 milliGRAM(s) Oral daily  gabapentin 100 milliGRAM(s) Oral at bedtime  levothyroxine Injectable 37.5 MICROGram(s) IV Push at bedtime  lisinopril 2.5 milliGRAM(s) Oral daily  pantoprazole  Injectable 40 milliGRAM(s) IV Push daily  sodium bicarbonate  Infusion 0.124 mEq/kG/Hr IV Continuous <Continuous>  tamsulosin 0.4 milliGRAM(s) Oral at bedtime  valACYclovir 500 milliGRAM(s) Oral two times a day    PRN Inpatient Medications  acetaminophen  Suppository .. 650 milliGRAM(s) Rectal every 6 hours PRN      REVIEW OF SYSTEMS  --------------------------------------------------------------------------------  unable to obtain    VITALS/PHYSICAL EXAM  --------------------------------------------------------------------------------  T(C): 36.5 (01-10-20 @ 07:36), Max: 37.8 (01-09-20 @ 16:20)  HR: 96 (01-10-20 @ 07:36) (73 - 96)  BP: 122/88 (01-10-20 @ 07:36) (115/62 - 122/88)  RR: 18 (01-10-20 @ 07:36) (18 - 18)  SpO2: 93% (01-10-20 @ 07:36) (93% - 96%)  Wt(kg): --      01-09-20 @ 07:01  -  01-10-20 @ 07:00  --------------------------------------------------------  IN: 525 mL / OUT: 550 mL / NET: -25 mL      Physical Exam:  	Gen: lethargic, arousable  	HEENT: supple neck,  	Pulm: CTA B/L anteriorly  	CV: RRR, S1S2; no rub  	Back: unable to examine  	Abd: +BS, soft, nontender/distended  	: No suprapubic tenderness  	UE: Warm,  no edema;  	LE: Warm, no edema  	Psych: unable to assess  	Skin: Warm  	Vascular access: none    LABS/STUDIES  --------------------------------------------------------------------------------              8.1    7.25  >-----------<  103      [01-10-20 @ 07:30]              28.0     150  |  117  |  38.0  ----------------------------<  114      [01-10-20 @ 07:30]  3.7   |  21.0  |  1.44        Ca     7.1     [01-10-20 @ 07:30]      Mg     2.4     [01-10-20 @ 07:30]      Phos  3.8     [01-10-20 @ 07:30]        Creatinine Trend:  SCr 1.44 [01-10 @ 07:30]  SCr 1.25 [01-09 @ 10:04]  SCr 1.32 [01-08 @ 10:25]  SCr 1.60 [01-07 @ 09:36]  SCr 1.58 [01-06 @ 08:09]    Urinalysis - [01-05-20 @ 19:06]      Color Yellow / Appearance Clear / SG 1.015 / pH 6.0      Gluc Negative / Ketone Trace  / Bili Negative / Urobili Negative       Blood Large / Protein 100 / Leuk Est Negative / Nitrite Negative      RBC 11-25 / WBC 0-2 / Hyaline  / Gran  / Sq Epi  / Non Sq Epi Few / Bacteria Occasional      Iron 27, TIBC 270, %sat 10      [12-12-19 @ 19:45]  Ferritin 502      [12-13-19 @ 07:39]  HbA1c 4.7      [12-12-19 @ 04:43]  TSH 1.66      [02-09-19 @ 02:14]  Lipid: chol 97, TG 46, HDL 50, LDL 38      [12-12-19 @ 05:51]      Syphilis Screen (Treponema Pallidum Ab) Negative      [01-22-18 @ 19:57]  Free Light Chains: kappa 0.12, lambda 184.80, ratio = 0.00      [12-13 @ 03:53]  Immunofixation Serum: IgG Lambda Band Identified    Reference Range: None Detected      [12-13-19 @ 03:53]  SPEP Interpretation: Gamma-Migrating Paraprotein Identified      [12-13-19 @ 03:53]

## 2020-01-10 NOTE — PROGRESS NOTE ADULT - ASSESSMENT
73y/o female with h/o multiple myeloma, pelvic mass, CHF, HTN, Bipolar disorder, recently admission for c.diff completed coarse of antibiotic on 12/30 sent in [facility] for alter mental status + fever 101F. admitted for sirs. work up negative. ID eval appreciated, abx discontinued.     presentation complicated by     course complicated by hypernatremia, mild, into 150s + calvin. nephro eval appreciated.     course also complicated by nstemi, elevated trop, peak 0.1. pt w/o clear symptoms, ekg non ischemic. ss cardio eval appreciated, no intervention planned given comorbid conditions, high risks, and lack of consent from patient who refuses all interventions.     course complicated by mild exac of hf (combined). volume congestion noted on imaging, congestion on echo, bnp elevaed. discussed w/ cardio + nephro, given hypernatremia pt still considered to be volume deplete. sp very brief course iv lasix.     #hypernatremia, mild, stable  -ss Renal consult appreciated, ivf trial, po bicarb, hold acei - resume if/when renal function allows  -encourage po intake during awake moments  -serial bmp    #calvin, on admit, stable  -baseline cr 0.5-0.7  -likely due to sirs, dehydration  - pt with poor po intake  -Renal consult appreciated, ivf as above  -serial bmp, avoid nephrotoxic meds    #BL Hydronephrosis  -incidentally noted on imaging, renal US w/ mod BL hydro  -irby, flomax, repeat renal us in am + tov if improved    #LLE edema.  -high risk vte due to MM, doppler pending    #SIRS, resolved  -blood cx from 1/5 shows no growth  -uti ruled out, no pna on ct chest  -empiric abx, rocephin + zithro started 1/7, discontinued when bld cx neg, cefepime + vanco started when low grade fever noted  -procal elevated but not clearly assoc w/ infection per ID  -rectal + iv meds while lethargic as poss  -rvp neg  -ID eval appreciated, abx stopped 1/10, monitor    #acute on chronic combined HF, stable  -ef 45-50%, [id number], mod-severe MVR, mild to mod AS, mod AR - echo 12/2019  -evidence of pulm vasc congestion + small BL effusions on imaging  -sp brief iv diuresis  -ss Cardio appreciated, bb titrated by cardio, acei as above  -TTE w/ evidence of mild volume overload, ef 40-45%, mild to mod AVS, mod AR, small PFO w/ shunting    #nstemi, stable  -peak trop 0.10, ekg w/o acute ischemic change  -ss cardio eval appreciated, refused nst + lhc in 12/2019, no invasive intervention planned    #htn, controlled    #bipolar disorder w/o behavioral disturbance, stable  -psych eval appreciated, no contraindication to dc back to [place] when stable    #obesity, bmi 34, wt loss advisable, outpt fu    #anemia, chronic, stable  -no acute/active s/s of blood loss, baseline 9, trend if it becomes clinically warranted otherwise outpt fu    #MM, stable  -mult areas of osseous mets noted on imaging, outpt fu w/ onc, known dx    #dvt ppx. lovenox    #dispo. pending improvement of Na+calvin, undetermined date    #outpt fu. pmd, nephro Dr TAIWO Mckeon, cardio Dr Ramey

## 2020-01-10 NOTE — PROGRESS NOTE ADULT - SUBJECTIVE AND OBJECTIVE BOX
CC: hypernatremia    Patient seen and examined at the bedside. No acute overnight events. no events yesterday. Complaining of nothing but extremely limited responses at baseline, intermittently answers questions and at times does not. denies pain or discomfort, did not answer any other questions.      =========================================================================================  T(C): 36.5 (01-10-20 @ 07:36), Max: 37.5 (01-09-20 @ 23:36)  HR: 96 (01-10-20 @ 07:36) (73 - 96)  BP: 122/88 (01-10-20 @ 07:36) (115/62 - 122/88)  RR: 18 (01-10-20 @ 07:36) (18 - 18)  SpO2: 93% (01-10-20 @ 07:36) (93% - 96%)    PHYSICAL EXAM.    GEN - appears age appropriate. frail. no distress.   HEENT - NCAT, EOMI, HOOD  RESP - bibasilar crackles. not on supplemental O2.   CARDIO - NS1S2, RRR. +3/6 murmur  ABD - Soft/Non tender/Non distended. Normal BS x4 quadrants. no guarding/rebound tenderness.  Ext - LLE pitting edema +3.   MSK -unable to assess  Neuro - unable to assess  Psych - lethargic, intermittently responding to questioning, unable to assess  Skin - c/d/i. no rashes/lesions      I&O's Summary    09 Jan 2020 07:01  -  10 Robe 2020 07:00  --------------------------------------------------------  IN: 525 mL / OUT: 550 mL / NET: -25 mL      Daily     Daily     =========================================================================================  LABS.    01-10    150<H>  |  117<H>  |  38.0<H>  ----------------------------<  114  3.7   |  21.0<L>  |  1.44<H>    Ca    7.1<L>      10 Robe 2020 07:30  Phos  3.8     01-10  Mg     2.4     01-10                            8.1    7.25  )-----------( 103      ( 10 Robe 2020 07:30 )             28.0                   =========================================================================================  IMAGING.     =========================================================================================    HOME MEDS.    Home Medications:  acetaminophen 325 mg oral tablet: 2 tab(s) orally every 6 hours, As Needed (07 Jan 2020 11:32)  aspirin 81 mg oral tablet: 1 tab(s) orally once a day (07 Jan 2020 11:32)  carvedilol 3.125 mg oral tablet: 1 tab(s) orally every 12 hours (07 Jan 2020 11:32)  dexamethasone 4 mg oral tablet: 20 milligram(s) orally mon-wed with chemotherapy (07 Jan 2020 11:32)  fentaNYL 12 mcg/hr transdermal film, extended release: 1 patch transdermal every 72 hours (07 Jan 2020 11:32)  ferrous sulfate 325 mg (65 mg elemental iron) oral tablet: 1 tab(s) orally once a day (07 Jan 2020 11:32)  folic acid 1 mg oral tablet: 1 tab(s) orally once a day (07 Jan 2020 11:32)  furosemide 40 mg oral tablet: 1 tab(s) orally once a day (07 Jan 2020 11:32)  gabapentin 100 mg oral capsule: 1 cap(s) orally once a day (at bedtime) (07 Jan 2020 11:32)  levothyroxine 75 mcg (0.075 mg) oral tablet: 1 tab(s) orally once a day (07 Jan 2020 11:32)  lisinopril 2.5 mg oral tablet: 1 tab(s) orally once a day (07 Jan 2020 11:32)  ondansetron 8 mg oral tablet: 1 tab(s) orally mon-wed with chemotherapy (07 Jan 2020 11:32)  Protonix 40 mg oral delayed release tablet: 1 tab(s) orally once a day (07 Jan 2020 11:32)  selinexor 20 mg oral tablet: 4 tab(s) orally mon and wed (07 Jan 2020 11:32)  valACYclovir 500 mg oral tablet: 1 tab(s) orally 2 times a day (07 Jan 2020 11:32)  Vitamin D3 1000 intl units oral tablet: 1 tab(s) orally once a day (07 Jan 2020 11:32)      =========================================================================================    HOSPITAL MEDS.    MEDICATIONS  (STANDING):  ascorbic acid 500 milliGRAM(s) Oral daily  aspirin enteric coated 81 milliGRAM(s) Oral daily  carvedilol 6.25 milliGRAM(s) Oral every 12 hours  cholecalciferol 1000 Unit(s) Oral daily  enoxaparin Injectable 40 milliGRAM(s) SubCutaneous every 24 hours  ferrous sulfate Oral Tab/Cap - Peds 325 milliGRAM(s) Oral daily  folic acid 1 milliGRAM(s) Oral daily  gabapentin 100 milliGRAM(s) Oral at bedtime  levothyroxine Injectable 37.5 MICROGram(s) IV Push at bedtime  lisinopril 2.5 milliGRAM(s) Oral daily  pantoprazole  Injectable 40 milliGRAM(s) IV Push daily  sodium bicarbonate  Infusion 0.124 mEq/kG/Hr (75 mL/Hr) IV Continuous <Continuous>  tamsulosin 0.4 milliGRAM(s) Oral at bedtime  valACYclovir 500 milliGRAM(s) Oral two times a day    MEDICATIONS  (PRN):  acetaminophen  Suppository .. 650 milliGRAM(s) Rectal every 6 hours PRN Temp greater or equal to 38C (100.4F), Mild Pain (1 - 3), Moderate Pain (4 - 6), Severe Pain (7 - 10)

## 2020-01-10 NOTE — PROGRESS NOTE ADULT - SUBJECTIVE AND OBJECTIVE BOX
patient persistently lethargic will not open eyes Feels "weak" "tired" disoriented to place and time Denies any self injurious or violent impulses Denies hunger or thirst  Mental Status: lethargic oriented to name only speech slow with response delay mood dysphoric affect blunted thought content unremarkable Impaired fund of knowledge  Impaired memory functions Impaired judgement   Vital Signs Last 24 Hrs  T(C): 37.5 (09 Jan 2020 23:36), Max: 37.8 (09 Jan 2020 16:20)  T(F): 99.5 (09 Jan 2020 23:36), Max: 100 (09 Jan 2020 16:20)  HR: 73 (09 Jan 2020 23:36) (73 - 92)  BP: 115/62 (09 Jan 2020 23:36) (115/62 - 116/59)  BP(mean): --  RR: 18 (09 Jan 2020 23:36) (18 - 18)  SpO2: 96% (09 Jan 2020 23:36) (96% - 96%)                        8.1    7.25  )-----------( 103      ( 10 Robe 2020 07:30 )             28.0     01-09    154<H>  |  122<H>  |  37.0<H>  ----------------------------<  102  4.0   |  17.0<L>  |  1.25    Ca    7.5<L>      09 Jan 2020 10:04  Phos  3.8     01-09  Mg     2.4     01-09  MEDICATIONS  (STANDING):  ascorbic acid 500 milliGRAM(s) Oral daily  aspirin enteric coated 81 milliGRAM(s) Oral daily  carvedilol 6.25 milliGRAM(s) Oral every 12 hours  cefepime   IVPB 2000 milliGRAM(s) IV Intermittent every 8 hours  cholecalciferol 1000 Unit(s) Oral daily  enoxaparin Injectable 40 milliGRAM(s) SubCutaneous every 24 hours  ferrous sulfate Oral Tab/Cap - Peds 325 milliGRAM(s) Oral daily  folic acid 1 milliGRAM(s) Oral daily  gabapentin 100 milliGRAM(s) Oral at bedtime  levothyroxine Injectable 37.5 MICROGram(s) IV Push at bedtime  lisinopril 2.5 milliGRAM(s) Oral daily  pantoprazole  Injectable 40 milliGRAM(s) IV Push daily  sodium bicarbonate  Infusion 0.124 mEq/kG/Hr (75 mL/Hr) IV Continuous <Continuous>  tamsulosin 0.4 milliGRAM(s) Oral at bedtime  valACYclovir 500 milliGRAM(s) Oral two times a day  vancomycin  IVPB 750 milliGRAM(s) IV Intermittent every 12 hours  vancomycin  IVPB

## 2020-01-10 NOTE — CONSULT NOTE ADULT - ASSESSMENT
73y/o  Female with a h/o multiple myeloma, pelvic mass, CHF, HTN, Bipolar disorder. Patient was recently discharged 12/19 after being treated for c diff completed coarse of Po Vancomycin on 12/30. Patient was sent in from [facility] for altered mental status. Here patient is afebrile, no leukocytosis, CXR ? LL infiltrated so started on IV Vancomycin and Cefepime on 1/5/20.      ? pneumonia  recent C Diff  Multiple myeloma   Bipolar      - Blood cultures no growth   - RVP negative  - CT Chest with no pneumonia   - UA with no UTI  - Procalcitonin level not suggestive of infection   - D/C Cefepime  - D/C Vancomycin   - Trend Fever  - Trend Leukocytosis      Will Follow

## 2020-01-10 NOTE — GOALS OF CARE CONVERSATION - ADVANCED CARE PLANNING - CONVERSATION DETAILS
SW familiar with patient from previous admission and involvement of palliative care team. During December 2019 admission patients sister Kim Chambers  requested as patients surrogate for DNR/DNI , understanding patients overall condition with multiple myeloma, pelvic mass and hx with chemotherapy as well as partial mastectomy in 2018 from breast Ca as well as cardiac issues.  At that time based upon [facility] guidelines I had  initiated MOLST checklist #3 for end of life wishes as per surrogate. Contact had been made to [employer] at that time and  Arthur Garvey reviewed clinical, spoke with sister and met with patient twice.     During attorneys meetings with patient on Dec 17 and Dec 18  patient had objected to her sister being her surrogate and objected to both DNR/DNI.  Patient stated to  that she did not want her sister making decisions for her in the future. [employer] Mental [employer]  advised me at that time that MOLST checklist 3 was being objected to by Mental  Hygiene Legal services according to Public Health Law Section 2994-c6 which indicates that patients objection and decision prevails . Therefore it needs to be noted that at no time in future can sister act as patients surrogate for decision making unless further action taken with courts as per  Arthur Garvey. As per objection letter form Dec 19, 2019  notes that patients objections stand and these objections remain in full force and effect until a court of competent jurisdiction supersedes it.    SW contacted [facility] -Director Of Social Work Jessy Rosales to review case further and [facility] had not proceeded with any further legal matters with courts regarding competency since patient returned to [facility] after last hospitalizations. Jessy had confirmed with me that Office of Mental Hygiene letters of objection which I had sent  from previous admission are on file at [facility].     Copy of the objection letter from [employer]  from Dec 19, 2019 placed on chart. Unit SW advised of situation and informed to send paperwork to SNF who accepts patient for LT care.

## 2020-01-10 NOTE — PROGRESS NOTE ADULT - ASSESSMENT
subacute delirium- multifactorial  Congestive heart failure (CHF)  Multiple myeloma- metastatic  Mental disorder-schizophrenia  Scoliosis  Hypercholesteremia  Hypertension  Hypothyroid  Patient is quite debilitated Her physical condition and poor prognosis preclude her return to [facility]  Psychiatrically and medically appropriate for skilled nursing level of care subacute delirium- multifactorial  Congestive heart failure (CHF)  Multiple myeloma- metastatic  Mental disorder-bipolar disorder  Scoliosis  Hypercholesteremia  Hypertension  Hypothyroid  Patient is quite debilitated Her physical condition and poor prognosis preclude her return to [facility]  Psychiatrically and medically appropriate for skilled nursing level of care

## 2020-01-11 LAB
AMMONIA BLD-MCNC: 79 UMOL/L — HIGH (ref 11–55)
ANION GAP SERPL CALC-SCNC: 12 MMOL/L — SIGNIFICANT CHANGE UP (ref 5–17)
ANION GAP SERPL CALC-SCNC: 14 MMOL/L — SIGNIFICANT CHANGE UP (ref 5–17)
APPEARANCE UR: ABNORMAL
BACTERIA # UR AUTO: ABNORMAL
BILIRUB UR-MCNC: NEGATIVE — SIGNIFICANT CHANGE UP
BLOOD GAS COMMENTS ARTERIAL: SIGNIFICANT CHANGE UP
BUN SERPL-MCNC: 35 MG/DL — HIGH (ref 8–20)
BUN SERPL-MCNC: 38 MG/DL — HIGH (ref 8–20)
CALCIUM SERPL-MCNC: 6.9 MG/DL — LOW (ref 8.6–10.2)
CALCIUM SERPL-MCNC: 7.1 MG/DL — LOW (ref 8.6–10.2)
CHLORIDE SERPL-SCNC: 113 MMOL/L — HIGH (ref 98–107)
CHLORIDE SERPL-SCNC: 117 MMOL/L — HIGH (ref 98–107)
CO2 SERPL-SCNC: 22 MMOL/L — SIGNIFICANT CHANGE UP (ref 22–29)
CO2 SERPL-SCNC: 23 MMOL/L — SIGNIFICANT CHANGE UP (ref 22–29)
COLOR SPEC: ABNORMAL
COMMENT - URINE: SIGNIFICANT CHANGE UP
CREAT ?TM UR-MCNC: 85 MG/DL — SIGNIFICANT CHANGE UP
CREAT SERPL-MCNC: 1.19 MG/DL — SIGNIFICANT CHANGE UP (ref 0.5–1.3)
CREAT SERPL-MCNC: 1.2 MG/DL — SIGNIFICANT CHANGE UP (ref 0.5–1.3)
DIFF PNL FLD: ABNORMAL
EPI CELLS # UR: SIGNIFICANT CHANGE UP
FOLATE SERPL-MCNC: 9.6 NG/ML — SIGNIFICANT CHANGE UP
GAS PNL BLDA: SIGNIFICANT CHANGE UP
GLUCOSE SERPL-MCNC: 101 MG/DL — SIGNIFICANT CHANGE UP (ref 70–115)
GLUCOSE SERPL-MCNC: 130 MG/DL — HIGH (ref 70–115)
GLUCOSE UR QL: NEGATIVE MG/DL — SIGNIFICANT CHANGE UP
HOROWITZ INDEX BLDA+IHG-RTO: SIGNIFICANT CHANGE UP
KETONES UR-MCNC: NEGATIVE — SIGNIFICANT CHANGE UP
LEUKOCYTE ESTERASE UR-ACNC: ABNORMAL
MAGNESIUM SERPL-MCNC: 2.2 MG/DL — SIGNIFICANT CHANGE UP (ref 1.6–2.6)
NITRITE UR-MCNC: NEGATIVE — SIGNIFICANT CHANGE UP
NT-PROBNP SERPL-SCNC: HIGH PG/ML (ref 0–300)
OSMOLALITY UR: 403 MOSM/KG — SIGNIFICANT CHANGE UP (ref 300–1000)
PH UR: 7 — SIGNIFICANT CHANGE UP (ref 5–8)
PHOSPHATE SERPL-MCNC: 3 MG/DL — SIGNIFICANT CHANGE UP (ref 2.4–4.7)
POTASSIUM SERPL-MCNC: 3.4 MMOL/L — LOW (ref 3.5–5.3)
POTASSIUM SERPL-MCNC: 3.7 MMOL/L — SIGNIFICANT CHANGE UP (ref 3.5–5.3)
POTASSIUM SERPL-SCNC: 3.4 MMOL/L — LOW (ref 3.5–5.3)
POTASSIUM SERPL-SCNC: 3.7 MMOL/L — SIGNIFICANT CHANGE UP (ref 3.5–5.3)
PROCALCITONIN SERPL-MCNC: 0.2 NG/ML — HIGH (ref 0.02–0.1)
PROT UR-MCNC: 500 MG/DL
RBC CASTS # UR COMP ASSIST: >50 /HPF (ref 0–4)
SODIUM SERPL-SCNC: 149 MMOL/L — HIGH (ref 135–145)
SODIUM SERPL-SCNC: 152 MMOL/L — HIGH (ref 135–145)
SODIUM UR-SCNC: 53 MMOL/L — SIGNIFICANT CHANGE UP
SP GR SPEC: 1.01 — SIGNIFICANT CHANGE UP (ref 1.01–1.02)
TSH SERPL-MCNC: 7.57 UIU/ML — HIGH (ref 0.27–4.2)
UROBILINOGEN FLD QL: NEGATIVE MG/DL — SIGNIFICANT CHANGE UP
VIT B12 SERPL-MCNC: 1164 PG/ML — SIGNIFICANT CHANGE UP (ref 232–1245)
WBC UR QL: SIGNIFICANT CHANGE UP

## 2020-01-11 PROCEDURE — 70450 CT HEAD/BRAIN W/O DYE: CPT | Mod: 26

## 2020-01-11 PROCEDURE — 99233 SBSQ HOSP IP/OBS HIGH 50: CPT

## 2020-01-11 RX ORDER — POTASSIUM CHLORIDE 20 MEQ
10 PACKET (EA) ORAL
Refills: 0 | Status: COMPLETED | OUTPATIENT
Start: 2020-01-11 | End: 2020-01-11

## 2020-01-11 RX ORDER — SODIUM CHLORIDE 9 MG/ML
1000 INJECTION, SOLUTION INTRAVENOUS
Refills: 0 | Status: DISCONTINUED | OUTPATIENT
Start: 2020-01-11 | End: 2020-01-12

## 2020-01-11 RX ADMIN — SODIUM CHLORIDE 90 MILLILITER(S): 9 INJECTION, SOLUTION INTRAVENOUS at 11:56

## 2020-01-11 RX ADMIN — Medication 650 MILLIGRAM(S): at 21:00

## 2020-01-11 RX ADMIN — ENOXAPARIN SODIUM 40 MILLIGRAM(S): 100 INJECTION SUBCUTANEOUS at 05:32

## 2020-01-11 RX ADMIN — Medication 37.5 MICROGRAM(S): at 22:04

## 2020-01-11 RX ADMIN — Medication 100 MILLIEQUIVALENT(S): at 12:00

## 2020-01-11 RX ADMIN — Medication 650 MILLIGRAM(S): at 20:50

## 2020-01-11 RX ADMIN — SODIUM CHLORIDE 90 MILLILITER(S): 9 INJECTION, SOLUTION INTRAVENOUS at 13:48

## 2020-01-11 RX ADMIN — Medication 100 MILLIEQUIVALENT(S): at 11:00

## 2020-01-11 RX ADMIN — Medication 100 MILLIEQUIVALENT(S): at 13:00

## 2020-01-11 NOTE — PROGRESS NOTE ADULT - ASSESSMENT
73y/o female with h/o multiple myeloma, pelvic mass, CHF, HTN, Bipolar disorder, recently admission for c.diff completed coarse of antibiotic on 12/30 sent in [facility] for alter mental status + fever 101F. admitted for sirs. work up negative. ID eval appreciated, abx discontinued.     presentation complicated by     course complicated by hypernatremia, mild, into 150s + luna. nephro eval appreciated.     course also complicated by nstemi, elevated trop, peak 0.1. pt w/o clear symptoms, ekg non ischemic. ss cardio eval appreciated, no intervention planned given comorbid conditions, high risks, and lack of consent from patient who refuses all interventions.     course complicated by mild exac of hf (combined). volume congestion noted on imaging, congestion on echo, bnp elevaed. discussed w/ cardio + nephro, given hypernatremia pt still considered to be volume deplete. sp very brief course iv lasix.       #Metabolic encephalopathy-   likely from hypernatremia, sirs & baseline bipolar r/o CVA  r/o brain mets  Mental status seems changed from baseline (pt was talking on admission), now obtunded since a few days  CT head not done, will order  check B12, folate, RPR, ammonia, TSH, ABG  Will call neuro/ ICU if no improvement      #hypernatremia-  Na 152 today  -ss Renal consult appreciated, D5 trial with bicarb, hold acei - resume if/when renal function allows  -encourage po intake during awake moments  -serial bmp q6    #luna, on admit, stable  -baseline cr 0.5-0.7  -likely due to sirs, dehydration  - pt with poor po intake  -Renal consult appreciated, ivf as above  -serial bmp, avoid nephrotoxic meds    #BL Hydronephrosis  -incidentally noted on imaging, renal US w/ mod BL hydro  -irby & flomax  repeat renal us in am: Mild to moderate bilateral hydronephrosis without significant change.  Voiding well with irby  Afebrile  LUNA improving  Will call  in am    #LLE edema.  -high risk vte due to MM, doppler -ve for DVT    #SIRS, resolved  -blood cx from 1/5 shows no growth  -uti ruled out, no pna on ct chest  -empiric abx, rocephin + zithro started 1/7, discontinued when bld cx neg, cefepime + vanco started when low grade fever noted  -procal elevated but not clearly assoc w/ infection per ID  -rectal + iv meds while lethargic as poss  -rvp neg  -ID eval appreciated, abx stopped 1/10, monitor    #acute on chronic combined HF, stable  -ef 45-50%, [id number], mod-severe MVR, mild to mod AS, mod AR - echo 12/2019  -evidence of pulm vasc congestion + small BL effusions on imaging  -sp brief iv diuresis  -ss Cardio appreciated, bb titrated by cardio, acei as above  -TTE w/ evidence of mild volume overload, ef 40-45%, mild to mod AVS, mod AR, small PFO w/ shunting    #nstemi, stable  -peak trop 0.10, ekg w/o acute ischemic change  -ss cardio eval appreciated, refused nst + lhc in 12/2019, no invasive intervention planned    #htn, controlled    #bipolar disorder w/o behavioral disturbance, stable  -psych eval appreciated, no contraindication to dc back to [place] when stable    #obesity, bmi 34, wt loss advisable, outpt fu    #anemia, chronic, stable  -no acute/active s/s of blood loss, baseline 9, trend if it becomes clinically warranted otherwise outpt fu    #MM, stable  -mult areas of osseous mets noted on imaging, outpt fu w/ onc, known dx    #dvt ppx. lovenox    #dispo. pending improvement of Na+luna, undetermined date    #outpt fu. pmd, nephro Dr TAIWO Mckeon, cardio Dr Ramey

## 2020-01-11 NOTE — PROGRESS NOTE ADULT - SUBJECTIVE AND OBJECTIVE BOX
CC: hypernatremia    Patient seen and examined at the bedside. No acute overnight events. no events yesterday. Does not open eyes to sternal rub. Moans with painful stimuli. As per RN this has been her mental status since the past few days    =========================================================================================  T(C): 36.5 (01-10-20 @ 07:36), Max: 37.5 (01-09-20 @ 23:36)  HR: 96 (01-10-20 @ 07:36) (73 - 96)  BP: 122/88 (01-10-20 @ 07:36) (115/62 - 122/88)  RR: 18 (01-10-20 @ 07:36) (18 - 18)  SpO2: 93% (01-10-20 @ 07:36) (93% - 96%)    PHYSICAL EXAM.    GEN - appears age appropriate. frail  HEENT - NCAT, EOMI, HOOD  RESP - bibasilar crackles. not on supplemental O2.   CARDIO - NS1S2, RRR. +3/6 murmur  ABD - Soft/Non tender/Non distended. Normal BS x4 quadrants. no guarding/rebound tenderness.  Ext - LLE pitting edema +3.   MSK -unable to assess  Neuro - Does not open eyes to sternal rub. Moans with painful stimuli.     Skin - c/d/i. no rashes/lesions      I&O's Summary    09 Jan 2020 07:01  -  10 Robe 2020 07:00  --------------------------------------------------------  IN: 525 mL / OUT: 550 mL / NET: -25 mL      Daily     Daily     =========================================================================================  LABS.    01-10    150<H>  |  117<H>  |  38.0<H>  ----------------------------<  114  3.7   |  21.0<L>  |  1.44<H>    Ca    7.1<L>      10 Robe 2020 07:30  Phos  3.8     01-10  Mg     2.4     01-10                            8.1    7.25  )-----------( 103      ( 10 Robe 2020 07:30 )             28.0                   =========================================================================================  IMAGING.     =========================================================================================    HOME MEDS.    Home Medications:  acetaminophen 325 mg oral tablet: 2 tab(s) orally every 6 hours, As Needed (07 Jan 2020 11:32)  aspirin 81 mg oral tablet: 1 tab(s) orally once a day (07 Jan 2020 11:32)  carvedilol 3.125 mg oral tablet: 1 tab(s) orally every 12 hours (07 Jan 2020 11:32)  dexamethasone 4 mg oral tablet: 20 milligram(s) orally mon-wed with chemotherapy (07 Jan 2020 11:32)  fentaNYL 12 mcg/hr transdermal film, extended release: 1 patch transdermal every 72 hours (07 Jan 2020 11:32)  ferrous sulfate 325 mg (65 mg elemental iron) oral tablet: 1 tab(s) orally once a day (07 Jan 2020 11:32)  folic acid 1 mg oral tablet: 1 tab(s) orally once a day (07 Jan 2020 11:32)  furosemide 40 mg oral tablet: 1 tab(s) orally once a day (07 Jan 2020 11:32)  gabapentin 100 mg oral capsule: 1 cap(s) orally once a day (at bedtime) (07 Jan 2020 11:32)  levothyroxine 75 mcg (0.075 mg) oral tablet: 1 tab(s) orally once a day (07 Jan 2020 11:32)  lisinopril 2.5 mg oral tablet: 1 tab(s) orally once a day (07 Jan 2020 11:32)  ondansetron 8 mg oral tablet: 1 tab(s) orally mon-wed with chemotherapy (07 Jan 2020 11:32)  Protonix 40 mg oral delayed release tablet: 1 tab(s) orally once a day (07 Jan 2020 11:32)  selinexor 20 mg oral tablet: 4 tab(s) orally mon and wed (07 Jan 2020 11:32)  valACYclovir 500 mg oral tablet: 1 tab(s) orally 2 times a day (07 Jan 2020 11:32)  Vitamin D3 1000 intl units oral tablet: 1 tab(s) orally once a day (07 Jan 2020 11:32)      =========================================================================================    HOSPITAL MEDS.    MEDICATIONS  (STANDING):  ascorbic acid 500 milliGRAM(s) Oral daily  aspirin enteric coated 81 milliGRAM(s) Oral daily  carvedilol 6.25 milliGRAM(s) Oral every 12 hours  cholecalciferol 1000 Unit(s) Oral daily  enoxaparin Injectable 40 milliGRAM(s) SubCutaneous every 24 hours  ferrous sulfate Oral Tab/Cap - Peds 325 milliGRAM(s) Oral daily  folic acid 1 milliGRAM(s) Oral daily  gabapentin 100 milliGRAM(s) Oral at bedtime  levothyroxine Injectable 37.5 MICROGram(s) IV Push at bedtime  lisinopril 2.5 milliGRAM(s) Oral daily  pantoprazole  Injectable 40 milliGRAM(s) IV Push daily  sodium bicarbonate  Infusion 0.124 mEq/kG/Hr (75 mL/Hr) IV Continuous <Continuous>  tamsulosin 0.4 milliGRAM(s) Oral at bedtime  valACYclovir 500 milliGRAM(s) Oral two times a day    MEDICATIONS  (PRN):  acetaminophen  Suppository .. 650 milliGRAM(s) Rectal every 6 hours PRN Temp greater or equal to 38C (100.4F), Mild Pain (1 - 3), Moderate Pain (4 - 6), Severe Pain (7 - 10)

## 2020-01-11 NOTE — PROGRESS NOTE ADULT - ASSESSMENT
73y/o female with h/o multiple myeloma, pelvic mass, CHF, HTN, Bipolar disorder, recently admitted  for c.diff,  completed coarse of antibiotic on 12/30 sent in [facility] for altered  mental status + fever 101F.     presentation complicated by  Hypernatremia, mild, into 150s + calvin.     course also complicated by nstemi, elevated trop, peak 0.1. pt w/o clear symptoms, ekg non ischemic. ss cardio eval appreciated, no intervention planned given comorbid conditions, high risks, and lack of consent from patient who refuses all interventions.     course complicated by mild exac of hf (combined). volume congestion noted on imaging, congestion on echo, given hypernatremia pt still considered to be volume deplete.   #hypernatremia,   -encourage po intake during awake moments  -serial bmp    #calvin, on admit, stable  -baseline cr 0.5-0.7 mg.,   -Due to sirs, dehydration    #BL Hydronephrosis  -incidentally noted on imaging, renal US w/ mod BL hydro  -irby, flomax, repeat renal us in am + tov if improved    #SIRS, resolved  -blood cx from 1/5 shows no growth  -uti ruled out, no pna on ct chest  -empiric abx, rocephin + zithro started 1/7, discontinued when bld cx neg, cefepime + vanco started when low grade fever noted  -procal elevated but not clearly assoc w/ infection per ID  -rectal + iv meds while lethargic as poss  -rvp neg  -abx stopped 1/10, monitor    #acute on chronic combined HF, stable  -ef 45-50%, [id number], mod-severe MVR, mild to mod AS, mod AR - echo 12/2019  -evidence of pulm vasc congestion + small BL effusions on imaging  -sp brief iv diuresis  -bb titrated,  -TTE w/ evidence of mild volume overload, ef 40-45%, mild to mod AVS, mod AR, small PFO w/ shunting    #nstemi, stable  -peak trop 0.10, ekg w/o acute ischemic change  -refused nst + lhc in 12/2019, no invasive intervention planned    #htn, controlled    #bipolar disorder w/o behavioral disturbance, stable  -No contraindication to dc back to [place] when stable    #anemia, chronic, stable  -no acute/active s/s of blood loss, baseline 9, trend if it becomes clinically warranted otherwise outpt fu    #MM, stable  -mult areas of osseous mets noted on imaging, outpt fu w/ onc, known dx

## 2020-01-11 NOTE — PROGRESS NOTE ADULT - SUBJECTIVE AND OBJECTIVE BOX
Vital Signs Last 24 Hrs,    T(C): 36.9 (2020 08:22), Max: 36.9 (10 Robe 2020 23:43)  T(F): 98.5 (2020 08:22), Max: 98.5 (10 Robe 2020 23:43)  HR: 80 (2020 08:22) (80 - 84)  BP: 100/56 (2020 08:22) (100/56 - 102/54)  RR: 18 (2020 08:22) (18 - 20)  SpO2: 95% (10 Robe 2020 23:43) (95% - 95%)    152<H>  |  117<H>  |  35.0<H>  ----------------------------<  101    Ca: 6.9<L> (2020 07:53)  3.4<L>   |  23.0   |  1.20     eGFR if Non : 44 <L>                        8.1<L>  7.25  )-----------( 103<L>    ( 10 Robe 2020 07:30 )             28.0<L>    Phos:3.0 mg/dL M.2 mg/dL.,    HPI:    73y/o  Female pt with  h/o multiple myeloma, pelvic mass, CHF, HTN, Bipolar disorder. Patient was recently discharged  after being treated for c diff completed coarse of Po Vancomycin on . Patient was sent in from [facility] for altered mental status. Nephrology consulted for hypernatremia. pt with serum sodium ~ 150 on presentation; ranging between 150-154 since admission. Unable to obtain history/ ROS from pt as pt lethargic.    PAST HISTORY  --------------------------------------------------------------------------------  PAST MEDICAL & SURGICAL HISTORY:  Congestive heart failure (CHF)  Multiple myeloma  Mental disorder  Bone cancer  Scoliosis  Hypertension  Hypothyroid    No significant past surgical history    FAMILY HISTORY:  No pertinent family history    PAST SOCIAL HISTORY:    ALLERGIES & MEDICATIONS  --------------------------------------------------------------------------------  Allergies    ixazomib (Unknown)  NSAIDs (Unknown)  penicillins (Unknown)  sulfa drugs (Unknown)    Standing Inpatient Medications  ascorbic acid 500 milliGRAM(s) Oral daily  aspirin enteric coated 81 milliGRAM(s) Oral daily  carvedilol 6.25 milliGRAM(s) Oral every 12 hours  cholecalciferol 1000 Unit(s) Oral daily  enoxaparin Injectable 40 milliGRAM(s) SubCutaneous every 24 hours  ferrous sulfate Oral Tab/Cap - Peds 325 milliGRAM(s) Oral daily  folic acid 1 milliGRAM(s) Oral daily  gabapentin 100 milliGRAM(s) Oral at bedtime  levothyroxine Injectable 37.5 MICROGram(s) IV Push at bedtime  lisinopril 2.5 milliGRAM(s) Oral daily  pantoprazole  Injectable 40 milliGRAM(s) IV Push daily  sodium bicarbonate  Infusion 0.124 mEq/kG/Hr IV Continuous <Continuous>  tamsulosin 0.4 milliGRAM(s) Oral at bedtime  valACYclovir 500 milliGRAM(s) Oral two times a day    PRN Inpatient Medications  acetaminophen  Suppository .. 650 milliGRAM(s) Rectal every 6 hours PRN    REVIEW OF SYSTEMS  --------------------------------------------------------------------------------  unable to obtain    VITALS/PHYSICAL EXAM  --------------------------------------------------------------------------------  T(C): 36.5 (01-10-20 @ 07:36), Max: 37.8 (20 @ 16:20)  HR: 96 (01-10-20 @ 07:36) (73 - 96)  BP: 122/88 (01-10-20 @ 07:36) (115/62 - 122/88)  RR: 18 (01-10-20 @ 07:36) (18 - 18)  SpO2: 93% (01-10-20 @ 07:36) (93% - 96%)    20 07:  -  01-10-20 @ 07:00  --------------------------------------------------------  IN: 525 mL / OUT: 550 mL / NET: -25 mL    Physical Exam:  	Gen: lethargic, arousable  	HEENT: supple neck,  	Pulm: CTA B/L anteriorly  	CV: RRR, S1S2; no rub  	Back: unable to examine  	Abd: +BS, soft, nontender/distended  	: No suprapubic tenderness  	UE: Warm,  no edema;  	LE: Warm, no edema  	Psych: unable to assess  	Skin: Warm  	Vascular access: none    LABS/STUDIES  --------------------------------------------------------------------------------              8.1    7.25  >-----------<  103      [01-10-20 @ 07:30]              28.0     150  |  117  |  38.0  ----------------------------<  114      [01-10-20 @ 07:30]  3.7   |  21.0  |  1.44        Ca     7.1     [01-10-20 @ 07:30]      Mg     2.4     [01-10-20 @ 07:30]      Phos  3.8     [01-10-20 @ 07:30]    Creatinine Trend:  SCr 1.44 [01-10 @ 07:30]  SCr 1.25 [ 10:04]  SCr 1.32 [ 10:25]  SCr 1.60 [ 09:36]  SCr 1.58 [ 08:09]    Urinalysis - [20 @ 19:06]      Color Yellow / Appearance Clear / SG 1.015 / pH 6.0      Gluc Negative / Ketone Trace  / Bili Negative / Urobili Negative       Blood Large / Protein 100 / Leuk Est Negative / Nitrite Negative      RBC 11-25 / WBC 0-2 / Hyaline  / Gran  / Sq Epi  / Non Sq Epi Few / Bacteria Occasional    Iron 27, TIBC 270, %sat 10      [19 @ 19:45]  Ferritin 502      [19 @ 07:39]  HbA1c 4.7      [19 @ 04:43]  TSH 1.66      [19 @ 02:14]  Lipid: chol 97, TG 46, HDL 50, LDL 38      [19 @ 05:51]    Syphilis Screen (Treponema Pallidum Ab) Negative      [18 @ 19:57]  Free Light Chains: kappa 0.12, lambda 184.80    [ @ 03:53]  Immunofixation Serum: IgG Lambda Band Identified    Reference Range: None Detected      [19 @ 03:53]  SPEP Interpretation: Gamma-Migrating Paraprotein Identified      [19 @ 03:53]      1) Hypernatremia  2) LUNA- previously with normal kidney function  3) Hyperchloremic metabolic acidosis  4) Anemia , Hypokalemia,     Oral intake - None,     IVF Modified,    K+ Repletion,    Trend U.O & Lytes,    ? Consider PEG - Feeds,    D/W RN,     Please call as Needed, TY,

## 2020-01-12 DIAGNOSIS — N13.30 UNSPECIFIED HYDRONEPHROSIS: ICD-10-CM

## 2020-01-12 LAB
ANION GAP SERPL CALC-SCNC: 11 MMOL/L — SIGNIFICANT CHANGE UP (ref 5–17)
ANION GAP SERPL CALC-SCNC: 12 MMOL/L — SIGNIFICANT CHANGE UP (ref 5–17)
ANISOCYTOSIS BLD QL: SLIGHT — SIGNIFICANT CHANGE UP
BASE EXCESS BLDA CALC-SCNC: 4.9 MMOL/L — HIGH (ref -2–2)
BASOPHILS # BLD AUTO: 0 K/UL — SIGNIFICANT CHANGE UP (ref 0–0.2)
BASOPHILS NFR BLD AUTO: 0 % — SIGNIFICANT CHANGE UP (ref 0–2)
BLOOD GAS COMMENTS ARTERIAL: SIGNIFICANT CHANGE UP
BUN SERPL-MCNC: 38 MG/DL — HIGH (ref 8–20)
BUN SERPL-MCNC: 41 MG/DL — HIGH (ref 8–20)
CALCIUM SERPL-MCNC: 6.9 MG/DL — LOW (ref 8.6–10.2)
CALCIUM SERPL-MCNC: 6.9 MG/DL — LOW (ref 8.6–10.2)
CHLORIDE SERPL-SCNC: 112 MMOL/L — HIGH (ref 98–107)
CHLORIDE SERPL-SCNC: 112 MMOL/L — HIGH (ref 98–107)
CO2 SERPL-SCNC: 24 MMOL/L — SIGNIFICANT CHANGE UP (ref 22–29)
CO2 SERPL-SCNC: 25 MMOL/L — SIGNIFICANT CHANGE UP (ref 22–29)
CREAT SERPL-MCNC: 1.31 MG/DL — HIGH (ref 0.5–1.3)
CREAT SERPL-MCNC: 1.36 MG/DL — HIGH (ref 0.5–1.3)
EOSINOPHIL # BLD AUTO: 0 K/UL — SIGNIFICANT CHANGE UP (ref 0–0.5)
EOSINOPHIL NFR BLD AUTO: 0 % — SIGNIFICANT CHANGE UP (ref 0–6)
GAS PNL BLDA: SIGNIFICANT CHANGE UP
GLUCOSE BLDC GLUCOMTR-MCNC: 95 MG/DL — SIGNIFICANT CHANGE UP (ref 70–99)
GLUCOSE SERPL-MCNC: 101 MG/DL — SIGNIFICANT CHANGE UP (ref 70–115)
GLUCOSE SERPL-MCNC: 89 MG/DL — SIGNIFICANT CHANGE UP (ref 70–115)
HCO3 BLDA-SCNC: 29 MMOL/L — HIGH (ref 20–26)
HCT VFR BLD CALC: 26.5 % — LOW (ref 34.5–45)
HCT VFR BLD CALC: 27.7 % — LOW (ref 34.5–45)
HGB BLD-MCNC: 7.8 G/DL — LOW (ref 11.5–15.5)
HGB BLD-MCNC: 8.1 G/DL — LOW (ref 11.5–15.5)
HOROWITZ INDEX BLDA+IHG-RTO: SIGNIFICANT CHANGE UP
HYPOCHROMIA BLD QL: SLIGHT — SIGNIFICANT CHANGE UP
LACTATE SERPL-SCNC: 2 MMOL/L — SIGNIFICANT CHANGE UP (ref 0.5–2)
LYMPHOCYTES # BLD AUTO: 0.5 K/UL — LOW (ref 1–3.3)
LYMPHOCYTES # BLD AUTO: 8.2 % — LOW (ref 13–44)
MACROCYTES BLD QL: SLIGHT — SIGNIFICANT CHANGE UP
MAGNESIUM SERPL-MCNC: 2.4 MG/DL — SIGNIFICANT CHANGE UP (ref 1.8–2.6)
MANUAL SMEAR VERIFICATION: SIGNIFICANT CHANGE UP
MCHC RBC-ENTMCNC: 29.2 GM/DL — LOW (ref 32–36)
MCHC RBC-ENTMCNC: 29.4 GM/DL — LOW (ref 32–36)
MCHC RBC-ENTMCNC: 30 PG — SIGNIFICANT CHANGE UP (ref 27–34)
MCHC RBC-ENTMCNC: 30.1 PG — SIGNIFICANT CHANGE UP (ref 27–34)
MCV RBC AUTO: 102.3 FL — HIGH (ref 80–100)
MCV RBC AUTO: 102.6 FL — HIGH (ref 80–100)
MICROCYTES BLD QL: SLIGHT — SIGNIFICANT CHANGE UP
MONOCYTES # BLD AUTO: 0.16 K/UL — SIGNIFICANT CHANGE UP (ref 0–0.9)
MONOCYTES NFR BLD AUTO: 2.7 % — SIGNIFICANT CHANGE UP (ref 2–14)
MYELOCYTES NFR BLD: 0.9 % — HIGH (ref 0–0)
NEUTROPHILS # BLD AUTO: 5.2 K/UL — SIGNIFICANT CHANGE UP (ref 1.8–7.4)
NEUTROPHILS NFR BLD AUTO: 85.5 % — HIGH (ref 43–77)
PCO2 BLDA: 32 MMHG — LOW (ref 35–45)
PH BLDA: 7.54 — HIGH (ref 7.35–7.45)
PHOSPHATE SERPL-MCNC: 3.3 MG/DL — SIGNIFICANT CHANGE UP (ref 2.4–4.7)
PLAT MORPH BLD: NORMAL — SIGNIFICANT CHANGE UP
PLATELET # BLD AUTO: 105 K/UL — LOW (ref 150–400)
PLATELET # BLD AUTO: 92 K/UL — LOW (ref 150–400)
PO2 BLDA: 166 MMHG — HIGH (ref 83–108)
POLYCHROMASIA BLD QL SMEAR: SLIGHT — SIGNIFICANT CHANGE UP
POTASSIUM SERPL-MCNC: 3.6 MMOL/L — SIGNIFICANT CHANGE UP (ref 3.5–5.3)
POTASSIUM SERPL-MCNC: 3.9 MMOL/L — SIGNIFICANT CHANGE UP (ref 3.5–5.3)
POTASSIUM SERPL-SCNC: 3.6 MMOL/L — SIGNIFICANT CHANGE UP (ref 3.5–5.3)
POTASSIUM SERPL-SCNC: 3.9 MMOL/L — SIGNIFICANT CHANGE UP (ref 3.5–5.3)
PROCALCITONIN SERPL-MCNC: 0.22 NG/ML — HIGH (ref 0.02–0.1)
PROT ?TM UR-MCNC: >400 MG/DL — HIGH (ref 0–12)
PROT/CREAT UR-RTO: >4.7 RATIO — HIGH
RBC # BLD: 2.59 M/UL — LOW (ref 3.8–5.2)
RBC # BLD: 2.7 M/UL — LOW (ref 3.8–5.2)
RBC # FLD: 15.7 % — HIGH (ref 10.3–14.5)
RBC # FLD: 15.7 % — HIGH (ref 10.3–14.5)
RBC BLD AUTO: ABNORMAL
SAO2 % BLDA: 99 % — SIGNIFICANT CHANGE UP (ref 95–99)
SMUDGE CELLS # BLD: PRESENT — SIGNIFICANT CHANGE UP
SODIUM SERPL-SCNC: 147 MMOL/L — HIGH (ref 135–145)
SODIUM SERPL-SCNC: 149 MMOL/L — HIGH (ref 135–145)
TSH SERPL-MCNC: 6.58 UIU/ML — HIGH (ref 0.27–4.2)
VARIANT LYMPHS # BLD: 2.7 % — SIGNIFICANT CHANGE UP (ref 0–6)
WBC # BLD: 5.78 K/UL — SIGNIFICANT CHANGE UP (ref 3.8–10.5)
WBC # BLD: 6.08 K/UL — SIGNIFICANT CHANGE UP (ref 3.8–10.5)
WBC # FLD AUTO: 5.78 K/UL — SIGNIFICANT CHANGE UP (ref 3.8–10.5)
WBC # FLD AUTO: 6.08 K/UL — SIGNIFICANT CHANGE UP (ref 3.8–10.5)

## 2020-01-12 PROCEDURE — 99232 SBSQ HOSP IP/OBS MODERATE 35: CPT

## 2020-01-12 PROCEDURE — 71045 X-RAY EXAM CHEST 1 VIEW: CPT | Mod: 26

## 2020-01-12 PROCEDURE — 43752 NASAL/OROGASTRIC W/TUBE PLMT: CPT

## 2020-01-12 RX ORDER — MEROPENEM 1 G/30ML
1000 INJECTION INTRAVENOUS ONCE
Refills: 0 | Status: COMPLETED | OUTPATIENT
Start: 2020-01-12 | End: 2020-01-12

## 2020-01-12 RX ORDER — MEROPENEM 1 G/30ML
INJECTION INTRAVENOUS
Refills: 0 | Status: DISCONTINUED | OUTPATIENT
Start: 2020-01-12 | End: 2020-01-15

## 2020-01-12 RX ORDER — MEROPENEM 1 G/30ML
1000 INJECTION INTRAVENOUS EVERY 12 HOURS
Refills: 0 | Status: DISCONTINUED | OUTPATIENT
Start: 2020-01-13 | End: 2020-01-15

## 2020-01-12 RX ORDER — LACTULOSE 10 G/15ML
200 SOLUTION ORAL DAILY
Refills: 0 | Status: DISCONTINUED | OUTPATIENT
Start: 2020-01-12 | End: 2020-01-15

## 2020-01-12 RX ADMIN — SODIUM CHLORIDE 90 MILLILITER(S): 9 INJECTION, SOLUTION INTRAVENOUS at 16:55

## 2020-01-12 RX ADMIN — SODIUM CHLORIDE 90 MILLILITER(S): 9 INJECTION, SOLUTION INTRAVENOUS at 06:01

## 2020-01-12 RX ADMIN — Medication 650 MILLIGRAM(S): at 23:26

## 2020-01-12 RX ADMIN — Medication 37.5 MICROGRAM(S): at 21:55

## 2020-01-12 RX ADMIN — TAMSULOSIN HYDROCHLORIDE 0.4 MILLIGRAM(S): 0.4 CAPSULE ORAL at 21:55

## 2020-01-12 RX ADMIN — LACTULOSE 200 GRAM(S): 10 SOLUTION ORAL at 16:54

## 2020-01-12 RX ADMIN — MEROPENEM 100 MILLIGRAM(S): 1 INJECTION INTRAVENOUS at 16:55

## 2020-01-12 RX ADMIN — GABAPENTIN 100 MILLIGRAM(S): 400 CAPSULE ORAL at 21:55

## 2020-01-12 RX ADMIN — ENOXAPARIN SODIUM 40 MILLIGRAM(S): 100 INJECTION SUBCUTANEOUS at 06:01

## 2020-01-12 NOTE — PROCEDURE NOTE - NSFINDINGS_GEN_A_CORE
borborygmi with insufflation air/bilious fluid/positive return of gastric contents DISPLAY PLAN FREE TEXT

## 2020-01-12 NOTE — PROGRESS NOTE ADULT - ASSESSMENT
75y/o female with h/o multiple myeloma, pelvic mass, CHF, HTN, Bipolar disorder, recently admission for c.diff completed coarse of antibiotic on 12/30 sent in [facility] for alter mental status + fever 101F. admitted for sirs. work up negative. ID eval appreciated, abx discontinued.     presentation complicated by     course complicated by hypernatremia, mild, into 150s + luna. nephro eval appreciated.     course also complicated by nstemi, elevated trop, peak 0.1. pt w/o clear symptoms, ekg non ischemic. ss cardio eval appreciated, no intervention planned given comorbid conditions, high risks, and lack of consent from patient who refuses all interventions.     course complicated by mild exac of hf (combined). volume congestion noted on imaging, congestion on echo, bnp elevaed. discussed w/ cardio + nephro, given hypernatremia pt still considered to be volume deplete. sp very brief course iv lasix.       #Metabolic encephalopathy-   likely from hypernatremia, sirs & baseline bipolar r/o CVA  r/o brain mets  Mental status seems changed from baseline (pt was talking on admission), now obtunded since a few days  CT head: No acute changes.No sign of hemorrhage, acute CVA or mass. 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 Stable lytic lesions in the right frontal and left parietal bones.  B12 wnl, folate wnl  f/u RPR  ammonia 79   TSH 7.5; FT3 FT4 ordered  ABG noted with metab alkalosis  Neuro consult called      #hypernatremia-  Na 152 -->149  -ss Renal consult appreciated, D5 trial with bicarb, hold acei - resume if/when renal function allows  -encourage po intake during awake moments  -serial bmp q6    #LUNA on CKD stage 2  -baseline cr 0.5-0.7 & GFR 73  -likely due to sirs, dehydration & now b/l hydronephrosis  -Renal consult appreciated, ivf as above  -serial bmp, avoid nephrotoxic meds    #BL Hydronephrosis  -incidentally noted on imaging, renal US w/ mod BL hydro  -irby & flomax  repeat renal us in am: Mild to moderate bilateral hydronephrosis without significant change.  Voiding well with irby  Afebrile  LUNA worsening   consult called    #LLE edema.  -high risk vte due to MM, doppler -ve for DVT    #SIRS-  -blood cx from 1/5 shows no growth  -uti ruled out, no pna on ct chest  -empiric abx, rocephin + zithro started 1/7, discontinued when bld cx neg, cefepime + vanco started when low grade fever noted  -procal elevated but not clearly assoc w/ infection per ID  -rectal + iv meds while lethargic as poss  -rvp neg  -ID eval appreciated, abx stopped 1/10, monitor  Tmax of 101 on 1/11 once again  Pan cx  Zosyn started  Procal  IVF  Check lactate    #acute on chronic combined HF, stable  -ef 45-50%, [id number], mod-severe MVR, mild to mod AS, mod AR - echo 12/2019  -evidence of pulm vasc congestion + small BL effusions on imaging  -sp brief iv diuresis  -ss Cardio appreciated, bb titrated by cardio, acei as above  -TTE w/ evidence of mild volume overload, ef 40-45%, mild to mod AVS, mod AR, small PFO w/ shunting    #nstemi, stable  -peak trop 0.10, ekg w/o acute ischemic change  -ss cardio eval appreciated, refused nst + lhc in 12/2019, no invasive intervention planned    #htn, controlled    #bipolar disorder w/o behavioral disturbance, stable  -psych eval appreciated, no contraindication to dc back to [place] when stable    #obesity, bmi 34, wt loss advisable, outpt fu    #anemia, chronic, stable  -no acute/active s/s of blood loss, baseline 9, trend if it becomes clinically warranted otherwise outpt fu    #MM, stable  -mult areas of osseous mets noted on imaging, outpt fu w/ onc, known dx    #dvt ppx. lovenox    #dispo. pending improvement of Na+luna, undetermined date    #outpt fu. pmd, nephro Dr TAIWO Mckeon, cardio Dr Ramey

## 2020-01-12 NOTE — CHART NOTE - NSCHARTNOTEFT_GEN_A_CORE
Source: Patient [ ]  Family [ ]   other [x ] EMR and Nursing    Current Diet: Diet, DASH/TLC:   Sodium & Cholesterol Restricted  1500mL Fluid Restriction ([id number])  Supplement Feeding Modality:  Oral  Ensure Enlive Cans or Servings Per Day:  3       Frequency:  Three Times a day (01-09-20 @ 18:02)    PO intake: Nursing reported pt consuming 0% of meals    Current Weight:   (1/9) 62kg  -Obtain current wt, continue to monitor. Dependent 3+ edema noted per EMR.    Pertinent Medications: MEDICATIONS  (STANDING):  ascorbic acid 500 milliGRAM(s) Oral daily  aspirin enteric coated 81 milliGRAM(s) Oral daily  carvedilol 6.25 milliGRAM(s) Oral every 12 hours  cholecalciferol 1000 Unit(s) Oral daily  dextrose 5% 1000 milliLiter(s) (90 mL/Hr) IV Continuous <Continuous>  enoxaparin Injectable 40 milliGRAM(s) SubCutaneous every 24 hours  ferrous sulfate Oral Tab/Cap - Peds 325 milliGRAM(s) Oral daily  folic acid 1 milliGRAM(s) Oral daily  gabapentin 100 milliGRAM(s) Oral at bedtime  lactulose Retention Enema 200 Gram(s) Rectal daily  levothyroxine Injectable 37.5 MICROGram(s) IV Push at bedtime  meropenem  IVPB      meropenem  IVPB 1000 milliGRAM(s) IV Intermittent once  pantoprazole  Injectable 40 milliGRAM(s) IV Push daily  tamsulosin 0.4 milliGRAM(s) Oral at bedtime  valACYclovir 500 milliGRAM(s) Oral two times a day    MEDICATIONS  (PRN):  acetaminophen  Suppository .. 650 milliGRAM(s) Rectal every 6 hours PRN Temp greater or equal to 38C (100.4F), Mild Pain (1 - 3), Moderate Pain (4 - 6), Severe Pain (7 - 10)    Pertinent Labs: CBC Full  -  ( 12 Jan 2020 11:20 )  WBC Count : 6.08 K/uL  RBC Count : 2.70 M/uL  Hemoglobin : 8.1 g/dL  Hematocrit : 27.7 %  Platelet Count - Automated : 92 K/uL  Mean Cell Volume : 102.6 fl  Mean Cell Hemoglobin : 30.0 pg  Mean Cell Hemoglobin Concentration : 29.2 gm/dL  Auto Neutrophil # : 5.20 K/uL  Auto Lymphocyte # : 0.50 K/uL  Auto Monocyte # : 0.16 K/uL  Auto Eosinophil # : 0.00 K/uL  Auto Basophil # : 0.00 K/uL  Auto Neutrophil % : 85.5 %  Auto Lymphocyte % : 8.2 %  Auto Monocyte % : 2.7 %  Auto Eosinophil % : 0.0 %  Auto Basophil % : 0.0 %  01-12 Na149 mmol/L<H> Glu 101 mg/dL K+ 3.6 mmol/L Cr  1.31 mg/dL<H> BUN 41.0 mg/dL<H> Phos 3.3 mg/dL Alb n/a   PAB n/a       Skin: Sacrum stage I    Nutrition focused physical exam previously conducted - found signs of malnutrition [ ]absent [x ]present    Subcutaneous fat loss: [x ] Orbital fat pads region, [ x]Buccal fat region, [x ]Triceps region,  [ ]Ribs region    Muscle wasting: [x ]Temples region, [ x]Clavicle region, [x ]Shoulder region, [ ]Scapula region, [ ]Interosseous region,  [ ]thigh region, [ ]Calf region    Estimated Needs:   [x ] no change since previous assessment  [ ] recalculated:     Current Nutrition Diagnosis: Pt remains at high nutrition risk and meets criteria for severe (chronic) malnutrition related to inability to meet sufficient protein-energy in setting of poor po intake as evidenced by pt likely meeting <75% estimated energy needs [id number], severe muscle wasting and fat loss. Pt was sleeping soundly during assessment. Open Ensure observed by bedside. Last BM 1/11 noted per EMR. RD to remain available.     Recommendations:   1) Continue Ensure Enlive TID to optimize po intake and provide an additional 350kcal, 20g protein per serving   2) Encourage po intake  3) Monitor weights and labs    Monitoring and Evaluation:   [x ] PO intake [x ] Tolerance to diet prescription [X] Weights  [X] Follow up per protocol [X] Labs:

## 2020-01-12 NOTE — CONSULT NOTE ADULT - SUBJECTIVE AND OBJECTIVE BOX
Patient is a 73 y/o female with PMHx multiple myeloma, CHF, HTN, and bipolar disorder brought in from [place] for altered mental status, lethargy, and fever. Admitted to medical service for work-up of infectious etiology. Hospital course has been complicated by LUNA, hypernatremia, heart failure exacerbation, and NSTEMI. Kidney ultrasound performed on  showed moderate b/l hydronephrosis. Repeat kidney ultrasound on 1/10 showed mild-moderate b/l hydronephrosis without significant change. Urology consulted for findings of hydronephrosis and report that LUNA not improving. Nephrology following. LUNA is persistent but stable - Cr ranging from 1.19 - 1.44 over the past 5 days.    Patient was seen and examined at bedside. She is unable to provide subjective exam due to current mental status. Per RN there have been no issues with irby catheter and no reports of hematuria.       MEDICATIONS  (STANDING):  ascorbic acid 500 milliGRAM(s) Oral daily  aspirin enteric coated 81 milliGRAM(s) Oral daily  carvedilol 6.25 milliGRAM(s) Oral every 12 hours  cholecalciferol 1000 Unit(s) Oral daily  dextrose 5% 1000 milliLiter(s) (90 mL/Hr) IV Continuous <Continuous>  enoxaparin Injectable 40 milliGRAM(s) SubCutaneous every 24 hours  ferrous sulfate Oral Tab/Cap - Peds 325 milliGRAM(s) Oral daily  folic acid 1 milliGRAM(s) Oral daily  gabapentin 100 milliGRAM(s) Oral at bedtime  lactulose Retention Enema 200 Gram(s) Rectal daily  levothyroxine Injectable 37.5 MICROGram(s) IV Push at bedtime  meropenem  IVPB      meropenem  IVPB 1000 milliGRAM(s) IV Intermittent once  pantoprazole  Injectable 40 milliGRAM(s) IV Push daily  tamsulosin 0.4 milliGRAM(s) Oral at bedtime  valACYclovir 500 milliGRAM(s) Oral two times a day    MEDICATIONS  (PRN):  acetaminophen  Suppository .. 650 milliGRAM(s) Rectal every 6 hours PRN Temp greater or equal to 38C (100.4F), Mild Pain (1 - 3), Moderate Pain (4 - 6), Severe Pain (7 - 10)    Vital Signs Last 24 Hrs  T(C): 37.1 (2020 08:07), Max: 38.3 (2020 20:45)  T(F): 98.7 (2020 08:07), Max: 101 (2020 20:45)  HR: 90 (2020 08:07) (87 - 96)  BP: 103/62 (2020 08:07) (98/61 - 104/53)  BP(mean): --  RR: 16 (2020 08:07) (16 - 18)  SpO2: 92% (2020 08:07) (92% - 95%)    Constitutional: patient is non-verbal at this time, responding to painful stimuli, appears mildly uncomfortable   Respiratory: respirations are unlabored, no accessory muscle use, no conversational dyspnea  Cardiovascular: regular rate & rhythm  Gastrointestinal: abdomen soft and ND, no grimace or other non-verbal signs of pain to palpation, no rebound tenderness / guarding  : indwelling irby catheter in place to gravity, clear yellow urine in bag. No hematuria or clots noted in collection bag. Small streak of blood on inside of tubing however urine itself in tubing with no blood. No non-verbal indications of pain to palpation of suprapubic area or CVA b/l      I&O's Detail    2020 07:01  -  2020 07:00  --------------------------------------------------------  IN:    dextrose 5%: 1080 mL  Total IN: 1080 mL    OUT:    Indwelling Catheter - Urethral: 2650 mL  Total OUT: 2650 mL    Total NET: -1570 mL      LABS:                        8.1    6.08  )-----------( 92       ( 2020 11:20 )             27.7     01-12    149<H>  |  112<H>  |  41.0<H>  ----------------------------<  101  3.6   |  25.0  |  1.31<H>    Ca    6.9<L>      2020 11:20  Phos  3.3     01-12  Mg     2.4     01-12        Urinalysis Basic - ( 2020 21:58 )    Color: [place] / Appearance: Slightly Turbid / S.010 / pH: x  Gluc: x / Ketone: Negative  / Bili: Negative / Urobili: Negative mg/dL   Blood: x / Protein: 500 mg/dL / Nitrite: Negative   Leuk Esterase: Trace / RBC: >50 /HPF / WBC 3-5   Sq Epi: x / Non Sq Epi: Occasional / Bacteria: Occasional

## 2020-01-12 NOTE — CONSULT NOTE ADULT - PROBLEM SELECTOR RECOMMENDATION 9
- Recommend to obtain CT renal stone hunt to evaluate if stone is etiology of hydronephrosis  - Continue irby catheter  - Monitor UOP  - Avoid nephrotoxic medications  - Remainder of care as per primary team

## 2020-01-12 NOTE — CONSULT NOTE ADULT - ASSESSMENT
75 y/o female with multiple medical comorbidities found to have b/l hydronephrosis on renal ultrasound 1/7. Stable on repeat ultrasound performed 1/10. Patient also presented with LUNA which is persistent but relatively stable.

## 2020-01-12 NOTE — CHART NOTE - NSCHARTNOTEFT_GEN_A_CORE
Pt is a 75 y/o female with PMHx multiple myeloma, CHF, HTN, and bipolar disorder brought in from [place] for altered mental status, lethargy, and fever complicated by LUNA, hypernatremia, heart failure exacerbation, NSTEMI with moderate b/l hydronephrosis s/p irby placement, noted to have a fever of 101F rectal last night. As per RN, Pt also has been only responding to sternal rub and to a loud voice for the past few days. A RRT was called around 16:15 because RN noted that Pt was not responding to voice the same as yesterday. RRT team present with FM Residents, PAs and RRT Attending. RRT was cancelled due to no change in Pt status.      VS:  Temp: 100.4F rectal   BP: 95/50  HR: 82  RR: 20  O2 97% on 3L NC    PHYSICAL EXAM:  GENERAL: NAD, sleeping in bed comfortably  HEAD:  Normocephalic  ENT: Dry mucous membranes, poor dentition, NG tube in place  CHEST/LUNG: Clear to auscultation bilaterally; No rales, rhonchi, wheezing, or rubs. Unlabored respirations  HEART: Regular rate and rhythm; No murmurs, rubs, or gallops  ABDOMEN: Bowel sounds present; Soft, Nontender, no guarding or rigidity   EXTREMITIES:  2+ Peripheral Pulses, brisk capillary refill. No clubbing or cyanosis   NERVOUS SYSTEM: Moans to painful stimuli but does not open eyes, does not offer any meaningful interaction   SKIN: No rashes or lesions    LABS:  ABG - ( 2020 11:51 )  pH, Arterial: 7.54  pH, Blood: x     /  pCO2: 32    /  pO2: 166   / HCO3: 29    / Base Excess: 4.9   /  SaO2: 99                            8.1    6.08  )-----------( 92       ( 2020 11:20 )             27.7     01-12    147<H>  |  112<H>  |  38.0<H>  ----------------------------<  89  3.9   |  24.0  |  1.36<H>    Ca    6.9<L>      2020 16:24  Phos  3.3     01-12  Mg     2.4     -12    Urinalysis Basic - ( 2020 21:58 )  Color: Digna / Appearance: Slightly Turbid / S.010 / pH: x  Gluc: x / Ketone: Negative  / Bili: Negative / Urobili: Negative mg/dL   Blood: x / Protein: 500 mg/dL / Nitrite: Negative   Leuk Esterase: Trace / RBC: >50 /HPF / WBC 3-5   Sq Epi: x / Non Sq Epi: Occasional / Bacteria: Occasional    CAPILLARY BLOOD GLUCOSE  POCT Blood Glucose.: 95 mg/dL (2020 16:16)    A/P: Pt is a 75 y/o female with PMHx multiple myeloma, CHF, HTN, and bipolar disorder brought in from [place], though not noted to be on antipsychotic medications for altered mental status, lethargy, and fever complicated by LUNA, hypernatremia, heart failure exacerbation, NSTEMI with moderate b/l hydronephrosis s/p irby placement. As per RN, Pt mental status is unchanged, responding to sternal rub and to a loud voice for the past few days and now to painful stimuli only. Had a fever last night. Pt is stable.   -CT head done yesterday 20 for same issue which was negative for any acute changes  -Chart and labs reviewed with MD. Nutrition note also noted 0% intake of fluids or meals and has not received PO medications since  due to lethargy  -NG tube placed per MD for trial of providing fluid and nutrients. Plan for free water flushes 250cc q6 hrs once CXR reviewed by Radiologist   -NPO  -PSYCH recalled  -ABG noted, bicarb drip d/c'd  -neuro to f/u   -low grade temperature today noted. to f/u blood cultures drawn . Started on Merrem   -trend leukocytosis and temperatures   -continue to observe, call PA for changes in condition Pt is a 73 y/o female with PMHx multiple myeloma, CHF, HTN, and bipolar disorder brought in from [place] for altered mental status, lethargy, and fever complicated by LUNA, hypernatremia, heart failure exacerbation, NSTEMI with moderate b/l hydronephrosis s/p irby placement, noted to have a fever of 101F rectal last night. As per RN, Pt also has been only responding to sternal rub and to a loud voice for the past few days. A RRT was called around 16:15 because RN noted that Pt was not responding to voice the same as yesterday. RRT team present with FM Residents, PAs and RRT Attending. RRT was cancelled due to no change in Pt status.      VS:  Temp: 100.4F rectal   BP: 95/50  HR: 82  RR: 20  O2 97% on 3L NC    PHYSICAL EXAM:  GENERAL: NAD, sleeping in bed comfortably  HEAD:  Normocephalic  ENT: Dry mucous membranes, poor dentition, NG tube in place  CHEST/LUNG: Clear to auscultation bilaterally; No rales, rhonchi, wheezing, or rubs. Unlabored respirations  HEART: Regular rate and rhythm; No murmurs, rubs, or gallops  ABDOMEN: Bowel sounds present; Soft, Nontender, no guarding or rigidity   EXTREMITIES:  2+ Peripheral Pulses, brisk capillary refill. No clubbing or cyanosis, 1+ pitting edema B/L upper and lower extremities. Left calf circumference > right calf, non tender to palpation   NERVOUS SYSTEM: Moans to painful stimuli but does not open eyes, does not offer any meaningful interaction   SKIN: No rashes or lesions    LABS:  ABG - ( 2020 11:51 )  pH, Arterial: 7.54  pH, Blood: x     /  pCO2: 32    /  pO2: 166   / HCO3: 29    / Base Excess: 4.9   /  SaO2: 99                            8.1    6.08  )-----------( 92       ( 2020 11:20 )             27.7   01-12    147<H>  |  112<H>  |  38.0<H>  ----------------------------<  89  3.9   |  24.0  |  1.36<H>    Ca    6.9<L>      2020 16:24  Phos  3.3       Mg     2.4     -12    Urinalysis Basic - ( 2020 21:58 )  Color: Digna / Appearance: Slightly Turbid / S.010 / pH: x  Gluc: x / Ketone: Negative  / Bili: Negative / Urobili: Negative mg/dL   Blood: x / Protein: 500 mg/dL / Nitrite: Negative   Leuk Esterase: Trace / RBC: >50 /HPF / WBC 3-5   Sq Epi: x / Non Sq Epi: Occasional / Bacteria: Occasional    CAPILLARY BLOOD GLUCOSE  POCT Blood Glucose.: 95 mg/dL (2020 16:16)    A/P: Pt is a 73 y/o female with PMHx multiple myeloma, CHF, HTN, and bipolar disorder brought in from [place], though not noted to be on antipsychotic medications for altered mental status, lethargy, and fever complicated by LUNA, hypernatremia, heart failure exacerbation, NSTEMI with moderate b/l hydronephrosis s/p irby placement. As per RN, Pt mental status is unchanged, responding to sternal rub and to a loud voice for the past few days and now to painful stimuli only. Had a fever last night. Pt is stable.   -CT head done yesterday 20 for same issue which was negative for any acute changes  -Chart and labs reviewed with MD. Nutrition note also noted 0% intake of fluids or meals and has not received PO medications since  due to lethargy  -NG tube placed per MD for trial of providing fluid and nutrients. Plan for free water flushes 250cc q6 hrs once CXR reviewed by Radiologist. Lungs appear clear, awaiting official read  -NPO  -PSYCH recalled  -ABG noted, bicarb drip d/c'd. Due to third spacing, will hold off on continuation of NS IVF for now   -neuro to f/u   -low grade temperature today noted. to f/u blood cultures drawn . Started on Merrem   -c/w lactulose and tylenol prn   -US doppler LLE to R/O DVT   -trend leukocytosis and temperatures   -continue to observe, call PA for changes in condition  -Plan d/w Edgardo Evangelista and RN Pt is a 73 y/o female with PMHx multiple myeloma, CHF, HTN, and bipolar disorder brought in from [place] for altered mental status, lethargy, and fever complicated by LUNA, hypernatremia, heart failure exacerbation, NSTEMI with moderate b/l hydronephrosis s/p irby placement, noted to have a fever of 101F rectal last night. As per RN, Pt also has been only responding to sternal rub and to a loud voice for the past few days. A RRT was called around 16:15 because RN noted that Pt was not responding to voice the same as yesterday. RRT team present with FM Residents, PAs and RRT Attending. RRT was cancelled due to no change in Pt status.      VS:  Temp: 100.4F rectal   BP: 95/50  HR: 82  RR: 20  O2 97% on 3L NC    PHYSICAL EXAM:  GENERAL: NAD, sleeping in bed comfortably  HEAD:  Normocephalic  ENT: Dry mucous membranes, poor dentition, NG tube in place  CHEST/LUNG: Clear to auscultation bilaterally; No rales, rhonchi, wheezing, or rubs. Unlabored respirations  HEART: Regular rate and rhythm; No murmurs, rubs, or gallops  ABDOMEN: Bowel sounds present; Soft, Nontender, no guarding or rigidity   EXTREMITIES:  2+ Peripheral Pulses, brisk capillary refill. No clubbing or cyanosis, 1+ pitting edema B/L upper and lower extremities. Left calf circumference slightly > right calf, non tender to palpation   NERVOUS SYSTEM: Moans to painful stimuli but does not open eyes, does not offer any meaningful interaction   SKIN: No rashes or lesions    LABS:  ABG - ( 2020 11:51 )  pH, Arterial: 7.54  pH, Blood: x     /  pCO2: 32    /  pO2: 166   / HCO3: 29    / Base Excess: 4.9   /  SaO2: 99                            8.1    6.08  )-----------( 92       ( 2020 11:20 )             27.7   01-12    147<H>  |  112<H>  |  38.0<H>  ----------------------------<  89  3.9   |  24.0  |  1.36<H>    Ca    6.9<L>      2020 16:24  Phos  3.3       Mg     2.4     -12    Urinalysis Basic - ( 2020 21:58 )  Color: Digna / Appearance: Slightly Turbid / S.010 / pH: x  Gluc: x / Ketone: Negative  / Bili: Negative / Urobili: Negative mg/dL   Blood: x / Protein: 500 mg/dL / Nitrite: Negative   Leuk Esterase: Trace / RBC: >50 /HPF / WBC 3-5   Sq Epi: x / Non Sq Epi: Occasional / Bacteria: Occasional    CAPILLARY BLOOD GLUCOSE  POCT Blood Glucose.: 95 mg/dL (2020 16:16)    A/P: Pt is a 73 y/o female with PMHx multiple myeloma, CHF, HTN, and bipolar disorder brought in from [place], though not noted to be on antipsychotic medications for altered mental status, lethargy, and fever complicated by LUNA, hypernatremia, heart failure exacerbation, NSTEMI with moderate b/l hydronephrosis s/p irby placement. As per RN, Pt mental status is unchanged, responding to sternal rub and to a loud voice for the past few days and now to painful stimuli only. Had a fever last night. Pt is stable.   -CT head done yesterday 20 for same issue which was negative for any acute changes  -Chart and labs reviewed with MD. Nutrition note also noted 0% intake of fluids or meals and has not received PO medications since  due to lethargy  -NG tube placed per MD for trial of providing fluid and nutrients. Plan for free water flushes 250cc q6 hrs once CXR reviewed by Radiologist. Lungs appear clear, awaiting official read  -NPO. Can start free water and tube feeds per MD after NG tube placement confirmed   -PSYCH recalled  -ABG noted, bicarb drip d/c'd. Due to third spacing, will hold off on continuation of NS IVF for now   -neuro to f/u   -low grade temperature today noted. to f/u blood cultures drawn . Started on Merrem   -c/w lactulose and tylenol prn   -US doppler LLE negative for LLE DVT 1/10  -trend leukocytosis and temperatures   -continue to observe, call PA for changes in condition  -Plan d/w Dr. Reynoso and RN

## 2020-01-13 LAB
ANION GAP SERPL CALC-SCNC: 12 MMOL/L — SIGNIFICANT CHANGE UP (ref 5–17)
APPEARANCE CSF: CLEAR — SIGNIFICANT CHANGE UP
APPEARANCE UR: ABNORMAL
BACTERIA # UR AUTO: ABNORMAL
BASOPHILS # BLD AUTO: 0.01 K/UL — SIGNIFICANT CHANGE UP (ref 0–0.2)
BASOPHILS NFR BLD AUTO: 0.2 % — SIGNIFICANT CHANGE UP (ref 0–2)
BILIRUB UR-MCNC: NEGATIVE — SIGNIFICANT CHANGE UP
BUN SERPL-MCNC: 43 MG/DL — HIGH (ref 8–20)
CALCIUM SERPL-MCNC: 7.1 MG/DL — LOW (ref 8.6–10.2)
CHLORIDE SERPL-SCNC: 113 MMOL/L — HIGH (ref 98–107)
CO2 SERPL-SCNC: 24 MMOL/L — SIGNIFICANT CHANGE UP (ref 22–29)
COLOR CSF: SIGNIFICANT CHANGE UP
COLOR SPEC: YELLOW — SIGNIFICANT CHANGE UP
COMMENT - URINE: SIGNIFICANT CHANGE UP
CREAT SERPL-MCNC: 1.51 MG/DL — HIGH (ref 0.5–1.3)
CRYPTOC AG CSF-ACNC: NEGATIVE — SIGNIFICANT CHANGE UP
DIFF PNL FLD: ABNORMAL
EOSINOPHIL # BLD AUTO: 0.09 K/UL — SIGNIFICANT CHANGE UP (ref 0–0.5)
EOSINOPHIL NFR BLD AUTO: 1.4 % — SIGNIFICANT CHANGE UP (ref 0–6)
EPI CELLS # UR: SIGNIFICANT CHANGE UP
GLUCOSE CSF-MCNC: 65 MG/DLG/24H — SIGNIFICANT CHANGE UP (ref 40–70)
GLUCOSE SERPL-MCNC: 91 MG/DL — SIGNIFICANT CHANGE UP (ref 70–115)
GLUCOSE UR QL: NEGATIVE MG/DL — SIGNIFICANT CHANGE UP
GRAM STN FLD: SIGNIFICANT CHANGE UP
GRAN CASTS # UR COMP ASSIST: ABNORMAL /LPF
HCT VFR BLD CALC: 27.1 % — LOW (ref 34.5–45)
HCT VFR BLD CALC: 27.2 % — LOW (ref 34.5–45)
HGB BLD-MCNC: 7.9 G/DL — LOW (ref 11.5–15.5)
HGB BLD-MCNC: 8 G/DL — LOW (ref 11.5–15.5)
IMM GRANULOCYTES NFR BLD AUTO: 0.9 % — SIGNIFICANT CHANGE UP (ref 0–1.5)
KETONES UR-MCNC: NEGATIVE — SIGNIFICANT CHANGE UP
LEUKOCYTE ESTERASE UR-ACNC: ABNORMAL
LYMPHOCYTES # BLD AUTO: 0.54 K/UL — LOW (ref 1–3.3)
LYMPHOCYTES # BLD AUTO: 8.3 % — LOW (ref 13–44)
MAGNESIUM SERPL-MCNC: 2.5 MG/DL — SIGNIFICANT CHANGE UP (ref 1.6–2.6)
MCHC RBC-ENTMCNC: 29.2 GM/DL — LOW (ref 32–36)
MCHC RBC-ENTMCNC: 29.4 GM/DL — LOW (ref 32–36)
MCHC RBC-ENTMCNC: 30.3 PG — SIGNIFICANT CHANGE UP (ref 27–34)
MCHC RBC-ENTMCNC: 30.3 PG — SIGNIFICANT CHANGE UP (ref 27–34)
MCV RBC AUTO: 103 FL — HIGH (ref 80–100)
MCV RBC AUTO: 103.8 FL — HIGH (ref 80–100)
MONOCYTES # BLD AUTO: 0.92 K/UL — HIGH (ref 0–0.9)
MONOCYTES NFR BLD AUTO: 14.1 % — HIGH (ref 2–14)
NEUTROPHILS # BLD AUTO: 4.89 K/UL — SIGNIFICANT CHANGE UP (ref 1.8–7.4)
NEUTROPHILS # CSF: SIGNIFICANT CHANGE UP % (ref 0–6)
NEUTROPHILS NFR BLD AUTO: 75.1 % — SIGNIFICANT CHANGE UP (ref 43–77)
NITRITE UR-MCNC: NEGATIVE — SIGNIFICANT CHANGE UP
NRBC NFR CSF: 0 /UL — SIGNIFICANT CHANGE UP (ref 0–5)
PH UR: 7 — SIGNIFICANT CHANGE UP (ref 5–8)
PHOSPHATE SERPL-MCNC: 4.5 MG/DL — SIGNIFICANT CHANGE UP (ref 2.4–4.7)
PLATELET # BLD AUTO: 91 K/UL — LOW (ref 150–400)
PLATELET # BLD AUTO: 96 K/UL — LOW (ref 150–400)
POTASSIUM SERPL-MCNC: 3.8 MMOL/L — SIGNIFICANT CHANGE UP (ref 3.5–5.3)
POTASSIUM SERPL-SCNC: 3.8 MMOL/L — SIGNIFICANT CHANGE UP (ref 3.5–5.3)
PROT CSF-MCNC: 23 MG/DL — SIGNIFICANT CHANGE UP (ref 15–45)
PROT UR-MCNC: 500 MG/DL
RAPID RVP RESULT: SIGNIFICANT CHANGE UP
RBC # BLD: 2.61 M/UL — LOW (ref 3.8–5.2)
RBC # BLD: 2.64 M/UL — LOW (ref 3.8–5.2)
RBC # CSF: 0 /CMM — SIGNIFICANT CHANGE UP (ref 0–1)
RBC # FLD: 15.4 % — HIGH (ref 10.3–14.5)
RBC # FLD: 15.7 % — HIGH (ref 10.3–14.5)
RBC CASTS # UR COMP ASSIST: >50 /HPF (ref 0–4)
SODIUM SERPL-SCNC: 149 MMOL/L — HIGH (ref 135–145)
SP GR SPEC: 1.01 — SIGNIFICANT CHANGE UP (ref 1.01–1.02)
SPECIMEN SOURCE: SIGNIFICANT CHANGE UP
T PALLIDUM AB TITR SER: NEGATIVE — SIGNIFICANT CHANGE UP
T3FREE SERPL-MCNC: 1.25 PG/ML — LOW (ref 1.8–4.6)
T4 FREE SERPL-MCNC: 1 NG/DL — SIGNIFICANT CHANGE UP (ref 0.9–1.8)
TUBE TYPE: SIGNIFICANT CHANGE UP
UROBILINOGEN FLD QL: NEGATIVE MG/DL — SIGNIFICANT CHANGE UP
WBC # BLD: 6.51 K/UL — SIGNIFICANT CHANGE UP (ref 3.8–10.5)
WBC # BLD: 6.85 K/UL — SIGNIFICANT CHANGE UP (ref 3.8–10.5)
WBC # FLD AUTO: 6.51 K/UL — SIGNIFICANT CHANGE UP (ref 3.8–10.5)
WBC # FLD AUTO: 6.85 K/UL — SIGNIFICANT CHANGE UP (ref 3.8–10.5)
WBC UR QL: SIGNIFICANT CHANGE UP

## 2020-01-13 PROCEDURE — 99232 SBSQ HOSP IP/OBS MODERATE 35: CPT

## 2020-01-13 PROCEDURE — 74176 CT ABD & PELVIS W/O CONTRAST: CPT | Mod: 26

## 2020-01-13 PROCEDURE — 62270 DX LMBR SPI PNXR: CPT

## 2020-01-13 PROCEDURE — 99223 1ST HOSP IP/OBS HIGH 75: CPT | Mod: 25

## 2020-01-13 PROCEDURE — 71045 X-RAY EXAM CHEST 1 VIEW: CPT | Mod: 26

## 2020-01-13 RX ORDER — SODIUM CHLORIDE 9 MG/ML
1000 INJECTION, SOLUTION INTRAVENOUS
Refills: 0 | Status: DISCONTINUED | OUTPATIENT
Start: 2020-01-13 | End: 2020-01-15

## 2020-01-13 RX ADMIN — CARVEDILOL PHOSPHATE 6.25 MILLIGRAM(S): 80 CAPSULE, EXTENDED RELEASE ORAL at 17:12

## 2020-01-13 RX ADMIN — Medication 37.5 MICROGRAM(S): at 22:03

## 2020-01-13 RX ADMIN — MEROPENEM 100 MILLIGRAM(S): 1 INJECTION INTRAVENOUS at 17:11

## 2020-01-13 RX ADMIN — TAMSULOSIN HYDROCHLORIDE 0.4 MILLIGRAM(S): 0.4 CAPSULE ORAL at 22:03

## 2020-01-13 RX ADMIN — Medication 325 MILLIGRAM(S): at 13:53

## 2020-01-13 RX ADMIN — Medication 1000 UNIT(S): at 13:52

## 2020-01-13 RX ADMIN — ENOXAPARIN SODIUM 40 MILLIGRAM(S): 100 INJECTION SUBCUTANEOUS at 05:42

## 2020-01-13 RX ADMIN — Medication 650 MILLIGRAM(S): at 00:00

## 2020-01-13 RX ADMIN — VALACYCLOVIR 500 MILLIGRAM(S): 500 TABLET, FILM COATED ORAL at 05:42

## 2020-01-13 RX ADMIN — Medication 1 MILLIGRAM(S): at 13:53

## 2020-01-13 RX ADMIN — Medication 500 MILLIGRAM(S): at 13:53

## 2020-01-13 RX ADMIN — MEROPENEM 100 MILLIGRAM(S): 1 INJECTION INTRAVENOUS at 05:42

## 2020-01-13 RX ADMIN — PANTOPRAZOLE SODIUM 40 MILLIGRAM(S): 20 TABLET, DELAYED RELEASE ORAL at 13:54

## 2020-01-13 RX ADMIN — CARVEDILOL PHOSPHATE 6.25 MILLIGRAM(S): 80 CAPSULE, EXTENDED RELEASE ORAL at 05:42

## 2020-01-13 NOTE — PROGRESS NOTE ADULT - ASSESSMENT
73y/o female with h/o multiple myeloma, pelvic mass, CHF, HTN, Bipolar disorder, recently admission for c.diff completed coarse of antibiotic on 12/30 sent in [facility] for alter mental status + fever 101F. admitted for sirs. work up negative. ID eval appreciated, abx discontinued.     presentation complicated by     course complicated by hypernatremia, mild, into 150s + luna. nephro eval appreciated.     course also complicated by nstemi, elevated trop, peak 0.1. pt w/o clear symptoms, ekg non ischemic. ss cardio eval appreciated, no intervention planned given comorbid conditions, high risks, and lack of consent from patient who refuses all interventions.     course complicated by mild exac of hf (combined). volume congestion noted on imaging, congestion on echo, bnp elevaed. discussed w/ cardio + nephro, given hypernatremia pt still considered to be volume deplete. sp very brief course iv lasix.       #Metabolic encephalopathy-   likely from hypernatremia, sirs & baseline bipolar r/o CVA  r/o brain mets  Mental status seems changed from baseline (pt was talking on admission), now obtunded since a few days  CT head: No acute changes.No sign of hemorrhage, acute CVA or mass. 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 Stable lytic lesions in the right frontal and left parietal bones.  B12 wnl, folate wnl  RPR -ve  ammonia 79, unlikely causing the mental status change  TSH 7.5; FT3 low but  FT4 wnl  ABG noted with metab alkalosis  Neuro consult appreciated, s/p LP with -ve CSF for infection. Awaiting cytopath. Core lab called for add on  MRI brain ordered   In Dec 2019 when pt was alert patient refused to allow her sister to be her surrogate (initially her surrogate who made her DNR/DNI) and therefore in future patients sister Kim can not make decisions on her behalf since patient objection prevails. Pt objected to DNR and wanted to be full code. DNR/DNI at that time was rescinded.   2 PC to be done for any urgent medical matters. 2 PC done for LP      #hypernatremia-  Na 152 -->149  -ss Renal consult appreciated, c/w D5 trial, d/c bicarb as resolution of acidosis, hold acei - resume if/when renal function allows  -encourage po intake during awake moments  -serial bmp q6    #LUNA on CKD stage 2  -baseline cr 0.5-0.7 & GFR 73  -likely due to sirs, dehydration & now b/l hydronephrosis  -Renal consult appreciated, ivf as above  -serial bmp, avoid nephrotoxic meds    #BL Hydronephrosis  -incidentally noted on imaging, renal US w/ mod BL hydro  -irby & flomax  repeat renal us in am: Mild to moderate bilateral hydronephrosis without significant change.  Voiding well with irby  Afebrile  LUNA worsening   consult appreciated  Hydronephrosis may likely be from extrinsic compression of pelvic mass  - if kidney function worsens, may need nephrostomy tubes, for decompression      #LLE edema.  -high risk vte due to MM, doppler -ve for DVT      #SIRS-  -blood cx from 1/5 shows no growth  -uti ruled out, no pna on ct chest  -empiric abx, rocephin + zithro started 1/7, discontinued when bld cx neg, cefepime + vanco started when low grade fever noted  -procal elevated but not clearly assoc w/ infection per ID  -rectal + iv meds while lethargic as poss  -rvp neg  -ID eval appreciated, abx stopped 1/10, monitor  Tmax of 101 on 1/11 once again  f/u Pan cx  c/w Merrem   s/p LP with -ve CSF for infection. Awaiting cytopath. Core lab called for add on  Procal 0.22  IVF        #acute on chronic combined HF, stable  -ef 45-50%, [id number], mod-severe MVR, mild to mod AS, mod AR - echo 12/2019  -evidence of pulm vasc congestion + small BL effusions on imaging  -sp brief iv diuresis  -ss Cardio appreciated, bb titrated by cardio, acei as above  -TTE w/ evidence of mild volume overload, ef 40-45%, mild to mod AVS, mod AR, small PFO w/ shunting    #nstemi, stable  -peak trop 0.10, ekg w/o acute ischemic change  -ss cardio eval appreciated, refused nst + lhc in 12/2019, no invasive intervention planned    #htn, controlled    #bipolar disorder w/o behavioral disturbance, stable  -psych eval appreciated, no contraindication to dc back to [place] when stable      #obesity, bmi 34, wt loss advisable, outpt fu    #anemia, chronic, stable  -no acute/active s/s of blood loss, baseline 9, trend if it becomes clinically warranted otherwise outpt fu    #MM, stable  -mult areas of osseous mets noted on imaging, outpt fu w/ onc, known dx    #dvt ppx. lovenox    #dispo. pending improvement of mental status, Na, luna, undetermined date    #outpt fu. pmd, nephro Dr TAIWO Mckeon, cardio Dr Ramey

## 2020-01-13 NOTE — PROGRESS NOTE ADULT - SUBJECTIVE AND OBJECTIVE BOX
[facility]                                                                         Neurology at [place]                                                                    Drs Tabatha, Maren, and Celestine                                                                           [address]                                                                               [place] [id number]                                                                                    [phone]      Procedure: Lumbar puncture.    Indication: Obtain CSF for study.    2 Physician consent due to urgency of procedure and absence of anyone able to give consent.     The patient was positioned on the right side and a sterile field was prepared.  1% lidocaine was instilled for local anesthesia.  Using a 20 gauge needle, 12 cc of clear colorless CSF was obtained and sent for study.  The patient tolerated the procedure well.  There were no complications.    The patient was left supine with instructions to lay flat for 3-4 hours.    Dakota Mays MD

## 2020-01-13 NOTE — CONSULT NOTE ADULT - SUBJECTIVE AND OBJECTIVE BOX
[facility]                                        Neurology at [place]                                  Maren Valiente, & Celestine                                      [address]                                           [place], [place], [address]                                                [phone]        CC: encephalopathy    HISTORY:  The patient is a 74y Female who was sent from [facility] due to alteration of mental status.   She had been hospitalized for C. Diff and completed treatment.   She then became febrile and lethargic and was sent back to the hospital.   T max was 101 on 1/11/2020.  She has been "lethargic" and "uncooperative" since arrival.  Apparently, over the past few days she has progressively declined to the point where she has been obtunded.     PAST MEDICAL & SURGICAL HISTORY:  Congestive heart failure (CHF)  Multiple myeloma  Mental disorder  Bone cancer  Scoliosis  Hypercholesteremia  Hypertension  Hypothyroid  No significant past surgical history    MEDICATIONS  (STANDING):  ascorbic acid 500 milliGRAM(s) Oral daily  aspirin enteric coated 81 milliGRAM(s) Oral daily  carvedilol 6.25 milliGRAM(s) Oral every 12 hours  cholecalciferol 1000 Unit(s) Oral daily  dextrose 5%. 1000 milliLiter(s) (75 mL/Hr) IV Continuous <Continuous>  enoxaparin Injectable 40 milliGRAM(s) SubCutaneous every 24 hours  ferrous sulfate Oral Tab/Cap - Peds 325 milliGRAM(s) Oral daily  folic acid 1 milliGRAM(s) Oral daily  gabapentin 100 milliGRAM(s) Oral at bedtime  lactulose Retention Enema 200 Gram(s) Rectal daily  levothyroxine Injectable 37.5 MICROGram(s) IV Push at bedtime  meropenem  IVPB      meropenem  IVPB 1000 milliGRAM(s) IV Intermittent every 12 hours  pantoprazole  Injectable 40 milliGRAM(s) IV Push daily  tamsulosin 0.4 milliGRAM(s) Oral at bedtime  valACYclovir 500 milliGRAM(s) Oral two times a day    MEDICATIONS  (PRN):  acetaminophen  Suppository .. 650 milliGRAM(s) Rectal every 6 hours PRN Temp greater or equal to 38C (100.4F), Mild Pain (1 - 3), Moderate Pain (4 - 6), Severe Pain (7 - 10)      Allergies  ixazomib (Unknown)  NSAIDs (Unknown)  penicillins (Unknown)  sulfa drugs (Unknown)    SOCIAL HISTORY:  Unknown.   Reportedly resides at [facility].     FAMILY HISTORY:  Patient is unable to provide family history.     ROS:  Constitutional: Unobtainable due to patient's condition.   Neuro: Unobtainable due to patient's condition.   Eyes: Unobtainable due to patient's condition.   Ears/nose/throat: Unobtainable due to patient's condition.   Cardiac: Unobtainable due to patient's condition.   Respiratory: Unobtainable due to patient's condition.   GI: Unobtainable due to patient's condition.   : Unobtainable due to patient's condition..  Integumentary: Unobtainable due to patient's condition.  Psych: Unobtainable due to patient's condition.  Heme: Unobtainable due to patient's condition.     Exam:  Vital Signs Last 24 Hrs  T(C): 37.2 (13 Jan 2020 08:02), Max: 37.9 (12 Jan 2020 16:15)  T(F): 98.9 (13 Jan 2020 08:02), Max: 100.3 (12 Jan 2020 16:15)  Tmax was 101 on 1/11/2020  HR: 80 (13 Jan 2020 08:02) (58 - 87)  BP: 113/58 (13 Jan 2020 08:02) (99/55 - 113/58)  RR: 20 (13 Jan 2020 08:02) (18 - 20)  SpO2: 97% (13 Jan 2020 08:02) (94% - 97%)  General: NAD.   Has nasogastric tube in place.   Carotid bruits absent.   No nuchal rigidity.     Mental status: Unresponsive to voice.     Cranial nerves: There is no papilledema. Pupils react symmetrically to light. She has no blink to threat from either side. She does not track with gaze. There is conjugate eye movement to oculocephalic maneuvers. Corneal reflexes are present. Face is grossly symmetric. Palate and tongue cannot be assessed.     Motor/Sensory: There is normal bulk and tone.  No movement to noxious stimuli.     Reflexes: 1+ throughout and plantar responses are flexor.    Cerebellar: Cannot be assessed.     LABS:                         7.9    6.51  )-----------( 96       ( 13 Jan 2020 07:39 )             27.1       01-13    149<H>  |  113<H>  |  43.0<H>  ----------------------------<  91  3.8   |  24.0  |  1.51<H>    Ca    7.1<L>      13 Jan 2020 07:39  Phos  4.5     01-13  Mg     2.5     01-13      RADIOLOGY   CT head images reviewed (and concur with report): There is no acute pathology.  There is an old infarct with encephalomalacia in the right frontal region. There are lytic skull lesions in the right frontal and left parietal regions.

## 2020-01-13 NOTE — PROGRESS NOTE ADULT - SUBJECTIVE AND OBJECTIVE BOX
[facility]  INFECTIOUS DISEASES AND INTERNAL MEDICINE at [place]  =======================================================  Blu Vasquez MD  Diplomates American Board of Internal Medicine and Infectious Diseases  Tel: [phone]      Fax: [phone]  =======================================================    KRYSTA HERRMANN [id number]    Follow up: SIRS    Patient developed fever 20 and restarted on IV antibiotics 20    Allergies:  ixazomib (Unknown)  NSAIDs (Unknown)  penicillins (Unknown)  sulfa drugs (Unknown)      Antibiotics:     meropenem  IVPB 1000 milliGRAM(s) IV Intermittent every 12 hours       REVIEW OF SYSTEMS:  Unable to obtain. Patient unable to provide history.       Physical Exam:  ICU Vital Signs Last 24 Hrs  T(C): 37.2 (2020 08:02), Max: 37.9 (2020 16:15)  T(F): 98.9 (2020 08:02), Max: 100.3 (2020 16:15)  HR: 80 (2020 08:02) (58 - 87)  BP: 113/58 (2020 08:02) (99/55 - 113/58)  RR: 20 (2020 08:02) (18 - 20)  SpO2: 97% (2020 08:02) (94% - 97%)      GEN: NAD, not responsive   HEENT: normocephalic and atraumatic.  Anicteric  NECK: Supple.   LUNGS: Clear to auscultation.  HEART: Regular rate and rhythm   ABDOMEN: Soft, nontender, and nondistended.  Positive bowel sounds.    : No CVA tenderness  EXTREMITIES: Without any edema.  MSK: No joint swelling  NEUROLOGIC: No meaningful communication   PSYCHIATRIC: No meaningful communication   SKIN: No Rash      Labs:  01-13    149<H>  |  113<H>  |  43.0<H>  ----------------------------<  91  3.8   |  24.0  |  1.51<H>    Ca    7.1<L>      2020 07:39  Phos  4.5       Mg     2.5                      7.9    6.51  )-----------( 96       ( 2020 07:39 )             27.1     Urinalysis Basic - ( 2020 06:50 )    Color: Yellow / Appearance: Slightly Turbid / S.010 / pH: x  Gluc: x / Ketone: Negative  / Bili: Negative / Urobili: Negative mg/dL   Blood: x / Protein: 500 mg/dL / Nitrite: Negative   Leuk Esterase: Trace / RBC: >50 /HPF / WBC 3-5   Sq Epi: x / Non Sq Epi: Occasional / Bacteria: Few      ABG - ( 2020 11:51 )  pH, Arterial: 7.54  pH, Blood: x     /  pCO2: 32    /  pO2: 166   / HCO3: 29    / Base Excess: 4.9   /  SaO2: 99          Procalcitonin, Serum: 0.22 ng/mL (20 @ 16:24)  Procalcitonin, Serum: 0.20 ng/mL (20 @ 07:53)  Procalcitonin, Serum: 0.23 ng/mL (20 @ 10:04)  Procalcitonin, Serum: 0.26 ng/mL (20 @ 08:09)      Cerebrospinal Fluid Cell Count-1 (20 @ 12:31)    Total Nucleated Cell Count, CSF: 0 /uL    CSF Color: No Color    CSF Appearance: Clear    CSF Segmented Neutrophils: N/A %      Protein, CSF (20 @ 12:27)    Protein, CSF: 23 mg/dL      Glucose, CSF (20 @ 12:27)    Glucose, CSF: 65 mg/dLG/24h      Cryptococcal Antigen - CSF (20 @ 12:26)    Cryptococcal Antigen - CSF: Negative: METHOD: Latex agglutination.  A negative test does not preclude diagnosis of cryptococcosis,  particularly  if only a single specimen has been tested and the  patient  shows  symptoms consistent with cryptococcosis.  A positive reaction in CSF or plasma of an untreated patient  at titers  of1:4 or less is highly suggestive of cryptococcal infection. Titers of  1:8 or  greater by latex agglutination usually indicates active cryptococcosis.  The antigen titer is usually proportional to the extent of the infection,  withincreasing titers reflecting progressive  infection and a poor  prognosis.  Declining titers indicate response to chemotherapy in a treated  patient.Occasionally, however, low titers may persist for an indefinite  period in the  presence of nonviable fungus.      Culture - CSF with Gram Stain . (20 @ 12:23)    Gram Stain:   No White blood cells  No organisms seen    Specimen Source: .CSF      RECENT CULTURES:   @ 12:23 .CSF     No White blood cells  No organisms seen       @ 06:40    RVP  NotDetec       @ 08:12    RVP  NotDetec       @ 01:29 .Urine     <10,000 CFU/mL Normal Urogenital Sarah       @ 19:32 .Blood     No growth at 5 days.       @ 19:31 .Blood     No growth at 5 days.        < from: Xray Chest 1 View-PORTABLE IMMEDIATE (20 @ 17:03) >   EXAM:  XR CHEST PORTABLE IMMED 1V                          PROCEDURE DATE:  2020      INTERPRETATION:  Chest one view    HISTORY: Fever    COMPARISON STUDY: 2020    Frontal expiratory view of the chest shows the heart to be similar in size. Right chest port and nasogastric tube are present. The lungs show mild congestive changes and there is no evidence of pneumothorax nor pleural effusion. Left second rib fracture is again noted.    IMPRESSION:  No active pulmonary disease.    < end of copied text >

## 2020-01-13 NOTE — PROGRESS NOTE ADULT - SUBJECTIVE AND OBJECTIVE BOX
CHIEF COMPLAINT/INTERVAL HISTORY:    Patient is a 74y old  Female who presents with a chief complaint of AMS, LL infiltrate (2020 12:13)      HPI:  73y/o female with h/o multiple myeloma, pelvic mass, CHF, HTN, Bipolar disorder, recently admission for c.diff completed coarse of antibiotic on  sent in [facility] [place] for alter mental status. As per [facility] chart review pt refusing to eat or drink for several days, today pt found lethargic only respond to names and pain, febrile 101 at the center. In the ED pt back to baseline, during my encounter to is alert and wake, noncooperative with questioning, states "I don't want see your face" but she is constantly asking if she can have coke to drink. in the ed pt with low grade fever 100.2, no leukocytosis, Na 150, BUN 39, Cr 1.8, CXR ? LL infiltrated. (2020 00:48)      SUBJECTIVE & OBJECTIVE/ ROS: Pt seen and examined at bedside. Pt yet obtunded. LP done by neuro. Afebrile overnight    ICU Vital Signs Last 24 Hrs  T(C): 37.2 (2020 08:02), Max: 37.9 (2020 16:15)  T(F): 98.9 (2020 08:02), Max: 100.3 (2020 16:15)  HR: 80 (2020 08:02) (58 - 87)  BP: 113/58 (2020 08:02) (99/55 - 113/58)  BP(mean): --  ABP: --  ABP(mean): --  RR: 20 (2020 08:02) (18 - 20)  SpO2: 97% (2020 08:02) (94% - 97%)        MEDICATIONS  (STANDING):  ascorbic acid 500 milliGRAM(s) Oral daily  aspirin enteric coated 81 milliGRAM(s) Oral daily  carvedilol 6.25 milliGRAM(s) Oral every 12 hours  cholecalciferol 1000 Unit(s) Oral daily  dextrose 5%. 1000 milliLiter(s) (75 mL/Hr) IV Continuous <Continuous>  enoxaparin Injectable 40 milliGRAM(s) SubCutaneous every 24 hours  ferrous sulfate Oral Tab/Cap - Peds 325 milliGRAM(s) Oral daily  folic acid 1 milliGRAM(s) Oral daily  lactulose Retention Enema 200 Gram(s) Rectal daily  levothyroxine Injectable 37.5 MICROGram(s) IV Push at bedtime  meropenem  IVPB      meropenem  IVPB 1000 milliGRAM(s) IV Intermittent every 12 hours  pantoprazole  Injectable 40 milliGRAM(s) IV Push daily  tamsulosin 0.4 milliGRAM(s) Oral at bedtime  valACYclovir 500 milliGRAM(s) Oral two times a day    MEDICATIONS  (PRN):  acetaminophen  Suppository .. 650 milliGRAM(s) Rectal every 6 hours PRN Temp greater or equal to 38C (100.4F), Mild Pain (1 - 3), Moderate Pain (4 - 6), Severe Pain (7 - 10)      LABS:                        7.9    6.51  )-----------( 96       ( 2020 07:39 )             27.1     01-13    149<H>  |  113<H>  |  43.0<H>  ----------------------------<  91  3.8   |  24.0  |  1.51<H>    Ca    7.1<L>      2020 07:39  Phos  4.5       Mg     2.5             Urinalysis Basic - ( 2020 06:50 )    Color: Yellow / Appearance: Slightly Turbid / S.010 / pH: x  Gluc: x / Ketone: Negative  / Bili: Negative / Urobili: Negative mg/dL   Blood: x / Protein: 500 mg/dL / Nitrite: Negative   Leuk Esterase: Trace / RBC: >50 /HPF / WBC 3-5   Sq Epi: x / Non Sq Epi: Occasional / Bacteria: Few        CAPILLARY BLOOD GLUCOSE      POCT Blood Glucose.: 95 mg/dL (2020 16:16)      RECENT CULTURES:      RADIOLOGY & ADDITIONAL TESTS:      PHYSICAL EXAM:    GEN - appears age appropriate. frail  HEENT - NCAT, EOMI, HOOD  RESP - diffuse crackles b/l. not on supplemental O2.   CARDIO - NS1S2, RRR. +3/6 murmur  ABD - Soft/Non tender/Non distended. Normal BS x4 quadrants. no guarding/rebound tenderness.  Ext - LLE pitting edema b/l.   MSK -unable to assess  Neuro - Does not open eyes to sternal rub. Moans with painful stimuli.     Skin - c/d/i. no rashes/lesions

## 2020-01-13 NOTE — PROGRESS NOTE ADULT - SUBJECTIVE AND OBJECTIVE BOX
Subjective:74yFemale with b/l hydronephrosis seen on US.  LUNA, creatinine trends up and down.  Huang cath in place, draining yellow urine.  Pt has no complaints at this time, resting comfortably.  pt has h/o multiple myeloma and is being treated.  Pt had CT abd/pelv  Dec 2019 showing large pelvic mass encasing several structures- see report below.    Huang: yellow    Vital Signs Last 24 Hrs  T(C): 37.2 (13 Jan 2020 08:02), Max: 37.9 (12 Jan 2020 16:15)  T(F): 98.9 (13 Jan 2020 08:02), Max: 100.3 (12 Jan 2020 16:15)  HR: 80 (13 Jan 2020 08:02) (58 - 87)  BP: 113/58 (13 Jan 2020 08:02) (99/55 - 113/58)  BP(mean): --  RR: 20 (13 Jan 2020 08:02) (18 - 20)  SpO2: 97% (13 Jan 2020 08:02) (94% - 97%)  I&O's Detail    12 Jan 2020 07:01  -  13 Jan 2020 07:00  --------------------------------------------------------  IN:  Total IN: 0 mL    OUT:    Indwelling Catheter - Urethral: 1150 mL  Total OUT: 1150 mL    Total NET: -1150 mL          Labs:                        7.9    6.51  )-----------( 96       ( 13 Jan 2020 07:39 )             27.1     01-13    149<H>  |  113<H>  |  43.0<H>  ----------------------------<  91  3.8   |  24.0  |  1.51<H>    Ca    7.1<L>      13 Jan 2020 07:39  Phos  4.5     01-13  Mg     2.5     01-13    Radiology: < from: CT Abdomen and Pelvis w/ IV Cont (12.12.19 @ 02:25) >   EXAM:  CT ABDOMEN AND PELVIS IC                         EXAM:  CT ANGIO CHEST (W)AW IC                          PROCEDURE DATE:  12/12/2019          INTERPRETATION:  PROCEDURE INFORMATION:   Exam: CT Angiography Chest With Contrast   Exam date andtime: 12/12/2019 2:11 AM   Age: 74 years old   Clinical history: Shortness of breath     TECHNIQUE:   Imaging protocol: Computed tomographic angiography of the chest with   intravenous contrast.   3D rendering: MIP reconstructed images were created and reviewed.   Contrast material: OMNI 350; Contrast volume: 95 ml; Contrast route: IV;      COMPARISON:   CT CHEST February 07, 2019     FINDINGS:   Pulmonary arteries: Adequate contrast enhancement of the pulmonary   arteries.   Aorta: No evidence of thoracic aortic dissection.   Chronic   atherosclerotic   calcification of the vasculature.   Lungs: No evidence endobronchial lesion. Diffuse smooth septal   thickening, peribronchial cuffing and scattered areas of groundglass   attenuation compatible with edema.    Pleural space: Mild bilateral pleural effusions. No evidence of   pneumothorax.   Heart: Heart appears within normal limits, no pericardial effusion. No   evidence   of filling defects in the cardiac chambers.   Kidneys and ureters:Ovoid 7 cm right renal simple cyst.   Lymph nodes: Few up to 1 cm mediastinal lymph nodes.   Bones/joints: Destructive lesion   involving acromion process-left scapula. Lytic lesions involving lateral   aspect 2nd left rib and at multiple levels in the thoracic spine, for   example T2, T4, T8, T9 and T10. Stable moderate T9 and mild T10   compression deformities. Old bilateral rib fractures  Soft tissues: Anasarca.    IMPRESSION:   1. No evidence of pulmonary embolism.   2. Pulmonary edema and small bilateral pleural effusions.  3.  Lytic lesions involving the acromion process-left scapula, left   second rib and multiple vertebral bodies suspicious for metastatic   disease.    A preliminary report was provided by the [facility] radiologist.      CT ABDOMEN AND PELVIS  CLINICAL INFORMATION: Abdominal pain and diarrhea    COMPARISON: December 15, 2016    PROCEDURE:   CT of the Abdomen and Pelvis was performed with intravenous contrast.   Intravenous contrast: 95 ml Omnipaque 350.    Oral contrast:None.  Sagittal and coronal reformats were performed.    FINDINGS:    LIVER: Within normal limits.  BILE DUCTS: Normal caliber.  GALLBLADDER: Nonspecific gallbladder wall thickening.  SPLEEN: Within normal limits.  PANCREAS: Within normal limits.  ADRENALS: Within normal limits.  KIDNEYS/URETERS: Right renal cyst. No hydronephrosis.    BLADDER: Within normal limits.  PERITONEUM: No ascites.  VESSELS: Within normal limits.  RETROPERITONEUM/LYMPH NODES/REPRODUCTIVE/BOWEL: Large infiltrating   hyperenhancing soft tissue mass occupying the presacral and perirectal   space, surrounding and narrowing the rectum, uterus and lateral aspect of   the bladder and encasing the iliac vessels. No bowel obstruction.  ABDOMINAL WALL: Within normal limits.  BONES: Numerous lytic lesions involving the bony pelvis and lumbar spine,   including a large L2 vertebral body lesion with mild compression   deformity. There are also lesions involving both femoral necks, without   pathologic fracture.    IMPRESSION:    Large infiltrating pelvic mass encasing the rectum, uterus, bladder and   iliac vessels. 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 Other considerations include sarcoma and lymphoma.    Numerous lytic bone lesionscompatible with multiple myeloma, including   lesions involving both femoral necks.      < end of copied text >

## 2020-01-13 NOTE — PROGRESS NOTE ADULT - ASSESSMENT
75y/o  Female with a h/o multiple myeloma, pelvic mass, CHF, HTN, Bipolar disorder. Patient was recently discharged 12/19 after being treated for c diff completed coarse of Po Vancomycin on 12/30. Patient was sent in from [facility] for altered mental status. Here patient is afebrile, no leukocytosis, CXR ? LL infiltrated so started on IV Vancomycin and Cefepime on 1/5/20.      ? pneumonia  recent C Diff  Multiple myeloma   Bipolar      - Repeat blood cultures pending  - Blood cultures no growth   - RVP negative  - CT Chest with no pneumonia   - UA with no UTI  - Procalcitonin level not suggestive of infection   - Had CSF studies done today which are negative for infection  - CSF PCR pending  - CSF cultures pending  - Continue meropenem   - Follow up cultures  - Trend Fever  - Trend Leukocytosis      Will Follow

## 2020-01-13 NOTE — PROGRESS NOTE ADULT - ASSESSMENT
75 yo female with multiple myeloma, lytic lesions, b/l hydronephrosis, LUNA.  Hydronephrosis may likely be from extrinsic compression of pelvic mass  - monitor UO  - trend renal function  - if kidney function worsens, may need nephrostomy tubes, for decompression  - will follow

## 2020-01-13 NOTE — CHART NOTE - NSCHARTNOTEFT_GEN_A_CORE
Medicine PA-  Cd Medicine PA-  Cd. for pt. found to have hematuria in [place], no clots or hx trauma/injury. Pt. had + RBCs U/A in a.m., on merrem, VSS. Labs 1/13- hgb- 7.9  Stat CBC-                       8.0    6.85  )-----------( 91       ( 13 Jan 2020 23:37 )             27.2   01-13    146<H>  |  110<H>  |  44.0<H>  ----------------------------<  110  3.8   |  23.0  |  1.54<H>    Ca    7.0<L>      13 Jan 2020 23:37  Phos  4.5     01-13  Mg     2.5     01-13    Continue to monitor output, call PA if status changes.

## 2020-01-13 NOTE — CONSULT NOTE ADULT - ASSESSMENT
The patient is a 74y Female with multiple medical issues including myeloma and possible sepsis.   Has been obtunded since admission.    Encephalopathy.   Reportedly was minimally verbal a few days ago and has progressively declined.   MRI brain ordered.     Bipolar disorder.  Seen by Behavioral health    Hydronephrosis/renal insufficiency.  Followed by renal and .     Possible sepsis.   Discussed with Dr Reynoso.   She is investigating consent issues.   LP will be attempted if consent obtainable.     Case discussed with Dr Reynoso.

## 2020-01-14 DIAGNOSIS — F05 DELIRIUM DUE TO KNOWN PHYSIOLOGICAL CONDITION: ICD-10-CM

## 2020-01-14 LAB
ANION GAP SERPL CALC-SCNC: 13 MMOL/L — SIGNIFICANT CHANGE UP (ref 5–17)
ANION GAP SERPL CALC-SCNC: 14 MMOL/L — SIGNIFICANT CHANGE UP (ref 5–17)
BUN SERPL-MCNC: 44 MG/DL — HIGH (ref 8–20)
BUN SERPL-MCNC: 48 MG/DL — HIGH (ref 8–20)
CALCIUM SERPL-MCNC: 7 MG/DL — LOW (ref 8.6–10.2)
CALCIUM SERPL-MCNC: 7.2 MG/DL — LOW (ref 8.6–10.2)
CHLORIDE SERPL-SCNC: 110 MMOL/L — HIGH (ref 98–107)
CHLORIDE SERPL-SCNC: 112 MMOL/L — HIGH (ref 98–107)
CO2 SERPL-SCNC: 21 MMOL/L — LOW (ref 22–29)
CO2 SERPL-SCNC: 23 MMOL/L — SIGNIFICANT CHANGE UP (ref 22–29)
CREAT SERPL-MCNC: 1.54 MG/DL — HIGH (ref 0.5–1.3)
CREAT SERPL-MCNC: 1.66 MG/DL — HIGH (ref 0.5–1.3)
CSF PCR RESULT: SIGNIFICANT CHANGE UP
GLUCOSE SERPL-MCNC: 110 MG/DL — SIGNIFICANT CHANGE UP (ref 70–115)
GLUCOSE SERPL-MCNC: 142 MG/DL — HIGH (ref 70–115)
HCT VFR BLD CALC: 26.7 % — LOW (ref 34.5–45)
HGB BLD-MCNC: 7.9 G/DL — LOW (ref 11.5–15.5)
MAGNESIUM SERPL-MCNC: 2.4 MG/DL — SIGNIFICANT CHANGE UP (ref 1.6–2.6)
MCHC RBC-ENTMCNC: 29.6 GM/DL — LOW (ref 32–36)
MCHC RBC-ENTMCNC: 30.6 PG — SIGNIFICANT CHANGE UP (ref 27–34)
MCV RBC AUTO: 103.5 FL — HIGH (ref 80–100)
PHOSPHATE SERPL-MCNC: 4.5 MG/DL — SIGNIFICANT CHANGE UP (ref 2.4–4.7)
PLATELET # BLD AUTO: 96 K/UL — LOW (ref 150–400)
POTASSIUM SERPL-MCNC: 3.7 MMOL/L — SIGNIFICANT CHANGE UP (ref 3.5–5.3)
POTASSIUM SERPL-MCNC: 3.8 MMOL/L — SIGNIFICANT CHANGE UP (ref 3.5–5.3)
POTASSIUM SERPL-SCNC: 3.7 MMOL/L — SIGNIFICANT CHANGE UP (ref 3.5–5.3)
POTASSIUM SERPL-SCNC: 3.8 MMOL/L — SIGNIFICANT CHANGE UP (ref 3.5–5.3)
PREALB SERPL-MCNC: 15 MG/DL — LOW (ref 18–38)
RBC # BLD: 2.58 M/UL — LOW (ref 3.8–5.2)
RBC # FLD: 15.5 % — HIGH (ref 10.3–14.5)
SODIUM SERPL-SCNC: 146 MMOL/L — HIGH (ref 135–145)
SODIUM SERPL-SCNC: 147 MMOL/L — HIGH (ref 135–145)
WBC # BLD: 7.67 K/UL — SIGNIFICANT CHANGE UP (ref 3.8–10.5)
WBC # FLD AUTO: 7.67 K/UL — SIGNIFICANT CHANGE UP (ref 3.8–10.5)

## 2020-01-14 PROCEDURE — 99232 SBSQ HOSP IP/OBS MODERATE 35: CPT

## 2020-01-14 PROCEDURE — 96156 HLTH BHV ASSMT/REASSESSMENT: CPT

## 2020-01-14 PROCEDURE — 99223 1ST HOSP IP/OBS HIGH 75: CPT

## 2020-01-14 RX ADMIN — ENOXAPARIN SODIUM 40 MILLIGRAM(S): 100 INJECTION SUBCUTANEOUS at 05:09

## 2020-01-14 RX ADMIN — MEROPENEM 100 MILLIGRAM(S): 1 INJECTION INTRAVENOUS at 16:06

## 2020-01-14 RX ADMIN — Medication 37.5 MICROGRAM(S): at 21:11

## 2020-01-14 RX ADMIN — MEROPENEM 100 MILLIGRAM(S): 1 INJECTION INTRAVENOUS at 05:09

## 2020-01-14 RX ADMIN — SODIUM CHLORIDE 75 MILLILITER(S): 9 INJECTION, SOLUTION INTRAVENOUS at 05:08

## 2020-01-14 RX ADMIN — Medication 1 MILLIGRAM(S): at 10:17

## 2020-01-14 RX ADMIN — Medication 500 MILLIGRAM(S): at 10:17

## 2020-01-14 RX ADMIN — PANTOPRAZOLE SODIUM 40 MILLIGRAM(S): 20 TABLET, DELAYED RELEASE ORAL at 10:18

## 2020-01-14 RX ADMIN — Medication 81 MILLIGRAM(S): at 10:18

## 2020-01-14 RX ADMIN — Medication 1000 UNIT(S): at 10:17

## 2020-01-14 RX ADMIN — Medication 325 MILLIGRAM(S): at 10:17

## 2020-01-14 NOTE — PROGRESS NOTE BEHAVIORAL HEALTH - NSBHCHARTREVIEWLAB_PSY_A_CORE FT
7.9    7.67  )-----------( 96       ( 2020 08:10 )             26.7     01-13    146<H>  |  110<H>  |  44.0<H>  ----------------------------<  110  3.8   |  23.0  |  1.54<H>    Ca    7.0<L>      2020 23:37  Phos  4.5       Mg     2.5       Urinalysis Basic - ( 2020 06:50 )  Color: Yellow / Appearance: Slightly Turbid / S.010 / pH: x  Gluc: x / Ketone: Negative  / Bili: Negative / Urobili: Negative mg/dL   Blood: x / Protein: 500 mg/dL / Nitrite: Negative   Leuk Esterase: Trace / RBC: >50 /HPF / WBC 3-5   Sq Epi: x / Non Sq Epi: Occasional / Bacteria: Few

## 2020-01-14 NOTE — PROGRESS NOTE ADULT - ASSESSMENT
74y Female with multiple medical issues including myeloma and possible sepsis.   Has been obtunded since admission.    Encephalopathy.   Await MRI brain.  CSF normal.    Bipolar disorder.  Seen by Behavioral health    Hydronephrosis/renal insufficiency.  Followed by renal and .     Would consider Palliative Care evaluation.

## 2020-01-14 NOTE — PROGRESS NOTE ADULT - SUBJECTIVE AND OBJECTIVE BOX
[facility]                                        Neurology at [place]                                 [address] [address]                                  [address]                                        [place], [place], [address]                                             [phone]        CC: Encephalopathy.     HPI:   The patient is a 74y Female who was sent from [facility] due to alteration of mental status.   She had been hospitalized for C. Diff and completed treatment.   She then became febrile and lethargic and was sent back to the hospital.   T max was 101 on 1/11/2020.  She has been "lethargic" and "uncooperative" since arrival.  Apparently, over the past few days she has progressively declined to the point where she has been obtunded.     Interim history:  Remains on 6 [place].   No clinical change.     ROS:   Unobtainable due to patient's condition.     MEDICATIONS  (STANDING):  ascorbic acid 500 milliGRAM(s) Oral daily  aspirin enteric coated 81 milliGRAM(s) Oral daily  carvedilol 6.25 milliGRAM(s) Oral every 12 hours  cholecalciferol 1000 Unit(s) Oral daily  dextrose 5%. 1000 milliLiter(s) (75 mL/Hr) IV Continuous <Continuous>  enoxaparin Injectable 40 milliGRAM(s) SubCutaneous every 24 hours  ferrous sulfate Oral Tab/Cap - Peds 325 milliGRAM(s) Oral daily  folic acid 1 milliGRAM(s) Oral daily  lactulose Retention Enema 200 Gram(s) Rectal daily  levothyroxine Injectable 37.5 MICROGram(s) IV Push at bedtime  meropenem  IVPB      meropenem  IVPB 1000 milliGRAM(s) IV Intermittent every 12 hours  pantoprazole  Injectable 40 milliGRAM(s) IV Push daily  tamsulosin 0.4 milliGRAM(s) Oral at bedtime      Vital Signs Last 24 Hrs  T(C): 36.6 (14 Jan 2020 07:57), Max: 37 (13 Jan 2020 16:07)  T(F): 97.8 (14 Jan 2020 07:57), Max: 98.6 (13 Jan 2020 16:07)  HR: 84 (14 Jan 2020 07:57) (70 - 84)  BP: 110/66 (14 Jan 2020 07:57) (91/52 - 110/66)  RR: 18 (14 Jan 2020 07:57) (18 - 18)  SpO2: 97% (14 Jan 2020 07:57) (96% - 99%)    Detailed Neurologic Exam:    Mental status: Unresponsive to voice.     Cranial nerves: There is no papilledema. Pupils react symmetrically to light. She has no blink to threat from either side. She does not track with gaze. There is conjugate eye movement to oculocephalic maneuvers. Corneal reflexes are present. Face is grossly symmetric. Palate and tongue cannot be assessed.     Motor/Sensory: There is normal bulk and tone.  No movement to noxious stimuli.     Reflexes: 1+ throughout and plantar responses are flexor.    Cerebellar: Cannot be assessed.     Labs:     01-14    147<H>  |  112<H>  |  48.0<H>  ----------------------------<  142<H>  3.7   |  21.0<L>  |  1.66<H>    Ca    7.2<L>      14 Jan 2020 08:10  Phos  4.5     01-14  Mg     2.4     01-14                          7.9    7.67  )-----------( 96       ( 14 Jan 2020 08:10 )             26.7     CSF  WBC: 0  RBC: 0  Prot: 23  Gluc: 65  Cryptococcal Ag negative.

## 2020-01-14 NOTE — PROGRESS NOTE BEHAVIORAL HEALTH - NSBHCHARTREVIEWIMAGING_PSY_A_CORE FT
< from: Xray Chest 1 View-PORTABLE IMMEDIATE (01.13.20 @ 15:43) >    FINDINGS: Enteric tube with tip in the stomach. Right-sided central venous port with tip at the cavoatrial junction.    Mild blunting of the left costophrenic sulcus compatible with a small left pleural effusion. No pneumothorax or acute lung consolidation.    Lytic lesion in the left acromion and multiple bilateral rib fractures are noted.    IMPRESSION: Small left pleural effusion. Lungs otherwise clear.    < end of copied text >

## 2020-01-14 NOTE — CONSULT NOTE ADULT - SUBJECTIVE AND OBJECTIVE BOX
PALLIATIVE CONSULT    CC: Patient is a 74y old  Female who presents with a chief complaint of AMS, LL infiltrate (2020 10:39)    HPI:  Mrs. Andre is a 74 year old female resident of [facility] with PMHx of multiple myeloma,  CHF, HTN, bipolar disorder, recent admission -19 for c.diff colitis and found with numerous lytic lesions and large pelvic mass on CT now readmitted for AMS, lethargy, poor PO intake and fever. In ER, febrile with labs significant for hypernatremia, LUNA, CXR with pulmonary edema, b/l effusions and ?PNA. SIRS workup unrevealing. On empiric IV antibiotic per ID. Course complicated by persistent metabolic encephalopathy s/p LP with neg CSF, bilateral hydronephrosis, NSTEMI, now obtunded. Psych following for bipolar and finds pt to lack medical decision making capacity. Pt is known to our service, seen by NP last visit. Palliative consulted to assist with advance directives and goc.     Unable to perform ROS due to obtundation.    PERTINENT PMH REVIEWED: Yes     PAST MEDICAL & SURGICAL HISTORY:  Congestive heart failure (CHF)  Multiple myeloma  Mental disorder  Bone cancer  Scoliosis  Hypercholesteremia  Hypertension  Hypothyroid  No significant past surgical history      SOCIAL HISTORY:    Admitted from:  [facility]     Baseline ADLs (prior to admission):  unable to assess  Karnofsky:  20%    Surrogate/HCP/Guardian:   - no surrogate decision maker as pt verbalized to office of mental hygiene  that she did not want her only known next of kin sister Kim to make any medical decisions pertaining to patient.     ADVANCE DIRECTIVES:   DNR YES NO  Completed on:                     MOLST  YES NO   Completed on:  Living Will  YES NO   Completed on:    FAMILY HISTORY:  No pertinent family history       Allergies    ixazomib (Unknown)  NSAIDs (Unknown)  penicillins (Unknown)  sulfa drugs (Unknown)    Intolerances        MEDICATIONS  (STANDING):  ascorbic acid 500 milliGRAM(s) Oral daily  aspirin enteric coated 81 milliGRAM(s) Oral daily  carvedilol 6.25 milliGRAM(s) Oral every 12 hours  cholecalciferol 1000 Unit(s) Oral daily  dextrose 5%. 1000 milliLiter(s) (75 mL/Hr) IV Continuous <Continuous>  enoxaparin Injectable 40 milliGRAM(s) SubCutaneous every 24 hours  ferrous sulfate Oral Tab/Cap - Peds 325 milliGRAM(s) Oral daily  folic acid 1 milliGRAM(s) Oral daily  lactulose Retention Enema 200 Gram(s) Rectal daily  levothyroxine Injectable 37.5 MICROGram(s) IV Push at bedtime  meropenem  IVPB      meropenem  IVPB 1000 milliGRAM(s) IV Intermittent every 12 hours  pantoprazole  Injectable 40 milliGRAM(s) IV Push daily  tamsulosin 0.4 milliGRAM(s) Oral at bedtime    MEDICATIONS  (PRN):  acetaminophen  Suppository .. 650 milliGRAM(s) Rectal every 6 hours PRN Temp greater or equal to 38C (100.4F), Mild Pain (1 - 3), Moderate Pain (4 - 6), Severe Pain (7 - 10)      PHYSICAL EXAM:    Vital Signs Last 24 Hrs  T(C): 36.6 (2020 07:57), Max: 37 (2020 16:07)  T(F): 97.8 (2020 07:57), Max: 98.6 (2020 16:07)  HR: 84 (2020 07:57) (70 - 84)  BP: 110/66 (2020 07:57) (91/52 - 110/66)  BP(mean): --  RR: 18 (2020 07:57) (18 - 18)  SpO2: 97% (2020 07:57) (96% - 99%)    General: Resting comfortably. No acute distress.   HEENT:  mucous membrane dry +NGT  Lungs: comfortable. non-labored.   CV: +s1/s2. RRR +murmur  GI:+ bowel sound. abdomen soft, non-tender, non-distended.  MSK:  No cyanosis. +trace edema. weakness.   Neuro: nonfocal. obtunded. unresponsive to tactile or verbal stimuli  Skin: warm and dry.     LABS:                        7.9    7.67  )-----------( 96       ( 2020 08:10 )             26.7     01-14    147<H>  |  112<H>  |  48.0<H>  ----------------------------<  142<H>  3.7   |  21.0<L>  |  1.66<H>    Ca    7.2<L>      2020 08:10  Phos  4.5       Mg     2.4     -    Urinalysis Basic - ( 2020 06:50 )    Color: Yellow / Appearance: Slightly Turbid / S.010 / pH: x  Gluc: x / Ketone: Negative  / Bili: Negative / Urobili: Negative mg/dL   Blood: x / Protein: 500 mg/dL / Nitrite: Negative   Leuk Esterase: Trace / RBC: >50 /HPF / WBC 3-5   Sq Epi: x / Non Sq Epi: Occasional / Bacteria: Few        RADIOLOGY & ADDITIONAL STUDIES:     CXR 2020  Comparison: Chest x-ray 2019  Right chest wall infusion port with the tip in the SVC.  The cardiac silhouette is enlarged. Pulmonary edema. Small bilateral pleural effusions.  IMPRESSION: Pulmonary edema and small bilateral effusions    Renal US 2020  IMPRESSION:   Moderate bilateral hydronephrosis. Bilateral perinephric inflammatory change, left greater than right.    Marked urinary bladder wall thickening compatible with cystitis. Correlate with urinalysis.    CT Chest 2020    FINDINGS:    LUNGS AND AIRWAYS: Patent central airways.  Diffuse intralobular septal thickening with areas of groundglass attenuation, compatible with pulmonary vascular congestion. There is compressive atelectasis of the posterior lungs related to the pleural effusions.    PLEURA: Small bilateral pleural effusions.    MEDIASTINUM AND VALERIE: No lymphadenopathy.    VESSELS: The thoracic aorta and main pulmonary artery are normal in caliber. There is scattered coronary artery calcification. Distal tip of the MediPort is located in the SVC.    HEART: Cardiomegaly. No pericardial effusion.    CHEST WALL AND LOWER NECK: The thyroid gland is within normal limits. There is no definite supraclavicular or axillary lymphadenopathy. There is a Mediport in the right anterior chest wall.    VISUALIZED UPPER ABDOMEN: The adrenal glands are within normal limits. The imaged portions of the unenhanced liver, spleen, are within normal limits. There is bilateral hydronephrosis. There is diffuse infiltration of the intra-abdominal fat as well as the overlying subcutaneous soft tissues.     BONES: Destructive changes in the left acromial process. Degenerative change of bilateral shoulders. Multilevel degenerative change of the thoracolumbar spine. There are multiple lytic lesions in the spine, the largest involves partially imaged L1 vertebral body. There is a lesion in the left second rib. Fracture of the left lateral sixth rib. Multiple old healed fractures and sclerotic lesions are noted in the bilateral ribs.    IMPRESSION:   Osseous metastasis, correlate with history of malignancy.  Pulmonary findings compatible with pulmonary vascular congestion. Cardiomegaly, bilateral pleural effusions.  Bilateral hydronephrosis of the partially imaged kidneys.    CTH 2020  IMPRESSION:  No acute changes.  No sign of hemorrhage, acute CVA or mass.  Cortical atrophy.  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  Chronic periventricular white matter ischemic changes.  Stable lytic lesions in the right frontal and left parietal bones.      Thank you for the opportunity to assist with the care of this patient.   [facility] [phone].

## 2020-01-14 NOTE — PROGRESS NOTE BEHAVIORAL HEALTH - NSBHCONSULTRECOMMENDOTHER_PSY_A_CORE FT
given patient's deterioration and inability to express her wishes at this time I would advise consultation with next of kin  for medical decision making  In my medical opinion resuscitative efforts would be futile given her advanced cancer and overall debilitation  I have spoken to Dr Centeno Chief of Medicine at [place] regarding patient's status
Patient to be reassessed by Psychiatry.

## 2020-01-14 NOTE — PROGRESS NOTE ADULT - SUBJECTIVE AND OBJECTIVE BOX
Subjective:74yFemale with multiple myeloma, lytic lesions, b/l hydronephrosis, pelvic mass, LUNA, bipolar disorder.  Huang cath in place, draining yellow urine. Pt in NAD.    Huang: yellow    Vital Signs Last 24 Hrs  T(C): 36.6 (14 Jan 2020 07:57), Max: 37 (13 Jan 2020 16:07)  T(F): 97.8 (14 Jan 2020 07:57), Max: 98.6 (13 Jan 2020 16:07)  HR: 84 (14 Jan 2020 07:57) (70 - 84)  BP: 110/66 (14 Jan 2020 07:57) (91/52 - 110/66)  BP(mean): --  RR: 18 (14 Jan 2020 07:57) (18 - 18)  SpO2: 97% (14 Jan 2020 07:57) (96% - 99%)  I&O's Detail    13 Jan 2020 07:01  -  14 Jan 2020 07:00  --------------------------------------------------------  IN:    dextrose 5%.: 75 mL  Total IN: 75 mL    OUT:    Indwelling Catheter - Urethral: 750 mL  Total OUT: 750 mL    Total NET: -675 mL      14 Jan 2020 07:01  -  14 Jan 2020 10:36  --------------------------------------------------------  IN:    dextrose 5%.: 225 mL    ns in tub fed  [id number]: 90 mL  Total IN: 315 mL    OUT:  Total OUT: 0 mL    Total NET: 315 mL          Labs:                        7.9    7.67  )-----------( 96       ( 14 Jan 2020 08:10 )             26.7     01-14    147<H>  |  112<H>  |  48.0<H>  ----------------------------<  142<H>  3.7   |  21.0<L>  |  1.66<H>    Ca    7.2<L>      14 Jan 2020 08:10  Phos  4.5     01-14  Mg     2.4     01-14            Culture - CSF with Gram Stain (collected 13 Jan 2020 12:23)  Source: .CSF  Gram Stain (13 Jan 2020 12:57):    No White blood cells    No organisms seen  Preliminary Report (14 Jan 2020 07:28):    No growth to date    < from: CT Renal Stone Hunt (01.13.20 @ 16:55) >   EXAM:  CT RENAL STONE HUNT                          PROCEDURE DATE:  01/13/2020          INTERPRETATION:  CLINICAL INFORMATION: Hydronephrosis, question of stone     COMPARISON: 12/12/2019 and renal ultrasound of 1/10/2020    PROCEDURE:   CT of the Abdomen and Pelvis was performed without intravenous contrast.   Intravenous contrast: None.  Oral contrast: None.  Sagittal and coronal reformats were performed.    FINDINGS:    LOWER CHEST: There are small bilateral pleural effusions with passive atelectasis at the lung bases.    LIVER: Within normal limits.  BILE DUCTS: Normal caliber.  GALLBLADDER: Within normal limits.  SPLEEN: Within normal limits.  PANCREAS: Within normal limits.  ADRENALS: Within normal limits.  KIDNEYS/URETERS: Exophytic lateral right mid to lower pole large renal cyst. There is bilateral hydronephrosis    BLADDER: Huang catheter, decompressed.  REPRODUCTIVE ORGANS: Not well seen due to large amount of surrounding soft tissue    BOWEL: No bowel obstruction. Appendixis normal.NG tube is seen terminating in the stomach  PERITONEUM: Small amount of ascites  VESSELS: Atherosclerotic changes.  RETROPERITONEUM/LYMPH NODES: There is extensive retroperitoneal soft tissue in the lower abdomen extending into the pelvis surrounding the aorta.    ABDOMINAL WALL: Within normal limits.  BONES: Degenerative changes. There are also multiple lytic lesions in the spine and pelvis suggestive of metastatic disease or myeloma. There is a large lytic lesion in the left aspect ofL2 again appreciated. There is anasarca    IMPRESSION: Limited without contrast  Bilateral hydronephrosis likely due to extensive retroperitoneal soft tissue. There is extensive retroperitoneal and pelvic soft tissue which is inseparable from the rectosigmoid colon, bladder and uterus without contrast.  Multiple lytic lesions in the spine and pelvis suggestive of metastatic disease versus myeloma. There is clinical history of myeloma.  Small bilateral pleural effusions are again noted  NG tube    < end of copied text >

## 2020-01-14 NOTE — PROGRESS NOTE ADULT - ASSESSMENT
75y/o female with h/o multiple myeloma, pelvic mass, CHF, HTN, Bipolar disorder, recently admission for c.diff completed coarse of antibiotic on 12/30 sent in [facility] for alter mental status + fever 101F. admitted for sirs. work up negative. ID eval appreciated, abx discontinued.     presentation complicated by     course complicated by hypernatremia, mild, into 150s + luna. nephro eval appreciated.     course also complicated by nstemi, elevated trop, peak 0.1. pt w/o clear symptoms, ekg non ischemic. ss cardio eval appreciated, no intervention planned given comorbid conditions, high risks, and lack of consent from patient who refuses all interventions.     course complicated by mild exac of hf (combined). volume congestion noted on imaging, congestion on echo, bnp elevaed. discussed w/ cardio + nephro, given hypernatremia pt still considered to be volume deplete. sp very brief course iv lasix.       #Metabolic encephalopathy-   likely from hypernatremia, sirs & baseline bipolar r/o CVA  r/o brain mets  Mental status seems changed from baseline (pt was talking on admission), now obtunded since a few days  CT head: No acute changes.No sign of hemorrhage, acute CVA or mass. 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 Stable lytic lesions in the right frontal and left parietal bones.  B12 wnl, folate wnl  RPR -ve  ammonia 79, unlikely causing the mental status change  TSH 7.5; FT3 low but  FT4 wnl  ABG noted with metab alkalosis  Neuro consult appreciated, s/p LP with -ve CSF for infection. Awaiting cytopath. Core lab called for add on  MRI brain ordered   In Dec 2019 when pt was alert patient refused to allow her sister to be her surrogate (initially her surrogate who made her DNR/DNI) and therefore in future patients sister Kim can not make decisions on her behalf since patient objection prevails. Pt objected to DNR and wanted to be full code. DNR/DNI at that time was rescinded.   2 PC to be done for any urgent medical matters. 2 PC done for LP      #hypernatremia-  Na 152 -->149  -ss Renal consult appreciated, c/w D5 trial, d/c bicarb as resolution of acidosis, hold acei - resume if/when renal function allows  -encourage po intake during awake moments  -serial bmp q6    #LUNA on CKD stage 2  -baseline cr 0.5-0.7 & GFR 73  -likely due to sirs, dehydration & now b/l hydronephrosis  -Renal consult appreciated, ivf as above  -serial bmp, avoid nephrotoxic meds    #BL Hydronephrosis  -incidentally noted on imaging, renal US w/ mod BL hydro  -irby & flomax  repeat renal us in am: Mild to moderate bilateral hydronephrosis without significant change.  Voiding well with irby  Afebrile  LUNA worsening   consult appreciated  Hydronephrosis may likely be from extrinsic compression of pelvic mass  - if kidney function worsens, may need nephrostomy tubes, for decompression      #LLE edema.  -high risk vte due to MM, doppler -ve for DVT      #SIRS-  -blood cx from 1/5 shows no growth  -uti ruled out, no pna on ct chest  -empiric abx, rocephin + zithro started 1/7, discontinued when bld cx neg, cefepime + vanco started when low grade fever noted  -procal elevated but not clearly assoc w/ infection per ID  -rectal + iv meds while lethargic as poss  -rvp neg  -ID eval appreciated, abx stopped 1/10, monitor  Tmax of 101 on 1/11 once again  f/u Pan cx  c/w Merrem   s/p LP with -ve CSF for infection. Awaiting cytopath. Core lab called for add on  Procal 0.22  IVF        #acute on chronic combined HF, stable  -ef 45-50%, [id number], mod-severe MVR, mild to mod AS, mod AR - echo 12/2019  -evidence of pulm vasc congestion + small BL effusions on imaging  -sp brief iv diuresis  -ss Cardio appreciated, bb titrated by cardio, acei as above  -TTE w/ evidence of mild volume overload, ef 40-45%, mild to mod AVS, mod AR, small PFO w/ shunting    #nstemi, stable  -peak trop 0.10, ekg w/o acute ischemic change  -ss cardio eval appreciated, refused nst + lhc in 12/2019, no invasive intervention planned    #htn, controlled    #bipolar disorder w/o behavioral disturbance, stable  -psych eval appreciated, no contraindication to dc back to [place] when stable      #obesity, bmi 34, wt loss advisable, outpt fu    #anemia, chronic, stable  -no acute/active s/s of blood loss, baseline 9, trend if it becomes clinically warranted otherwise outpt fu    #MM, stable  -mult areas of osseous mets noted on imaging, outpt fu w/ onc, known dx    #dvt ppx. lovenox      Pt remains obtunded & thus cannot make medical decisions for herself. Currently is full code but resuscitative efforts would be futile given her advanced cancer, state of obtundation, hypernatremia, NSTEMI, b/l hydronephrosis, recent C.diff and overall debilitation. Pt should be made comfortable at this point.          #dispo. pending improvement of mental status, Na, luna, undetermined date

## 2020-01-14 NOTE — PROGRESS NOTE ADULT - ASSESSMENT
75y/o  Female with a h/o multiple myeloma, pelvic mass, CHF, HTN, Bipolar disorder. Patient was recently discharged 12/19 after being treated for c diff completed coarse of Po Vancomycin on 12/30. Patient was sent in from [facility] for altered mental status. Here patient is afebrile, no leukocytosis, CXR ? LL infiltrated so started on IV Vancomycin and Cefepime on 1/5/20.      ? pneumonia  recent C Diff  Multiple myeloma   Bipolar      - Repeat blood cultures pending  - Blood cultures no growth   - RVP negative  - CT Chest with no pneumonia   - UA with no UTI  - Procalcitonin level not suggestive of infection   - Had CSF studies done which are negative for infection  - CSF PCR pending  - CSF cultures no growth to date  - Continue meropenem   - Follow up cultures  - Trend Fever  - Trend Leukocytosis      Will Follow

## 2020-01-14 NOTE — PROGRESS NOTE ADULT - SUBJECTIVE AND OBJECTIVE BOX
CHIEF COMPLAINT/INTERVAL HISTORY:    Patient is a 74y old  Female who presents with a chief complaint of AMS, LL infiltrate (2020 12:13)      HPI:  73y/o female with h/o multiple myeloma, pelvic mass, CHF, HTN, Bipolar disorder, recently admission for c.diff completed coarse of antibiotic on  sent in [facility] [place] for alter mental status. As per [facility] chart review pt refusing to eat or drink for several days, today pt found lethargic only respond to names and pain, febrile 101 at the center. In the ED pt back to baseline, during my encounter to is alert and wake, noncooperative with questioning, states "I don't want see your face" but she is constantly asking if she can have coke to drink. in the ed pt with low grade fever 100.2, no leukocytosis, Na 150, BUN 39, Cr 1.8, CXR ? LL infiltrated. (2020 00:48)      SUBJECTIVE & OBJECTIVE/ ROS: Pt seen and examined at bedside. Pt yet remains obtunded. Afebrile overnight    ICU Vital Signs Last 24 Hrs  T(C): 37.2 (2020 08:02), Max: 37.9 (2020 16:15)  T(F): 98.9 (2020 08:02), Max: 100.3 (2020 16:15)  HR: 80 (2020 08:02) (58 - 87)  BP: 113/58 (2020 08:02) (99/55 - 113/58)  BP(mean): --  ABP: --  ABP(mean): --  RR: 20 (2020 08:02) (18 - 20)  SpO2: 97% (2020 08:02) (94% - 97%)        MEDICATIONS  (STANDING):  ascorbic acid 500 milliGRAM(s) Oral daily  aspirin enteric coated 81 milliGRAM(s) Oral daily  carvedilol 6.25 milliGRAM(s) Oral every 12 hours  cholecalciferol 1000 Unit(s) Oral daily  dextrose 5%. 1000 milliLiter(s) (75 mL/Hr) IV Continuous <Continuous>  enoxaparin Injectable 40 milliGRAM(s) SubCutaneous every 24 hours  ferrous sulfate Oral Tab/Cap - Peds 325 milliGRAM(s) Oral daily  folic acid 1 milliGRAM(s) Oral daily  lactulose Retention Enema 200 Gram(s) Rectal daily  levothyroxine Injectable 37.5 MICROGram(s) IV Push at bedtime  meropenem  IVPB      meropenem  IVPB 1000 milliGRAM(s) IV Intermittent every 12 hours  pantoprazole  Injectable 40 milliGRAM(s) IV Push daily  tamsulosin 0.4 milliGRAM(s) Oral at bedtime  valACYclovir 500 milliGRAM(s) Oral two times a day    MEDICATIONS  (PRN):  acetaminophen  Suppository .. 650 milliGRAM(s) Rectal every 6 hours PRN Temp greater or equal to 38C (100.4F), Mild Pain (1 - 3), Moderate Pain (4 - 6), Severe Pain (7 - 10)      LABS:                        7.9    6.51  )-----------( 96       ( 2020 07:39 )             27.1     01-13    149<H>  |  113<H>  |  43.0<H>  ----------------------------<  91  3.8   |  24.0  |  1.51<H>    Ca    7.1<L>      2020 07:39  Phos  4.5       Mg     2.5     -13        Urinalysis Basic - ( 2020 06:50 )    Color: Yellow / Appearance: Slightly Turbid / S.010 / pH: x  Gluc: x / Ketone: Negative  / Bili: Negative / Urobili: Negative mg/dL   Blood: x / Protein: 500 mg/dL / Nitrite: Negative   Leuk Esterase: Trace / RBC: >50 /HPF / WBC 3-5   Sq Epi: x / Non Sq Epi: Occasional / Bacteria: Few        CAPILLARY BLOOD GLUCOSE      POCT Blood Glucose.: 95 mg/dL (2020 16:16)      RECENT CULTURES:      RADIOLOGY & ADDITIONAL TESTS:      PHYSICAL EXAM:    GEN - appears age appropriate. frail  HEENT - NCAT, EOMI, HOOD  RESP - diffuse crackles b/l. not on supplemental O2.   CARDIO - NS1S2, RRR. +3/6 murmur  ABD - Soft/Non tender/Non distended. Normal BS x4 quadrants. no guarding/rebound tenderness.  Ext - LLE pitting edema b/l.   MSK -unable to assess  Neuro - Does not open eyes to sternal rub. Moans with painful stimuli.     Skin - c/d/i. no rashes/lesions

## 2020-01-14 NOTE — GOALS OF CARE CONVERSATION - ADVANCED CARE PLANNING - CONVERSATION DETAILS
MAIN was contacted by Dr Smyth psychiatrist who reviewed with MAIN current status with patient as end of life at this point and [patient obtunded. SW was advised that decision making at this point is now for end of life  and Office of Mental Hygiene  Arthur Garvey advised that checklist 4 to be initiated. MOLST checklist 4 is for patient with no capacity and no surrogate. As previously noted patient had previously expressed that she did not want he r sister making medical decisions as her surrogate and therefore at this point process for end of life decisions are with checklist 4. MAIN initiated checklist documents and conferred with DR Cage Director of Palliative Care to request coordination with ethics since ethics committee must be coordinated as requirement on checklist 4. DR Cage spoke with DR Porter form ethics and committee to be set up. MAIN will follow with Office of Mental [facility] Legal Services once checklist completed.

## 2020-01-14 NOTE — PROGRESS NOTE ADULT - ASSESSMENT
73 yo female pt with multiple medical issues pelvic mass, most likely causing extrinsic compression of bladder and distal ureters, hydronephrosis, slowly worsening renal function  - trend renal function  - may now likely need nephrostomy tubes

## 2020-01-14 NOTE — PROGRESS NOTE ADULT - SUBJECTIVE AND OBJECTIVE BOX
[facility]  INFECTIOUS DISEASES AND INTERNAL MEDICINE at [place]  =======================================================  Blu Vasquez MD  Diplomates American Board of Internal Medicine and Infectious Diseases  Tel: [phone]      Fax: [phone]  =======================================================    KRYSTA HERRMANN [id number]    Follow up: SIRS    Patient developed fever 20 and restarted on IV antibiotics 20  No fever since    Allergies:  ixazomib (Unknown)  NSAIDs (Unknown)  penicillins (Unknown)  sulfa drugs (Unknown)      Antibiotics:     meropenem  IVPB 1000 milliGRAM(s) IV Intermittent every 12 hours       REVIEW OF SYSTEMS:  Unable to obtain. Patient unable to provide history.       Physical Exam:  Vital Signs Last 24 Hrs  T(C): 36.6 (2020 07:57), Max: 37 (2020 16:07)  T(F): 97.8 (2020 07:57), Max: 98.6 (2020 16:07)  HR: 84 (2020 07:57) (70 - 84)  BP: 110/66 (2020 07:57) (91/52 - 110/66)  RR: 18 (2020 07:57) (18 - 18)  SpO2: 97% (2020 07:57) (96% - 99%)      GEN: NAD, not responsive   HEENT: normocephalic and atraumatic.  Anicteric  NECK: Supple.   LUNGS: Clear to auscultation.  HEART: Regular rate and rhythm   ABDOMEN: Soft, nontender, and nondistended.  Positive bowel sounds.    : No CVA tenderness  EXTREMITIES: Without any edema.  MSK: No joint swelling  NEUROLOGIC: No meaningful communication   PSYCHIATRIC: No meaningful communication   SKIN: No Rash      Labs:  01-14    147<H>  |  112<H>  |  48.0<H>  ----------------------------<  142<H>  3.7   |  21.0<L>  |  1.66<H>    Ca    7.2<L>      2020 08:10  Phos  4.5       Mg     2.4                    7.9    7.67  )-----------( 96       ( 2020 08:10 )             26.7     Urinalysis Basic - ( 2020 06:50 )    Color: Yellow / Appearance: Slightly Turbid / S.010 / pH: x  Gluc: x / Ketone: Negative  / Bili: Negative / Urobili: Negative mg/dL   Blood: x / Protein: 500 mg/dL / Nitrite: Negative   Leuk Esterase: Trace / RBC: >50 /HPF / WBC 3-5   Sq Epi: x / Non Sq Epi: Occasional / Bacteria: Few    ABG - ( 2020 11:51 )  pH, Arterial: 7.54  pH, Blood: x     /  pCO2: 32    /  pO2: 166   / HCO3: 29    / Base Excess: 4.9   /  SaO2: 99          RECENT CULTURES:   @ 12:23 .CSF     No growth to date  No White blood cells  No organisms seen       @ 06:40    RVP  NotDete       @ 08:12    RVP  NotDete       @ 01:29 .Urine     <10,000 CFU/mL Normal Urogenital Sarah       @ 19:32 .Blood     No growth at 5 days.       @ 19:31 .Blood     No growth at 5 days.        < from: Xray Chest 1 View-PORTABLE IMMEDIATE (20 @ 17:03) >   EXAM:  XR CHEST PORTABLE IMMED 1V                          PROCEDURE DATE:  2020      INTERPRETATION:  Chest one view    HISTORY: Fever    COMPARISON STUDY: 2020    Frontal expiratory view of the chest shows the heart to be similar in size. Right chest port and nasogastric tube are present. The lungs show mild congestive changes and there is no evidence of pneumothorax nor pleural effusion. Left second rib fracture is again noted.    IMPRESSION:  No active pulmonary disease.    < end of copied text >

## 2020-01-14 NOTE — CONSULT NOTE ADULT - ASSESSMENT
74F resident of [facility] with h/o multiple myeloma, CHF, HTN, bipolar disorder, recent admission 12/12-12/19/19 for c.diff colitis and found with numerous lytic lesions and large pelvic mass on CT now readmitted for AMS, LUNA, CHF exacerbation, presumed PNA with SIRS workup. Course complicated by persistent encephalopathy, dysphagia, NSTEMI, severe protein calorie malnutrition, debility. Palliative consult for advance directives and goc.     PLAN    Acute Encephalopathy  - pt obtunded during my visit, unresponsive to tactile or verbal stimuli  - etiology unclear  - s/p LP this admission with neg CSF, infectious workup unrevealing  - neurology on board  - overall poor prognosis    Presumed PNA/SIRS  - blood and urine cx neg to date  - on IV meropenam per ID    Bipolar Disorder  - Psych input appreciated, per chart: pt lacks medical decision making capacity, given pt's deterioration, advanced cancer and debility - resuscitative efforts felt to be futile    Severe Protein Calorie Malnutrition  - hypoalbuminemia, will check prealbumin    Dysphagia   - on TF via NGT 74F resident of [facility] with h/o multiple myeloma, CHF, HTN, bipolar disorder, recent admission 12/12-12/19/19 for c.diff colitis and found with numerous lytic lesions and large pelvic mass on CT now readmitted for AMS, LUNA, CHF exacerbation, presumed PNA with SIRS workup. Course complicated by persistent encephalopathy, dysphagia, NSTEMI, severe protein calorie malnutrition, debility. Palliative consult for advance directives and goc.     PLAN    Acute Encephalopathy  - pt obtunded during my visit, unresponsive to tactile or verbal stimuli  - etiology unclear  - s/p LP this admission with neg CSF, infectious workup unrevealing  - neurology on board  - overall poor prognosis    Presumed PNA/SIRS  - blood and urine cx neg to date  - on IV meropenam per ID    Bipolar Disorder  - Psych input appreciated, per chart: pt lacks medical decision making capacity, given pt's deterioration, advanced cancer and debility - resuscitative efforts felt to be futile    Severe Protein Calorie Malnutrition  - hypoalbuminemia, will check prealbumin  - poor prognostic factor    Dysphagia   - on TF via NGT    Palliative Care Encounter  Pt is known to our service. See GOC 1/10 and again 1/14 by Palliative MAIN Marcano. Per chart review and discussion with Krys, pt had verbalized in December 2019 to office of mental hygiene , Arthur Garvey, two separate occasions that she objected to sister being her surrogate and making any medical decisions and to a DNR/I. Pt had also declined workup and biopsy of new pelvic mass found last visit. During this hospital course pt has continued to clinically deteriorate with no significant improvement since admission despite optimizing all medical interventions. She is now completed obtunded and nonverbal, appears to be transitioning toward end of life. Overall prognosis is very poor and high risk of further decompensation given her advanced metastatic multiple myeloma, pelvic mass likely also malignant in nature, HFrEF 2/2 valvular disease, dysphagia and severe protein calorie malnutrition, debility.     At this juncture, continued invasive and aggressive medical interventions including CPR, mechanical intubation, feeding tube/peg would not result in any meaningful recovery and would be futile. CPR and intubation would would likely incur more symptom burden, pain and suffering on this unfortunate woman. Palliative MAIN has initiated a MOLST checklist #4 to obtain DNR/I, comfort measures/hospice as pt is now at end of life and is without a surrogate decision maker. Pending ethics committee meeting. 74F resident of [facility] with h/o multiple myeloma, CHF, HTN, bipolar disorder, recent admission 12/12-12/19/19 for c.diff colitis and found with numerous lytic lesions and large pelvic mass on CT now readmitted for AMS, LUNA, CHF exacerbation, presumed PNA with SIRS workup. Course complicated by persistent encephalopathy, dysphagia, NSTEMI, severe protein calorie malnutrition, debility. Palliative consult for advance directives and goc.     PLAN    Acute Encephalopathy  - pt obtunded during my visit, unresponsive to tactile or verbal stimuli  - etiology unclear  - s/p LP this admission with neg CSF, infectious workup unrevealing  - neurology on board  - overall poor prognosis    Presumed PNA/SIRS  - blood and urine cx neg to date  - on IV meropenam per ID    Bipolar Disorder  - Psych input appreciated, per chart: pt lacks medical decision making capacity, given pt's deterioration, advanced cancer and debility - resuscitative efforts felt to be futile    Severe Protein Calorie Malnutrition  - hypoalbuminemia, will check prealbumin  - poor prognostic factor    Dysphagia   - on TF via NGT    Palliative Care Encounter  Pt is known to our service. See GOC 1/10 and again 1/14 by Palliative MAIN Marcano. Per chart review and discussion with Krys, pt had verbalized in December 2019 to office of mental hygiene , Arthur Garvey, two separate occasions that she objected to sister being her surrogate and making any medical decisions and to a DNR/I. Pt had also declined workup and biopsy of new pelvic mass found last visit. During this hospital course pt has continued to clinically deteriorate with no significant improvement since admission despite optimizing all medical interventions. She is now completed obtunded and nonverbal, appears to be transitioning toward end of life. Overall prognosis is very poor and high risk of further decompensation given her advanced metastatic multiple myeloma, pelvic mass likely also malignant in nature, HFrEF 2/2 valvular disease, dysphagia and severe protein calorie malnutrition, debility.     At this juncture, continued invasive and aggressive medical interventions including CPR, mechanical intubation, feeding tube/peg would NOT be medically therapeutic nor result in any meaningful recovery. CPR and intubation would would likely incur more symptom burden, pain and suffering on this unfortunate woman. Palliative MAIN has initiated a MOLST checklist #4 to obtain DNR/I, comfort measures/hospice as pt is now at end of life and is without a surrogate decision maker. Pending ethics committee meeting.

## 2020-01-14 NOTE — CHART NOTE - NSCHARTNOTEFT_GEN_A_CORE
Source: Patient [ ]  Family [ ]   other [x ] EMR    Current Diet: Diet, NPO with Tube Feed:   Tube Feeding Modality: Nasogastric  Jevity 1.5 Jacinto  Total Volume for 24 Hours (mL): 720  Continuous  Starting Tube Feed Rate {mL per Hour}: 10  Increase Tube Feed Rate by (mL): 10     Every 6 hours  Until Goal Tube Feed Rate (mL per Hour): 30  Tube Feed Duration (in Hours): 24  Tube Feed Start Time: 19:00 (01-12-20 @ 19:12)    Enteral /Parenteral Nutrition: Jevity 1.5 running at 30ml/hour (x20 hours) providing 600ml (900kcal, 57g protein)     Current Weight:   (1/9) 62kg  -Obtain current wt, continue to monitor. Dependent 3+ edema noted per EMR.     Pertinent Medications: MEDICATIONS  (STANDING):  ascorbic acid 500 milliGRAM(s) Oral daily  aspirin enteric coated 81 milliGRAM(s) Oral daily  carvedilol 6.25 milliGRAM(s) Oral every 12 hours  cholecalciferol 1000 Unit(s) Oral daily  dextrose 5%. 1000 milliLiter(s) (75 mL/Hr) IV Continuous <Continuous>  enoxaparin Injectable 40 milliGRAM(s) SubCutaneous every 24 hours  ferrous sulfate Oral Tab/Cap - Peds 325 milliGRAM(s) Oral daily  folic acid 1 milliGRAM(s) Oral daily  lactulose Retention Enema 200 Gram(s) Rectal daily  levothyroxine Injectable 37.5 MICROGram(s) IV Push at bedtime  meropenem  IVPB      meropenem  IVPB 1000 milliGRAM(s) IV Intermittent every 12 hours  pantoprazole  Injectable 40 milliGRAM(s) IV Push daily  tamsulosin 0.4 milliGRAM(s) Oral at bedtime    MEDICATIONS  (PRN):  acetaminophen  Suppository .. 650 milliGRAM(s) Rectal every 6 hours PRN Temp greater or equal to 38C (100.4F), Mild Pain (1 - 3), Moderate Pain (4 - 6), Severe Pain (7 - 10)    Pertinent Labs: CBC Full  -  ( 14 Jan 2020 08:10 )  WBC Count : 7.67 K/uL  RBC Count : 2.58 M/uL  Hemoglobin : 7.9 g/dL  Hematocrit : 26.7 %  Platelet Count - Automated : 96 K/uL  Mean Cell Volume : 103.5 fl  Mean Cell Hemoglobin : 30.6 pg  Mean Cell Hemoglobin Concentration : 29.6 gm/dL  Auto Neutrophil # : x  Auto Lymphocyte # : x  Auto Monocyte # : x  Auto Eosinophil # : x  Auto Basophil # : x  Auto Neutrophil % : x  Auto Lymphocyte % : x  Auto Monocyte % : x  Auto Eosinophil % : x  Auto Basophil % : x  01-14 Na147 mmol/L<H> Glu 142 mg/dL<H> K+ 3.7 mmol/L Cr  1.66 mg/dL<H> BUN 48.0 mg/dL<H> Phos 4.5 mg/dL Alb n/a   PAB n/a       Skin: R/L heel DTI, Coccyx stage II    Nutrition focused physical exam previously conducted - found signs of malnutrition [ ]absent [x ]present    Subcutaneous fat loss: [x ] Orbital fat pads region, [ x]Buccal fat region, [x ]Triceps region,  [ ]Ribs region    Muscle wasting: [x ]Temples region, [ x]Clavicle region, [x ]Shoulder region, [ ]Scapula region, [ ]Interosseous region,  [ ]thigh region, [ ]Calf region    Estimated Needs:   [x ] no change since previous assessment  [ ] recalculated:     Current Nutrition Diagnosis: Pt remains at high nutrition risk and meets criteria for severe (chronic) malnutrition related to inability to meet sufficient protein-energy in setting of poor po intake as evidenced by pt likely meeting <75% estimated energy needs [id number], severe muscle wasting and fat loss. Last BM 1/13 noted per EMR. RD to remain available.     Recommendations:   1) Change TF to Jevity 1.5; initiate at 20ml/hr and advance 10ml/hr q4 until goal rate 60ml/hr (x20 hours) providing 1200ml (1800kcal, 77g protein, 912ml free water). Additional free water per MD discretion.    2) Add Pro-Stat BID for an additional 100kcal and 15g protein per serving   3) Monitor tolerance to TF  4) Monitor weights and labs    Monitoring and Evaluation:   [ ] PO intake [x ] Tolerance to diet prescription [X] Weights  [X] Follow up per protocol [X] Labs: Source: Patient [ ]  Family [ ]   other [x ] EMR    Aware of TF consult. RX below.    Current Diet: Diet, NPO with Tube Feed:   Tube Feeding Modality: Nasogastric  Jevity 1.5 Jacinto  Total Volume for 24 Hours (mL): 720  Continuous  Starting Tube Feed Rate {mL per Hour}: 10  Increase Tube Feed Rate by (mL): 10     Every 6 hours  Until Goal Tube Feed Rate (mL per Hour): 30  Tube Feed Duration (in Hours): 24  Tube Feed Start Time: 19:00 (01-12-20 @ 19:12)    Enteral /Parenteral Nutrition: Jevity 1.5 running at 30ml/hour (x20 hours) providing 600ml (900kcal, 57g protein)     Current Weight:   (1/9) 62kg  -Obtain current wt, continue to monitor. Dependent 3+ edema noted per EMR.     Pertinent Medications: MEDICATIONS  (STANDING):  ascorbic acid 500 milliGRAM(s) Oral daily  aspirin enteric coated 81 milliGRAM(s) Oral daily  carvedilol 6.25 milliGRAM(s) Oral every 12 hours  cholecalciferol 1000 Unit(s) Oral daily  dextrose 5%. 1000 milliLiter(s) (75 mL/Hr) IV Continuous <Continuous>  enoxaparin Injectable 40 milliGRAM(s) SubCutaneous every 24 hours  ferrous sulfate Oral Tab/Cap - Peds 325 milliGRAM(s) Oral daily  folic acid 1 milliGRAM(s) Oral daily  lactulose Retention Enema 200 Gram(s) Rectal daily  levothyroxine Injectable 37.5 MICROGram(s) IV Push at bedtime  meropenem  IVPB      meropenem  IVPB 1000 milliGRAM(s) IV Intermittent every 12 hours  pantoprazole  Injectable 40 milliGRAM(s) IV Push daily  tamsulosin 0.4 milliGRAM(s) Oral at bedtime    MEDICATIONS  (PRN):  acetaminophen  Suppository .. 650 milliGRAM(s) Rectal every 6 hours PRN Temp greater or equal to 38C (100.4F), Mild Pain (1 - 3), Moderate Pain (4 - 6), Severe Pain (7 - 10)    Pertinent Labs: CBC Full  -  ( 14 Jan 2020 08:10 )  WBC Count : 7.67 K/uL  RBC Count : 2.58 M/uL  Hemoglobin : 7.9 g/dL  Hematocrit : 26.7 %  Platelet Count - Automated : 96 K/uL  Mean Cell Volume : 103.5 fl  Mean Cell Hemoglobin : 30.6 pg  Mean Cell Hemoglobin Concentration : 29.6 gm/dL  Auto Neutrophil # : x  Auto Lymphocyte # : x  Auto Monocyte # : x  Auto Eosinophil # : x  Auto Basophil # : x  Auto Neutrophil % : x  Auto Lymphocyte % : x  Auto Monocyte % : x  Auto Eosinophil % : x  Auto Basophil % : x  01-14 Na147 mmol/L<H> Glu 142 mg/dL<H> K+ 3.7 mmol/L Cr  1.66 mg/dL<H> BUN 48.0 mg/dL<H> Phos 4.5 mg/dL Alb n/a   PAB n/a       Skin: R/L heel DTI, Coccyx stage II    Nutrition focused physical exam previously conducted - found signs of malnutrition [ ]absent [x ]present    Subcutaneous fat loss: [x ] Orbital fat pads region, [ x]Buccal fat region, [x ]Triceps region,  [ ]Ribs region    Muscle wasting: [x ]Temples region, [ x]Clavicle region, [x ]Shoulder region, [ ]Scapula region, [ ]Interosseous region,  [ ]thigh region, [ ]Calf region    Estimated Needs:   [x ] no change since previous assessment  [ ] recalculated:     Current Nutrition Diagnosis: Pt remains at high nutrition risk and meets criteria for severe (chronic) malnutrition related to inability to meet sufficient protein-energy in setting of poor po intake as evidenced by pt likely meeting <75% estimated energy needs [id number], severe muscle wasting and fat loss. Last BM 1/13 noted per EMR. RD to remain available.     Recommendations:   1) Change TF to Jevity 1.5; initiate at 20ml/hr and advance 10ml/hr q4 until goal rate 60ml/hr (x20 hours) providing 1200ml (1800kcal, 77g protein, 912ml free water). Additional free water per MD discretion.    2) Add Pro-Stat BID for an additional 100kcal and 15g protein per serving   3) Monitor tolerance to TF  4) Monitor weights and labs    Monitoring and Evaluation:   [ ] PO intake [x ] Tolerance to diet prescription [X] Weights  [X] Follow up per protocol [X] Labs:

## 2020-01-14 NOTE — PROGRESS NOTE BEHAVIORAL HEALTH - NSBHFUPINTERVALHXFT_PSY_A_CORE
chart reviewed Fever work up without clear source LP done emergently last night Discussed issues with ID physician Maria Luisa Blanco Liaison made aware of patient's deterioration  [phone]

## 2020-01-14 NOTE — PROGRESS NOTE BEHAVIORAL HEALTH - NSBHCHARTREVIEWVS_PSY_A_CORE FT
Vital Signs Last 24 Hrs  T(C): 36.6 (14 Jan 2020 07:57), Max: 37 (13 Jan 2020 16:07)  T(F): 97.8 (14 Jan 2020 07:57), Max: 98.6 (13 Jan 2020 16:07)  HR: 84 (14 Jan 2020 07:57) (70 - 84)  BP: 110/66 (14 Jan 2020 07:57) (91/52 - 110/66)  BP(mean): --  RR: 18 (14 Jan 2020 07:57) (18 - 18)  SpO2: 97% (14 Jan 2020 07:57) (96% - 99%)

## 2020-01-15 DIAGNOSIS — J96.91 RESPIRATORY FAILURE, UNSPECIFIED WITH HYPOXIA: ICD-10-CM

## 2020-01-15 DIAGNOSIS — E87.2 ACIDOSIS: ICD-10-CM

## 2020-01-15 DIAGNOSIS — J69.0 PNEUMONITIS DUE TO INHALATION OF FOOD AND VOMIT: ICD-10-CM

## 2020-01-15 DIAGNOSIS — R57.9 SHOCK, UNSPECIFIED: ICD-10-CM

## 2020-01-15 LAB
ALBUMIN SERPL ELPH-MCNC: 1.9 G/DL — LOW (ref 3.3–5.2)
ALBUMIN SERPL ELPH-MCNC: 2.1 G/DL — LOW (ref 3.3–5.2)
ALP SERPL-CCNC: 282 U/L — HIGH (ref 40–120)
ALP SERPL-CCNC: 293 U/L — HIGH (ref 40–120)
ALT FLD-CCNC: 170 U/L — HIGH
ALT FLD-CCNC: 174 U/L — HIGH
ANION GAP SERPL CALC-SCNC: 18 MMOL/L — HIGH (ref 5–17)
ANION GAP SERPL CALC-SCNC: 23 MMOL/L — HIGH (ref 5–17)
AST SERPL-CCNC: 423 U/L — HIGH
AST SERPL-CCNC: 543 U/L — HIGH
BASE EXCESS BLDA CALC-SCNC: -10 MMOL/L — LOW (ref -3–3)
BILIRUB SERPL-MCNC: 0.3 MG/DL — LOW (ref 0.4–2)
BILIRUB SERPL-MCNC: 0.3 MG/DL — LOW (ref 0.4–2)
BLOOD GAS COMMENTS ARTERIAL: SIGNIFICANT CHANGE UP
BUN SERPL-MCNC: 50 MG/DL — HIGH (ref 8–20)
BUN SERPL-MCNC: 52 MG/DL — HIGH (ref 8–20)
CALCIUM SERPL-MCNC: 7.5 MG/DL — LOW (ref 8.6–10.2)
CALCIUM SERPL-MCNC: 7.6 MG/DL — LOW (ref 8.6–10.2)
CHLORIDE SERPL-SCNC: 107 MMOL/L — SIGNIFICANT CHANGE UP (ref 98–107)
CHLORIDE SERPL-SCNC: 110 MMOL/L — HIGH (ref 98–107)
CO2 SERPL-SCNC: 15 MMOL/L — LOW (ref 22–29)
CO2 SERPL-SCNC: 19 MMOL/L — LOW (ref 22–29)
CREAT SERPL-MCNC: 2.06 MG/DL — HIGH (ref 0.5–1.3)
CREAT SERPL-MCNC: 2.19 MG/DL — HIGH (ref 0.5–1.3)
GAS PNL BLDA: SIGNIFICANT CHANGE UP
GLUCOSE SERPL-MCNC: 170 MG/DL — HIGH (ref 70–115)
GLUCOSE SERPL-MCNC: 199 MG/DL — HIGH (ref 70–115)
HCO3 BLDA-SCNC: 16 MMOL/L — LOW (ref 20–26)
HCT VFR BLD CALC: 28.2 % — LOW (ref 34.5–45)
HGB BLD-MCNC: 7.9 G/DL — LOW (ref 11.5–15.5)
HOROWITZ INDEX BLDA+IHG-RTO: 0.6 — SIGNIFICANT CHANGE UP
INR BLD: 1.38 RATIO — HIGH (ref 0.88–1.16)
LACTATE SERPL-SCNC: 12 MMOL/L — CRITICAL HIGH (ref 0.5–2)
LACTATE SERPL-SCNC: 6.8 MMOL/L — CRITICAL HIGH (ref 0.5–2)
MAGNESIUM SERPL-MCNC: 2.6 MG/DL — SIGNIFICANT CHANGE UP (ref 1.6–2.6)
MCHC RBC-ENTMCNC: 28 GM/DL — LOW (ref 32–36)
MCHC RBC-ENTMCNC: 30.2 PG — SIGNIFICANT CHANGE UP (ref 27–34)
MCV RBC AUTO: 107.6 FL — HIGH (ref 80–100)
PCO2 BLDA: 34 MMHG — LOW (ref 35–45)
PH BLDA: 7.27 — LOW (ref 7.35–7.45)
PHOSPHATE SERPL-MCNC: 8.1 MG/DL — HIGH (ref 2.4–4.7)
PLATELET # BLD AUTO: 94 K/UL — LOW (ref 150–400)
PO2 BLDA: 192 MMHG — HIGH (ref 83–108)
POTASSIUM SERPL-MCNC: 4.2 MMOL/L — SIGNIFICANT CHANGE UP (ref 3.5–5.3)
POTASSIUM SERPL-MCNC: 5.1 MMOL/L — SIGNIFICANT CHANGE UP (ref 3.5–5.3)
POTASSIUM SERPL-SCNC: 4.2 MMOL/L — SIGNIFICANT CHANGE UP (ref 3.5–5.3)
POTASSIUM SERPL-SCNC: 5.1 MMOL/L — SIGNIFICANT CHANGE UP (ref 3.5–5.3)
PROT SERPL-MCNC: 8.1 G/DL — SIGNIFICANT CHANGE UP (ref 6.6–8.7)
PROT SERPL-MCNC: 8.1 G/DL — SIGNIFICANT CHANGE UP (ref 6.6–8.7)
PROTHROM AB SERPL-ACNC: 16 SEC — HIGH (ref 10–12.9)
RBC # BLD: 2.62 M/UL — LOW (ref 3.8–5.2)
RBC # FLD: 15.6 % — HIGH (ref 10.3–14.5)
SAO2 % BLDA: 99 % — SIGNIFICANT CHANGE UP (ref 95–99)
SODIUM SERPL-SCNC: 145 MMOL/L — SIGNIFICANT CHANGE UP (ref 135–145)
SODIUM SERPL-SCNC: 147 MMOL/L — HIGH (ref 135–145)
TROPONIN T SERPL-MCNC: 0.22 NG/ML — HIGH (ref 0–0.06)
VDRL CSF-TITR: NEGATIVE — SIGNIFICANT CHANGE UP
WBC # BLD: 18.25 K/UL — HIGH (ref 3.8–10.5)
WBC # FLD AUTO: 18.25 K/UL — HIGH (ref 3.8–10.5)

## 2020-01-15 PROCEDURE — 87483 CNS DNA AMP PROBE TYPE 12-25: CPT

## 2020-01-15 PROCEDURE — 84481 FREE ASSAY (FT-3): CPT

## 2020-01-15 PROCEDURE — 76770 US EXAM ABDO BACK WALL COMP: CPT

## 2020-01-15 PROCEDURE — 82962 GLUCOSE BLOOD TEST: CPT

## 2020-01-15 PROCEDURE — 84134 ASSAY OF PREALBUMIN: CPT

## 2020-01-15 PROCEDURE — 80048 BASIC METABOLIC PNL TOTAL CA: CPT

## 2020-01-15 PROCEDURE — 99221 1ST HOSP IP/OBS SF/LOW 40: CPT

## 2020-01-15 PROCEDURE — 84484 ASSAY OF TROPONIN QUANT: CPT

## 2020-01-15 PROCEDURE — 86780 TREPONEMA PALLIDUM: CPT

## 2020-01-15 PROCEDURE — 85610 PROTHROMBIN TIME: CPT

## 2020-01-15 PROCEDURE — 96375 TX/PRO/DX INJ NEW DRUG ADDON: CPT

## 2020-01-15 PROCEDURE — 83880 ASSAY OF NATRIURETIC PEPTIDE: CPT

## 2020-01-15 PROCEDURE — 80053 COMPREHEN METABOLIC PANEL: CPT

## 2020-01-15 PROCEDURE — 81001 URINALYSIS AUTO W/SCOPE: CPT

## 2020-01-15 PROCEDURE — 86592 SYPHILIS TEST NON-TREP QUAL: CPT

## 2020-01-15 PROCEDURE — 71250 CT THORAX DX C-: CPT

## 2020-01-15 PROCEDURE — 82570 ASSAY OF URINE CREATININE: CPT

## 2020-01-15 PROCEDURE — 87581 M.PNEUMON DNA AMP PROBE: CPT

## 2020-01-15 PROCEDURE — 84100 ASSAY OF PHOSPHORUS: CPT

## 2020-01-15 PROCEDURE — 82140 ASSAY OF AMMONIA: CPT

## 2020-01-15 PROCEDURE — 94760 N-INVAS EAR/PLS OXIMETRY 1: CPT

## 2020-01-15 PROCEDURE — 84157 ASSAY OF PROTEIN OTHER: CPT

## 2020-01-15 PROCEDURE — 76775 US EXAM ABDO BACK WALL LIM: CPT

## 2020-01-15 PROCEDURE — 71045 X-RAY EXAM CHEST 1 VIEW: CPT | Mod: 26

## 2020-01-15 PROCEDURE — 87205 SMEAR GRAM STAIN: CPT

## 2020-01-15 PROCEDURE — 70450 CT HEAD/BRAIN W/O DYE: CPT

## 2020-01-15 PROCEDURE — 94002 VENT MGMT INPAT INIT DAY: CPT

## 2020-01-15 PROCEDURE — 89051 BODY FLUID CELL COUNT: CPT

## 2020-01-15 PROCEDURE — 87633 RESP VIRUS 12-25 TARGETS: CPT

## 2020-01-15 PROCEDURE — 93308 TTE F-UP OR LMTD: CPT

## 2020-01-15 PROCEDURE — 84145 PROCALCITONIN (PCT): CPT

## 2020-01-15 PROCEDURE — 71045 X-RAY EXAM CHEST 1 VIEW: CPT

## 2020-01-15 PROCEDURE — 85730 THROMBOPLASTIN TIME PARTIAL: CPT

## 2020-01-15 PROCEDURE — 87070 CULTURE OTHR SPECIMN AEROBIC: CPT

## 2020-01-15 PROCEDURE — 99233 SBSQ HOSP IP/OBS HIGH 50: CPT

## 2020-01-15 PROCEDURE — 84300 ASSAY OF URINE SODIUM: CPT

## 2020-01-15 PROCEDURE — 93971 EXTREMITY STUDY: CPT

## 2020-01-15 PROCEDURE — 84156 ASSAY OF PROTEIN URINE: CPT

## 2020-01-15 PROCEDURE — 97163 PT EVAL HIGH COMPLEX 45 MIN: CPT

## 2020-01-15 PROCEDURE — 83935 ASSAY OF URINE OSMOLALITY: CPT

## 2020-01-15 PROCEDURE — 99285 EMERGENCY DEPT VISIT HI MDM: CPT | Mod: 25

## 2020-01-15 PROCEDURE — 86403 PARTICLE AGGLUT ANTBDY SCRN: CPT

## 2020-01-15 PROCEDURE — 93005 ELECTROCARDIOGRAM TRACING: CPT

## 2020-01-15 PROCEDURE — 84439 ASSAY OF FREE THYROXINE: CPT

## 2020-01-15 PROCEDURE — 82607 VITAMIN B-12: CPT

## 2020-01-15 PROCEDURE — 74176 CT ABD & PELVIS W/O CONTRAST: CPT

## 2020-01-15 PROCEDURE — 83735 ASSAY OF MAGNESIUM: CPT

## 2020-01-15 PROCEDURE — 36415 COLL VENOUS BLD VENIPUNCTURE: CPT

## 2020-01-15 PROCEDURE — 82945 GLUCOSE OTHER FLUID: CPT

## 2020-01-15 PROCEDURE — 87486 CHLMYD PNEUM DNA AMP PROBE: CPT

## 2020-01-15 PROCEDURE — 31500 INSERT EMERGENCY AIRWAY: CPT

## 2020-01-15 PROCEDURE — 84443 ASSAY THYROID STIM HORMONE: CPT

## 2020-01-15 PROCEDURE — 85027 COMPLETE CBC AUTOMATED: CPT

## 2020-01-15 PROCEDURE — 99232 SBSQ HOSP IP/OBS MODERATE 35: CPT

## 2020-01-15 PROCEDURE — 96374 THER/PROPH/DIAG INJ IV PUSH: CPT

## 2020-01-15 PROCEDURE — 83605 ASSAY OF LACTIC ACID: CPT

## 2020-01-15 PROCEDURE — 96156 HLTH BHV ASSMT/REASSESSMENT: CPT

## 2020-01-15 PROCEDURE — 82550 ASSAY OF CK (CPK): CPT

## 2020-01-15 PROCEDURE — 82746 ASSAY OF FOLIC ACID SERUM: CPT

## 2020-01-15 PROCEDURE — 87086 URINE CULTURE/COLONY COUNT: CPT

## 2020-01-15 PROCEDURE — 87798 DETECT AGENT NOS DNA AMP: CPT

## 2020-01-15 PROCEDURE — 82803 BLOOD GASES ANY COMBINATION: CPT

## 2020-01-15 PROCEDURE — 92950 HEART/LUNG RESUSCITATION CPR: CPT

## 2020-01-15 PROCEDURE — 87040 BLOOD CULTURE FOR BACTERIA: CPT

## 2020-01-15 RX ORDER — SODIUM CHLORIDE 9 MG/ML
1000 INJECTION, SOLUTION INTRAVENOUS
Refills: 0 | Status: DISCONTINUED | OUTPATIENT
Start: 2020-01-15 | End: 2020-01-15

## 2020-01-15 RX ORDER — MEROPENEM 1 G/30ML
500 INJECTION INTRAVENOUS EVERY 12 HOURS
Refills: 0 | Status: DISCONTINUED | OUTPATIENT
Start: 2020-01-15 | End: 2020-01-15

## 2020-01-15 RX ORDER — NOREPINEPHRINE BITARTRATE/D5W 8 MG/250ML
0.05 PLASTIC BAG, INJECTION (ML) INTRAVENOUS
Qty: 8 | Refills: 0 | Status: DISCONTINUED | OUTPATIENT
Start: 2020-01-15 | End: 2020-01-15

## 2020-01-15 RX ORDER — HYDROMORPHONE HYDROCHLORIDE 2 MG/ML
2 INJECTION INTRAMUSCULAR; INTRAVENOUS; SUBCUTANEOUS ONCE
Refills: 0 | Status: DISCONTINUED | OUTPATIENT
Start: 2020-01-15 | End: 2020-01-15

## 2020-01-15 RX ORDER — CHLORHEXIDINE GLUCONATE 213 G/1000ML
15 SOLUTION TOPICAL EVERY 12 HOURS
Refills: 0 | Status: DISCONTINUED | OUTPATIENT
Start: 2020-01-15 | End: 2020-01-15

## 2020-01-15 RX ORDER — SODIUM BICARBONATE 1 MEQ/ML
0.23 SYRINGE (ML) INTRAVENOUS
Qty: 150 | Refills: 0 | Status: DISCONTINUED | OUTPATIENT
Start: 2020-01-15 | End: 2020-01-15

## 2020-01-15 RX ORDER — ROBINUL 0.2 MG/ML
0.4 INJECTION INTRAMUSCULAR; INTRAVENOUS ONCE
Refills: 0 | Status: COMPLETED | OUTPATIENT
Start: 2020-01-15 | End: 2020-01-15

## 2020-01-15 RX ORDER — HYDROMORPHONE HYDROCHLORIDE 2 MG/ML
4 INJECTION INTRAMUSCULAR; INTRAVENOUS; SUBCUTANEOUS
Refills: 0 | Status: DISCONTINUED | OUTPATIENT
Start: 2020-01-15 | End: 2020-01-15

## 2020-01-15 RX ADMIN — HYDROMORPHONE HYDROCHLORIDE 2 MILLIGRAM(S): 2 INJECTION INTRAMUSCULAR; INTRAVENOUS; SUBCUTANEOUS at 16:44

## 2020-01-15 RX ADMIN — MEROPENEM 100 MILLIGRAM(S): 1 INJECTION INTRAVENOUS at 06:19

## 2020-01-15 RX ADMIN — SODIUM CHLORIDE 30 MILLILITER(S): 9 INJECTION, SOLUTION INTRAVENOUS at 07:08

## 2020-01-15 RX ADMIN — Medication 500 MILLIGRAM(S): at 12:09

## 2020-01-15 RX ADMIN — Medication 1000 UNIT(S): at 12:10

## 2020-01-15 RX ADMIN — HYDROMORPHONE HYDROCHLORIDE 2 MILLIGRAM(S): 2 INJECTION INTRAMUSCULAR; INTRAVENOUS; SUBCUTANEOUS at 16:15

## 2020-01-15 RX ADMIN — Medication 2 MILLIGRAM(S): at 16:16

## 2020-01-15 RX ADMIN — Medication 8.5 MICROGRAM(S)/KG/MIN: at 06:20

## 2020-01-15 RX ADMIN — ROBINUL 0.4 MILLIGRAM(S): 0.2 INJECTION INTRAMUSCULAR; INTRAVENOUS at 16:16

## 2020-01-15 RX ADMIN — ENOXAPARIN SODIUM 40 MILLIGRAM(S): 100 INJECTION SUBCUTANEOUS at 07:07

## 2020-01-15 RX ADMIN — Medication 8.5 MICROGRAM(S)/KG/MIN: at 10:00

## 2020-01-15 RX ADMIN — CHLORHEXIDINE GLUCONATE 15 MILLILITER(S): 213 SOLUTION TOPICAL at 07:07

## 2020-01-15 RX ADMIN — Medication 650 MILLIGRAM(S): at 12:45

## 2020-01-15 RX ADMIN — Medication 100 MEQ/KG/HR: at 08:48

## 2020-01-15 RX ADMIN — Medication 325 MILLIGRAM(S): at 12:09

## 2020-01-15 RX ADMIN — SODIUM CHLORIDE 100 MILLILITER(S): 9 INJECTION, SOLUTION INTRAVENOUS at 06:19

## 2020-01-15 RX ADMIN — Medication 1 MILLIGRAM(S): at 12:09

## 2020-01-15 RX ADMIN — PANTOPRAZOLE SODIUM 40 MILLIGRAM(S): 20 TABLET, DELAYED RELEASE ORAL at 12:08

## 2020-01-15 NOTE — CHART NOTE - NSCHARTNOTEFT_GEN_A_CORE
[facility] [place] called at 0351 on [facility]. Arrived to room with nursing staff doing chest compressions. Pt had no pulses. Pt being ambued with 100% O2 via bag valve mask. Order given by HENRY Sandoval to intubate patient. Pt intubated with 7.5 ETT secured at 25cm at lipline with no complications. Positive color change on EtCO2 and breath sounds heard bilaterally with no gurgling heard over abdomen. Pt diminished on left so ETT pulled back to 23cm at lip. Tube secured with tube tavera. Pt suctioned via 14F catheter for small amount of frothy secretions. Pt had regained pulses back and was placed on mechanical vent AC16/450/1.0/+5 and transported to MICU.

## 2020-01-15 NOTE — CONSULT NOTE ADULT - ASSESSMENT
73 yo lady with advanced multiple myeloma and pelvic mass  She is s/p cardiopulmonary arrest last night with likely anoxic CNS effects.  Chart was reviewed yesterday and case presented to Ethics committee (see Minutes)  at [facility]  The committee determined that life sustaining measures would not be medically therapeutic and would result in further harm and suffering.  A DNR/DNI would be appropriate and removal of lifesustaining therapies would be ethically acceptable.

## 2020-01-15 NOTE — DISCHARGE NOTE FOR THE EXPIRED PATIENT - HOSPITAL COURSE
73y/o  Female with a h/o multiple myeloma, pelvic mass, CHF, HTN, Bipolar disorder. Patient was recently discharged 12/19 after being treated for c diff completed coarse of Po Vancomycin on 12/30. Patient was sent in from [facility] for altered mental status. Admitted with PNA and Severe Dehydration and failure to thrive.   Was found this morning unreponsive in cardiac arrest. Last know time awake was unclear. She was intubated and CPR initiated. ROSC was obtained after @23mins.   She was brought to the ICU with severe Shock requiring Levophed and severe Metabolic/Lactic acidosis. Labes revealed Shock Liver as well as Acute renal Failure.  Neurologically, she did not awaken, She had unresponsive pupils, No gag reflex, No Corneal reflexes and no spontaneous respirations. All findings were consistent with and highly suggestive of severe anoxic brain injury and possible Brain Death.   Social Work and Palliative Care, contacted the court appointed  and discussed withdrawl of Life support, which he was in agreement with.   The patient's sister Kim Chambers was also contacted and informed of everything.    She was removed from life support and passed away peacefully a few mins later at 16:52.  No Autopsy was requested.

## 2020-01-15 NOTE — PROVIDER CONTACT NOTE (EICU) - ACTION/TREATMENT ORDERED:
As per Live on staff, Hakeem Little, Pt is medically R/O due to advanced multiple myeloma. Ref# [id number].

## 2020-01-15 NOTE — PROGRESS NOTE ADULT - ATTENDING COMMENTS
Pt remains obtunded & thus cannot make medical decisions for herself. Currently is full code but resuscitative efforts would be futile given her advanced cancer, state of obtundation, hypernatremia, NSTEMI, b/l hydronephrosis, recent C.diff and overall debilitation. Pt should be made comfortable at this point.
Patient seen and examined at the bedside. I was physically present for key portions of the evaluation and management services provided. Agree with the above history, physical, assessment, and plan with the necessary amendments/elaborations below:    clinically elevated trop o/n - 0.9, repeat staying @ 0.09 then 0.10. ss cardio eval appreciated - per them findings not consistent w/ angina, iv lasix for hf exac stopped, placed on iv fluids, will stop fluids and resume lasix given elevated bnp. med management for elevated trop, per them no current needs for tele, repeat ekg in am.    regarding sirs, source identified - hmpv viral URI. sepsis resolved despite some low grade fevers still. supportive care. dc abx. blood cx neg.    regarding hf exac, stable. diuresis as above. daily wt, strict i/o, brief po fluid restriction. monitor.     regarding metabolic acidosis, unchanged. nabicarb.     regarding calvin, improved but due to volume overload require diuresis as above. ivf stopped. . trend bmp. nephro cs if worsening    regarding BL hydronephrosis, redemonstrated on renal US, moderate, bladder wall consistent w/ cystitis but ua on arrival neg. failed TOV, was straight cath overnight, replace irby + start flomax.     *note: per MAR, pt refusing majority of po meds within past 24hrs. meds changed to po + iv as able
Patient seen and examined at the bedside. I was physically present for key portions of the evaluation and management services provided. Agree with the above history, physical, assessment, and plan with the necessary amendments/elaborations below:    clinically stable. discussed w/ cardio, per them rec avoiding diuresis, believe pt is volume deplete, sp brief ivf course. hypernatremia worsening. serial bmp. nephro eval pending.    BL hydronephrosis, irby in place, flomax. repeat imaging in am, if improved will do TOV.     rvp neg, still w/ mild fevers, + elevated procal. trend. resume empiric abx. ID cs in am.     calvin resolved.     severe protein jose malnutrition, nutri eval appreciated, ensure 3x daily, encourage intake, pt very limited    PT eval appreciated, rec long term facility w/ assist - will return to [facility] when medically stable.
D/W CHERU HENRY Ocasio RN, palliative team Dr. Cage and MAIN Marcano
Reviewed CT.  benign right renal cyst  large pelvic mass causing external ureteral compression.    \If creatinine rises recommend bilateral neph tubes as stents are unlikely to be effective.

## 2020-01-15 NOTE — PROGRESS NOTE BEHAVIORAL HEALTH - PRIMARY DX
Delirium due to general medical condition
Delirium due to general medical condition
Metabolic encephalopathy

## 2020-01-15 NOTE — PROCEDURE NOTE - ADDITIONAL PROCEDURE DETAILS
Pt tolerated well. Emergently placed during RRT per MD
Dx: Cardiac arrest, acute hypoxic respiratory failure    Procedure performed independent of CCT.

## 2020-01-15 NOTE — GOALS OF CARE CONVERSATION - ADVANCED CARE PLANNING - CONVERSATION DETAILS
SW received letter of no objection from  Orion Null form office of Mental [employer] to proceed with withdrawal form life support, comfort measures and DNR/DNI. SW notified ICU attending and MOLST completed on chart. Palliative physician DR Cage contacted patients sister Kim Chambers to notify of withdrawal.

## 2020-01-15 NOTE — PROGRESS NOTE BEHAVIORAL HEALTH - NSBHCHARTREVIEWVS_PSY_A_CORE FT
Vital Signs Last 24 Hrs  T(C): 37.6 (15 Robe 2020 08:00), Max: 37.6 (15 Robe 2020 08:00)  T(F): 99.7 (15 Robe 2020 08:00), Max: 99.7 (15 Robe 2020 08:00)  HR: 86 (15 Robe 2020 08:00) (69 - 100)  BP: 98/49 (15 Robe 2020 08:00) (79/46 - 115/62)  BP(mean): 70 (15 Robe 2020 08:00) (58 - 72)  RR: 16 (15 Robe 2020 08:00) (16 - 24)  SpO2: 100% (15 Robe 2020 08:00) (92% - 100%)

## 2020-01-15 NOTE — GOALS OF CARE CONVERSATION - ADVANCED CARE PLANNING - CONVERSATION DETAILS
MOLST checklist 4 was completed. Determination categories for medical capacity as well as category for compliance with patient centered standards completed in medical record. Ethics Committee was held yesterday to review patients case. Clinical review was completed and committee reviewed clinical issues with providers. It was determined by ethics committee that life sustaining measures are not therapeutic and would cause further harm and suffering. MAIN spoke with Arthur Garvey  at [employer] who is aware of patient case and plan to initiate checklist. MAIN advised  further regarding medical events last night with patient unresponsive and currently intubated on life support in ICU. MAIN informed [facility] for request for withdrawal of ventilator, comfort care measures/Hospice and DNR. Checklist 4 with clinical documentation sent to [employer] [facility] attorneys for their review and  assigned will be coming to hospital later today.

## 2020-01-15 NOTE — PROGRESS NOTE ADULT - ASSESSMENT
74F resident of [facility] with h/o multiple myeloma, CHF, HTN, bipolar disorder, recent admission 12/12-12/19/19 for c.diff colitis and found with numerous lytic lesions and large pelvic mass on CT now readmitted for AMS, LUNA, CHF exacerbation, presumed PNA with SIRS workup. Course complicated by persistent encephalopathy, dysphagia, NSTEMI, severe protein calorie malnutrition, debility. S/P code blue for bradycardiac asystole presumed 2/2 aspiration event on tube feeds, prolong down time with ROSC achieved ~23 mins with ACLS. High suspicion of anoxic brain injury.    PLAN 74F resident of [facility] with h/o multiple myeloma, CHF, HTN, bipolar disorder, recent admission 12/12-12/19/19 for c.diff colitis and found with numerous lytic lesions and large pelvic mass on CT now readmitted for AMS, LUNA, CHF exacerbation, presumed PNA with SIRS workup. Course complicated by persistent encephalopathy, dysphagia, NSTEMI, severe protein calorie malnutrition, debility. S/P code blue for bradycardiac asystole presumed 2/2 aspiration event on tube feeds, prolong down time with ROSC achieved ~23 mins with ACLS. High suspicion of anoxic brain injury.    PLAN    S/P Cardiac Arrest/Asystole  Likely Anoxic Encephalopathy  - s/p code blue with prolong down time  - pupils fixed and absent gag reflex  - overall very poor prognosis    Acute Respiratory Failure with Hypoxia  - intubated and on full vent support    Septic Shock/ Aspiration PNA  - likely recurrent aspiration event with tube feeds overnight  - on levophed gtt, IV antibiotic Meropenem    Bipolar Disorder  - psych following: pt lacks medical decision making capacity and given recent events overnight "prognosis is grim, recovery unlikely"    Severe Protein Calorie Malnutrition/Cachexia  - hypoalbuminemia, low prealbumin  - poor prognostic factor    Palliative Care Encounter  Pt with code blue, cardiac arrest and prolong down time, pupils fixed and absent gag reflex are all concerning for anoxic brain injury. In light of recent events along with preexisting comorbidities, metastatic multiple myeloma, pelvic mass, CHF, dysphagia, malnutrition and debility, prognosis is extremely poor and any chance of meaningful recovery is unlikely. D/W palliative team including SKYLER Delgado checklist #4 has been initiated to request for DNR/I, comfort measures with palliative extubation through office of mental hygiene. Ethics Committee Meeting was held yesterday.

## 2020-01-15 NOTE — PROGRESS NOTE BEHAVIORAL HEALTH - NSBHCONSULTPRIMARYDISCUSSYES_PSY_A_CORE FT
call placed to Dr Peoples Clinical Director at [facility] to inform of current status [phone]  call placed to [employer] [employer] [facility] to inform of change in status [phone]- Mr Arthur Garvey Esq

## 2020-01-15 NOTE — CONSULT NOTE ADULT - ATTENDING COMMENTS
D/W RN, hospitalist Dr. Reynoso, Palliative MAIN Marcano, Dr. Cage, unit SW
74F w/ PMHx HTN, CHF, multiple myeloma, hypothyroid, bipolar disorder, recent C diff was admitted from [facility] w/ AMS and was being empirically treated for PNA.   Overnight she had a cardiac arrest around ~ 03:51AM after she was found unresponsive with no pulse. ROSC achieved in 23min.   Currently on full vent support.   No sedation. On examination her pupils are equal and non reactive, No cough/gag. Pt does not trigger the vent or withdraw to painful stimuli    Plan   Pt likely has significant anoxic injury after the arrest  Maintain temp ~36C for 24 hrs  Continue hydration w/ Bicarb gtt for now. Hold feeds  Trend enzymes, LA and check CTH  Palliative care eval

## 2020-01-15 NOTE — PROGRESS NOTE ADULT - SUBJECTIVE AND OBJECTIVE BOX
FOLLOW UP VISIT    INTERVAL HPI/OVERNIGHT EVENTS:  Chart reviewed and discussed with ICU HENRY Ocasio. Code blue for cardiac arrest overnight with prolong downtime and ROSC achieved ~23 mins of ACLS. S/P intubation for airway protection. Presumed likely aspiration event from tube feeds. High suspicion of anoxic brain injury.     Present Symptoms:   Dyspnea: Yes No   Nausea/Vomiting: Yes No  Anxiety:  Yes No  Depression: Yes No  Fatigue: Yes No  Loss of appetite: Yes No  Constipation:  Pain:     Review of Systems: Reviewed, All others negative  Limited ROS due to   Unable to perform ROS due to    MEDICATIONS  (STANDING):  ascorbic acid 500 milliGRAM(s) Oral daily  aspirin enteric coated 81 milliGRAM(s) Oral daily  carvedilol 6.25 milliGRAM(s) Oral every 12 hours  chlorhexidine 0.12% Liquid 15 milliLiter(s) Oral Mucosa every 12 hours  cholecalciferol 1000 Unit(s) Oral daily  enoxaparin Injectable 40 milliGRAM(s) SubCutaneous every 24 hours  ferrous sulfate Oral Tab/Cap - Peds 325 milliGRAM(s) Oral daily  folic acid 1 milliGRAM(s) Oral daily  lactulose Retention Enema 200 Gram(s) Rectal daily  levothyroxine Injectable 37.5 MICROGram(s) IV Push at bedtime  meropenem  IVPB      meropenem  IVPB 1000 milliGRAM(s) IV Intermittent every 12 hours  multiple electrolytes Injection Type 1 1000 milliLiter(s) (30 mL/Hr) IV Continuous <Continuous>  norepinephrine Infusion 0.05 MICROgram(s)/kG/Min (8.503 mL/Hr) IV Continuous <Continuous>  pantoprazole  Injectable 40 milliGRAM(s) IV Push daily  sodium bicarbonate  Infusion 0.228 mEq/kG/Hr (100 mL/Hr) IV Continuous <Continuous>  tamsulosin 0.4 milliGRAM(s) Oral at bedtime    MEDICATIONS  (PRN):  acetaminophen  Suppository .. 650 milliGRAM(s) Rectal every 6 hours PRN Temp greater or equal to 38C (100.4F), Mild Pain (1 - 3), Moderate Pain (4 - 6), Severe Pain (7 - 10)      PHYSICAL EXAM:    Vital Signs Last 24 Hrs  T(C): 37.6 (15 Robe 2020 08:00), Max: 37.6 (15 Robe 2020 08:00)  T(F): 99.7 (15 Robe 2020 08:00), Max: 99.7 (15 Robe 2020 08:00)  HR: 86 (15 Robe 2020 08:00) (69 - 100)  BP: 98/49 (15 Robe 2020 08:00) (79/46 - 115/62)  BP(mean): 70 (15 Robe 2020 08:00) (58 - 72)  RR: 16 (15 Robe 2020 08:00) (16 - 24)  SpO2: 100% (15 Robe 2020 08:00) (92% - 100%)    General: Resting comfortably. No acute distress. cachexia  obese  nonverbal  coma  HEENT: mucous membrane moist dry.  ETT/trach or NGT  Lungs: clear to auscultation bilaterally. no rales, rhonchi, wheezing. non-labored.   CV: +s1/s2. Regular rate and rhythm.  murmurs, rubs, gallop  GI: +bowel sound. abdomen soft, non-tender, non-distended, obese. guarding. PEG. Last BM:  MSK: Moves all 4 extremities.  cyanosis or edema. weakness. ambulatory  bedbound/wheelchair bound  Neuro: Awake and alert, oriented to person/place/time. Interactive, nonverbal.   : suprapubic tenderness to palpation. Huang  Skin: warm and dry. no rash. ecchymosis. wounds.      LABS:                          7.9    18.25 )-----------( 94       ( 15 Robe 2020 05:48 )             28.2     01-15    145  |  107  |  52.0<H>  ----------------------------<  199<H>  5.1   |  15.0<L>  |  2.06<H>    Ca    7.6<L>      15 Robe 2020 05:48  Phos  8.1     01-15  Mg     2.6     01-15    TPro  8.1  /  Alb  2.1<L>  /  TBili  0.3<L>  /  DBili  x   /  AST  423<H>  /  ALT  174<H>  /  AlkPhos  282<H>  01-15    PT/INR - ( 15 Robe 2020 05:48 )   PT: 16.0 sec;   INR: 1.38 ratio                 RADIOLOGY & ADDITIONAL STUDIES: Reviewed, no new recent studies    ADVANCE DIRECTIVES:   DNR YES NO  Completed on:                     MOLST  YES NO   Completed on:  Living Will  YES NO   Completed on: FOLLOW UP VISIT    INTERVAL HPI/OVERNIGHT EVENTS:  Chart reviewed and discussed with ICU HENRY Ocasio. Code blue for cardiac arrest overnight with prolong downtime and ROSC achieved ~23 mins of ACLS. Now intubated on vent support. Presumed likely underlying aspiration event from tube feeds. High suspicion of anoxic brain injury.     Unable to perform ROS due to encephalopathy.     MEDICATIONS  (STANDING):  ascorbic acid 500 milliGRAM(s) Oral daily  aspirin enteric coated 81 milliGRAM(s) Oral daily  carvedilol 6.25 milliGRAM(s) Oral every 12 hours  chlorhexidine 0.12% Liquid 15 milliLiter(s) Oral Mucosa every 12 hours  cholecalciferol 1000 Unit(s) Oral daily  enoxaparin Injectable 40 milliGRAM(s) SubCutaneous every 24 hours  ferrous sulfate Oral Tab/Cap - Peds 325 milliGRAM(s) Oral daily  folic acid 1 milliGRAM(s) Oral daily  lactulose Retention Enema 200 Gram(s) Rectal daily  levothyroxine Injectable 37.5 MICROGram(s) IV Push at bedtime  meropenem  IVPB      meropenem  IVPB 1000 milliGRAM(s) IV Intermittent every 12 hours  multiple electrolytes Injection Type 1 1000 milliLiter(s) (30 mL/Hr) IV Continuous <Continuous>  norepinephrine Infusion 0.05 MICROgram(s)/kG/Min (8.503 mL/Hr) IV Continuous <Continuous>  pantoprazole  Injectable 40 milliGRAM(s) IV Push daily  sodium bicarbonate  Infusion 0.228 mEq/kG/Hr (100 mL/Hr) IV Continuous <Continuous>  tamsulosin 0.4 milliGRAM(s) Oral at bedtime    MEDICATIONS  (PRN):  acetaminophen  Suppository .. 650 milliGRAM(s) Rectal every 6 hours PRN Temp greater or equal to 38C (100.4F), Mild Pain (1 - 3), Moderate Pain (4 - 6), Severe Pain (7 - 10)      PHYSICAL EXAM:    Vital Signs Last 24 Hrs  T(C): 37.6 (15 Robe 2020 08:00), Max: 37.6 (15 Robe 2020 08:00)  T(F): 99.7 (15 Robe 2020 08:00), Max: 99.7 (15 Robe 2020 08:00)  HR: 86 (15 Robe 2020 08:00) (69 - 100)  BP: 98/49 (15 Robe 2020 08:00) (79/46 - 115/62)  BP(mean): 70 (15 Robe 2020 08:00) (58 - 72)  RR: 16 (15 Robe 2020 08:00) (16 - 24)  SpO2: 100% (15 Robe 2020 08:00) (92% - 100%)    General: ill appearing. cachetic. no acute distress  HEENT: mucous membrane  dry. +ETT +NGT. pupils nonreactive to light bilaterally  Lungs: comfortable. non-labored. vent support  CV: +s1/s2. RRR  GI: +bowel sound. abdomen soft, non-tender, non-distended   MSK: No cyanosis. +edema. bedbound  Neuro: nonfocal. unresponsive and obtunded  : Huang  Skin: warm and dry.      LABS:                          7.9    18.25 )-----------( 94       ( 15 Robe 2020 05:48 )             28.2     01-15    145  |  107  |  52.0<H>  ----------------------------<  199<H>  5.1   |  15.0<L>  |  2.06<H>    Ca    7.6<L>      15 Robe 2020 05:48  Phos  8.1     01-15  Mg     2.6     01-15    TPro  8.1  /  Alb  2.1<L>  /  TBili  0.3<L>  /  DBili  x   /  AST  423<H>  /  ALT  174<H>  /  AlkPhos  282<H>  01-15    PT/INR - ( 15 Robe 2020 05:48 )   PT: 16.0 sec;   INR: 1.38 ratio      RADIOLOGY & ADDITIONAL STUDIES: Reviewed     ADVANCE DIRECTIVES: FULL CODE FOLLOW UP VISIT    INTERVAL HPI/OVERNIGHT EVENTS:  Chart reviewed and discussed with ICU HENRY Ocasio. Code blue for cardiac arrest overnight with prolong downtime and ROSC achieved ~23 mins of ACLS. Now intubated on vent support. Presumed likely underlying aspiration event from tube feeds. High suspicion of anoxic brain injury especially given fixed pupils and absent gag reflex.    Unable to perform ROS due to encephalopathy.     MEDICATIONS  (STANDING):  ascorbic acid 500 milliGRAM(s) Oral daily  aspirin enteric coated 81 milliGRAM(s) Oral daily  carvedilol 6.25 milliGRAM(s) Oral every 12 hours  chlorhexidine 0.12% Liquid 15 milliLiter(s) Oral Mucosa every 12 hours  cholecalciferol 1000 Unit(s) Oral daily  enoxaparin Injectable 40 milliGRAM(s) SubCutaneous every 24 hours  ferrous sulfate Oral Tab/Cap - Peds 325 milliGRAM(s) Oral daily  folic acid 1 milliGRAM(s) Oral daily  lactulose Retention Enema 200 Gram(s) Rectal daily  levothyroxine Injectable 37.5 MICROGram(s) IV Push at bedtime  meropenem  IVPB      meropenem  IVPB 1000 milliGRAM(s) IV Intermittent every 12 hours  multiple electrolytes Injection Type 1 1000 milliLiter(s) (30 mL/Hr) IV Continuous <Continuous>  norepinephrine Infusion 0.05 MICROgram(s)/kG/Min (8.503 mL/Hr) IV Continuous <Continuous>  pantoprazole  Injectable 40 milliGRAM(s) IV Push daily  sodium bicarbonate  Infusion 0.228 mEq/kG/Hr (100 mL/Hr) IV Continuous <Continuous>  tamsulosin 0.4 milliGRAM(s) Oral at bedtime    MEDICATIONS  (PRN):  acetaminophen  Suppository .. 650 milliGRAM(s) Rectal every 6 hours PRN Temp greater or equal to 38C (100.4F), Mild Pain (1 - 3), Moderate Pain (4 - 6), Severe Pain (7 - 10)      PHYSICAL EXAM:    Vital Signs Last 24 Hrs  T(C): 37.6 (15 Robe 2020 08:00), Max: 37.6 (15 Robe 2020 08:00)  T(F): 99.7 (15 Robe 2020 08:00), Max: 99.7 (15 Robe 2020 08:00)  HR: 86 (15 Robe 2020 08:00) (69 - 100)  BP: 98/49 (15 Robe 2020 08:00) (79/46 - 115/62)  BP(mean): 70 (15 Robe 2020 08:00) (58 - 72)  RR: 16 (15 Robe 2020 08:00) (16 - 24)  SpO2: 100% (15 Robe 2020 08:00) (92% - 100%)    General: ill appearing. cachetic. no acute distress  HEENT: mucous membrane  dry. +ETT +NGT. pupils nonreactive to light bilaterally  Lungs: comfortable. non-labored. vent support  CV: +s1/s2. RRR  GI: +bowel sound. abdomen soft, non-tender, non-distended   MSK: No cyanosis. +edema. bedbound  Neuro: nonfocal. unresponsive and obtunded  : Huang  Skin: warm and dry.      LABS:                          7.9    18.25 )-----------( 94       ( 15 Robe 2020 05:48 )             28.2     01-15    145  |  107  |  52.0<H>  ----------------------------<  199<H>  5.1   |  15.0<L>  |  2.06<H>    Ca    7.6<L>      15 Robe 2020 05:48  Phos  8.1     01-15  Mg     2.6     01-15    TPro  8.1  /  Alb  2.1<L>  /  TBili  0.3<L>  /  DBili  x   /  AST  423<H>  /  ALT  174<H>  /  AlkPhos  282<H>  01-15    PT/INR - ( 15 Robe 2020 05:48 )   PT: 16.0 sec;   INR: 1.38 ratio      RADIOLOGY & ADDITIONAL STUDIES: Reviewed     ADVANCE DIRECTIVES: FULL CODE

## 2020-01-15 NOTE — CONSULT NOTE ADULT - SUBJECTIVE AND OBJECTIVE BOX
Patient is a 75 y/o Female who presents with a chief complaint of AMS, LL infiltrate (2020 15:57)    BRIEF HOSPITAL COURSE: 75 y/o F w/ a PMHx of multiple myeloma, pelvic mass, CHF, HTN, and bipolar disorder who was recently admitted to [facility] for C diff (completed abx course on ) presented to the ED from [facility] on 20 w/ altered mental status. Per [place] documentation, pt had been refusing to eat/drink for several days and was then found to be lethargic w/ fever (101 F) prompting ED presentation. In the ED, pt found to have low grade fever (100.2 F) and CXR w/ questionable left-sided infiltrate. Pt was admitted to medicine w/ encephalopathy, presumed PNA, and hypernatremia.    Events last 24 hours: CODE BLUE was called at 03:51 after pt was found unresponsive w/o a pulse by RN. Per RN, she had checked on pt a few minutes prior. Initial rhythm was asystole. Pt received epi x4, calcium x2, and bicarb. ROSC was achieved after 23 mins of ACLS. Pt was administered saravanan stick x1.5. Intubated by respiratory therapist during code. Transfer to MICU for continuation of care.    PAST MEDICAL & SURGICAL HISTORY:  Congestive heart failure (CHF)  Multiple myeloma  Mental disorder  Bone cancer  Scoliosis  Hypercholesteremia  Hypertension  Hypothyroid  No significant past surgical history    Allergies  ixazomib (Unknown)  NSAIDs (Unknown)  penicillins (Unknown)  sulfa drugs (Unknown)    FAMILY HISTORY:  No pertinent family history    SOCIAL HISTORY:  Lives at [facility]. Remainder of social hx unable to obtain as pt is intubated.     Review of Systems:  Unable to obtain. S/p cardiac arrest. Pt is intubated.    Medications:  meropenem  IVPB      meropenem  IVPB 1000 milliGRAM(s) IV Intermittent every 12 hours  carvedilol 6.25 milliGRAM(s) Oral every 12 hours  norepinephrine Infusion 0.05 MICROgram(s)/kG/Min IV Continuous <Continuous>  tamsulosin 0.4 milliGRAM(s) Oral at bedtime  acetaminophen  Suppository .. 650 milliGRAM(s) Rectal every 6 hours PRN  aspirin enteric coated 81 milliGRAM(s) Oral daily  enoxaparin Injectable 40 milliGRAM(s) SubCutaneous every 24 hours  lactulose Retention Enema 200 Gram(s) Rectal daily  pantoprazole  Injectable 40 milliGRAM(s) IV Push daily  levothyroxine Injectable 37.5 MICROGram(s) IV Push at bedtime  ascorbic acid 500 milliGRAM(s) Oral daily  cholecalciferol 1000 Unit(s) Oral daily  ferrous sulfate Oral Tab/Cap - Peds 325 milliGRAM(s) Oral daily  folic acid 1 milliGRAM(s) Oral daily  multiple electrolytes Injection Type 1 1000 milliLiter(s) IV Continuous <Continuous>  chlorhexidine 0.12% Liquid 15 milliLiter(s) Oral Mucosa every 12 hours    Mode: AC/ CMV (Assist Control/ Continuous Mandatory Ventilation)  RR (machine): 16  TV (machine): 450  FiO2: 60  PEEP: 5  MAP: 14  PIP: 45    ICU Vital Signs Last 24 Hrs  T(C): 36.8 (2020 23:23), Max: 36.9 (2020 15:57)  T(F): 98.2 (2020 23:23), Max: 98.5 (2020 15:57)  HR: 69 (15 Robe 2020 05:24) (69 - 100)  BP: 98/49 (15 Robe 2020 05:01) (79/46 - 115/62)  BP(mean): 71 (15 Robe 2020 05:01) (58 - 72)  ABP: --  ABP(mean): --  RR: 18 (15 Robe 2020 05:01) (16 - 24)  SpO2: 100% (15 Robe 2020 05:24) (85% - 100%)    Vital Signs Last 24 Hrs  T(C): 36.8 (2020 23:23), Max: 36.9 (2020 15:57)  T(F): 98.2 (2020 23:23), Max: 98.5 (2020 15:57)  HR: 69 (15 Robe 2020 05:24) (69 - 100)  BP: 98/49 (15 Robe 2020 05:01) (79/46 - 115/62)  BP(mean): 71 (15 Robe 2020 05:01) (58 - 72)  RR: 18 (15 Robe 2020 05:01) (16 - 24)  SpO2: 100% (15 Robe 2020 05:24) (85% - 100%)    I&O's Detail    2020 07:01  -  2020 07:00  --------------------------------------------------------  IN:    dextrose 5%.: 75 mL  Total IN: 75 mL    OUT:    Indwelling Catheter - Urethral: 750 mL  Total OUT: 750 mL    Total NET: -675 mL    2020 07:01  -  15 Robe 2020 05:37  --------------------------------------------------------  IN:    dextrose 5%.: 225 mL    ns in tub fed  wuqvon85: 150 mL  Total IN: 375 mL    OUT:  Total OUT: 0 mL    Total NET: 375 mL    LABS:                        7.9    7.67  )-----------( 96       ( 2020 08:10 )             26.7     01-14    147<H>  |  112<H>  |  48.0<H>  ----------------------------<  142<H>  3.7   |  21.0<L>  |  1.66<H>    Ca    7.2<L>      2020 08:10  Phos  4.5       Mg     2.4         CAPILLARY BLOOD GLUCOSE    Urinalysis Basic - ( 2020 06:50 )  Color: Yellow / Appearance: Slightly Turbid / S.010 / pH: x  Gluc: x / Ketone: Negative  / Bili: Negative / Urobili: Negative mg/dL   Blood: x / Protein: 500 mg/dL / Nitrite: Negative   Leuk Esterase: Trace / RBC: >50 /HPF / WBC 3-5   Sq Epi: x / Non Sq Epi: Occasional / Bacteria: Few    CULTURES:  Culture Results:   No growth to date ( @ 12:23)  Culture Results:   No growth at 48 hours ( @ 16:26)  Culture Results:   No growth at 48 hours ( @ 16:25)    Physical Examination:    VITALS AT TIME OF EXAM:  HR:  BP:  RR:  SPO2:    General: Well appearing, lying in bed in NAD.    HEENT: Pupils equal, reactive to light. Symmetric. No scleral icterus or injection.    PULM: Clear to auscultation B/L. No wheezes, rales, or rhonchi appreciated. No significant sputum production or increased respiratory effort.    NECK: Supple, no lymphadenopathy, trachea midline.    CVS: Regular rate and rhythm, no murmurs appreciated, +s1/s2.    ABD: Soft, nondistended, nontender, normoactive bowel sounds.    EXT: No edema, nontender.    SKIN: Warm and well perfused, no rashes noted.    NEURO: Alert, oriented, interactive, nonfocal.    DEVICES: N  LINES: Left IJ CVC  HOOKER: Y    RADIOLOGY:  < from: CT Head No Cont (20 @ 15:55) >   EXAM:  CT BRAIN                          PROCEDURE DATE:  2020      INTERPRETATION:  History:Altered mental status. Rule out stroke    Findings:    Comparison study: 2018    Encephalomalacia consistent with old infarction in the right frontal lobe is again noted. Chronic periventricular white matter changes are seen bilaterally.  There is no hydrocephalus or midline shift. There are no intra or extra-axial  fluid collections or masses. There is no sign of intracranial hemorrhage or acute infarction. Cortical atrophy is noted. The visualized orbits and paranasal sinuses are unremarkable.  1.4 cm lesion in the left parietal bone is stable. 1.2 cm lytic lesion in the right frontal bone is stable.    IMPRESSION:    No acute changes.    No sign of hemorrhage, acute CVA or mass.    Cortical atrophy.    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    Chronic periventricular white matter ischemic changes.    Stable lytic lesions in the right frontal and left parietal bones.    NAVA DOBSON M.D.; ATTENDING RADIOLOGIST  This document has been electronically signed. 2020  4:25PM    < end of copied text >    < from: CT Chest No Cont (20 @ 10:03) >   EXAM:  CT CHEST                          PROCEDURE DATE:  2020      INTERPRETATION:  CLINICAL INFORMATION: Cough and fever, suspected pneumonia.    COMPARISON: CT chest, abdomen and pelvis 2019.    PROCEDURE:   CT of the Chest was performed without intravenous contrast.  Sagittal and coronal reformats were performed.    FINDINGS:    LUNGS AND AIRWAYS: Patent central airways.  Diffuse intralobular septal thickening with areas of groundglass attenuation, compatible with pulmonary vascular congestion. There is compressive atelectasis of the posterior lungs related to the pleural effusions.    PLEURA: Small bilateral pleural effusions.    MEDIASTINUM AND VALERIE: No lymphadenopathy.    VESSELS: The thoracic aorta and main pulmonary artery are normal in caliber. There is scattered coronary artery calcification. Distal tip of the MediPort is located in the SVC.    HEART: Cardiomegaly. No pericardial effusion.    CHEST WALL AND LOWER NECK: The thyroid gland is within normal limits. There is no definite supraclavicular or axillary lymphadenopathy. There is a Mediport in the right anterior chest wall.    VISUALIZED UPPER ABDOMEN: The adrenal glands are within normal limits. The imaged portions of the unenhanced liver, spleen,are within normal limits. There is bilateral hydronephrosis. There is diffuse infiltration of the intra-abdominal fat as well as the overlying subcutaneous soft tissues.     BONES: Destructive changes in the left acromial process. Degenerative changeof bilateral shoulders. Multilevel degenerative change of the thoracolumbar spine. There are multiple lytic lesions in the spine, the largest involves partially imaged L1 vertebral body. There is a lesion in the left second rib. Fracture of the left lateral sixth rib. Multiple old healed fractures and sclerotic lesions are noted in the bilateral ribs.    IMPRESSION:     Osseous metastasis, correlate with history of malignancy.    Pulmonary findings compatible with pulmonary vascular congestion. Cardiomegaly, bilateral pleural effusions.    Bilateral hydronephrosis of the partially imaged kidneys.    EVIN RICE M.D. ATTENDING RADIOLOGIST  This document has been electronically signed. 2020 10:33AM    < end of copied text >    Critical Care time: 56 mins assessing presenting problems of acute illness that pose high probability of life threatening deterioration or end organ damage/dysfunction. Medical decision making including initiating plan of care, reviewing data, reviewing radiology, direct patient bedside evaluation, interpretation of vital signs, any necessary ventilator management, discussion with multidisciplinary team, discussing goals of care with patient/family, all non inclusive of procedures. Patient is a 75 y/o Female who presents with a chief complaint of AMS, LL infiltrate (2020 15:57)    BRIEF HOSPITAL COURSE: 75 y/o F w/ a PMHx of multiple myeloma, pelvic mass, CHF, hypothyroid, HTN, and bipolar disorder who was recently admitted to [facility] for C diff (completed abx course on ) presented to the ED from [facility] on 20 w/ altered mental status. Per [place] documentation, pt had been refusing to eat/drink for several days and was then found to be lethargic w/ fever (101 F) prompting ED presentation. In the ED, pt found to have low grade fever (100.2 F) and CXR w/ questionable left-sided infiltrate. Pt was admitted to medicine w/ encephalopathy, presumed PNA, and hypernatremia.    Events last 24 hours: Code Blue was called at 03:51 after pt was found unresponsive w/o a pulse by RN. Per RN, she had checked on pt a few minutes prior. Initial rhythm was asystole. Pt received epi x4, calcium x2, and bicarb. ROSC was achieved after 23 mins of ACLS. Pt was administered saravanan stick x1.5. Intubated by respiratory therapist during code. Transfer to MICU for continuation of care.    PAST MEDICAL & SURGICAL HISTORY:  Congestive heart failure (CHF)  Multiple myeloma  Mental disorder  Bone cancer  Scoliosis  Hypercholesteremia  Hypertension  Hypothyroid  No significant past surgical history    Allergies  ixazomib (Unknown)  NSAIDs (Unknown)  penicillins (Unknown)  sulfa drugs (Unknown)    FAMILY HISTORY:  No pertinent family history    SOCIAL HISTORY:  Lives at [facility]. Remainder of social hx unable to obtain as pt is intubated.     Review of Systems:  Unable to obtain. S/p cardiac arrest. Pt is intubated.    Medications:  meropenem  IVPB      meropenem  IVPB 1000 milliGRAM(s) IV Intermittent every 12 hours  carvedilol 6.25 milliGRAM(s) Oral every 12 hours  norepinephrine Infusion 0.05 MICROgram(s)/kG/Min IV Continuous <Continuous>  tamsulosin 0.4 milliGRAM(s) Oral at bedtime  acetaminophen  Suppository .. 650 milliGRAM(s) Rectal every 6 hours PRN  aspirin enteric coated 81 milliGRAM(s) Oral daily  enoxaparin Injectable 40 milliGRAM(s) SubCutaneous every 24 hours  lactulose Retention Enema 200 Gram(s) Rectal daily  pantoprazole  Injectable 40 milliGRAM(s) IV Push daily  levothyroxine Injectable 37.5 MICROGram(s) IV Push at bedtime  ascorbic acid 500 milliGRAM(s) Oral daily  cholecalciferol 1000 Unit(s) Oral daily  ferrous sulfate Oral Tab/Cap - Peds 325 milliGRAM(s) Oral daily  folic acid 1 milliGRAM(s) Oral daily  multiple electrolytes Injection Type 1 1000 milliLiter(s) IV Continuous <Continuous>  chlorhexidine 0.12% Liquid 15 milliLiter(s) Oral Mucosa every 12 hours    Mode: AC/ CMV (Assist Control/ Continuous Mandatory Ventilation)  RR (machine): 16  TV (machine): 450  FiO2: 60  PEEP: 5  MAP: 14  PIP: 45    ICU Vital Signs Last 24 Hrs  T(C): 36.8 (2020 23:23), Max: 36.9 (2020 15:57)  T(F): 98.2 (2020 23:23), Max: 98.5 (2020 15:57)  HR: 69 (15 Robe 2020 05:24) (69 - 100)  BP: 98/49 (15 Robe 2020 05:01) (79/46 - 115/62)  BP(mean): 71 (15 Robe 2020 05:01) (58 - 72)  ABP: --  ABP(mean): --  RR: 18 (15 Robe 2020 05:01) (16 - 24)  SpO2: 100% (15 Robe 2020 05:24) (85% - 100%)    Vital Signs Last 24 Hrs  T(C): 36.8 (2020 23:23), Max: 36.9 (2020 15:57)  T(F): 98.2 (2020 23:23), Max: 98.5 (2020 15:57)  HR: 69 (15 Robe 2020 05:24) (69 - 100)  BP: 98/49 (15 Robe 2020 05:01) (79/46 - 115/62)  BP(mean): 71 (15 Robe 2020 05:01) (58 - 72)  RR: 18 (15 Robe 2020 05:01) (16 - 24)  SpO2: 100% (15 Robe 2020 05:24) (85% - 100%)    I&O's Detail    2020 07:01  -  2020 07:00  --------------------------------------------------------  IN:    dextrose 5%.: 75 mL  Total IN: 75 mL    OUT:    Indwelling Catheter - Urethral: 750 mL  Total OUT: 750 mL    Total NET: -675 mL    2020 07:01  -  15 Robe 2020 05:37  --------------------------------------------------------  IN:    dextrose 5%.: 225 mL    ns in tub fed  [id number]: 150 mL  Total IN: 375 mL    OUT:  Total OUT: 0 mL    Total NET: 375 mL    LABS:                        7.9    7.67  )-----------( 96       ( 2020 08:10 )             26.7     01-14    147<H>  |  112<H>  |  48.0<H>  ----------------------------<  142<H>  3.7   |  21.0<L>  |  1.66<H>    Ca    7.2<L>      2020 08:10  Phos  4.5     -  Mg     2.4         CAPILLARY BLOOD GLUCOSE    Urinalysis Basic - ( 2020 06:50 )  Color: Yellow / Appearance: Slightly Turbid / S.010 / pH: x  Gluc: x / Ketone: Negative  / Bili: Negative / Urobili: Negative mg/dL   Blood: x / Protein: 500 mg/dL / Nitrite: Negative   Leuk Esterase: Trace / RBC: >50 /HPF / WBC 3-5   Sq Epi: x / Non Sq Epi: Occasional / Bacteria: Few    CULTURES:  Culture Results:   No growth to date ( @ 12:23)  Culture Results:   No growth at 48 hours ( @ 16:26)  Culture Results:   No growth at 48 hours ( @ 16:25)    Physical Examination:    General: S/p cardiac arrest. Intubated.    HEENT: Pupils equal, not reactive to light.    PULM: Diffuse rhonchi B/L.    NECK: Supple, no lymphadenopathy, trachea midline.    CVS: Regular rate and rhythm, no murmurs appreciated, +s1/s2.    ABD: Soft, distended, nontender, normoactive bowel sounds.    EXT: No edema, nontender.    SKIN: Warm and well perfused, no rashes noted.    NEURO: Not on any sedation. Not responsive to verbal or noxious stimuli. Not moving extremities spontaneously.    DEVICES: N  LINES: Left IJ CVC  HOOKER: Y    RADIOLOGY:  < from: CT Head No Cont (20 @ 15:55) >   EXAM:  CT BRAIN                          PROCEDURE DATE:  2020      INTERPRETATION:  History:Altered mental status. Rule out stroke    Findings:    Comparison study: 2018    Encephalomalacia consistent with old infarction in the right frontal lobe is again noted. Chronic periventricular white matter changes are seen bilaterally.  There is no hydrocephalus or midline shift. There are no intra or extra-axial  fluid collections or masses. There is no sign of intracranial hemorrhage or acute infarction. Cortical atrophy is noted. The visualized orbits and paranasal sinuses are unremarkable.  1.4 cm lesion in the left parietal bone is stable. 1.2 cm lytic lesion in the right frontal bone is stable.    IMPRESSION:    No acute changes.    No sign of hemorrhage, acute CVA or mass.    Cortical atrophy.    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    Chronic periventricular white matter ischemic changes.    Stable lytic lesions in the right frontal and left parietal bones.    NAVA DOBSON M.D.; ATTENDING RADIOLOGIST  This document has been electronically signed. 2020  4:25PM    < end of copied text >    < from: CT Chest No Cont (20 @ 10:03) >   EXAM:  CT CHEST                          PROCEDURE DATE:  2020      INTERPRETATION:  CLINICAL INFORMATION: Cough and fever, suspected pneumonia.    COMPARISON: CT chest, abdomen and pelvis 2019.    PROCEDURE:   CT of the Chest was performed without intravenous contrast.  Sagittal and coronal reformats were performed.    FINDINGS:    LUNGS AND AIRWAYS: Patent central airways.  Diffuse intralobular septal thickening with areas of groundglass attenuation, compatible with pulmonary vascular congestion. There is compressive atelectasis of the posterior lungs related to the pleural effusions.    PLEURA: Small bilateral pleural effusions.    MEDIASTINUM AND VALERIE: No lymphadenopathy.    VESSELS: The thoracic aorta and main pulmonary artery are normal in caliber. There is scattered coronary artery calcification. Distal tip of the MediPort is located in the SVC.    HEART: Cardiomegaly. No pericardial effusion.    CHEST WALL AND LOWER NECK: The thyroid gland is within normal limits. There is no definite supraclavicular or axillary lymphadenopathy. There is a Mediport in the right anterior chest wall.    VISUALIZED UPPER ABDOMEN: The adrenal glands are within normal limits. The imaged portions of the unenhanced liver, spleen,are within normal limits. There is bilateral hydronephrosis. There is diffuse infiltration of the intra-abdominal fat as well as the overlying subcutaneous soft tissues.     BONES: Destructive changes in the left acromial process. Degenerative changeof bilateral shoulders. Multilevel degenerative change of the thoracolumbar spine. There are multiple lytic lesions in the spine, the largest involves partially imaged L1 vertebral body. There is a lesion in the left second rib. Fracture of the left lateral sixth rib. Multiple old healed fractures and sclerotic lesions are noted in the bilateral ribs.    IMPRESSION:     Osseous metastasis, correlate with history of malignancy.    Pulmonary findings compatible with pulmonary vascular congestion. Cardiomegaly, bilateral pleural effusions.    Bilateral hydronephrosis of the partially imaged kidneys.    EVIN IRCE M.D. ATTENDING RADIOLOGIST  This document has been electronically signed. 2020 10:33AM    < end of copied text >    Critical Care time: 56 mins assessing presenting problems of acute illness that pose high probability of life threatening deterioration or end organ damage/dysfunction. Medical decision making including initiating plan of care, reviewing data, reviewing radiology, direct patient bedside evaluation, interpretation of vital signs, any necessary ventilator management, discussion with multidisciplinary team, discussing goals of care with patient/family, all non inclusive of procedures.

## 2020-01-15 NOTE — PROGRESS NOTE ADULT - ASSESSMENT
73y/o  Female with a h/o multiple myeloma, pelvic mass, CHF, HTN, Bipolar disorder. Patient was recently discharged 12/19 after being treated for c diff completed coarse of Po Vancomycin on 12/30. Patient was sent in from [facility] for altered mental status. Here patient is afebrile, no leukocytosis, CXR ? LL infiltrated so started on IV Vancomycin and Cefepime on 1/5/20.    Cardiac arrest   pneumonia  recent C Diff  Multiple myeloma   Bipolar      - Repeat blood cultures no growth  - Blood cultures no growth   - RVP negative  - CT Chest with no pneumonia   - UA with no UTI  - Had CSF studies done which are negative for infection  - CSF PCR negative  - CSF cultures no growth to date  - Continue meropenem   - Follow up cultures  - Trend Fever  - Trend Leukocytosis      Will Follow

## 2020-01-15 NOTE — PROCEDURE NOTE - NSPROCDETAILS_GEN_ALL_CORE
location identified, feeding tube inserted/right nare
lumen(s) aspirated and flushed/guidewire recovered/sterile technique, catheter placed/ultrasound guidance/sterile dressing applied

## 2020-01-15 NOTE — CONSULT NOTE ADULT - REASON FOR ADMISSION
AMS, LL infiltrate

## 2020-01-15 NOTE — PROGRESS NOTE ADULT - REASON FOR ADMISSION
AMS, LL infiltrate

## 2020-01-15 NOTE — PROCEDURE NOTE - NSPOSTCAREGUIDE_GEN_A_CORE
Verbal/written post procedure instructions were given to patient/caregiver/Instructed patient/caregiver regarding signs and symptoms of infection
Care for catheter as per unit/ICU protocols

## 2020-01-15 NOTE — PROGRESS NOTE BEHAVIORAL HEALTH - NSBHFUPINTERVALHXFT_PSY_A_CORE
chart reviewed and patient seen S/p cardiac arrest On ventilator nurses inform patient w fixed pupils and no gag reflex

## 2020-01-15 NOTE — CONSULT NOTE ADULT - ASSESSMENT
75 y/o F w/ a PMHx of multiple myeloma, pelvic mass, CHF, HTN, and bipolar disorder who was recently admitted to [facility] for C diff (completed abx course on 12/30) presented to the ED from [facility] on 1/5/20 w/ altered mental status. Pt was admitted to medicine w/ encephalopathy, presumed PNA, and hypernatremia. Transferred to MICU this morning s/p asystolic cardiac arrest w/ acute hypoxic respiratory failure and shock likely 2/2 aspiration of tube feeds.    PLAN:  Neuro:  CV:  Resp:  GI: NPO while intubated. Holding tube feeds for now.  Renal:  ID:  Endo: No active issues.  Heme/Onc: Lovenox 40mg SQ for chemical DVT prophylaxis. SCDs for mechanical DVT prophylaxis.    Dispo:     Case discussed w/ 75 y/o F w/ a PMHx of multiple myeloma, pelvic mass, CHF, hypothyroid, HTN, and bipolar disorder who was recently admitted to [facility] for C diff (completed abx course on 12/30) presented to the ED from [facility] on 1/5/20 w/ altered mental status. Pt was admitted to medicine w/ encephalopathy, presumed PNA, and hypernatremia. Transferred to MICU this morning s/p asystolic cardiac arrest w/ acute hypoxic respiratory failure and shock likely 2/2 aspiration of tube feeds.    PLAN:  Neuro: Will monitor off sedation to assess for mental status. Consider obtaining CT head today to evaluate for anoxia if pt remains unresponsive.  CV: S/p asystolic cardiac arrest. Likely hypoxic arrest 2/2 aspiration of tube feeds. Copious amounts of tube feeds suctioned by RN and RT prior to intubation. In shock, actively titrating Levophed to maintain MAP > 65. Modified target temperature management (goal temp 36 degrees F).  Resp: Intubated w/ low tidal volume ventilation strategy 6cc/kg. Actively titrating FiO2 to maintain SpO2 > 92%. Post intubation ABG obtained (7.27/34/192/16), FiO2 decreased to 40%. Peridex for VAP prophylaxis, protonix for stress ulcer prophylaxis, HOB > 30 degrees.  GI: NPO while intubated. Holding tube feeds for now.  Renal:   ID:  Endo: Continue w/ Synthroid.  Heme/Onc: Lovenox 40mg SQ for chemical DVT prophylaxis. SCDs for mechanical DVT prophylaxis.    Dispo:     Case discussed w/ 75 y/o F w/ a PMHx of multiple myeloma, pelvic mass, CHF, hypothyroid, HTN, and bipolar disorder who was recently admitted to [facility] for C diff (completed abx course on 12/30) presented to the ED from [facility] on 1/5/20 w/ altered mental status. Pt was admitted to medicine w/ encephalopathy, presumed PNA, and hypernatremia. Transferred to MICU this morning s/p asystolic cardiac arrest w/ acute hypoxic respiratory failure and shock likely 2/2 aspiration of tube feeds, lactic acidosis, and LUNA.    PLAN:  Neuro: Will monitor off sedation to assess for mental status. Consider obtaining CT head today to evaluate for anoxia if pt remains unresponsive. CT head on admission w/o acute event.  CV: S/p asystolic cardiac arrest. Likely hypoxic arrest 2/2 aspiration of tube feeds. Copious amounts of tube feeds suctioned by RN and RT prior to intubation. In shock, actively titrating Levophed to maintain MAP > 65. Modified target temperature management (goal temp 36 degrees F).  Resp: Intubated w/ low tidal volume ventilation strategy 6cc/kg. Actively titrating FiO2 to maintain SpO2 > 92%. Post intubation ABG obtained (7.27/34/192/16), FiO2 decreased to 40%. Peridex for VAP prophylaxis, protonix for stress ulcer prophylaxis, HOB > 30 degrees. F/u repeat CXR.  GI: NPO while intubated. Holding tube feeds for now.  Renal: Worsening LUNA likely 2/2 to hypoperfusion during cardiac arrest (BUN/Cr 52/2.06). Avoid nephrotoxic agents. Lactate elevated s/p arrest, will continue to trend. Bicarb infusion initiated for metabolic acidosis @ 100cc/hr. Insert Huang. Strict I&Os. Monitor lytes, replace as needed. Maintain K > 4, Mg >2. Continue w/ normosol @ 30cc/hr.   ID: Afebrile, WBC count elevated s/p arrest (18K). Will continue to trend. Sepsis work-up during this admission has thus far been negative (blood cultures, RVP, UA/urine culture, LP). Questionable PNA visualized on CXR on admission, however, CT chest w/o evidence of PNA. Continue w/ meropenem per ID.  Endo: Continue w/ Synthroid.  Heme/Onc: Lovenox 40mg SQ for chemical DVT prophylaxis. SCDs for mechanical DVT prophylaxis.    Dispo: [place]. Currently is full code. Per palliative care note, palliative SW has initiated a MOLST checklist #4 to obtain DNR/I, comfort measures/hospice. Pending ethics committee meeting.    Case discussed w/ ICU attending Dr. Arteaga. 75 y/o F w/ a PMHx of multiple myeloma, pelvic mass, CHF, hypothyroid, HTN, and bipolar disorder who was recently admitted to [facility] for C diff (completed abx course on 12/30) presented to the ED from [facility] on 1/5/20 w/ altered mental status. Pt was admitted to medicine w/ encephalopathy, presumed PNA, and hypernatremia. Transferred to MICU this morning s/p asystolic cardiac arrest w/ acute hypoxic respiratory failure and shock likely 2/2 aspiration of tube feeds, lactic acidosis, and LUNA.    PLAN:  Neuro: Will monitor off sedation to assess for mental status. Consider obtaining CT head today to evaluate for anoxia if pt remains unresponsive. CT head on admission w/o acute event.  CV: S/p asystolic cardiac arrest. Likely hypoxic arrest 2/2 aspiration of tube feeds. Copious amounts of tube feeds suctioned by RN and RT prior to intubation. In shock, actively titrating Levophed to maintain MAP > 65. Modified target temperature management (goal temp 36 degrees F).  Resp: Intubated w/ low tidal volume ventilation strategy 6cc/kg. Actively titrating FiO2 to maintain SpO2 > 92%. Post intubation ABG obtained (7.27/34/192/16), FiO2 decreased to 40%. Peridex for VAP prophylaxis, protonix for stress ulcer prophylaxis, HOB > 30 degrees. F/u repeat CXR.  GI: NPO while intubated. Holding tube feeds for now.  Renal: Worsening LUNA likely 2/2 to hypoperfusion during cardiac arrest (BUN/Cr 52/2.06). Avoid nephrotoxic agents, renally dose meds. Lactate elevated s/p arrest, will continue to trend. Normosol @ 30cc/hr. Bicarb infusion initiated for metabolic acidosis @ 100cc/hr. Insert Huang. Strict I&Os. Monitor lytes, replace as needed. Maintain K > 4, Mg >2.   ID: Afebrile, WBC count elevated s/p arrest (18K). Will continue to trend. Sepsis work-up during this admission has thus far been negative (blood cultures, RVP, UA/urine culture, LP). Questionable PNA visualized on CXR on admission, however, CT chest w/o evidence of PNA. Continue w/ meropenem per ID. Likely now w/ aspiration PNA s/p tube feed aspiration.  Endo: Continue w/ Synthroid.  Heme/Onc: Lovenox 40mg SQ for chemical DVT prophylaxis. SCDs for mechanical DVT prophylaxis.    Dispo: [place]. Currently is full code. Per palliative care note, palliative SW has initiated a MOLST checklist #4 to obtain DNR/I, comfort measures/hospice. Pending ethics committee meeting.    Case discussed w/ ICU attending Dr. Arteaga.

## 2020-01-15 NOTE — PROCEDURE NOTE - NSPOSTPRCRAD_GEN_A_CORE
chest radiograph
central line located in the superior vena cava/post-procedure radiography performed

## 2020-01-15 NOTE — CHART NOTE - NSCHARTNOTEFT_GEN_A_CORE
Delayed Entry:  Responded to Code Blue. Patient last seen at baseline by Nurse "minutes prior." Patient found by Nurse unresponsive, with no pulse and no respirations with evidence of tube feeds around mouth/face. Code Blue called. CPR in progress when I arrived, and MICU team arrived at the same time. Patient with asystole during most of ACLS protocol. Intubated by anesthesia during code, Patient achieved ROSC around 23 minutes into ACLS. SBP noted to be in 90s, and HR in 130s. Airway secured, placed on ventilator and trans to MICU without incident. Likely had hypoxic bradycardic arrest into asystole from aspiration of tube feeds. Will endorse to Hospitalist this AM.

## 2020-01-15 NOTE — PROGRESS NOTE BEHAVIORAL HEALTH - SUMMARY
status post cardiac arrest with reported period of asystole On life support Active bleeding from NGT noted
patient has continued to deteriorate Now obtunded , no clear infectious source as yet Has NGT - feeding tube
73y/o female sent from [place] with h/o multiple myeloma, pelvic mass, CHF, HTN, Bipolar disorder was seen Psychiatry. Patient was seen at bedside and was slightly arousal to tactile stimuli. The patient only mumbled for one second during the interview. Unable to asses much of the interview to due to patient sleeping. Patient will be reassessed by Psychiatry when more appropriate.

## 2020-01-15 NOTE — PROGRESS NOTE BEHAVIORAL HEALTH - NSBHCHARTREVIEWLAB_PSY_A_CORE FT
7.9    18.25 )-----------( 94       ( 15 Robe 2020 05:48 )             28.2     01-15    145  |  107  |  52.0<H>  ----------------------------<  199<H>  5.1   |  15.0<L>  |  2.06<H>    Ca    7.6<L>      15 Robe 2020 05:48  Phos  8.1     01-15  Mg     2.6     01-15    TPro  8.1  /  Alb  2.1<L>  /  TBili  0.3<L>  /  DBili  x   /  AST  423<H>  /  ALT  174<H>  /  AlkPhos  282<H>  01-15    LIVER FUNCTIONS - ( 15 Robe 2020 05:48 )  Alb: 2.1 g/dL / Pro: 8.1 g/dL / ALK PHOS: 282 U/L / ALT: 174 U/L / AST: 423 U/L / GGT: x           PT/INR - ( 15 Robe 2020 05:48 )   PT: 16.0 sec;   INR: 1.38 ratio

## 2020-01-15 NOTE — CHART NOTE - NSCHARTNOTEFT_GEN_A_CORE
Non- objection Letter received from [facility] of Mental [facility] and it is placed on chart. Comfort measures being implemented, DNR, DNI, and withdrawal of ventilator support. Non- objection Letter received from Office of Mental Health and it is placed on chart. Comfort measures being implemented, DNR, DNI, and withdrawal of ventilator support. I called sister Kim Chambers and updated her regarding condition and situation. She was appreciative of the phone call and expressed frustrating at the  because they didn't allow her to instate a DNR/I last admission ( sister was a nurse and understood medical condition and gravity).

## 2020-01-15 NOTE — PROGRESS NOTE ADULT - SUBJECTIVE AND OBJECTIVE BOX
[facility]  INFECTIOUS DISEASES AND INTERNAL MEDICINE at [place]  =======================================================  Blu Vasquez MD  Diplomates American Board of Internal Medicine and Infectious Diseases  Tel: [phone]      Fax: [phone]  =======================================================    KRYSTA HERRMANN [id number]    Follow up: SIRS    had cardiac arrest lest night    Allergies:  ixazomib (Unknown)  NSAIDs (Unknown)  penicillins (Unknown)  sulfa drugs (Unknown)      Antibiotics:     meropenem  IVPB 500 milliGRAM(s) IV Intermittent every 12 hours       REVIEW OF SYSTEMS:  Unable to obtain. Patient unable to provide history.       Physical Exam:  Vital Signs Last 24 Hrs  T(C): 36.6 (14 Jan 2020 07:57), Max: 37 (13 Jan 2020 16:07)  T(F): 97.8 (14 Jan 2020 07:57), Max: 98.6 (13 Jan 2020 16:07)  HR: 84 (14 Jan 2020 07:57) (70 - 84)  BP: 110/66 (14 Jan 2020 07:57) (91/52 - 110/66)  RR: 18 (14 Jan 2020 07:57) (18 - 18)  SpO2: 97% (14 Jan 2020 07:57) (96% - 99%)      GEN: NAD, not responsive   HEENT: normocephalic and atraumatic.  Anicteric  NECK: Supple.   LUNGS: Course BS B/L  HEART: Regular rate and rhythm   ABDOMEN: Soft, nontender, and nondistended.  No bowel sounds.    : No CVA tenderness  EXTREMITIES: Without any edema.  MSK: No joint swelling  NEUROLOGIC: No meaningful communication   PSYCHIATRIC: No meaningful communication   SKIN: No Rash      Labs:  01-15    145  |  107  |  52.0<H>  ----------------------------<  199<H>  5.1   |  15.0<L>  |  2.06<H>    Ca    7.6<L>      15 Robe 2020 05:48  Phos  8.1     01-15  Mg     2.6     01-15    TPro  8.1  /  Alb  2.1<L>  /  TBili  0.3<L>  /  DBili  x   /  AST  423<H>  /  ALT  174<H>  /  AlkPhos  282<H>  01-15                          7.9    18.25 )-----------( 94       ( 15 Robe 2020 05:48 )             28.2       PT/INR - ( 15 Robe 2020 05:48 )   PT: 16.0 sec;   INR: 1.38 ratio        LIVER FUNCTIONS - ( 15 Robe 2020 05:48 )  Alb: 2.1 g/dL / Pro: 8.1 g/dL / ALK PHOS: 282 U/L / ALT: 174 U/L / AST: 423 U/L / GGT: x             ABG - ( 15 Robe 2020 05:49 )  pH, Arterial: 7.27  pH, Blood: x     /  pCO2: 34    /  pO2: 192   / HCO3: 16    / Base Excess: -10.0 /  SaO2: 99          RECENT CULTURES:  01-13 @ 12:23 .CSF     No growth at 1 day.  Culture in progress  No White blood cells  No organisms seen      01-13 @ 06:40    [facility]  NotDete      01-12 @ 16:26 .Blood     No growth at 48 hours      01-12 @ 16:25 .Blood     No growth at 48 hours      01-08 @ 08:12    RV  NotDete      01-06 @ 01:29 .Urine     <10,000 CFU/mL Normal Urogenital Sarah      01-05 @ 19:32 .Blood     No growth at 5 days.      01-05 @ 19:31 .Blood     No growth at 5 days.      CSF PCR Panel (01.14.20 @ 11:00)    CSF PCR Result: [facility]: The meningitis/encephalitis (ME) panel (CSFPCR) is a PCR based assay that  screens for: Escherichia coli; Haemophilus influenzae; Listeria  monocytogenes; Neisseria meningitidis; Streptococcus agalactiae;  Streptococcus pneumoniae; CMV; Enterovirus; HSV-1; HSV-2; Human  herpesvirus 6; Parechovirus; VZV and Cryptococcus. Result should be  interpreted in context of clinical presentation, imaging and other lab  tests. Positive predictive value may be lower in patients with normal CSF  chemistry and cell count.

## 2020-01-16 DIAGNOSIS — I46.9 CARDIAC ARREST, CAUSE UNSPECIFIED: ICD-10-CM

## 2020-01-17 LAB
CULTURE RESULTS: SIGNIFICANT CHANGE UP
SPECIMEN SOURCE: SIGNIFICANT CHANGE UP

## 2020-01-30 NOTE — CDI QUERY NOTE - NSCDIOTHERTXTBX_GEN_ALL_CORE_HH
is requesting clarification on the status of sepsis. Documentation noted sepsis in ED notes and sepsis being worked up.     Supporting Documentation:  1/8 Hospitalist note: regarding sirs, source identified - hmpv viral URI. sepsis resolved despite some low grade fevers still. supportive care. dc abx. blood cx neg.  Ed provider note: Sepsis Suspected:  · Do you suspect infection?	Yes  1/15 Critical care note: D: Afebrile, WBC count elevated s/p arrest (18K). Will continue to trend. Sepsis work-up during this admission has thus far been negative (blood cultures, RVP, UA/urine culture, LP). Questionable PNA visualized on CXR on admission, however, CT chest w/o evidence of PNA. Continue w/ meropenem per ID. Likely now w/ aspiration PNA s/p tube feed aspiration.  Endo: Continue w/ Synthroid.  1/14 Neuro note: 74y Female with multiple medical issues including myeloma and possible sepsis.   Has been obtunded since admission.  V/S on admission 100.2, HR 80, RR 18-20  WBC 10.32  Procalcitonin 0.26  Lactate 1.4      Please clarify the status of Sepsis  A) Sepsis ruled out  B) Sepsis ruled in, POA  C) Other, please specify  D) Not clinically significant

## 2020-09-06 LAB
BASE EXCESS BLDA CALC-SCNC: 2.3 MMOL/L — HIGH (ref -2–2)
HCO3 BLDA-SCNC: 26 MMOL/L — SIGNIFICANT CHANGE UP (ref 20–26)
PCO2 BLDA: 29 MMHG — LOW (ref 35–45)
PH BLDA: 7.53 — HIGH (ref 7.35–7.45)
PO2 BLDA: 85 MMHG — SIGNIFICANT CHANGE UP (ref 83–108)
SAO2 % BLDA: 97 % — SIGNIFICANT CHANGE UP (ref 95–99)

## 2021-01-04 NOTE — PROGRESS NOTE ADULT - PROBLEM SELECTOR PLAN 4
LM to call back for COVID screening
Follow up oncology. Monitor CBC
am labs, hematology follow up noted.
trending labs,  hematology follow up noted.
trending labs,  hematology follow up noted.

## 2021-02-05 NOTE — PROGRESS NOTE BEHAVIORAL HEALTH - NS ED BHA MSE SPEECH ARTICULATION
metFORMIN (GLUCOPHAGE-XR) 500 MG 24 hr tablet       Last Written Prescription Date:  12/1/20  Last Fill Quantity: 180,   # refills: 1  Last Office Visit: 12/1/20  Future Office visit:       Routing refill request to provider for review/approval because:    Medication is reported/historical     Other

## 2021-04-16 NOTE — ED PROVIDER NOTE - GENITOURINARY NEGATIVE STATEMENT, MLM
Addended by: Aneta Anderson on: 4/16/2021 04:17 PM     Modules accepted: Orders
no dysuria, no frequency, and no hematuria.

## 2021-05-27 NOTE — CONSULT NOTE ADULT - CONSULT REQUESTED BY NAME
Internal Medicine  PGY1  Progress note    CC   Chief Complaint   Patient presents with    Cough     since Wednesday, missed appt for OP CXR on Thursday, thinks she has PNA         History Obtained From:  patient, electronic medical record    Interval Hx:  Rob Mart. Pt afeb, other vitals stable. RFP/CBC stable, no leukocytosis. SpO2 95 percent on RA. Clinically she has significantly improved. Home O2 eval screening: Did well , sating at 92 on RA while ambulating  Day 6 rocephin/Azithro  S/p 5 days of  prednisone      HISTORY OF PRESENT ILLNESS:  The patient is a 45 yo female smoker with no known prior history who presented to the ED with 4 days of SOB, fatigue, subjective fever. She reports that symptoms started on Wednesday. She was feeling very tired, increasing productive cough with clear yellow sputum associated with SOB and subjective fever. She had a virtual appointment with her PCP on the next day and was advised to get a CXR for her cough. However, today she felt worse and decided to come to the hospital.     She denies any CP but endorses chest tightness after each bouts of coughing. No sick contact, no recent travel, no N/V, HA, diarrhea. Of note, she did have positive COVID 19 test in 12/2020 when she has minimal symptoms with SOB and dry cough but was in close contact with her sister who had positive COVID 19. During that time she was not required to be hospitalized or on oxygen for COVID 19 infection. She was free of symptoms until now. She does reports that she is a current smoker with 20 pack year history. She has not been smoking the past few days because of cough and shortness of breath. In the ED, she was found to be mildly hypoxic and was placed on 1L of oxygen satting in the upper 90s. Other labs were unremarkable. CTPA in the ED ruled out PE but does shows multifocal bilateral opacities concerning for pneumonia. She was given Azithromycin, Rocephin, 1 dose of steroid, and breathing treatment. DR. LAST Sensory: No sensory deficit. Psychiatric:         Behavior: Behavior normal.        Vitals:    05/27/21 0740   BP: (!) 149/91   Pulse: 78   Resp: 18   Temp: 97 °F (36.1 °C)   SpO2: 95%       DATA:    Labs:  CBC:   Recent Labs     05/25/21  0528 05/26/21  0514 05/27/21  0439   WBC 5.3 6.1 5.8   HGB 14.2 13.6 13.4   HCT 40.8 39.1 39.7    212 241       BMP:   Recent Labs     05/25/21  0528 05/26/21  0514 05/27/21  0439    137 140   K 4.0 3.7 3.9    101 104   CO2 29 27 26   BUN 19 17 19   CREATININE 0.9 0.9 0.7   GLUCOSE 91 85 90   PHOS 3.9 3.7 4.3     LFT's: No results for input(s): AST, ALT, ALB, BILITOT, ALKPHOS in the last 72 hours. Troponin: No results for input(s): TROPONINI in the last 72 hours. BNP:No results for input(s): BNP in the last 72 hours. ABGs: No results for input(s): PHART, MYB8RFW, PO2ART in the last 72 hours. INR: No results for input(s): INR in the last 72 hours. U/A:No results for input(s): NITRITE, COLORU, PHUR, LABCAST, WBCUA, RBCUA, MUCUS, TRICHOMONAS, YEAST, BACTERIA, CLARITYU, SPECGRAV, LEUKOCYTESUR, UROBILINOGEN, BILIRUBINUR, BLOODU, GLUCOSEU, AMORPHOUS in the last 72 hours. Invalid input(s): KETONESU    XR CHEST (2 VW)   Final Result   1. No acute disease. CT CHEST PULMONARY EMBOLISM W CONTRAST   Final Result      1. No evidence of pulmonary thromboembolism. No acute vascular abnormality. 2. Patchy areas of branching micronodular opacities bilaterally involving all lobes suggesting bronchiolitis versus early bilateral multifocal bronchopneumonia. XR CHEST (2 VW)   Final Result   1. No acute disease. ASSESSMENT AND PLAN:  44year old female smoker with no know past medical history presented with worsening SOB, productive cough, fever and admitted for the concern of PNA and exacerbation of underlying obstructive disease.      Acute hypoxic respiratory failure 2/2 Multifocal bronchopneumonia (resolved)  Duo nebs  Continue antibiotics while inpatient  Po antibiotics at discharge  LABA/ICS at discharge    Multifocal pneumonia   Management with Abx as above     Suspected COPD exacerbation   Given her long history of smoking and now presented with productive cough and has expiratory wheezes. Management as above  Counseled on smoking cessation  Needs outpatient PFTs  Home O2 eval screening: Did well , sating at 92 on RA while ambulating    History of COVID 19   COVID 19 positive in 12/2020 but with minimal symptoms including SOB and dry cough. Did not have to be hospitalized or on oxygen. Sx resolved after several days.    COVID 19 test resent which is negative    Tobacco use   History of 20 pack year smoking history   Current smoking   Nicotine patch   Counseled on smoking cessation     Obesity   BMI of 30.6  Counseled patient on weight loss       Will discuss with attending physician Dr. Vimal Milligan Status:Full code  FEN: Regular diet   PPX: Lovenox  DISPO: IP, anticipating discharge in 24 hours    Sahara Baxter MD, PGY 1  Internal Medicine   Please contact via Perfect Serve   5/27/2021,  9:36 AM

## 2021-09-22 NOTE — ED PROVIDER NOTE - ST/T WAVE
Face to Face Supporting Documentation - Home Health    The encounter with this patient was in whole or in part the primary reason for home health admission.    Date of encounter:   Patient:                    MRN:                       YOB: 2023  Jacinto Loyola  [id number]  1973     Home health to see patient for:  Physical Therapy evaluation and treatment    Skilled need for:  Exacerbation of Chronic Disease State weakness and difficulty transferring from left-sided deficits from prior stroke.    Skilled nursing interventions to include:  Home health PT    Homebound status evidenced by:  Need the aid of supportive devices such as crutches, canes, wheelchairs or walkers. Leaving home requires a considerable and taxing effort. There is a normal inability to leave the home.    Community Physician to provide follow up care: OMEGA Sanchez     Optional Interventions? No      I certify the face to face encounter for this home health care referral meets the CMS requirements and the encounter/clinical assessment with the patient was, in whole, or in part, for the medical condition(s) listed above, which is the primary reason for home health care. Based on my clinical findings: the service(s) are medically necessary, support the need for home health care, and the homebound criteria are met.  I certify that this patient has had a face to face encounter by myself.  Juan Moore M.D. - NPI: [id number]    
no ST elevation

## 2021-12-10 NOTE — PATIENT PROFILE ADULT - HAS THE PATIENT BEEN ADMITTED FROM SHORT TERM REHAB?
no Discussion: The nature of the lesion(s) was discussed.  Questions were answered.  I recommend surgery as appropriate treatment due to the site, size and histologic type of the tumor.  Surgery will mitigate risk of poor outcomes due to recurrence.  The procedure, morbidity (bruising, swelling, wound care, bandaging), activity restrictions, time off work and complications (bleeding, infection, scarring) were reviewed.  The potential appearance of the scar was discussed.  Questions were answered.  Their procedure was performed today, see dictation. Risk Assessment Explanation (Does Not Render In The Note): Clinical determination of the probability and/or consequences of an event, such as surgery. Clinical assessment of the level of risk is affected by the nature of the event under consideration for the patient. Modifier 57 is used to indicate an Evaluation and Management (E/M) service resulted in the initial decision to perform surgery either the day before a major surgery (90 day global) or the day of a major surgery. Date Of Surgery - Today Or Tomorrow?: today Initial Decision For Surgery?: Yes Complexity (Necessary For Coding; Major - 90 Day Global With Some Exceptions; Minor - 10 Day Global): minor

## 2022-07-13 NOTE — ED ADULT NURSE REASSESSMENT NOTE - NS ED NURSE REASSESS COMMENT FT1
Cardio PA at bedside for eval at this time. L LE no sharp/dull, hot/cold sensation from previous injury; R LE WNL

## 2022-07-16 NOTE — ED ADULT NURSE REASSESSMENT NOTE - NS ED NURSE REASSESS COMMENT FT1
As per  pt is refusing blood work.
Assumed pt care @ 2330 from SIMEON Sauer. Pt is sleeping at this time in NAD, NSR on cardiac monitor. Pt denies complaint.  IV clean dry and intact, flushes without difficulty.  Safety maintained, Bed locked in lowest position. Huang catheter draining at bedside. Pt awaiting bed placement. Will continue to monitor.
Pt is very sleepy from ativan administration earlier. Pt unable to be awaken at this time for Vancomycin. Will continue to monitor.
Pt sleeping comfortably in NAD resp even and unlabored. NSR on cardiac monitor. [place] aide @ bedside. IV dry and intact flushes without difficulty. Pt awaiting bed placement. Will continue to monitor.
Hospitalist at patient's bedside, [place] aide at patients bedside. Pt on portable monitor. will continue to monitor.
Lab called about lithium level, which was sent and not received, stated they have it and will upload results. will continue to monitor patient. [place] aide at bedside. CM in place.
No

## 2022-11-28 NOTE — ED ADULT NURSE NOTE - CADM POA VASCULAR ACCESS LOCATION
Patient accompanied by mother.  They may be reached at [phone].  Ok to leave detailed message.     right chest

## 2023-02-03 NOTE — ED PROVIDER NOTE - ATTESTATION, MLM
02/07/23        Zoila Alvarenga  [address] [place] [id number]      MRN: [id number]    Please refer to 02- Shanna.  Also printed the following:  -En[facility] order form  -Allergy list    
I have reviewed and confirmed nurses' notes for patient's medications, allergies, medical history, and surgical history.

## 2023-05-10 NOTE — DISCHARGE NOTE ADULT - PROVIDER TOKENS
FREE:[LAST:[PMD],PHONE:[(   )    -],FAX:[(   )    -]],FREE:[LAST:[ONC],PHONE:[(   )    -],FAX:[(   )    -]]
Name band;

## 2023-08-01 NOTE — PATIENT PROFILE ADULT - NSASFUNCLEVELADLAMBULATE_GEN_A_NUR
Patient requests all Lab, Cardiology, and Radiology Results on their Discharge Instructions
3 = assistive equipment and person

## 2023-08-07 NOTE — DISCHARGE NOTE ADULT - REASON FOR ADMISSION
slipped from chair
You can access the FollowMyHealth Patient Portal offered by [facility] by registering at the following website: [email or web address]. By joining [employer]’s FollowMyHealth portal, you will also be able to view your health information using other applications (apps) compatible with our system.

## 2023-08-23 NOTE — ED ADULT TRIAGE NOTE - NS ED TRIAGE AVPU SCALE
Patient is here for a routine OB check.  No new significant problems or changes are noted.  Varicella non-immune - vaccinate postpartum.  GDM - diet controlled - monitor QID BS.  GBBS done.  F/U in 1 week.  
Shira is a 33 year old female here for an obstetrics check.  She is      . Gestational age: 37w2d     She reports fetal movement  She reports contractions-a couple every few days  She denies bleeding  She denies headaches  She denies edema  She denies abdominal pain  She denies rupture of membranes  She denies vision changes    OB vitals: Urine:  Blood:  Negative  Leukocytes:  Trace    Additional concerns:  1. None    See Prenatal Flowsheet for additional comments.  
Alert-The patient is alert, awake and responds to voice. The patient is oriented to time, place, and person. The triage nurse is able to obtain subjective information.

## 2023-08-25 NOTE — PATIENT PROFILE ADULT - NSPROMEDSPUMP_GEN_A_NUR
Attempting to reach patient in order to schedule an office visit with current [place] patient for chest pain per referral from [facility]; phone line busy. none

## 2023-10-27 NOTE — ED ADULT NURSE NOTE - OBJECTIVE STATEMENT
GAVE MESSAGE TO PATIENT AND TOLD THEM TO CHECK WITH PHARMACY AGAIN.    pt biba from [facility]. pt is awake, disoriented, and now acting appropriately. pt presents to er s/p fall out of wheelchair today, did not hit head. code sepsis, febrile, tachypneic, and hypoxic. pt is more lethargic as per aide and ems. pt is no apparent distress. abd soft and non tender. no nausea vomiting headache abdominal pain and urinary problems. lung sounds cta bl. ems reports pt has had cough pt biba from [facility]. pt is awake, disoriented, and now acting appropriately. pt presents to er s/p fall out of wheelchair today, did not hit head. code sepsis, febrile, tachypneic, and hypoxic. pt is more lethargic as per aide and ems. pt is no apparent distress. abd soft and non tender. no nausea vomiting headache abdominal pain and urinary problems. lung sounds cta bl. ems reports pt has had cough. pt has [place] aide at bedside.

## 2023-10-30 NOTE — BEHAVIORAL HEALTH ASSESSMENT NOTE - NS ED BHA MED ROS CARDIOVASCULAR
Spoke with Gurinder SRIVSATAVA  via phone for follow up anticoagulation visit.  Last INR on 10/16 was 2.1.  Dose maintained.   //Today's INR is 2.2 and is within goal range.    Current warfarin total weekly dose of 35 mg verified.  Informed the INR result is within therapeutic range and instructed to maintain current dose per protocol. Discussed dose and return date of 11/16/23 for next INR. See Anticoagulation flowsheet.    ATIF [place] with [facility] calling. INR is 2.2 today, within range and up from last INR of 2.1 in one week on continued TWD of 35mg/wk. denies changes since last INR. Gurinder advised to have pt continue TWD of 35mg/wk and recheck INR in two weeks on 11/16/23 with OMR wt check. POCT INR ordered, [facility] discharging today and transitioning INR management back to [facility].        Dr. Toledo is in the office today supervising the treatment.  Dr. Mirza referring    Instructed to contact the clinic with any unusual bleeding or bruising, any changes in medications, diet, health status, lifestyle, or any other changes, questions or concerns. Verbalized understanding of all discussed.     
Unable to assess

## 2023-11-11 NOTE — DISCHARGE NOTE ADULT - NS AS DC FU [facility] LV FUNCTION ASSESSMENT
Allergies reviewed.  H&P note has been confirmed for the patient. Procedural consent has been signed.  Staff has reviewed the patient's labs. yes

## 2023-12-13 NOTE — ED PROVIDER NOTE - PSH
Is This A New Presentation, Or A Follow-Up?: Breast Reconstruction Consultation
Who Referred You?: Dr. Marilyn Delgadillo
No significant past surgical history

## 2024-01-01 NOTE — PROGRESS NOTE BEHAVIORAL HEALTH - BODY HABITUS
INITIAL OFFICE VISIT      Afshin Solitario  2026             Visit Vitals  Ht 21\" (53.3 cm)   Wt 3.955 kg (8 lb 11.5 oz)   HC 36 cm (14.17\")   BMI 13.90 kg/m²     Weight: 8.72 lbs        YOUR BABY AT BIRTH TO 2 WEEKS OF AGE    Key points at this age…     Breast milk is the best nutrition.    Car seats save lives--make sure to install and use them correctly!    “Back to sleep” is recommended--it’s the safest sleeping position for your young infant!    FEEDING: Breast milk is the best food for Afshin at this age; it is beneficial in many ways to both babies and moms! It can be challenging early on, so ask for help from your doctors and the lactation specialists at the hospital. Breastfeeding moms should make sure their doctors know about any medications they are taking.     If you are bottle feeding, make sure to prepare formula exactly as directed (standard formula preparation is one scoop of formula in 2 ounces of water). You should hold your baby upright (not lying down) while feeding and never prop the bottle so that it stays in your baby’s mouth without you holding it (this can cause dangerous choking episodes as well as ear infections).     ABOUT VITAMIN D: All babies ( or formula fed) need extra vitamin D. Vitamin D is very important for bone health (especially preventing rickets) as well as other important health aspects. Breast milk is the “perfect” food except that it does not contain enough vitamin D for your baby. Formula has some vitamin D in it, but your baby needs extra until they are taking ~32-33 ounces of formula daily. We recommend 400 IU of vitamin D once daily. Vitamin D drops are available in two forms-- a concentrated drop which provides the full dose in one DROP or a liquid product which is dosed at one ml (or one cc-- the syringes provided with the drops are labeled with the correct dose). Make sure to follow the instructions on the bottle exactly for  proper dosing! A  baby should continue this for as long as they are primarily breastfeeding; by the time a formula-fed baby goes through one bottle of vitamin D drops they are likely getting enough vitamin D in their formula.     BEHAVIOR & CRYING: Touch and hold and carry your baby as often as you can. Cuddling, caressing, talking and singing to your baby makes them feel secure and loved. You will not spoil them when they are this young! Be sure to focus on them when you are feeding them; this is an important time for bonding and social development (don't be distracted by your cell phone or computer-- focus on your baby!). It’s also important to remember that other children in the house may be jealous of the new baby, and sometimes they may act out to get your attention. Try to set aside short periods of time every day devoted exclusively to your older child (or children). Also, try to find simple ways that they can help care for the baby-- this will help them connect better.      CAR SAFETY:  Your baby should be secured in a rear-facing infant car safety seat whenever riding in the car for at least the first year of life.This will protect your baby in case of an accident, plus it is the law. When choosing a car seat, you can check on any safety concerns for a particular model at www.safercar.gov. You can also search for local inspection stations on that site. (Or at www.safekidswi.org.) Correct installation of the car seat is critical (at least 7 out of 10 car seats are estimated to be incorrectly installed or used!). A certified car seat  can ensure your baby is as safely secured as possible. You should also register your car seat with the , so that you will be notified of any future problems or recalls with that seat. The straps need to be very snug to protect your baby in case of an accident (so no thick coats or snowsuits while riding in the car during the winter!). Never leave a  child alone in a car, even for a very short time.      SAFE SLEEPING: The safest place for a  to sleep is in their own crib in their parent’s room; this is recommended until at least 6 months of age. They should always be placed on their back to sleep (not on their tummy or their side). This “back to sleep” position decreases the risk of crib death (SIDS). Avoid loose, soft bedding (blankets, comforters, sheepskins, quilts, pillows, pillow-like bumper pads) and soft toys in the baby’s crib because they are a risk for suffocation (and SIDS). “Sleep sacks” and swaddling with thin blankets are okay. Cribs (including Pack-n-Plays) should be certified by [facility] (a safety agency for baby products). Safety criteria for cribs include: slats or bars no more than 2-3/8 inches (6cm) apart, a snug-fitting mattress, and a distance of no less than 26 inches from the mattress to the top rail. Sleeping in or with other devices (car seats, swings, bed sharing devices, cushions) are not recommended; likewise, sleeping on sofas or in armchairs is very dangerous for small infants.     TUMMY TIME: It is safe to start “tummy time” as soon as you bring your baby home. (Laying on your chest or across your lap counts!) It should always be done when your baby is awake. Start with a few minutes at a time, and increase it as your baby grows older. (Never leave your baby alone on their tummy.) Tummy time strengthens their neck and shoulder muscles and helps with their development. (It also prevents lopsided or flat heads that come from lying in the same position on their back all the time.)    UMBILICAL CORD CARE: The umbilical cord stump and the area around it should be left completely dry until the cord  stump falls off. (Washing it or using alcohol wipes will just delay how long it takes to fall off.) Keep it open to air as much as possible. Give your baby sponge baths early on; don’t put them in a tub with their belly button underwater  until the cord has fallen off and the base of it appears dry. A tiny bit of oozing blood when the cord falls off is normal.     OTHER SAFETY GUIDELINES:  Burns:  Adjust your hot-water heater to 120-125°F or use the “low” setting. This will decrease the risk of scald burns (and save money on your utility bills!).     Smoke and carbon monoxide detectors: Make sure that detectors are on each level of your home, especially near sleeping areas. Check the batteries regularly and replace them at least twice a year. Discuss a “fire escape plan” with all family members.     No smoking! Exposure to tobacco smoke puts children at risk for lots of problems (asthma and other severe breathing problems, crib death [SIDS], ear infections and even behavior and learning problems). If you smoke, this is the time to talk to your doctor about quitting. If you can’t quit, keep all smoking (including e-cigarettes and vaping) outside the home (not just in another room), and all smokers should change their clothes and wash their hands before handling the baby.     Tap water is safe! Tap water is safe for formula preparation. News reports in 2016 raised concerns about a risk of lead in the [place] city water supply. This water supply is very safe--there is no lead in the water that leaves the local water treatment center. In some older homes (built in 1951), there may be lead in the service pipe lines (which carry water from the main water pipe to individual homes). When water sits in these pipes for prolonged periods, some lead may dissolve into the water. As a precaution, the [facility] recommends a water filter at the tap of homes which have lead service lines. If you are not sure when your home was built, do an internet search for \"[place] lead awareness and drinking water safety\". From this web page, you can search for your address to find out if your home was built with lead service lines. There are also helpful hints  regarding water safety on this site. Another option is to call the Customer Service line at (997) 410-3312. If it would make you feel better, you could also get a filter for your faucet or tap. This would help save you money (and the environment--which is going to be important to your baby's future!).     OTHER  ISSUES:   Body temperature: A rectal temperature is most accurate way to check for fever in this age group. Normal rectal temperatures range between 97.9 and 100.3°F. A fever is defined as a rectal temperature of 100.4°F or higher. Call the doctor or have Afshin seen if they have a fever. In a baby this young, a fever needs to be evaluated. Do not give Tylenol or ibuprofen at this age.     Bowel movements:  Once your baby is feeding well, how often they poop may vary from once every feeding to once every three to four days ( babies often poop less than once a day).  It is normal for babies to strain and turn a little red in the face with bowel movements, as long as the stool they pass is soft.  If you feel Afshin is very uncomfortable, call the clinic. Blood in the stool may be normal early on in nursing infants, but you should call us to discuss it if you do see any blood.    Most Recent Immunizations   Administered Date(s) Administered   • Hep B, adolescent or pediatric 2024   • RSV - nirsevimab-alip Beyfortus 0.5 mL 2024          Follow up visits: After the  period, we usually recommend your baby be seen at 2 weeks of age, and then at two months of age. Of course, we will see you anytime for any concerns and to follow up on any problems.     Thank you for entrusting your care to [facility]!    Also, check out “Children’s Health” on the [facility] Blog for updates on timely topics regarding children’s health!       Overweight

## 2024-01-06 NOTE — ED ADULT NURSE NOTE - OBJECTIVE STATEMENT
Xray Chest 1 View AP/PA
pt received in er bed a 11 L with 1:1 aide at bedside. pt is from [place]. as per paperwork, pt had lithium level of 2.15. pt was recently seen here in [facility] and admitted for sepsis and bacteremia. in er, she is awake and alert, as per caregiver she is at her normal baseline. she denies any medical complaints. afebrile. denies nausea vomiting fever chills chest pain sob headache abdominal pain and urinary problems

## 2024-05-21 NOTE — ED PROVIDER NOTE - CROS ED GI ALL NEG
72 year old Female, comes to the ER from home via EMS for chest pressure, leg cramping and dehydration. Patient reports not eating often, feeling dizzy. Symptoms worse this morning. Also endorsing dysuria. Has been taking care of her  who has been ill at home. Patient alert and oriented, breathing spontaneous and unlabored, English speaking, saturating within normal limits, sinus rhythm on cardiac monitor, sitting up talking on phone at bedside. Patient denies shortness of breath, abdominal pain, vomiting, diarrhea, vision changes. Bed locked and in lowest position for safety. negative...

## 2024-10-25 NOTE — PATIENT PROFILE ADULT. - PURPOSEFUL PROACTIVE ROUNDING
Successful placement of 10.2 F MPD in the paraspinal fluid collection. 15ml fluid aspirate (tea cloudy fluid) sent to lab for analysis, culture and beta 2 transferrin. Sutured to skin with silk suture and connected to gravity drainage bag. Patient

## 2025-02-11 NOTE — BEHAVIORAL HEALTH ASSESSMENT NOTE - ORIENTED TO SITUATION
Stool studies negative for infection. Fecal elastase wnl.   Fecal calprotectin elevated at 380.   Did have diverticulitis in 12/2024.   Was originally on rocephin/flagyl, this has been d/c.    Continue Banatrol supplement. Continue probiotics.   Could cautiously trial anti-diarrheal if needed.   D/c rectal tube as soon as possible.    No